# Patient Record
Sex: MALE | Race: BLACK OR AFRICAN AMERICAN | Employment: OTHER | URBAN - METROPOLITAN AREA
[De-identification: names, ages, dates, MRNs, and addresses within clinical notes are randomized per-mention and may not be internally consistent; named-entity substitution may affect disease eponyms.]

---

## 2018-02-22 ENCOUNTER — HOSPITAL ENCOUNTER (OUTPATIENT)
Dept: PREADMISSION TESTING | Age: 68
Discharge: HOME | End: 2018-02-22
Payer: MEDICARE

## 2018-02-22 VITALS
BODY MASS INDEX: 26.52 KG/M2 | WEIGHT: 165 LBS | HEART RATE: 65 BPM | DIASTOLIC BLOOD PRESSURE: 84 MMHG | TEMPERATURE: 98 F | HEIGHT: 66 IN | SYSTOLIC BLOOD PRESSURE: 151 MMHG

## 2018-02-22 LAB
ABO + RH BLD: NORMAL
ANION GAP SERPL CALC-SCNC: 6 MMOL/L (ref 5–15)
APPEARANCE UR: CLEAR
ATRIAL RATE: 63 BPM
BACTERIA URNS QL MICRO: NEGATIVE /HPF
BILIRUB UR QL: NEGATIVE
BLOOD GROUP ANTIBODIES SERPL: NORMAL
BUN SERPL-MCNC: 12 MG/DL (ref 6–20)
BUN/CREAT SERPL: 18 (ref 12–20)
CALCIUM SERPL-MCNC: 9.4 MG/DL (ref 8.5–10.1)
CALCULATED P AXIS, ECG09: 35 DEGREES
CALCULATED R AXIS, ECG10: 31 DEGREES
CALCULATED T AXIS, ECG11: 43 DEGREES
CHLORIDE SERPL-SCNC: 109 MMOL/L (ref 97–108)
CO2 SERPL-SCNC: 26 MMOL/L (ref 21–32)
COLOR UR: YELLOW
CREAT SERPL-MCNC: 0.65 MG/DL (ref 0.7–1.3)
DIAGNOSIS, 93000: NORMAL
EPITH CASTS URNS QL MICRO: NORMAL /LPF
ERYTHROCYTE [DISTWIDTH] IN BLOOD BY AUTOMATED COUNT: 12.3 % (ref 11.5–14.5)
EST. AVERAGE GLUCOSE BLD GHB EST-MCNC: NORMAL MG/DL
GLUCOSE SERPL-MCNC: 94 MG/DL (ref 65–100)
GLUCOSE UR STRIP.AUTO-MCNC: NEGATIVE MG/DL
HBA1C MFR BLD: 4.5 % (ref 4.2–6.3)
HCT VFR BLD AUTO: 35.4 % (ref 36.6–50.3)
HGB BLD-MCNC: 12 G/DL (ref 12.1–17)
HGB UR QL STRIP: NEGATIVE
INR PPP: 1 (ref 0.9–1.1)
KETONES UR QL STRIP.AUTO: NEGATIVE MG/DL
LEUKOCYTE ESTERASE UR QL STRIP.AUTO: NEGATIVE
MCH RBC QN AUTO: 32.3 PG (ref 26–34)
MCHC RBC AUTO-ENTMCNC: 33.9 G/DL (ref 30–36.5)
MCV RBC AUTO: 95.4 FL (ref 80–99)
NITRITE UR QL STRIP.AUTO: NEGATIVE
NRBC # BLD: 0 K/UL (ref 0–0.01)
NRBC BLD-RTO: 0 PER 100 WBC
P-R INTERVAL, ECG05: 168 MS
PH UR STRIP: 5.5 [PH]
PLATELET # BLD AUTO: 184 K/UL (ref 150–400)
PMV BLD AUTO: 11.7 FL (ref 8.9–12.9)
POTASSIUM SERPL-SCNC: 4.3 MMOL/L (ref 3.5–5.1)
PROT UR STRIP-MCNC: NEGATIVE MG/DL
PROTHROMBIN TIME: 10.6 SEC (ref 9–11.1)
Q-T INTERVAL, ECG07: 424 MS
QRS DURATION, ECG06: 76 MS
QTC CALCULATION (BEZET), ECG08: 433 MS
RBC # BLD AUTO: 3.71 M/UL (ref 4.1–5.7)
RBC #/AREA URNS HPF: NORMAL /HPF (ref 0–5)
SODIUM SERPL-SCNC: 141 MMOL/L (ref 136–145)
SP GR UR REFRACTOMETRY: 1.02
SPECIMEN EXP DATE BLD: NORMAL
UA: UC IF INDICATED,UAUC: NORMAL
UROBILINOGEN UR QL STRIP.AUTO: 0.2 EU/DL
VENTRICULAR RATE, ECG03: 63 BPM
WBC # BLD AUTO: 5.1 K/UL (ref 4.1–11.1)
WBC URNS QL MICRO: NORMAL /HPF (ref 0–4)

## 2018-02-22 PROCEDURE — 93005 ELECTROCARDIOGRAM TRACING: CPT

## 2018-02-22 PROCEDURE — 36415 COLL VENOUS BLD VENIPUNCTURE: CPT | Performed by: ORTHOPAEDIC SURGERY

## 2018-02-22 PROCEDURE — 86900 BLOOD TYPING SEROLOGIC ABO: CPT | Performed by: ORTHOPAEDIC SURGERY

## 2018-02-22 PROCEDURE — 81001 URINALYSIS AUTO W/SCOPE: CPT | Performed by: ORTHOPAEDIC SURGERY

## 2018-02-22 PROCEDURE — 85027 COMPLETE CBC AUTOMATED: CPT | Performed by: ORTHOPAEDIC SURGERY

## 2018-02-22 PROCEDURE — 85610 PROTHROMBIN TIME: CPT | Performed by: ORTHOPAEDIC SURGERY

## 2018-02-22 PROCEDURE — 83036 HEMOGLOBIN GLYCOSYLATED A1C: CPT | Performed by: ORTHOPAEDIC SURGERY

## 2018-02-22 PROCEDURE — 80048 BASIC METABOLIC PNL TOTAL CA: CPT | Performed by: ORTHOPAEDIC SURGERY

## 2018-02-22 RX ORDER — LANOLIN ALCOHOL/MO/W.PET/CERES
100 CREAM (GRAM) TOPICAL DAILY
COMMUNITY

## 2018-02-22 RX ORDER — IBUPROFEN 200 MG
400 TABLET ORAL
COMMUNITY
End: 2018-03-21

## 2018-02-22 RX ORDER — TAMSULOSIN HYDROCHLORIDE 0.4 MG/1
0.4 CAPSULE ORAL DAILY
COMMUNITY

## 2018-02-22 NOTE — ADVANCED PRACTICE NURSE
Preoperative instructions reviewed with patient. Patient given 2-6 packs of CHG wipes. Instructions to be reviewed in class on use of CHG wipes. Patient given SSI infection FAQS sheet, as well as a  MRSA/MSSA treatment instruction sheet  With an explanation to patient that they will be notified if treatment instructions need to be initiated. Patient was given the opportunity to ask questions on the information provided. Orthopedic pre-op assessment and planning form sent for scanning.

## 2018-02-23 LAB
BACTERIA SPEC CULT: NORMAL
BACTERIA SPEC CULT: NORMAL
SERVICE CMNT-IMP: NORMAL

## 2018-03-15 NOTE — H&P
Hudson Castellanos (MR# [id number])  : 1950        Office Visit     2018  171 Vero Ricci MD   Orthopedic Surgery   Primary osteoarthritis of both hips +1 more   Dx   Right Hip - Pain   Reason for Visit    Progress notes        PCP: No Pcp  There are no active problems to display for this patient.        Subjective:   Chief Complaint: Pain of the Right Hip        HPI: Hudson Castellanos is a 69 y.o. male who comes in today with a 1 year history of progressive increasing right hip and groin pain. He denies any injury. He states the pain is getting progressively worse to the point that now affects his everyday activities. He is no longer able to walk for exercise. He is having night pain. He is having difficulty sleeping. He is limping. He has tried anti-inflammatory agents without relief. He denies any radicular symptoms. He denies any symptoms on the left hip.        Objective:          Vitals:     18 1453   BP: (!) 167/98      Height: 5' 9\"   Weight: 165 lb   Body mass index is 24.37 kg/(m^2).     Constitutional:  No acute distress. Well nourished. Well developed. Eyes:  Sclera are nonicteric. Respiratory:  No labored breathing. Cardiovascular:  No marked edema. Skin:  No marked skin ulcers. Neurological:  No marked sensory loss noted. Psychiatric: Alert and oriented x3. Musculoskeletal :  He has essentially no motion of the right hip. He has good range of motion of the left hip. He has severe pain with motion of the right hip. He has negative straight leg raise. He has good range of motion of bilateral knees without crepitus or effusion. He has a strong pedal pulse. He has no pedal edema. He has shortening of the right leg compared to the left. He has no obvious atrophy.   His skin is healthy and intact.        Radiographs:       X-ray Hip Right 2+ Views     Result Date: 2018  AP Pelvis, Frog Lateral. Notes Impression:  AP pelvis and right lateral hip x-ray show severe end-stage osteoarthritis of the right hip with complete loss of joint space and subchondral sclerosis with cyst formation.         Assessment:      1. Primary osteoarthritis of both hips          Plan:   I reviewed his x-ray findings with him and discussed treatment options. He has severe end-stage osteoarthritis of the right hip. I discussed hip replacement surgery at length with him including expected outcomes and postop recovery as well as risks and complications. After much discussion he is anxious to proceed. We will set this up for him at his convenience.         Orders Placed This Encounter    X-ray hip right 2+ views    BP Elevated Patient Education & Instructions      Return for scheduled surgery.      Claire Leach MD   2/7/2018  7:46 PM      Electronically signed by Claire Leach MD at 2/7/2018  7:48 PM    Instructions         Return for scheduled surgery. Additional Documentation     Vitals:    BP  167/98    Ht 5' 9\"    Wt 165 lb    BMI 24.37 kg/m²    BSA 1.91 m²    Flowsheets:    Custom Formula Data       SmartForms:    OV REVIEW OF SYSTEMS       Encounter Info:    Billing Info,    History,    Allergies,    Detailed Report       Media      Consents-scan - Scan on 2/8/2018 2:45 PM : Surgical consent for right total hip replacement      Routing History     There are no sent or routed communications associated with this encounter. Recipient     There are no sent or routed communications associated with this encounter.    Media      Consents-scan - Scan on 2/8/2018 2:45 PM : Surgical consent for right total hip replacement      Orders Placed         X-ray hip right 2+ views      BP Elevated Patient Education & Instructions      Surgical Posting Sheet   Medication Changes        None      Medication List   Visit Diagnoses         Primary osteoarthritis of both hips      Primary osteoarthritis of right hip      Problem List     Date of Surgery Update:  Devi Gino was seen and examined. Past Medical History:   Diagnosis Date    Arthritis     Mild anemia     Prostate cancer ([facility] [address] 2008 2017    PALLIATIVE CARE PER DR LAMBERT FARR NOTES 2/12/2018: [facility], [facility], AND PAST EXTERNAL BEAM RADIATION THERAPY     Prior to Admission Medications   Prescriptions Last Dose Informant Patient Reported? Taking? CALCIUM CARBONATE/VITAMIN D3 (CALTRATE-600 PLUS VITAMIN D3 PO)   Yes No   Sig: Take 2 Tabs by mouth daily. enzalutamide (XTANDI) 40 mg capsule   Yes No   Sig: Take 160 mg by mouth daily. ibuprofen (ADVIL) 200 mg tablet   Yes No   Sig: Take 400 mg by mouth every eight (8) hours as needed for Pain.   tamsulosin (FLOMAX) 0.4 mg capsule   Yes No   Sig: Take 0.4 mg by mouth daily. thiamine (VITAMIN B-1) 100 mg tablet   Yes No   Sig: Take 100 mg by mouth daily. Facility-Administered Medications: None      Allergy to:Review of patient's allergies indicates no known allergies. Physical Examination: General appearance - alert, well appearing, and in no distress  Mental status - alert, oriented to person, place, and time  Chest - clear to auscultation  Heart - normal rate and regular rhythm  Neurological - alert, oriented, normal speech, no focal findings or movement disorder noted  Musculoskeletal - abnormal exam of both hip, painful/limited ROM  Extremities - peripheral pulses normal, right leg slightly shorter than left  Skin - Venous stasis disease Bilateral LE  History and physical has been reviewed. The patient has been examined.  There have been no significant clinical changes since the completion of the originally dated History and Physical.    Signed By: JERRI Ortega     March 19, 2018 9:27 AM

## 2018-03-16 ENCOUNTER — ANESTHESIA EVENT (OUTPATIENT)
Dept: SURGERY | Age: 68
DRG: 470 | End: 2018-03-16
Payer: MEDICARE

## 2018-03-19 ENCOUNTER — APPOINTMENT (OUTPATIENT)
Dept: GENERAL RADIOLOGY | Age: 68
DRG: 470 | End: 2018-03-19
Attending: ORTHOPAEDIC SURGERY
Payer: MEDICARE

## 2018-03-19 ENCOUNTER — HOSPITAL ENCOUNTER (INPATIENT)
Age: 68
LOS: 2 days | Discharge: NURSING, REHAB OR LONG-TERM CARE | DRG: 470 | End: 2018-03-21
Attending: ORTHOPAEDIC SURGERY | Admitting: ORTHOPAEDIC SURGERY
Payer: MEDICARE

## 2018-03-19 ENCOUNTER — ANESTHESIA (OUTPATIENT)
Dept: SURGERY | Age: 68
DRG: 470 | End: 2018-03-19
Payer: MEDICARE

## 2018-03-19 DIAGNOSIS — M16.11 PRIMARY OSTEOARTHRITIS OF RIGHT HIP: Primary | ICD-10-CM

## 2018-03-19 PROBLEM — M16.9 DEGENERATIVE JOINT DISEASE (DJD) OF HIP: Status: ACTIVE | Noted: 2018-03-19

## 2018-03-19 LAB
ABO + RH BLD: NORMAL
BLOOD GROUP ANTIBODIES SERPL: NORMAL
SPECIMEN EXP DATE BLD: NORMAL

## 2018-03-19 PROCEDURE — 77030018836 HC SOL IRR NACL ICUM -A: Performed by: ORTHOPAEDIC SURGERY

## 2018-03-19 PROCEDURE — 86850 RBC ANTIBODY SCREEN: CPT | Performed by: NURSE PRACTITIONER

## 2018-03-19 PROCEDURE — 74011000250 HC RX REV CODE- 250: Performed by: ORTHOPAEDIC SURGERY

## 2018-03-19 PROCEDURE — 74011250636 HC RX REV CODE- 250/636: Performed by: PHYSICIAN ASSISTANT

## 2018-03-19 PROCEDURE — 77030018846 HC SOL IRR STRL H20 ICUM -A: Performed by: ORTHOPAEDIC SURGERY

## 2018-03-19 PROCEDURE — G8979 MOBILITY GOAL STATUS: HCPCS

## 2018-03-19 PROCEDURE — 77030002933 HC SUT MCRYL J&J -A: Performed by: ORTHOPAEDIC SURGERY

## 2018-03-19 PROCEDURE — 76060000035 HC ANESTHESIA 2 TO 2.5 HR: Performed by: ORTHOPAEDIC SURGERY

## 2018-03-19 PROCEDURE — C1776 JOINT DEVICE (IMPLANTABLE): HCPCS | Performed by: ORTHOPAEDIC SURGERY

## 2018-03-19 PROCEDURE — 74011250636 HC RX REV CODE- 250/636

## 2018-03-19 PROCEDURE — 74011000258 HC RX REV CODE- 258

## 2018-03-19 PROCEDURE — 76000 FLUOROSCOPY <1 HR PHYS/QHP: CPT

## 2018-03-19 PROCEDURE — 36415 COLL VENOUS BLD VENIPUNCTURE: CPT | Performed by: NURSE PRACTITIONER

## 2018-03-19 PROCEDURE — G8978 MOBILITY CURRENT STATUS: HCPCS

## 2018-03-19 PROCEDURE — 65270000029 HC RM PRIVATE

## 2018-03-19 PROCEDURE — 97530 THERAPEUTIC ACTIVITIES: CPT

## 2018-03-19 PROCEDURE — 76010000171 HC OR TIME 2 TO 2.5 HR INTENSV-TIER 1: Performed by: ORTHOPAEDIC SURGERY

## 2018-03-19 PROCEDURE — 77030020788: Performed by: ORTHOPAEDIC SURGERY

## 2018-03-19 PROCEDURE — 77030006835 HC BLD SAW SAG STRY -B: Performed by: ORTHOPAEDIC SURGERY

## 2018-03-19 PROCEDURE — 77030007866 HC KT SPN ANES BBMI -B: Performed by: ANESTHESIOLOGY

## 2018-03-19 PROCEDURE — 97161 PT EVAL LOW COMPLEX 20 MIN: CPT

## 2018-03-19 PROCEDURE — 74011250636 HC RX REV CODE- 250/636: Performed by: ORTHOPAEDIC SURGERY

## 2018-03-19 PROCEDURE — 77030003666 HC NDL SPINAL BD -A: Performed by: ORTHOPAEDIC SURGERY

## 2018-03-19 PROCEDURE — 77030010507 HC ADH SKN DERMBND J&J -B: Performed by: ORTHOPAEDIC SURGERY

## 2018-03-19 PROCEDURE — 74011000250 HC RX REV CODE- 250

## 2018-03-19 PROCEDURE — 0SR902Z REPLACEMENT OF RIGHT HIP JOINT WITH METAL ON POLYETHYLENE SYNTHETIC SUBSTITUTE, OPEN APPROACH: ICD-10-PCS | Performed by: ORTHOPAEDIC SURGERY

## 2018-03-19 PROCEDURE — 77030035236 HC SUT PDS STRATFX BARB J&J -B: Performed by: ORTHOPAEDIC SURGERY

## 2018-03-19 PROCEDURE — 77030011264 HC ELECTRD BLD EXT COVD -A: Performed by: ORTHOPAEDIC SURGERY

## 2018-03-19 PROCEDURE — 77030032490 HC SLV COMPR SCD KNE COVD -B: Performed by: ORTHOPAEDIC SURGERY

## 2018-03-19 PROCEDURE — 76210000017 HC OR PH I REC 1.5 TO 2 HR: Performed by: ORTHOPAEDIC SURGERY

## 2018-03-19 PROCEDURE — 74011250637 HC RX REV CODE- 250/637: Performed by: PHYSICIAN ASSISTANT

## 2018-03-19 PROCEDURE — 77030034850: Performed by: ORTHOPAEDIC SURGERY

## 2018-03-19 PROCEDURE — 77030011640 HC PAD GRND REM COVD -A: Performed by: ORTHOPAEDIC SURGERY

## 2018-03-19 PROCEDURE — 77030012935 HC DRSG AQUACEL BMS -B: Performed by: ORTHOPAEDIC SURGERY

## 2018-03-19 PROCEDURE — 77030031139 HC SUT VCRL2 J&J -A: Performed by: ORTHOPAEDIC SURGERY

## 2018-03-19 PROCEDURE — 73501 X-RAY EXAM HIP UNI 1 VIEW: CPT

## 2018-03-19 DEVICE — SCREW BNE L25MM DIA6.5MM CANC HIP S STL GRIPTION FULL THRD: Type: IMPLANTABLE DEVICE | Site: HIP | Status: FUNCTIONAL

## 2018-03-19 DEVICE — LINER ACET OD56MM ID36MM HIP ALTRX PINN: Type: IMPLANTABLE DEVICE | Site: HIP | Status: FUNCTIONAL

## 2018-03-19 DEVICE — STEM FEMORAL SUMMIT: Type: IMPLANTABLE DEVICE | Site: HIP | Status: FUNCTIONAL

## 2018-03-19 DEVICE — CUP ACET DIA56MM HIP GRIPTION PRI CEMENTLESS FIX SECT SER: Type: IMPLANTABLE DEVICE | Site: HIP | Status: FUNCTIONAL

## 2018-03-19 DEVICE — COMPONENT TOT HIP PRIMARY MTL ON POLYETH: Type: IMPLANTABLE DEVICE | Status: FUNCTIONAL

## 2018-03-19 DEVICE — HEAD FEM DIA36MM -2MM OFFSET 12/14 TAPR ARTC EZ HIP MTL: Type: IMPLANTABLE DEVICE | Site: HIP | Status: FUNCTIONAL

## 2018-03-19 RX ORDER — SODIUM CHLORIDE 9 MG/ML
25 INJECTION, SOLUTION INTRAVENOUS CONTINUOUS
Status: DISCONTINUED | OUTPATIENT
Start: 2018-03-19 | End: 2018-03-19 | Stop reason: HOSPADM

## 2018-03-19 RX ORDER — AMOXICILLIN 250 MG
1 CAPSULE ORAL 2 TIMES DAILY
Status: DISCONTINUED | OUTPATIENT
Start: 2018-03-19 | End: 2018-03-21 | Stop reason: HOSPADM

## 2018-03-19 RX ORDER — SODIUM CHLORIDE, SODIUM LACTATE, POTASSIUM CHLORIDE, CALCIUM CHLORIDE 600; 310; 30; 20 MG/100ML; MG/100ML; MG/100ML; MG/100ML
25 INJECTION, SOLUTION INTRAVENOUS CONTINUOUS
Status: DISCONTINUED | OUTPATIENT
Start: 2018-03-19 | End: 2018-03-19 | Stop reason: HOSPADM

## 2018-03-19 RX ORDER — FENTANYL CITRATE 50 UG/ML
50 INJECTION, SOLUTION INTRAMUSCULAR; INTRAVENOUS AS NEEDED
Status: DISCONTINUED | OUTPATIENT
Start: 2018-03-19 | End: 2018-03-19 | Stop reason: HOSPADM

## 2018-03-19 RX ORDER — HYDROMORPHONE HYDROCHLORIDE 1 MG/ML
0.2 INJECTION, SOLUTION INTRAMUSCULAR; INTRAVENOUS; SUBCUTANEOUS
Status: DISCONTINUED | OUTPATIENT
Start: 2018-03-19 | End: 2018-03-19 | Stop reason: HOSPADM

## 2018-03-19 RX ORDER — ACETAMINOPHEN 325 MG/1
650 TABLET ORAL
Status: DISCONTINUED | OUTPATIENT
Start: 2018-03-19 | End: 2018-03-20

## 2018-03-19 RX ORDER — GLYCOPYRROLATE 0.2 MG/ML
INJECTION INTRAMUSCULAR; INTRAVENOUS AS NEEDED
Status: DISCONTINUED | OUTPATIENT
Start: 2018-03-19 | End: 2018-03-19 | Stop reason: HOSPADM

## 2018-03-19 RX ORDER — DIPHENHYDRAMINE HYDROCHLORIDE 50 MG/ML
12.5 INJECTION, SOLUTION INTRAMUSCULAR; INTRAVENOUS AS NEEDED
Status: DISCONTINUED | OUTPATIENT
Start: 2018-03-19 | End: 2018-03-19 | Stop reason: HOSPADM

## 2018-03-19 RX ORDER — POLYETHYLENE GLYCOL 3350 17 G/17G
17 POWDER, FOR SOLUTION ORAL DAILY
Status: DISCONTINUED | OUTPATIENT
Start: 2018-03-20 | End: 2018-03-21 | Stop reason: HOSPADM

## 2018-03-19 RX ORDER — SODIUM CHLORIDE, SODIUM LACTATE, POTASSIUM CHLORIDE, CALCIUM CHLORIDE 600; 310; 30; 20 MG/100ML; MG/100ML; MG/100ML; MG/100ML
INJECTION, SOLUTION INTRAVENOUS
Status: DISCONTINUED | OUTPATIENT
Start: 2018-03-19 | End: 2018-03-19 | Stop reason: HOSPADM

## 2018-03-19 RX ORDER — FACIAL-BODY WIPES
10 EACH TOPICAL DAILY PRN
Status: DISCONTINUED | OUTPATIENT
Start: 2018-03-21 | End: 2018-03-21 | Stop reason: HOSPADM

## 2018-03-19 RX ORDER — ONDANSETRON 2 MG/ML
4 INJECTION INTRAMUSCULAR; INTRAVENOUS AS NEEDED
Status: DISCONTINUED | OUTPATIENT
Start: 2018-03-19 | End: 2018-03-19 | Stop reason: HOSPADM

## 2018-03-19 RX ORDER — MELOXICAM 7.5 MG/1
15 TABLET ORAL DAILY
Status: DISCONTINUED | OUTPATIENT
Start: 2018-03-21 | End: 2018-03-21 | Stop reason: HOSPADM

## 2018-03-19 RX ORDER — CEFAZOLIN SODIUM IN 0.9 % NACL 2 G/100 ML
PLASTIC BAG, INJECTION (ML) INTRAVENOUS AS NEEDED
Status: DISCONTINUED | OUTPATIENT
Start: 2018-03-19 | End: 2018-03-19 | Stop reason: HOSPADM

## 2018-03-19 RX ORDER — LIDOCAINE HYDROCHLORIDE 10 MG/ML
0.1 INJECTION, SOLUTION EPIDURAL; INFILTRATION; INTRACAUDAL; PERINEURAL AS NEEDED
Status: DISCONTINUED | OUTPATIENT
Start: 2018-03-19 | End: 2018-03-19 | Stop reason: HOSPADM

## 2018-03-19 RX ORDER — KETOROLAC TROMETHAMINE 30 MG/ML
15 INJECTION, SOLUTION INTRAMUSCULAR; INTRAVENOUS EVERY 6 HOURS
Status: COMPLETED | OUTPATIENT
Start: 2018-03-19 | End: 2018-03-20

## 2018-03-19 RX ORDER — FENTANYL CITRATE 50 UG/ML
25 INJECTION, SOLUTION INTRAMUSCULAR; INTRAVENOUS
Status: DISCONTINUED | OUTPATIENT
Start: 2018-03-19 | End: 2018-03-19 | Stop reason: HOSPADM

## 2018-03-19 RX ORDER — SODIUM CHLORIDE 0.9 % (FLUSH) 0.9 %
5-10 SYRINGE (ML) INJECTION AS NEEDED
Status: DISCONTINUED | OUTPATIENT
Start: 2018-03-19 | End: 2018-03-19 | Stop reason: HOSPADM

## 2018-03-19 RX ORDER — BUPIVACAINE HYDROCHLORIDE 5 MG/ML
INJECTION, SOLUTION EPIDURAL; INTRACAUDAL AS NEEDED
Status: DISCONTINUED | OUTPATIENT
Start: 2018-03-19 | End: 2018-03-19 | Stop reason: HOSPADM

## 2018-03-19 RX ORDER — HYDROXYZINE HYDROCHLORIDE 10 MG/1
10 TABLET, FILM COATED ORAL
Status: DISCONTINUED | OUTPATIENT
Start: 2018-03-19 | End: 2018-03-21 | Stop reason: HOSPADM

## 2018-03-19 RX ORDER — CEFAZOLIN SODIUM 2 G/50ML
2 SOLUTION INTRAVENOUS EVERY 8 HOURS
Status: COMPLETED | OUTPATIENT
Start: 2018-03-19 | End: 2018-03-20

## 2018-03-19 RX ORDER — SODIUM CHLORIDE 0.9 % (FLUSH) 0.9 %
5-10 SYRINGE (ML) INJECTION EVERY 8 HOURS
Status: DISCONTINUED | OUTPATIENT
Start: 2018-03-20 | End: 2018-03-21 | Stop reason: HOSPADM

## 2018-03-19 RX ORDER — MIDAZOLAM HYDROCHLORIDE 1 MG/ML
1 INJECTION, SOLUTION INTRAMUSCULAR; INTRAVENOUS AS NEEDED
Status: DISCONTINUED | OUTPATIENT
Start: 2018-03-19 | End: 2018-03-19 | Stop reason: HOSPADM

## 2018-03-19 RX ORDER — CELECOXIB 200 MG/1
200 CAPSULE ORAL ONCE
Status: COMPLETED | OUTPATIENT
Start: 2018-03-19 | End: 2018-03-19

## 2018-03-19 RX ORDER — ONDANSETRON 2 MG/ML
INJECTION INTRAMUSCULAR; INTRAVENOUS AS NEEDED
Status: DISCONTINUED | OUTPATIENT
Start: 2018-03-19 | End: 2018-03-19 | Stop reason: HOSPADM

## 2018-03-19 RX ORDER — SODIUM CHLORIDE 9 MG/ML
1000 INJECTION, SOLUTION INTRAVENOUS CONTINUOUS
Status: DISCONTINUED | OUTPATIENT
Start: 2018-03-19 | End: 2018-03-19 | Stop reason: HOSPADM

## 2018-03-19 RX ORDER — SODIUM CHLORIDE 9 MG/ML
125 INJECTION, SOLUTION INTRAVENOUS CONTINUOUS
Status: DISPENSED | OUTPATIENT
Start: 2018-03-19 | End: 2018-03-20

## 2018-03-19 RX ORDER — OXYCODONE HYDROCHLORIDE 5 MG/1
10 TABLET ORAL
Status: DISCONTINUED | OUTPATIENT
Start: 2018-03-19 | End: 2018-03-21 | Stop reason: HOSPADM

## 2018-03-19 RX ORDER — DEXTROSE, SODIUM CHLORIDE, SODIUM LACTATE, POTASSIUM CHLORIDE, AND CALCIUM CHLORIDE 5; .6; .31; .03; .02 G/100ML; G/100ML; G/100ML; G/100ML; G/100ML
25 INJECTION, SOLUTION INTRAVENOUS CONTINUOUS
Status: DISCONTINUED | OUTPATIENT
Start: 2018-03-19 | End: 2018-03-19 | Stop reason: HOSPADM

## 2018-03-19 RX ORDER — SODIUM CHLORIDE 0.9 % (FLUSH) 0.9 %
5-10 SYRINGE (ML) INJECTION EVERY 8 HOURS
Status: DISCONTINUED | OUTPATIENT
Start: 2018-03-19 | End: 2018-03-19 | Stop reason: HOSPADM

## 2018-03-19 RX ORDER — LIDOCAINE HYDROCHLORIDE 20 MG/ML
INJECTION, SOLUTION EPIDURAL; INFILTRATION; INTRACAUDAL; PERINEURAL AS NEEDED
Status: DISCONTINUED | OUTPATIENT
Start: 2018-03-19 | End: 2018-03-19 | Stop reason: HOSPADM

## 2018-03-19 RX ORDER — ACETAMINOPHEN 500 MG
1000 TABLET ORAL ONCE
Status: COMPLETED | OUTPATIENT
Start: 2018-03-19 | End: 2018-03-19

## 2018-03-19 RX ORDER — OXYCODONE HYDROCHLORIDE 5 MG/1
5 TABLET ORAL
Status: DISCONTINUED | OUTPATIENT
Start: 2018-03-19 | End: 2018-03-21 | Stop reason: HOSPADM

## 2018-03-19 RX ORDER — PREGABALIN 75 MG/1
75 CAPSULE ORAL ONCE
Status: COMPLETED | OUTPATIENT
Start: 2018-03-19 | End: 2018-03-19

## 2018-03-19 RX ORDER — MIDAZOLAM HYDROCHLORIDE 1 MG/ML
INJECTION, SOLUTION INTRAMUSCULAR; INTRAVENOUS AS NEEDED
Status: DISCONTINUED | OUTPATIENT
Start: 2018-03-19 | End: 2018-03-19 | Stop reason: HOSPADM

## 2018-03-19 RX ORDER — MIDAZOLAM HYDROCHLORIDE 1 MG/ML
0.5 INJECTION, SOLUTION INTRAMUSCULAR; INTRAVENOUS
Status: DISCONTINUED | OUTPATIENT
Start: 2018-03-19 | End: 2018-03-19 | Stop reason: HOSPADM

## 2018-03-19 RX ORDER — DEXAMETHASONE SODIUM PHOSPHATE 4 MG/ML
INJECTION, SOLUTION INTRA-ARTICULAR; INTRALESIONAL; INTRAMUSCULAR; INTRAVENOUS; SOFT TISSUE AS NEEDED
Status: DISCONTINUED | OUTPATIENT
Start: 2018-03-19 | End: 2018-03-19 | Stop reason: HOSPADM

## 2018-03-19 RX ORDER — HYDROMORPHONE HYDROCHLORIDE 1 MG/ML
0.5 INJECTION, SOLUTION INTRAMUSCULAR; INTRAVENOUS; SUBCUTANEOUS
Status: DISPENSED | OUTPATIENT
Start: 2018-03-19 | End: 2018-03-20

## 2018-03-19 RX ORDER — NALOXONE HYDROCHLORIDE 0.4 MG/ML
0.4 INJECTION, SOLUTION INTRAMUSCULAR; INTRAVENOUS; SUBCUTANEOUS AS NEEDED
Status: DISCONTINUED | OUTPATIENT
Start: 2018-03-19 | End: 2018-03-21 | Stop reason: HOSPADM

## 2018-03-19 RX ORDER — ONDANSETRON 2 MG/ML
4 INJECTION INTRAMUSCULAR; INTRAVENOUS
Status: DISPENSED | OUTPATIENT
Start: 2018-03-19 | End: 2018-03-20

## 2018-03-19 RX ORDER — BUPIVACAINE HYDROCHLORIDE AND EPINEPHRINE 5; 5 MG/ML; UG/ML
INJECTION, SOLUTION EPIDURAL; INTRACAUDAL; PERINEURAL AS NEEDED
Status: DISCONTINUED | OUTPATIENT
Start: 2018-03-19 | End: 2018-03-19 | Stop reason: HOSPADM

## 2018-03-19 RX ORDER — MORPHINE SULFATE 10 MG/ML
2 INJECTION, SOLUTION INTRAMUSCULAR; INTRAVENOUS
Status: DISCONTINUED | OUTPATIENT
Start: 2018-03-19 | End: 2018-03-19 | Stop reason: HOSPADM

## 2018-03-19 RX ORDER — PROPOFOL 10 MG/ML
INJECTION, EMULSION INTRAVENOUS
Status: DISCONTINUED | OUTPATIENT
Start: 2018-03-19 | End: 2018-03-19 | Stop reason: HOSPADM

## 2018-03-19 RX ORDER — TAMSULOSIN HYDROCHLORIDE 0.4 MG/1
0.4 CAPSULE ORAL DAILY
Status: DISCONTINUED | OUTPATIENT
Start: 2018-03-20 | End: 2018-03-21 | Stop reason: HOSPADM

## 2018-03-19 RX ORDER — SODIUM CHLORIDE 0.9 % (FLUSH) 0.9 %
5-10 SYRINGE (ML) INJECTION AS NEEDED
Status: DISCONTINUED | OUTPATIENT
Start: 2018-03-19 | End: 2018-03-21 | Stop reason: HOSPADM

## 2018-03-19 RX ORDER — PROPOFOL 10 MG/ML
INJECTION, EMULSION INTRAVENOUS AS NEEDED
Status: DISCONTINUED | OUTPATIENT
Start: 2018-03-19 | End: 2018-03-19 | Stop reason: HOSPADM

## 2018-03-19 RX ADMIN — LIDOCAINE HYDROCHLORIDE 40 MG: 20 INJECTION, SOLUTION EPIDURAL; INFILTRATION; INTRACAUDAL; PERINEURAL at 10:28

## 2018-03-19 RX ADMIN — GLYCOPYRROLATE 0.2 MG: 0.2 INJECTION INTRAMUSCULAR; INTRAVENOUS at 10:12

## 2018-03-19 RX ADMIN — KETOROLAC TROMETHAMINE 15 MG: 30 INJECTION, SOLUTION INTRAMUSCULAR at 19:40

## 2018-03-19 RX ADMIN — SODIUM CHLORIDE, SODIUM LACTATE, POTASSIUM CHLORIDE, CALCIUM CHLORIDE: 600; 310; 30; 20 INJECTION, SOLUTION INTRAVENOUS at 10:03

## 2018-03-19 RX ADMIN — OXYCODONE HYDROCHLORIDE 5 MG: 5 TABLET ORAL at 18:14

## 2018-03-19 RX ADMIN — BUPIVACAINE HYDROCHLORIDE 15 MG: 5 INJECTION, SOLUTION EPIDURAL; INTRACAUDAL at 10:24

## 2018-03-19 RX ADMIN — SODIUM CHLORIDE 125 ML/HR: 900 INJECTION, SOLUTION INTRAVENOUS at 12:46

## 2018-03-19 RX ADMIN — DOCUSATE SODIUM AND SENNOSIDES 1 TABLET: 8.6; 5 TABLET, FILM COATED ORAL at 17:17

## 2018-03-19 RX ADMIN — CELECOXIB 200 MG: 200 CAPSULE ORAL at 10:02

## 2018-03-19 RX ADMIN — PREGABALIN 75 MG: 75 CAPSULE ORAL at 10:02

## 2018-03-19 RX ADMIN — MIDAZOLAM HYDROCHLORIDE 2 MG: 1 INJECTION, SOLUTION INTRAMUSCULAR; INTRAVENOUS at 10:12

## 2018-03-19 RX ADMIN — ONDANSETRON 4 MG: 2 INJECTION INTRAMUSCULAR; INTRAVENOUS at 10:41

## 2018-03-19 RX ADMIN — RIVAROXABAN 10 MG: 10 TABLET, FILM COATED ORAL at 21:39

## 2018-03-19 RX ADMIN — PROPOFOL 30 MG: 10 INJECTION, EMULSION INTRAVENOUS at 10:21

## 2018-03-19 RX ADMIN — CEFAZOLIN SODIUM 2 G: 2 SOLUTION INTRAVENOUS at 18:18

## 2018-03-19 RX ADMIN — SODIUM CHLORIDE, SODIUM LACTATE, POTASSIUM CHLORIDE, CALCIUM CHLORIDE: 600; 310; 30; 20 INJECTION, SOLUTION INTRAVENOUS at 11:00

## 2018-03-19 RX ADMIN — PROPOFOL 50 MCG/KG/MIN: 10 INJECTION, EMULSION INTRAVENOUS at 10:28

## 2018-03-19 RX ADMIN — Medication 2 G: at 10:49

## 2018-03-19 RX ADMIN — ACETAMINOPHEN 650 MG: 325 TABLET, FILM COATED ORAL at 21:39

## 2018-03-19 RX ADMIN — HYDROMORPHONE HYDROCHLORIDE 0.5 MG: 1 INJECTION, SOLUTION INTRAMUSCULAR; INTRAVENOUS; SUBCUTANEOUS at 21:39

## 2018-03-19 RX ADMIN — ACETAMINOPHEN 1000 MG: 500 TABLET, FILM COATED ORAL at 10:02

## 2018-03-19 RX ADMIN — DEXAMETHASONE SODIUM PHOSPHATE 8 MG: 4 INJECTION, SOLUTION INTRA-ARTICULAR; INTRALESIONAL; INTRAMUSCULAR; INTRAVENOUS; SOFT TISSUE at 10:41

## 2018-03-19 NOTE — ANESTHESIA PROCEDURE NOTES
Spinal Block    Start time: 3/19/2018 10:17 AM  End time: 3/19/2018 10:24 AM  Performed by: Malcolm Falcon  Authorized by: Mariela Alarcon     Pre-procedure:   Indications: at surgeon's request, post-op pain management, procedure for pain and primary anesthetic  Preanesthetic Checklist: patient identified, risks and benefits discussed, anesthesia consent, site marked, patient being monitored and timeout performed    Timeout Time: 10:17          Spinal Block:   Patient Position:  Seated    Prep: Betadine      Location:  L3-4  Technique:  Single shot  Local:  Lidocaine 1%  Local Dose (mL):  2    Needle:   Needle Type:  Pencil-tip  Needle Gauge:  24 G  Attempts:  1      Events: CSF confirmed, no blood with aspiration and no paresthesia        Assessment:  Insertion:  Uncomplicated  Patient tolerance:  Patient tolerated the procedure well with no immediate complications

## 2018-03-19 NOTE — ANESTHESIA PREPROCEDURE EVALUATION
Anesthetic History   No history of anesthetic complications            Review of Systems / Medical History  Patient summary reviewed, nursing notes reviewed and pertinent labs reviewed    Pulmonary  Within defined limits                 Neuro/Psych   Within defined limits           Cardiovascular    Hypertension                   GI/Hepatic/Renal  Within defined limits              Endo/Other  Within defined limits           Other Findings   Comments: Prostate cancer           Physical Exam    Airway  Mallampati: II  TM Distance: > 6 cm  Neck ROM: normal range of motion   Mouth opening: Normal     Cardiovascular  Regular rate and rhythm,  S1 and S2 normal,  no murmur, click, rub, or gallop             Dental    Dentition: Edentulous, Full lower dentures and Full upper dentures     Pulmonary  Breath sounds clear to auscultation               Abdominal  GI exam deferred       Other Findings            Anesthetic Plan    ASA: 2  Anesthesia type: spinal          Induction: Intravenous  Anesthetic plan and risks discussed with: Patient

## 2018-03-19 NOTE — PROGRESS NOTES
Problem: Mobility Impaired (Adult and Pediatric)  Goal: *Acute Goals and Plan of Care (Insert Text)  Physical Therapy Goals  Initiated 3/19/2018    1. Patient will move from supine to sit and sit to supine  and scoot up and down in bed with modified independence within 4 days. 2. Patient will perform sit to stand with modified independence within 4 days. 3. Patient will ambulate with modified independence for 200 feet with the least restrictive device within 4 days. 4. Patient will ascend/descend 4 stairs with  handrail(s) with modified independence within 4 days. 5. Patient will perform RAMO home exercise program per protocol with independence within 4 days. physical Therapy EVALUATION  Patient: Erick Orellana (44 y.o. male)  Date: 3/19/2018  Primary Diagnosis: DEGENERATIVE JOINT DISEASE RIGHT HIP  Osteoarthritis of right hip  Degenerative joint disease (DJD) of hip  Procedure(s) (LRB):  RIGHT TOTAL HIP REPLACEMENT ANTERIOR APPROACH   (Right) Day of Surgery   Precautions:   WBAT    ASSESSMENT :  Based on the objective data described below, the patient presents with impaired functional mobility as compared to baseline level 2* generalized post op pain, decreased ROM, decreased strength, and proximal R LE \"tingles\" leading to impaired gait and balance following admission for R RAMO, POD 0. Prior to this admission, pt reports that he lived at home alone in a [facility] with ~4STE and was indep with all ADLs and mobility. Reports he thinks he has a walker - will need to verifiy and/or order one. Pt was cleared for mobility by RN and agreeable to participation - vitals assessed and stable with positional changes despite complaints of dizziness. He required up to min A with increased time for bed mobility. Demos good/fair sitting balance. Completed sit<>stand with min A x2 and cues for hand placement sequencing.  Required manual block at R knee to promote extension through transitional movement - unable to laterally weight shift w/o knee buckle, therefore fwd and lateral stepping deferred this date. Worked on lateral scooting up to [facility] with SBA and assisted back to bed with min A for LE management. Provided education on basic HEP and avoidance of extreme ranges of motion. Recommend reassessment of Tinetti next session for more accurate falls risk reflection. Answered pt questions to satisfaction, NAD at end of session. Pending progress with therapy sessions, pt may benefit from short rehab stay prior to returning home with limited to no assist. Will continue to follow. Patient will benefit from skilled intervention to address the above impairments. Patients rehabilitation potential is considered to be Good  Factors which may influence rehabilitation potential include:   []         None noted  []         Mental ability/status  []         Medical condition  [x]         Home/family situation and support systems  []         Safety awareness  []         Pain tolerance/management  []         Other:      PLAN :  Recommendations and Planned Interventions:  [x]           Bed Mobility Training             [x]    Neuromuscular Re-Education  [x]           Transfer Training                   []    Orthotic/Prosthetic Training  [x]           Gait Training                         []    Modalities  [x]           Therapeutic Exercises           []    Edema Management/Control  [x]           Therapeutic Activities            [x]    Patient and Family Training/Education  []           Other (comment):    Frequency/Duration: Patient will be followed by physical therapy  twice daily to address goals. Discharge Recommendations: Rehab and To Be Determined  Further Equipment Recommendations for Discharge: Need to verify if pt has RW. . Order was NOT placed today     SUBJECTIVE:   Patient stated I'm still feeling dizzy.     OBJECTIVE DATA SUMMARY:   HISTORY:    Past Medical History:   Diagnosis Date    Arthritis     Mild anemia     Prostate cancer [facility]) 2008 2017    PALLIATIVE CARE PER  [address] NOTES 2/12/2018: [facility], AND PAST EXTERNAL BEAM RADIATION THERAPY     Past Surgical History:   Procedure Laterality Date    HX BUNIONECTOMY Right 2014    HX CATARACT REMOVAL Bilateral 2017 2018    HX COLONOSCOPY      HX GI  2009    ANAL FISTULA     HX HEMORRHOIDECTOMY  1974    HX TONSILLECTOMY  1958     Prior Level of Function/Home Situation: Lives at home alone in a [facility] with 4 KAYLENE; mod I with ADLs and mobility   Personal factors and/or comorbidities impacting plan of care:     Home Situation  Home Environment: Private residence  # Steps to Enter: 4  Rails to Enter: Yes  Hand Rails : Bilateral  One/Two Story Residence: One story  Living Alone: Yes  Support Systems: Family member(s)  Patient Expects to be Discharged to[de-identified] Rehabilitation facility  Current DME Used/Available at Home: [employer]    EXAMINATION/PRESENTATION/DECISION MAKING:   Critical Behavior:  Neurologic State: Alert  Orientation Level: Oriented X4  Cognition: Follows commands  Safety/Judgement: Awareness of environment, Fall prevention  Hearing: Auditory  Auditory Impairment: None  Skin:  Intact where exposed  Edema: none noted  Range Of Motion:  AROM: Generally decreased, functional     Strength:    Strength: Generally decreased, functional     Tone & Sensation:   Tone: Normal   Sensation:  (\"tingly\" proximal R LE)          Coordination:  Coordination: Within functional limits  Functional Mobility:  Bed Mobility:     Supine to Sit: Minimum assistance; Additional time  Sit to Supine: Minimum assistance; Additional time  Scooting: Additional time  Transfers:  Sit to Stand: Assist x2;Minimum assistance  Stand to Sit: Assist x2;Minimum assistance        Balance:   Sitting: Intact; With support  Standing: Impaired  Standing - Static: Poor;Constant support  Standing - Dynamic : Poor  Ambulation/Gait Training:     Gait Description (WDL): Exceptions to WDL  Right Side Weight Bearing: As tolerated      Therapeutic Exercises:   APs, quad sets, glute sets, IS    Functional Measure:  Tinetti test:    Sitting Balance: 1  Arises: 0  Attempts to Rise: 0  Immediate Standing Balance: 0  Standing Balance: 0  Nudged: 0  Eyes Closed: 0  Turn 360 Degrees - Continuous/Discontinuous: 0  Turn 360 Degrees - Steady/Unsteady: 0  Sitting Down: 0  Balance Score: 1  Indication of Gait: 0  R Step Length/Height: 0  L Step Length/Height: 0  R Foot Clearance: 0  L Foot Clearance: 0  Step Symmetry: 0  Step Continuity: 0  Path: 0  Trunk: 0  Walking Time: 0  Gait Score: 0  Total Score: 1       Tinetti Test and G-code impairment scale:  Percentage of Impairment CH    0%   CI    1-19% CJ    20-39% CK    40-59% CL    60-79% CM    80-99% CN     100%   Tinetti  Score 0-28 28 23-27 17-22 12-16 6-11 1-5 0       Tinetti Tool Score Risk of Falls  <19 = High Fall Risk  19-24 = Moderate Fall Risk  25-28 = Low Fall Risk  Tinetti ME. Performance-Oriented Assessment of Mobility Problems in Elderly Patients. [facility] 66; [id number]. (Scoring Description: PT Bulletin Feb. 10, 1993)    Older adults: Betsey Lindsey et al, 2009; n = 1000 [facility] elderly evaluated with ABC, FALGUNI, ADL, and IADL)  · Mean FALGUNI score for males aged 69-68 years = 26.21(3.40)  · Mean FALGUNI score for females age 69-68 years = 25.16(4.30)  · Mean FALGUNI score for males over 80 years = 23.29(6.02)  · Mean FALGUNI score for females over 80 years = 17.20(8.32)         G codes: In compliance with CMSs Claims Based Outcome Reporting, the following G-code set was chosen for this patient based on their primary functional limitation being treated: The outcome measure chosen to determine the severity of the functional limitation was the Tinetti with a score of 1/28 which was correlated with the impairment scale.     ? Mobility - Walking and Moving Around:     - CURRENT STATUS: CM - 80%-99% impaired, limited or restricted    - GOAL STATUS: CL - 60%-79% impaired, limited or restricted    - D/C STATUS:  ---------------To be determined---------------      Physical Therapy Evaluation Charge Determination   History Examination Presentation Decision-Making   HIGH Complexity :3+ comorbidities / personal factors will impact the outcome/ POC  MEDIUM Complexity : 3 Standardized tests and measures addressing body structure, function, activity limitation and / or participation in recreation  LOW Complexity : Stable, uncomplicated  HIGH Complexity : FOTO score of 1- 25       Based on the above components, the patient evaluation is determined to be of the following complexity level: LOW     Pain:  Pain Scale 1: Numeric (0 - 10)  Pain Intensity 1: 0            Activity Tolerance:   NAD; VSS but c/o dizziness    Please refer to the flowsheet for vital signs taken during this treatment. After treatment:   []         Patient left in no apparent distress sitting up in chair  [x]         Patient left in no apparent distress in bed  [x]         Call bell left within reach  [x]         Nursing notified  []         Caregiver present  []         Bed alarm activated    COMMUNICATION/EDUCATION:   The patients plan of care was discussed with: Registered Nurse. [x]         Fall prevention education was provided and the patient/caregiver indicated understanding. [x]         Patient/family have participated as able in goal setting and plan of care. [x]         Patient/family agree to work toward stated goals and plan of care. []         Patient understands intent and goals of therapy, but is neutral about his/her participation. []         Patient is unable to participate in goal setting and plan of care.     Thank you for this referral.  Laurita Ambrose, PT, DPT   Time Calculation: 28 mins

## 2018-03-19 NOTE — PROGRESS NOTES
Problem: Falls - Risk of  Goal: *Absence of Falls  Document Sam Fall Risk and appropriate interventions in the flowsheet.    Outcome: Progressing Towards Goal  Fall Risk Interventions:  Mobility Interventions: PT Consult for mobility concerns, PT Consult for assist device competence         Medication Interventions: Patient to call before getting OOB    Elimination Interventions: Call light in reach, Patient to call for help with toileting needs

## 2018-03-19 NOTE — PROGRESS NOTES
TRANSFER - IN REPORT:    Verbal report received from Suman(name) chris Meredith  being received from [place]) for routine post - op      Report consisted of patients Situation, Background, Assessment and   Recommendations(SBAR). Information from the following report(s) SBAR was reviewed with the receiving nurse. Opportunity for questions and clarification was provided. Assessment completed upon patients arrival to unit and care assumed.

## 2018-03-19 NOTE — IP AVS SNAPSHOT
Summary of Care Report The Summary of Care report has been created to help improve care coordination. Users with access to [employer]Q or [address] (Web-based application) may access additional patient information including the Discharge Summary. If you are not currently a [address] user and need more information, please call the number listed below in the [place] [address] section and ask to be connected with Medical Records. Facility Information Name Address Phone [address] [phone] Patient Information Patient Name Sex GAUTAM Caballero ([id number]) Male 1950 Discharge Information Admitting Provider Service Area Unit Jonaie Leggett MD / [address] / [phone] Discharge Provider Discharge Date/Time Discharge Disposition Destination (none) 3/21/2018 Afternoon (Pending) ELSY (none) Patient Language Language ENGLISH [13] Hospital Problems as of 3/21/2018  Reviewed: 3/19/2018 12:08 PM by Joanie Leggett MD  
  
  
  
 Class Noted - Resolved Last Modified POA Active Problems * (Principal)Osteoarthritis of right hip  3/19/2018 - Present 3/19/2018 by Joanie Leggett MD Yes Entered by JERRI Lopez Degenerative joint disease (DJD) of hip  3/19/2018 - Present 3/19/2018 by Joanie Leggett MD Yes Entered by JERRI Lyle Non-Hospital Problems as of 3/21/2018  Reviewed: 3/19/2018 12:08 PM by Joanie Leggett MD  
 None You are allergic to the following No active allergies Current Discharge Medication List  
  
START taking these medications Dose & Instructions Dispensing Information Comments  
 meloxicam 7.5 mg tablet Commonly known as:  MOBIC Dose:  7.5 mg Take 1 Tab by mouth daily for 21 days. Quantity:  21 Tab Refills:  0  
   
 oxyCODONE IR 5 mg immediate release tablet Commonly known as:  [place] Dose:  5-10 mg Take 1-2 Tabs by mouth every four (4) hours as needed for Pain. Max Daily Amount: 60 mg.  
 Quantity:  80 Tab Refills:  0  
   
 rivaroxaban 10 mg tablet Commonly known as:  Amadou Seat Dose:  10 mg Take 1 Tab by mouth daily (with lunch) for 30 days. Quantity:  30 Tab Refills:  0 CONTINUE these medications which have NOT CHANGED Dose & Instructions Dispensing Information Comments CALTRATE-600 PLUS VITAMIN D3 PO Dose:  2 Tab Take 2 Tabs by mouth daily. Refills:  0  
   
 tamsulosin 0.4 mg capsule Commonly known as:  FLOMAX Dose:  0.4 mg Take 0.4 mg by mouth daily. Refills:  0  
   
 VITAMIN B-1 100 mg tablet Generic drug:  thiamine Dose:  100 mg Take 100 mg by mouth daily. Refills:  0  
   
 XTANDI 40 mg capsule Generic drug:  enzalutamide Dose:  160 mg Take 160 mg by mouth daily. Refills:  0 STOP taking these medications Comments ADVIL 200 mg tablet Generic drug:  ibuprofen Surgery Information ID Date/Time Status Primary Surgeon All Procedures Location [id number] 3/19/2018 [address], MD RIGHT TOTAL HIP REPLACEMENT ANTERIOR APPROACH   [facility] MAIN OR Follow-up Information Follow up With Details Comments Contact Info Robert Landrum MD   Patient can only remember the practice name and not the physician [address] [address] [phone] Discharge Instructions Patient meets criteria for BUNDLED PAYMENT  
for Care Improvement Initiative Criteria Contact Information for Orthopedic Nurse Navigator:     
ANDREAS Eduardo, RN-BC 
Z:[phone] D:[phone] T:[phone] After [address] [facility] Discharge Instructions Anterior Approach Hip Replacement- Dr. Paola Marshall Patient Name  Aylin Gamez Date of procedure  3/19/2018 Procedure  Procedure(s): RIGHT TOTAL HIP REPLACEMENT ANTERIOR APPROACH Surgeon  Surgeon(s) and Role: Paulo Rojas MD - Primary PCP:  Jose Cantrell MD  
Date of discharge:  3/21/18 Follow up appointments 
-follow up with Dr. Paola Marshall in 2 weeks. Call [phone] to make an appointment. Activity 
- weight bearing with walker or crutches; discontinue as tolerated 
-avoid extreme movement of hip to prevent hip dislocation Incision Care 
the Aquacel (brown, waterproof) surgical dressing is to remain on the hip for 7 days. On the 7th day gently peel the dressing off by carefully lifting the edge and stretching it slightly to break the adhesive seal 
-You may now leave the incision open to air. Patient will have absorbable sutures in the incision. 
-If the Aquacel dressing comes loose/off before the 7th day, you may replace it with a dry sterile gauze dressing; change it daily. Once the incision is not draining, leave it open to air 
-May take a shower with the Aquacel dressing in place. Once the Aquacel is removed,  may shower and get the incision wet but do not submerge the incision under water in a bath tub, hot tub or swimming pool for 6 weeks after surgery. Preventing blood clots -Give Xarelto at 12 noon daily for 35 days Medications 
-continue pain medication as prescribed in the hospital 
-continue home medications per medication reconciliation - place an ice bag on the hip for 15-20 minutes after exercise and as needed Diet 
-resume usual diet; encourage fluids; provide foods high in fiber 
-provide stool softeners/laxatives as needed Rehab/SNF Protocol (to be followed by the facility) **Anticipated length of stay is 10 days. If going to exceed 10 day length of stay; call Dr. Brock Howell office at [phone] to discuss patients progress. Nursing 
-complete head to toe assessment, vital signs 
-medication reconciliation 
-review pain management 
-manage chronic medical conditions 
-remove Aquacel dressing at 7 days post-op Physical Therapy Weight bearing status: 
Precautions at Admission: WBAT Right Side Weight Bearing: As tolerated Mobility Status: 
Supine to Sit: Moderate assistance, Additional time Sit to Stand: Moderate assistance, Minimum assistance, Adaptive equipment, Additional time (initially mod and then min assist) Sit to Supine: Minimum assistance, Additional time Bed to Chair: Moderate assistance, Adaptive equipment, Additional time Gait: 
Distance (ft): 20 Feet (ft) (twice, with seated rest) Ambulation - Level of Assistance: Minimal assistance, Moderate assistance, Adaptive equipment, Additional time (initially mod, progressed to min) Assistive Device: Gait belt, Walker, rolling Gait Abnormalities: Antalgic, Decreased step clearance, Step to gait (difficulty advancing RLE) ADL status overall composite: 
  
  
  
  
 
Physical Therapy-anticipate 10 days length of stay 
-assessment and evaluation-bed mobility; functional transfers (bed, chair, bathroom, stairs); ambulation with equipment, safety and ability to get out of facility in the event of an emergency 
-review and maintain weight bearing as tolerated with walker or crutches; avoid extreme movement of hip to prevent hip dislocation 
-discuss pain management 
-review how to do ADLs. Refer to pages 46-50 of handbook. Exercise Program-refer to pages 38-45 of handbook 
-supine exercises-quad sets, hamstring sets, gluteal sets, hip abduction/adduction to neutral, hip internal rotation, heel slides (flexing the knee) -sitting exercises-ankle pumps, bicep curls (use weights as appropriate), triceps pushups, shoulder flexion (use weights as appropriate) -standing exercises holding onto supportive counter-toe raises, heel raises, mini-squats, heel/toe touches with knee bend, marching in place, hip abduction/adduction, hip external rotation, hip extension, hip abduction/extension/external rotation (concentration on gluteus steffi), heel toe taps Chart Review Routing History No Routing History on File

## 2018-03-19 NOTE — IP AVS SNAPSHOT
[address] [facility] [address] [place] [address] 
[phone] Patient: Tish Sorenson MRN: [id number] MNV:[birth year] A check arslan indicates which time of day the medication should be taken. My Medications START taking these medications Instructions Each Dose to Equal  
 Morning Noon Evening Bedtime  
 meloxicam 7.5 mg tablet Commonly known as:  MOBIC Your last dose was: Your next dose is: Take 1 Tab by mouth daily for 21 days. 7.5 mg  
    
   
   
   
  
 oxyCODONE IR 5 mg immediate release tablet Commonly known as:  Corina Wade Your last dose was: Your next dose is: Take 1-2 Tabs by mouth every four (4) hours as needed for Pain. Max Daily Amount: 60 mg.  
 5-10 mg  
    
   
   
   
  
 rivaroxaban 10 mg tablet Commonly known as:  Thresa Ferraris Your last dose was: Your next dose is: Take 1 Tab by mouth daily (with lunch) for 30 days. 10 mg CONTINUE taking these medications Instructions Each Dose to Equal  
 Morning Noon Evening Bedtime CALTRATE-600 PLUS VITAMIN D3 PO Your last dose was: Your next dose is: Take 2 Tabs by mouth daily. 2 Tab  
    
   
   
   
  
 tamsulosin 0.4 mg capsule Commonly known as:  FLOMAX Your last dose was: Your next dose is: Take 0.4 mg by mouth daily. 0.4 mg  
    
   
   
   
  
 VITAMIN B-1 100 mg tablet Generic drug:  thiamine Your last dose was: Your next dose is: Take 100 mg by mouth daily. 100 mg  
    
   
   
   
  
 XTANDI 40 mg capsule Generic drug:  enzalutamide Your last dose was: Your next dose is: Take 160 mg by mouth daily. 160 mg  
    
   
   
   
  
  
STOP taking these medications ADVIL 200 mg tablet Generic drug:  ibuprofen Where to Get Your Medications Information on where to get these meds will be given to you by the nurse or doctor. ! Ask your nurse or doctor about these medications  
  meloxicam 7.5 mg tablet  
 oxyCODONE IR 5 mg immediate release tablet  
 rivaroxaban 10 mg tablet

## 2018-03-19 NOTE — PROGRESS NOTES
Primary Nurse Derick Hanson, BARAK and Reanna Haney RN performed a dual skin assessment on this patient No impairment noted  Ty score is 21

## 2018-03-19 NOTE — OP NOTES
OPERATIVE NOTE    3/19/2018  DEGENERATIVE JOINT DISEASE RIGHT HIP  DEGENERATIVE JOINT DISEASE RIGHT HIP    PREOPERATIVE DIAGNOSIS: Osteoarthritis, right hip. POSTOPERATIVE DIAGNOSIS: Osteoarthritis, right hip. OPERATIVE PROCEDURE: right total hip replacement. SURGEON: Edenilson Brizuela MD    ASSISTANT SURGEON: Sunitha Gauthier, [facility]    ANESTHESIA: epidural/spinal anesthesia    ESTIMATED BLOOD LOSS: 100cc. INDICATIONS: A 77 y.o.  male  with end stage osteoarthritis to the right hip, not responsive to conservative management. COMPLICATIONS: None    PROCEDURE: Patient was taken to the operating room and underwent placement of spinal anesthesia. The patient was placed on the [facility] table with both legs in boots, and intermittent compression hose placed on bilateral calves. The right hip and leg were prepped and draped in the usual fashion. Standard anterior approach was made to the hip, down through the skin and subcutaneous tissue. Hemostasis was obtained using Bovie apparatus. Fascia overlying the tensor muscle was incised longitudinally and the muscles were dissected off the fascia, and retracted posterolaterally. Bovie was used to incise the fascia. Circumflex vessels identified, clamped and cauterized. Retractors were placed medially and laterally around the femoral neck. .The capsule was incised longitudinally in a T-shape fashion. The capsule was dissected subperiosteally. Sutures were place at 2 ends of the capsule medially and laterally and used for attraction. Retractors were placed intracapsular. .Femoral neck was cut at the appropriate level. The femoral head was removed using corkscrew. Acetabular retractors were placed. The acetabulum was reamed up to a size 55 reamer. A size 56 acetabular component was impacted into the acetabulum in the appropriate amount of anteversion and horizontal inclination. A single screw was placed superiorly in the ilium with good fixation. Fluoroscopy was used to confirm placement of the cup and screw. This was irrigated, A 36 mm inner diameter polyethylene liner was impacted into the acetabular component. We then turned our attention to the femur. Capsular releases were performed. The leg was brought into hyperextension, and retractors placed, exposing the proximal femur. Canal finder was used to establish the canal. The femoral canal was reamed w/ the tapered reamers up to a size of 7. Canal was broached up to a size 7 broach. Trial reduction was performed, noting excellent range of motion and stability. Fluoroscopy was used to confirm size of the implant and leg lengths. Trials were removed. Canal was cleansed using pulsatile lavage system with antibiotic solution. A size 7 std offset stem was impacted down the intramedullary canal with good fixation. Again, trial reduction was performed, and the -2 mm head was chosen. Leg lengths were measured. The trial head was removed, and a -2 mm metal head was impacted onto the stem. The hip reduced. The hip was stable at 90 degrees of external rotation. Fluoroscopy was used to confirm placement of all hardware, and to measure leg length using a horizontal line parallel to bilateral ischium, looking at the level of the lesser trochanters. Joint was extensively irrigated with antibiotic solution. Capsular repair was performed using #2 Vicryl interrupted figure-of-eight fashion. Capsule was infiltrated with 30 mL of 0.5% Marcaine with epinephrine. Fascia over the tensor muscle was repaired using #2 Stratafix quilled suture in a running fashion. The subcutaneous tissue was approximated using 2-0 Vicryl in interrupted fashion. The skin edges were approximated using 3-0 Monocryl in running subcuticular fashion. Dermabond was applied. A bulky, sterile dressing was applied. The patient was transferred to the recovery room in stable condition. There were no complications.     SPECIMEN:* No specimens in log *    G Gerardo Castro MD  3/19/2018  12:08 PM

## 2018-03-19 NOTE — BRIEF OP NOTE
BRIEF OPERATIVE NOTE    Date of Procedure: 3/19/2018   Preoperative Diagnosis: DEGENERATIVE JOINT DISEASE RIGHT HIP  Postoperative Diagnosis: DEGENERATIVE JOINT DISEASE RIGHT HIP    Procedure(s):  RIGHT TOTAL HIP REPLACEMENT ANTERIOR APPROACH    Surgeon(s) and Role:     * Kvng Reynoso MD - Primary         Assistant Staff: Physician Assistant: JERRI Britt      Surgical Staff:  Circ-1: Rosemary Jerome RN  Circ-Relief: Mala Childress RN  Physician Assistant: JERRI Britt  Scrub Tech-1: Kita Sandoval  Scrub RN-Relief: Tika Sanders RN  Surg Asst-1: Dallas Board Hulings  Event Time In   Incision Start 1053   Incision Close      Anesthesia: Spinal   Estimated Blood Loss: 100cc  Specimens: * No specimens in log *   Findings: DJD   Complications: none  Implants:   Implant Name Type Inv.  Item Serial No.  Lot No. LRB No. Used Action   CUP ACET SECTOR GRIPTION 56MM -- TI - SNA  CUP ACET SECTOR GRIPTION 56MM -- TI NA [facility] [id number] Right 1 Implanted   SCR ACET CANC PINN 6.5X25MM SS --  - SNA  SCR ACET CANC PINN 6.5X25MM SS --  NA [facility] [id number] Right 1 Implanted   LINER ACET PINN NEUT [id number] -- ALTRX - SNA  LINER ACET PINN NEUT [id number] -- ALTRX NA [facility] [id number] Right 1 Implanted   STEM FEM 12/14 TAPR 7 -- [place] - SNA  STEM FEM 12/14 TAPR 7 -- [place] NA [facility] [employer] [id number] Right 1 Implanted   HEAD FEM 36MM 2MM NK --  - SNA   HEAD FEM 36MM 2MM NK --  NA [facility] [id number] Right 1 Implanted

## 2018-03-19 NOTE — PROGRESS NOTES
Bedside and Verbal shift change report given to Kathy (oncoming nurse) by Savannah Mcpherson (offgoing nurse). Report included the following information SBAR.

## 2018-03-19 NOTE — IP AVS SNAPSHOT
[address] 
[phone] Patient: Elizabeth Gann MRN: [id number] OEA:[birth year] About your hospitalization You were admitted on:  March 19, 2018 You last received care in the:  [id number] [facility] You were discharged on:  March 21, 2018 Why you were hospitalized Your primary diagnosis was:  Osteoarthritis Of Right Hip Your diagnoses also included:  Degenerative Joint Disease (Djd) Of Hip Follow-up Information Follow up With Details Comments Contact Info Robert Landrum MD   Patient can only remember the practice name and not the physician [address] [place] [address] [phone] Discharge Orders None A check arslan indicates which time of day the medication should be taken. My Medications START taking these medications Instructions Each Dose to Equal  
 Morning Noon Evening Bedtime  
 meloxicam 7.5 mg tablet Commonly known as:  MOBIC Your last dose was: Your next dose is: Take 1 Tab by mouth daily for 21 days. 7.5 mg  
    
   
   
   
  
 oxyCODONE IR 5 mg immediate release tablet Commonly known as:  Abbi Sanders Your last dose was: Your next dose is: Take 1-2 Tabs by mouth every four (4) hours as needed for Pain. Max Daily Amount: 60 mg.  
 5-10 mg  
    
   
   
   
  
 rivaroxaban 10 mg tablet Commonly known as:  Sebas Fry Your last dose was: Your next dose is: Take 1 Tab by mouth daily (with lunch) for 30 days. 10 mg CONTINUE taking these medications Instructions Each Dose to Equal  
 Morning Noon Evening Bedtime CALTRATE-600 PLUS VITAMIN D3 PO Your last dose was: Your next dose is: Take 2 Tabs by mouth daily. 2 Tab tamsulosin 0.4 mg capsule Commonly known as:  FLOMAX Your last dose was: Your next dose is: Take 0.4 mg by mouth daily. 0.4 mg  
    
   
   
   
  
 VITAMIN B-1 100 mg tablet Generic drug:  thiamine Your last dose was: Your next dose is: Take 100 mg by mouth daily. 100 mg  
    
   
   
   
  
 XTANDI 40 mg capsule Generic drug:  enzalutamide Your last dose was: Your next dose is: Take 160 mg by mouth daily. 160 mg  
    
   
   
   
  
  
STOP taking these medications ADVIL 200 mg tablet Generic drug:  ibuprofen Where to Get Your Medications Information on where to get these meds will be given to you by the nurse or doctor. ! Ask your nurse or doctor about these medications  
  meloxicam 7.5 mg tablet  
 oxyCODONE IR 5 mg immediate release tablet  
 rivaroxaban 10 mg tablet Discharge Instructions Patient meets criteria for BUNDLED PAYMENT  
for Care Improvement Initiative Criteria Contact Information for Orthopedic Nurse Navigator:     
ANDREAS Martin, RN-BC 
J:[phone] B:[phone] H:[phone] After [address] [place] [address] for SNF/Rehab Discharge Instructions Anterior Approach Hip Replacement- Dr. Luciana Deras Patient Name  Jullie Area Date of procedure  3/19/2018 Procedure  Procedure(s): RIGHT TOTAL HIP REPLACEMENT ANTERIOR APPROACH Surgeon  Surgeon(s) and Role: Ed Sandoval MD - Primary PCP:  Em Burleson MD  
Date of discharge:  3/21/18 Follow up appointments 
-follow up with Dr. Luciana Deras in 2 weeks. Call [phone] to make an appointment. Activity 
- weight bearing with walker or crutches; discontinue as tolerated 
-avoid extreme movement of hip to prevent hip dislocation Incision Care 
the Aquacel (brown, waterproof) surgical dressing is to remain on the hip for 7 days. On the 7th day gently peel the dressing off by carefully lifting the edge and stretching it slightly to break the adhesive seal 
-You may now leave the incision open to air. Patient will have absorbable sutures in the incision. 
-If the Aquacel dressing comes loose/off before the 7th day, you may replace it with a dry sterile gauze dressing; change it daily. Once the incision is not draining, leave it open to air 
-May take a shower with the Aquacel dressing in place. Once the Aquacel is removed,  may shower and get the incision wet but do not submerge the incision under water in a bath tub, hot tub or swimming pool for 6 weeks after surgery. Preventing blood clots -Give Xarelto at 12 noon daily for 35 days Medications 
-continue pain medication as prescribed in the hospital 
-continue home medications per medication reconciliation - place an ice bag on the hip for 15-20 minutes after exercise and as needed Diet 
-resume usual diet; encourage fluids; provide foods high in fiber 
-provide stool softeners/laxatives as needed Rehab/SNF Protocol (to be followed by the facility) **Anticipated length of stay is 10 days. If going to exceed 10 day length of stay; call Dr. David Lyon office at [phone] to discuss patients progress. Nursing 
-complete head to toe assessment, vital signs 
-medication reconciliation 
-review pain management 
-manage chronic medical conditions 
-remove Aquacel dressing at 7 days post-op Physical Therapy Weight bearing status: 
Precautions at Admission: WBAT Right Side Weight Bearing: As tolerated Mobility Status: 
Supine to Sit: Moderate assistance, Additional time Sit to Stand: Moderate assistance, Minimum assistance, Adaptive equipment, Additional time (initially mod and then min assist) Sit to Supine: Minimum assistance, Additional time Bed to Chair: Moderate assistance, Adaptive equipment, Additional time Gait: 
 Distance (ft): 20 Feet (ft) (twice, with seated rest) Ambulation - Level of Assistance: Minimal assistance, Moderate assistance, Adaptive equipment, Additional time (initially mod, progressed to min) Assistive Device: Gait belt, Walker, rolling Gait Abnormalities: Antalgic, Decreased step clearance, Step to gait (difficulty advancing RLE) ADL status overall composite: 
  
  
  
  
 
Physical Therapy-anticipate 10 days length of stay 
-assessment and evaluation-bed mobility; functional transfers (bed, chair, bathroom, stairs); ambulation with equipment, safety and ability to get out of facility in the event of an emergency 
-review and maintain weight bearing as tolerated with walker or crutches; avoid extreme movement of hip to prevent hip dislocation 
-discuss pain management 
-review how to do ADLs. Refer to pages 46-50 of handbook. Exercise Program-refer to pages 38-45 of handbook 
-supine exercises-quad sets, hamstring sets, gluteal sets, hip abduction/adduction to neutral, hip internal rotation, heel slides (flexing the knee) -sitting exercises-ankle pumps, bicep curls (use weights as appropriate), triceps pushups, shoulder flexion (use weights as appropriate) -standing exercises holding onto supportive counter-toe raises, heel raises, mini-squats, heel/toe touches with knee bend, marching in place, hip abduction/adduction, hip external rotation, hip extension, hip abduction/extension/external rotation (concentration on gluteus steffi), heel toe taps Introducing [facility]! [facility] introduces [employer] patient portal. Now you can access parts of your medical record, email your doctor's office, and request medication refills online. 1. In your internet browser, go to [email or web address] 2. Click on the First Time User? Click Here link in the Sign In box. You will see the New Member Sign Up page. 3. Enter your [employer] Access Code exactly as it appears below. You will not need to use this code after youve completed the sign-up process. If you do not sign up before the expiration date, you must request a new code. · [employer] Access Code: [email or web address] Expires: 5/23/2018  8:53 AM 
 
4. Enter the last four digits of your Social Security Number (xxxx) and Date of Birth (mm/dd/yyyy) as indicated and click Submit. You will be taken to the next sign-up page. 5. Create a [employer] ID. This will be your [employer] login ID and cannot be changed, so think of one that is secure and easy to remember. 6. Create a [employer] password. You can change your password at any time. 7. Enter your Password Reset Question and Answer. This can be used at a later time if you forget your password. 8. Enter your e-mail address. You will receive e-mail notification when new information is available in [address] 9. Click Sign Up. You can now view and download portions of your medical record. 10. Click the Download Summary menu link to download a portable copy of your medical information. If you have questions, please visit the Frequently Asked Questions section of the [employer] website. Remember, [employer] is NOT to be used for urgent needs. For medical emergencies, dial 911. Now available from your iPhone and Android! Providers Seen During Your Hospitalization Provider Specialty Primary office phone Bridger Nichole MD Orthopedic Surgery [phone] Your Primary Care Physician (PCP) Primary Care Physician Office Phone Office Fax OTHER, PHYS ** None ** ** None ** You are allergic to the following No active allergies Recent Documentation Height Weight BMI Smoking Status 1.676 m 74.8 kg 26.63 kg/m2 Former Smoker Emergency Contacts Name Discharge Info Relation Home Work Mobile [phone] [address] CAREGIVER [3] Sister [23] [phone] Patient Belongings The following personal items are in your possession at time of discharge: 
  Dental Appliances: Lowers, Uppers  Visual Aid: Glasses, With patient             Clothing: Other (comment) (2 bags of clothes returned to patient) Please provide this summary of care documentation to your next provider. Signatures-by signing, you are acknowledging that this After Visit Summary has been reviewed with you and you have received a copy. Patient Signature:  ____________________________________________________________ Date:  ____________________________________________________________  
  
[facility] Provider Signature:  ____________________________________________________________ Date:  ____________________________________________________________

## 2018-03-19 NOTE — ANESTHESIA POSTPROCEDURE EVALUATION
Post-Anesthesia Evaluation and Assessment    Patient: Adolfo Young MRN: [id number]  SSN: [id number]    YOB: 1950  Age: 77 y.o. Sex: male       Cardiovascular Function/Vital Signs  Visit Vitals    /75    Pulse 72    Temp 36.4 °C (97.6 °F)    Resp 14    Ht 5' 6\" (1.676 m)    Wt 74.8 kg (165 lb)    SpO2 100%    BMI 26.63 kg/m2       Patient is status post spinal anesthesia for Procedure(s):  RIGHT TOTAL HIP REPLACEMENT ANTERIOR APPROACH  . Nausea/Vomiting: None    Postoperative hydration reviewed and adequate. Pain:  Pain Scale 1: Numeric (0 - 10) (03/19/18 1245)  Pain Intensity 1: 0 (03/19/18 1245)   Managed    Neurological Status:   Neuro (WDL): Exceptions to WDL (03/19/18 1245)  Neuro  Neurologic State: Alert (03/19/18 1245)  LUE Motor Response: Purposeful (03/19/18 1245)  LLE Motor Response: Pharmacologically paralyzed (03/19/18 1245)  RUE Motor Response: Purposeful (03/19/18 1245)  RLE Motor Response: Pharmacologically paralyzed (03/19/18 1245)   At baseline    Mental Status and Level of Consciousness: Arousable    Pulmonary Status:   O2 Device: Room air (03/19/18 1245)   Adequate oxygenation and airway patent    Complications related to anesthesia: None    Post-anesthesia assessment completed.  No concerns    Signed By: Mercedes Mariano MD     March 19, 2018

## 2018-03-19 NOTE — PROGRESS NOTES
Physical Therapy  Order received, chart reviewed, evaluation held. Pt remains numb and unable to participate after multiple checks. Pt on floor at 14:08. Will continue to follow as able and appropriate.   Thank you,  Miya Calixto, PT, DPT

## 2018-03-19 NOTE — PROGRESS NOTES
TRANSFER - OUT REPORT:    Verbal report given to ,RN(name) on Ming Zamora  being transferred to 56(unit) for routine post - op       Report consisted of patients Situation, Background, Assessment and   Recommendations(SBAR). Time Pre op antibiotic given:10:49 Ancef  Anesthesia Stop time: 12:23  Farrell Present on Transfer to floor:yes  Order for Farrell on Chart:yes  Discharge Prescriptions with Chart:no    Information from the following report(s) SBAR, Kardex, OR Summary, Procedure Summary, Intake/Output and MAR was reviewed with the receiving nurse. Opportunity for questions and clarification was provided. Is the patient on 02? NO       L/Min        Other     Is the patient on a monitor? NO    Is the nurse transporting with the patient? NO    Surgical Waiting Area notified of patient's transfer from PACU?  YES      The following personal items collected during your admission accompanied patient upon transfer:   Dental Appliance: Dental Appliances: Lowers, Uppers  Vision:    Hearing Aid:    Jewelry:    Clothing: Clothing: Other (comment) (2 bags of clothes returned to patient)  Other Valuables:    Valuables sent to safe:

## 2018-03-20 LAB
ANION GAP SERPL CALC-SCNC: 5 MMOL/L (ref 5–15)
BUN SERPL-MCNC: 10 MG/DL (ref 6–20)
BUN/CREAT SERPL: 16 (ref 12–20)
CALCIUM SERPL-MCNC: 8.2 MG/DL (ref 8.5–10.1)
CHLORIDE SERPL-SCNC: 106 MMOL/L (ref 97–108)
CO2 SERPL-SCNC: 26 MMOL/L (ref 21–32)
CREAT SERPL-MCNC: 0.61 MG/DL (ref 0.7–1.3)
GLUCOSE SERPL-MCNC: 112 MG/DL (ref 65–100)
HGB BLD-MCNC: 10.4 G/DL (ref 12.1–17)
POTASSIUM SERPL-SCNC: 3.5 MMOL/L (ref 3.5–5.1)
SODIUM SERPL-SCNC: 137 MMOL/L (ref 136–145)

## 2018-03-20 PROCEDURE — 65270000029 HC RM PRIVATE

## 2018-03-20 PROCEDURE — 36415 COLL VENOUS BLD VENIPUNCTURE: CPT | Performed by: PHYSICIAN ASSISTANT

## 2018-03-20 PROCEDURE — 85018 HEMOGLOBIN: CPT | Performed by: PHYSICIAN ASSISTANT

## 2018-03-20 PROCEDURE — 97165 OT EVAL LOW COMPLEX 30 MIN: CPT

## 2018-03-20 PROCEDURE — 74011250637 HC RX REV CODE- 250/637: Performed by: PHYSICIAN ASSISTANT

## 2018-03-20 PROCEDURE — 97530 THERAPEUTIC ACTIVITIES: CPT

## 2018-03-20 PROCEDURE — 74011250636 HC RX REV CODE- 250/636: Performed by: PHYSICIAN ASSISTANT

## 2018-03-20 PROCEDURE — G8987 SELF CARE CURRENT STATUS: HCPCS

## 2018-03-20 PROCEDURE — 97116 GAIT TRAINING THERAPY: CPT

## 2018-03-20 PROCEDURE — 80048 BASIC METABOLIC PNL TOTAL CA: CPT | Performed by: PHYSICIAN ASSISTANT

## 2018-03-20 PROCEDURE — 77010033678 HC OXYGEN DAILY

## 2018-03-20 PROCEDURE — 97535 SELF CARE MNGMENT TRAINING: CPT

## 2018-03-20 PROCEDURE — G8988 SELF CARE GOAL STATUS: HCPCS

## 2018-03-20 RX ORDER — MELOXICAM 7.5 MG/1
7.5 TABLET ORAL DAILY
Qty: 21 TAB | Refills: 0 | Status: SHIPPED | OUTPATIENT
Start: 2018-03-20 | End: 2018-04-10

## 2018-03-20 RX ORDER — ACETAMINOPHEN 325 MG/1
650 TABLET ORAL EVERY 6 HOURS
Status: DISCONTINUED | OUTPATIENT
Start: 2018-03-20 | End: 2018-03-21 | Stop reason: HOSPADM

## 2018-03-20 RX ORDER — OXYCODONE HYDROCHLORIDE 5 MG/1
5-10 TABLET ORAL
Qty: 80 TAB | Refills: 0 | OUTPATIENT
Start: 2018-03-20 | End: 2021-09-10

## 2018-03-20 RX ADMIN — TAMSULOSIN HYDROCHLORIDE 0.4 MG: 0.4 CAPSULE ORAL at 09:08

## 2018-03-20 RX ADMIN — OXYCODONE HYDROCHLORIDE 10 MG: 5 TABLET ORAL at 13:15

## 2018-03-20 RX ADMIN — POLYETHYLENE GLYCOL 3350 17 G: 17 POWDER, FOR SOLUTION ORAL at 09:08

## 2018-03-20 RX ADMIN — DOCUSATE SODIUM AND SENNOSIDES 1 TABLET: 8.6; 5 TABLET, FILM COATED ORAL at 09:08

## 2018-03-20 RX ADMIN — Medication 10 ML: at 15:11

## 2018-03-20 RX ADMIN — CEFAZOLIN SODIUM 2 G: 2 SOLUTION INTRAVENOUS at 02:06

## 2018-03-20 RX ADMIN — KETOROLAC TROMETHAMINE 15 MG: 30 INJECTION, SOLUTION INTRAMUSCULAR at 15:10

## 2018-03-20 RX ADMIN — OXYCODONE HYDROCHLORIDE 10 MG: 5 TABLET ORAL at 08:41

## 2018-03-20 RX ADMIN — KETOROLAC TROMETHAMINE 15 MG: 30 INJECTION, SOLUTION INTRAMUSCULAR at 09:17

## 2018-03-20 RX ADMIN — SODIUM CHLORIDE 125 ML/HR: 900 INJECTION, SOLUTION INTRAVENOUS at 05:20

## 2018-03-20 RX ADMIN — ACETAMINOPHEN 650 MG: 325 TABLET, FILM COATED ORAL at 05:01

## 2018-03-20 RX ADMIN — OXYCODONE HYDROCHLORIDE 10 MG: 5 TABLET ORAL at 20:12

## 2018-03-20 RX ADMIN — OXYCODONE HYDROCHLORIDE 10 MG: 5 TABLET ORAL at 05:01

## 2018-03-20 RX ADMIN — Medication 10 ML: at 05:25

## 2018-03-20 RX ADMIN — DOCUSATE SODIUM AND SENNOSIDES 1 TABLET: 8.6; 5 TABLET, FILM COATED ORAL at 17:44

## 2018-03-20 RX ADMIN — OXYCODONE HYDROCHLORIDE 10 MG: 5 TABLET ORAL at 16:13

## 2018-03-20 RX ADMIN — ACETAMINOPHEN 650 MG: 325 TABLET, FILM COATED ORAL at 17:43

## 2018-03-20 RX ADMIN — RIVAROXABAN 10 MG: 10 TABLET, FILM COATED ORAL at 11:11

## 2018-03-20 RX ADMIN — KETOROLAC TROMETHAMINE 15 MG: 30 INJECTION, SOLUTION INTRAMUSCULAR at 01:26

## 2018-03-20 RX ADMIN — OXYCODONE HYDROCHLORIDE 10 MG: 5 TABLET ORAL at 01:25

## 2018-03-20 RX ADMIN — ONDANSETRON 4 MG: 2 INJECTION INTRAMUSCULAR; INTRAVENOUS at 05:02

## 2018-03-20 RX ADMIN — ACETAMINOPHEN 650 MG: 325 TABLET, FILM COATED ORAL at 11:10

## 2018-03-20 NOTE — PROGRESS NOTES
Problem: Discharge Planning  Goal: *Discharge to safe environment  Outcome: Progressing Towards Goal  HH vs Rehab

## 2018-03-20 NOTE — PROGRESS NOTES
Called Dr. Elroy Runner to report patient having left groin pain and refusing to wear scd's and pain is better with scd maching turned off. Educated patient to do ankle pumps more often while awake. patient running a oral temperature of 100.2. I reported to Dr. Elroy Runner we are pushing patient to use his incentive spirometer more often and patient taking scheduled tylenol every 6 hours. No orders at this time, continuing to monitor.

## 2018-03-20 NOTE — DISCHARGE INSTRUCTIONS
Patient meets criteria for   BUNDLED PAYMENT   for Care Improvement Initiative Criteria    Contact Information for Orthopedic Nurse Navigator:      ANDREAS Valle, RN-BC  H:[phone]  P:[phone]  K:[phone]    After [address] for SNF/Rehab    Discharge Instructions Anterior Approach Hip Replacement- Dr. Tameka Miles    Patient Name  Irene Fox  Date of procedure  3/19/2018    Procedure  Procedure(s):  RIGHT TOTAL HIP REPLACEMENT ANTERIOR APPROACH    Surgeon  Surgeon(s) and Role:     * Darryl Zuniga MD - Primary      PCP:  Jerome Carter MD   Date of discharge:  3/21/18    Follow up appointments  -follow up with Dr. Tameka Miles in 2 weeks. Call [phone] to make an appointment. Activity  - weight bearing with walker or crutches; discontinue as tolerated  -avoid extreme movement of hip to prevent hip dislocation    Incision Care  the Aquacel (brown, waterproof) surgical dressing is to remain on the hip for 7 days. On the 7th day gently peel the dressing off by carefully lifting the edge and stretching it slightly to break the adhesive seal  -You may now leave the incision open to air. Patient will have absorbable sutures in the incision.  -If the Aquacel dressing comes loose/off before the 7th day, you may replace it with a dry sterile gauze dressing; change it daily. Once the incision is not draining, leave it open to air  -May take a shower with the Aquacel dressing in place. Once the Aquacel is removed,  may shower and get the incision wet but do not submerge the incision under water in a bath tub, hot tub or swimming pool for 6 weeks after surgery.     Preventing blood clots  -Give Xarelto at 12 noon daily for 35 days    Medications  -continue pain medication as prescribed in the hospital  -continue home medications per medication reconciliation  - place an ice bag on the hip for 15-20 minutes after exercise and as needed    Diet  -resume usual diet; encourage fluids; provide foods high in fiber  -provide stool softeners/laxatives as needed    Rehab/SNF Protocol (to be followed by the facility)    **Anticipated length of stay is 10 days. If going to exceed 10 day length of stay; call Dr. Laurita Lara office at [phone] to discuss patients progress. Nursing  -complete head to toe assessment, vital signs  -medication reconciliation  -review pain management  -manage chronic medical conditions  -remove Aquacel dressing at 7 days post-op    Physical Therapy  Weight bearing status:  Precautions at Admission: WBAT     Right Side Weight Bearing: As tolerated    Mobility Status:  Supine to Sit: Moderate assistance, Additional time  Sit to Stand: Moderate assistance, Minimum assistance, Adaptive equipment, Additional time (initially mod and then min assist)  Sit to Supine: Minimum assistance, Additional time  Bed to Chair: Moderate assistance, Adaptive equipment, Additional time    Gait:  Distance (ft): 20 Feet (ft) (twice, with seated rest)  Ambulation - Level of Assistance: Minimal assistance, Moderate assistance, Adaptive equipment, Additional time (initially mod, progressed to min)  Assistive Device: Gait belt, Walker, rolling  Gait Abnormalities: Antalgic, Decreased step clearance, Step to gait (difficulty advancing RLE)    ADL status overall composite:                Physical Therapy-anticipate 10 days length of stay  -assessment and evaluation-bed mobility; functional transfers (bed, chair, bathroom, stairs); ambulation with equipment, safety and ability to get out of facility in the event of an emergency  -review and maintain weight bearing as tolerated with walker or crutches; avoid extreme movement of hip to prevent hip dislocation  -discuss pain management  -review how to do ADLs. Refer to pages 46-50 of handbook.     Exercise Program-refer to pages 38-45 of handbook  -supine exercises-quad sets, hamstring sets, gluteal sets, hip abduction/adduction to neutral, hip internal rotation, heel slides (flexing the knee)  -sitting exercises-ankle pumps, bicep curls (use weights as appropriate), triceps pushups, shoulder flexion (use weights as appropriate)  -standing exercises holding onto supportive counter-toe raises, heel raises, mini-squats, heel/toe touches with knee bend, marching in place, hip abduction/adduction, hip external rotation, hip extension, hip abduction/extension/external rotation (concentration on gluteus steffi), heel toe taps

## 2018-03-20 NOTE — PROGRESS NOTES
Problem: Mobility Impaired (Adult and Pediatric)  Goal: *Acute Goals and Plan of Care (Insert Text)  Physical Therapy Goals  Initiated 3/19/2018    1. Patient will move from supine to sit and sit to supine  and scoot up and down in bed with modified independence within 4 days. 2. Patient will perform sit to stand with modified independence within 4 days. 3. Patient will ambulate with modified independence for 200 feet with the least restrictive device within 4 days. 4. Patient will ascend/descend 4 stairs with  handrail(s) with modified independence within 4 days. 5. Patient will perform RAMO home exercise program per protocol with independence within 4 days. physical Therapy TREATMENT  Patient: Denys Olszewski (51 y.o. male)  Date: 3/20/2018  Diagnosis: DEGENERATIVE JOINT DISEASE RIGHT HIP  Osteoarthritis of right hip  Degenerative joint disease (DJD) of hip Osteoarthritis of right hip  Procedure(s) (LRB):  RIGHT TOTAL HIP REPLACEMENT ANTERIOR APPROACH   (Right) 1 Day Post-Op  Precautions: WBAT  Chart, physical therapy assessment, plan of care and goals were reviewed. ASSESSMENT:  Patient moving very slowly with his mobility, requiring mod assist at first, but then to min assist for short distance ambulation in the room. He was not able to ambulate into the foley yet, but greatly improved from first to second gait training within session. His arthritis in his other joints, R hip pain and generalized weakness made mobility challenging for him. Reviewed activity recommendations and restrictions including limiting time in the chair to less than 1 hour at a time. Expect that he may need some post acute inpatient rehab and he could tolerate 3 hours a day of therapy. He lives alone and was \"furniture walking\" prior to admission, but was managing all of his own shopping and cooking and is very motivated to work. We will follow up this afternoon with progression of mobility.   Progression toward goals:  [] Improving appropriately and progressing toward goals  [x]      Improving slowly and progressing toward goals  []      Not making progress toward goals and plan of care will be adjusted     PLAN:  Patient continues to benefit from skilled intervention to address the above impairments. Continue treatment per established plan of care. Discharge Recommendations:  Inpatient Rehab likely  Further Equipment Recommendations for Discharge:  Pt has a std walker, no wheels, but will defer ordering DME until closer to D/C in case he ends up in rehab prior to home     SUBJECTIVE:   Patient stated I want to try to do as much as I can.     OBJECTIVE DATA SUMMARY:   Critical Behavior:  Neurologic State: Alert, Appropriate for age  Orientation Level: Appropriate for age, Oriented to person, Oriented to place, Oriented to situation, Oriented to time, Oriented X4  Cognition: Appropriate decision making, Appropriate for age attention/concentration, Appropriate safety awareness, Follows commands  Safety/Judgement: Awareness of environment, Fall prevention  Functional Mobility Training:  Bed Mobility:     Supine to Sit: Moderate assistance; Additional time              Transfers:  Sit to Stand: Moderate assistance;Minimum assistance; Adaptive equipment; Additional time (initially mod and then min assist)  Stand to Sit: Minimum assistance; Adaptive equipment; Additional time        Bed to Chair: Moderate assistance; Adaptive equipment; Additional time                    Balance:  Sitting: Intact; Without support  Standing: Impaired; With support  Standing - Static: Fair  Standing - Dynamic : Fair  Ambulation/Gait Training:  Distance (ft): 20 Feet (ft) (twice, with seated rest)  Assistive Device: Gait belt;Walker, rolling  Ambulation - Level of Assistance: Minimal assistance; Moderate assistance; Adaptive equipment; Additional time (initially mod, progressed to min)        Gait Abnormalities: Antalgic;Decreased step clearance; Step to gait (difficulty advancing RLE)  Right Side Weight Bearing: As tolerated     Base of Support: Shift to left  Stance: Right decreased  Speed/Jazmin: Pace decreased (<100 feet/min); Shuffled  Step Length: Left shortened;Right shortened  Swing Pattern: Right asymmetrical                 Stairs:              Therapeutic Exercises: deferred until afternoon session, in order to focus on gait and mobility training  SUPINE  EXERCISES   Sets   Reps   Active Active Assist   Passive Self ROM   Comments   Ankle Pumps   []                                        []                                        []                                        []                                           Quad Sets   []                                        []                                        []                                        []                                           Heel Slides   []                                        []                                        []                                        []                                           Hip Abduction   []                                        []                                        []                                        []                                           Glut Sets   []                                        []                                        []                                        []                                              []                                        []                                        []                                        []                                              []                                        []                                        []                                        []                                             STANDING  EXERCISES   Sets   Reps   Active Active Assist   Passive Self ROM   Comments   Heel Raises   []                                        [] []                                        []                                           Hip Abduction   []                                        []                                        []                                        []                                              []                                        []                                        []                                        []                                              []                                        []                                        []                                        []                                             Pain:  Pain Scale 1: Numeric (0 - 10)  Pain Intensity 1: 8  Pain Location 1: Incisional;Hip  Pain Orientation 1: Anterior; Inner;Lateral;Right  Pain Description 1: Aching; Intermittent; Sore  Pain Intervention(s) 1: Medication (see MAR); Elevation; Emotional support  Activity Tolerance:   Good, with no complaints of lightheadedness or dizziness with activity  Please refer to the flowsheet for vital signs taken during this treatment.   After treatment:   [x] Patient left in no apparent distress sitting up in chair  [] Patient left in no apparent distress in bed  [x] Call bell left within reach  [x] Nursing notified  [] Caregiver present  [] Bed alarm activated    COMMUNICATION/COLLABORATION:   The patients plan of care was discussed with: Occupational Therapist, Registered Nurse,  and JERRI Guevara, PT   Time Calculation: 41 mins

## 2018-03-20 NOTE — PROGRESS NOTES
Problem: Hip Replacement: Post Op Day 1  Goal: Nutrition/Diet  Outcome: Progressing Towards Goal  Diet is progressing.    Goal: *Optimal pain control at patient's stated goal  Outcome: Progressing Towards Goal  Patient taking oxycodone 10mg    Problem: Pain  Goal: *Control of Pain  Outcome: Progressing Towards Goal  Oxy 10mg

## 2018-03-20 NOTE — PROGRESS NOTES
Problem: Mobility Impaired (Adult and Pediatric)  Goal: *Acute Goals and Plan of Care (Insert Text)  Physical Therapy Goals  Initiated 3/19/2018    1. Patient will move from supine to sit and sit to supine  and scoot up and down in bed with modified independence within 4 days. 2. Patient will perform sit to stand with modified independence within 4 days. 3. Patient will ambulate with modified independence for 200 feet with the least restrictive device within 4 days. 4. Patient will ascend/descend 4 stairs with  handrail(s) with modified independence within 4 days. 5. Patient will perform RAMO home exercise program per protocol with independence within 4 days. physical Therapy TREATMENT  Patient: Rafael Meredith (87 y.o. male)  Date: 3/20/2018  Diagnosis: DEGENERATIVE JOINT DISEASE RIGHT HIP  Osteoarthritis of right hip  Degenerative joint disease (DJD) of hip Osteoarthritis of right hip  Procedure(s) (LRB):  RIGHT TOTAL HIP REPLACEMENT ANTERIOR APPROACH   (Right) 1 Day Post-Op  Precautions: WBAT  Chart, physical therapy assessment, plan of care and goals were reviewed. ASSESSMENT:  Patient was able to ambulate farther this afternoon compared to the morning session, but still had great difficulty with supine to sit and sit to stand. This appears to be due to R knee tightness in flexion. When sitting, he has quite a bit of difficulty with getting his R knee flexed enough to utilize the RLE to assist with sit to stand. He also needs constant cues to stand upright when walking, evidence that he has, in fact, been using furniture to support himself walking around at home for quite some time. Due to his generalized weakness, R knee stiffness and R hip pain related to RAMO, recommend rehab prior to discharge home in order to ensure safe transition to living alone with intermittent help.   Progression toward goals:  []      Improving appropriately and progressing toward goals  [x]      Improving slowly and progressing toward goals  []      Not making progress toward goals and plan of care will be adjusted     PLAN:  Patient continues to benefit from skilled intervention to address the above impairments. Continue treatment per established plan of care. Discharge Recommendations:  Rehab  Further Equipment Recommendations for Discharge:  He will need wheels for his walker or a new RW     SUBJECTIVE:   No complaints    OBJECTIVE DATA SUMMARY:   Functional Mobility Training:  Bed Mobility:     Supine to Sit: Moderate assistance; Additional time              Transfers:  Sit to Stand: Moderate assistance; Adaptive equipment; Additional time  Stand to Sit: Minimum assistance; Adaptive equipment; Additional time        Bed to Chair: Moderate assistance; Adaptive equipment; Additional time                    Ambulation/Gait Training:  Distance (ft): 90 Feet (ft)  Assistive Device: Gait belt;Walker, rolling  Ambulation - Level of Assistance: Minimal assistance; Moderate assistance; Adaptive equipment; Additional time (initially mod, progressed to min)        Gait Abnormalities: Antalgic;Decreased step clearance; Step to gait (difficulty advancing RLE)  Right Side Weight Bearing: As tolerated     Base of Support: Shift to left  Stance: Right decreased  Speed/Jazmin: Pace decreased (<100 feet/min); Shuffled  Step Length: Left shortened;Right shortened  Swing Pattern: Right asymmetrical                 Stairs: Therapeutic Exercises:   Exercises performed per protocol. See morning treatment note for description. Pain:  Pain Scale 1: Numeric (0 - 10)  Pain Intensity 1: 6 (\"pain is much better\")  Pain Location 1: Groin (asking to take the scd's off. Pumping action causing pain.)  Pain Orientation 1: Left  Pain Description 1: Aching  Pain Intervention(s) 1: Medication (see MAR); Ice  Activity Tolerance:   Fair  Please refer to the flowsheet for vital signs taken during this treatment.   After treatment:   [x] Patient left in no apparent distress sitting up in chair  [] Patient left in no apparent distress in bed  [x] Call bell left within reach  [x] Nursing notified  [x] Caregiver present, OT  [] Bed alarm activated    COMMUNICATION/COLLABORATION:   The patients plan of care was discussed with: Occupational Therapist, Registered Nurse and     Odalis Meyers, PT   Time Calculation: 31 mins

## 2018-03-20 NOTE — PROGRESS NOTES
Problem: Self Care Deficits Care Plan (Adult)  Goal: *Acute Goals and Plan of Care (Insert Text)  Occupational Therapy Goals  Initiated: 3/20/2018    1. Patient will perform grooming with supervision/set-up standing at sink within 3 day(s). 2.  Patient will perform bathing with supervision/set-up from chair within 3 day(s). 3.  Patient will perform upper body dressing and lower body dressing with supervision/set-up within 3 day(s). 4.  Patient will perform toilet transfers with supervision/set-up within 3 day(s). 5.  Patient will perform all aspects of toileting with supervision/set-up within 3 day(s). Occupational Therapy EVALUATION  Patient: Kun Larios (16 y.o. male)  Date: 3/20/2018  Primary Diagnosis: DEGENERATIVE JOINT DISEASE RIGHT HIP  Osteoarthritis of right hip  Degenerative joint disease (DJD) of hip  Procedure(s) (LRB):  RIGHT TOTAL HIP REPLACEMENT ANTERIOR APPROACH   (Right) 1 Day Post-Op   Precautions:   Fall, WBAT    ASSESSMENT :  Based on the objective data described below, the patient presents with decreased independence with all self care and functional mobility following admission for R THR. Pt noted with increased swelling in right thigh from above knee to hip region and reporting tightness and increased pain in this area. Encouraged pt to elevate and ice tonight to see if swelling will improve which is impairing his mobility at this time. Pt with limited knee flexion impairing lower body ADL performance due to tightness and pain in knee. He denies contractures and reports he was able to sit in chair with both legs underneath him without difficulty prior to surgery. At this time, pt unable to safely return home and recommend discharge to rehab setting. Pt is eager to regain independence and will tolerate aggressive in-pt rehab setting. Patient will benefit from skilled intervention to address the above impairments.   Patients rehabilitation potential is considered to be Good  Factors which may influence rehabilitation potential include:   []             None noted  []             Mental ability/status  []             Medical condition  []             Home/family situation and support systems  []             Safety awareness  [x]             Pain tolerance/management  []             Other:      PLAN :  Recommendations and Planned Interventions:  [x]               Self Care Training                  [x]        Therapeutic Activities  [x]               Functional Mobility Training    []        Cognitive Retraining  [x]               Therapeutic Exercises           [x]        Endurance Activities  [x]               Balance Training                   []        Neuromuscular Re-Education  []               Visual/Perceptual Training     [x]   Home Safety Training  [x]               Patient Education                 [x]        Family Training/Education  []               Other (comment):    Frequency/Duration: Patient will be followed by occupational therapy 5 times a week to address goals. Discharge Recommendations: Inpatient Rehab  Further Equipment Recommendations for Discharge: TBD     SUBJECTIVE:   Patient stated I am feeling alright.     OBJECTIVE DATA SUMMARY:   HISTORY:   Past Medical History:   Diagnosis Date    Arthritis     Mild anemia     Prostate cancer ([facility] [address] 2008 2017    PALLIATIVE CARE PER DR LAMBERT FARR NOTES 2/12/2018: Stephanie Griggs, AND PAST EXTERNAL BEAM RADIATION THERAPY     Past Surgical History:   Procedure Laterality Date    HX BUNIONECTOMY Right 2014    HX CATARACT REMOVAL Bilateral 2017 2018    HX COLONOSCOPY      HX GI  2009    ANAL FISTULA     HX HEMORRHOIDECTOMY  1974    HX TONSILLECTOMY  1958       Prior Level of Function/Environment/Context: pt was independent at home prior to surgery.    Expanded or extensive additional review of patient history:     Home Situation  Home Environment: Private residence  # Steps to Enter: 4  Rails to Enter: Yes  [employer] Rails : Bilateral  One/Two Story Residence: One story  Living Alone: Yes  Support Systems: Family member(s)  Patient Expects to be Discharged to[de-identified] Rehabilitation facility  Current DME Used/Available at Home: Walker  Tub or Shower Type: Shower  [x]  Right hand dominant   []  Left hand dominant    EXAMINATION OF PERFORMANCE DEFICITS:  Cognitive/Behavioral Status:  Neurologic State: Alert  Orientation Level: Oriented X4  Cognition: Appropriate for age attention/concentration  Perception: Appears intact  Perseveration: No perseveration noted  Safety/Judgement: Good awareness of safety precautions    Skin: see nursing notes    Edema: none noted    Hearing: Auditory  Auditory Impairment: None    Vision/Perceptual:                           Acuity: Within Defined Limits         Range of Motion:    AROM: Within functional limits  PROM: Within functional limits                      Strength:    Strength: Within functional limits                Coordination:  Coordination: Within functional limits  Fine Motor Skills-Upper: Right Intact; Left Intact    Gross Motor Skills-Upper: Right Intact; Left Intact    Tone & Sensation:    Tone: Normal  Sensation: Intact                      Balance:  Sitting: Intact; Without support  Standing: Impaired; With support  Standing - Static: Fair  Standing - Dynamic : Fair    Functional Mobility and Transfers for ADLs:  Bed Mobility:  Supine to Sit:  (received in chair)  Sit to Supine:  (remained in chair)    Transfers:  Sit to Stand: Moderate assistance  Stand to Sit: Moderate assistance  Bed to Chair: Moderate assistance  Toilet Transfer : Moderate assistance    ADL Assessment:  Feeding: Supervision    Oral Facial Hygiene/Grooming: Supervision    Bathing: Maximum assistance    Upper Body Dressing: Minimum assistance    Lower Body Dressing: Maximum assistance    Toileting:  Moderate assistance                ADL Intervention and task modifications:     Adaptive Equipment Training:  Pt educated on use of AE to complete lower body dressing. Pt received skilled instruction on the following tasks: donning socks, and doffing socks. Discussed dressing surgical LE first to maximize independence. Provided education for energy conservation and pain management for sit to stand to complete lower body dressing tasks. Pt demonstrated good understanding of training provided. Cognitive Retraining  Safety/Judgement: Good awareness of safety precautions    Functional Measure:  Barthel Index:    Bathin  Bladder: 10  Bowels: 10  Groomin  Dressin  Feeding: 10  Mobility: 5  Stairs: 0  Toilet Use: 5  Transfer (Bed to Chair and Back): 5  Total: 55       Barthel and G-code impairment scale:  Percentage of impairment CH  0% CI  1-19% CJ  20-39% CK  40-59% CL  60-79% CM  80-99% CN  100%   Barthel Score 0-100 100 99-80 79-60 59-40 20-39 1-19   0   Barthel Score 0-20 20 17-19 13-16 9-12 5-8 1-4 0      The Barthel ADL Index: Guidelines  1. The index should be used as a record of what a patient does, not as a record of what a patient could do. 2. The main aim is to establish degree of independence from any help, physical or verbal, however minor and for whatever reason. 3. The need for supervision renders the patient not independent. 4. A patient's performance should be established using the best available evidence. Asking the patient, friends/relatives and nurses are the usual sources, but direct observation and common sense are also important. However direct testing is not needed. 5. Usually the patient's performance over the preceding 24-48 hours is important, but occasionally longer periods will be relevant. 6. Middle categories imply that the patient supplies over 50 per cent of the effort. 7. Use of aids to be independent is allowed. Jessica Barragan., Barthel, D.W. ([id number]). Functional evaluation: the Barthel Index. [address] [facility] (14)2.   Larna Glaze annabelle [place], J.J.M.F, Paola Dia., Wandy Curiel. (1999). Measuring the change indisability after inpatient rehabilitation; comparison of the responsiveness of the Barthel Index and Functional Ronda Measure. Journal of Neurology, Neurosurgery, and Psychiatry, 66(4), 547-496. JOVANI Romero, DANIEL Schroeder, & Maria Fernanda Alba M.A. (2004.) Assessment of post-stroke quality of life in cost-effectiveness studies: The usefulness of the Barthel Index and the EuroQoL-5D. Quality of Life Research, 13, 284-15     G codes: In compliance with CMSs Claims Based Outcome Reporting, the following G-code set was chosen for this patient based on their primary functional limitation being treated: The outcome measure chosen to determine the severity of the functional limitation was the Barthel Index with a score of 55/100 which was correlated with the impairment scale. ? Self Care:     - CURRENT STATUS: CK - 40%-59% impaired, limited or restricted    - GOAL STATUS: CI - 1%-19% impaired, limited or restricted    - D/C STATUS:  ---------------To be determined---------------     Occupational Therapy Evaluation Charge Determination   History Examination Decision-Making   LOW Complexity : Brief history review  MEDIUM Complexity : 3-5 performance deficits relating to physical, cognitive , or psychosocial skils that result in activity limitations and / or participation restrictions MEDIUM Complexity : Patient may present with comorbidities that affect occupational performnce. Miniml to moderate modification of tasks or assistance (eg, physical or verbal ) with assesment(s) is necessary to enable patient to complete evaluation       Based on the above components, the patient evaluation is determined to be of the following complexity level: LOW   Pain:  Pain Scale 1: Numeric (0 - 10)  Pain Intensity 1: 6 (\"pain is much better\")  Pain Location 1: Groin (asking to take the scd's off.   Pumping action causing pain.)  Pain Orientation 1: Left  Pain Description 1: Aching  Pain Intervention(s) 1: Medication (see MAR); Ice  Activity Tolerance:   VSS throughout session. After treatment:   [x] Patient left in no apparent distress sitting up in chair  [] Patient left in no apparent distress in bed  [x] Call bell left within reach  [x] Nursing notified  [] Caregiver present  [] Bed alarm activated    COMMUNICATION/EDUCATION:   The patients plan of care was discussed with: Physical Therapist and Registered Nurse. [x] Home safety education was provided and the patient/caregiver indicated understanding. [] Patient/family have participated as able in goal setting and plan of care. [x] Patient/family agree to work toward stated goals and plan of care. [] Patient understands intent and goals of therapy, but is neutral about his/her participation. [] Patient is unable to participate in goal setting and plan of care. This patients plan of care is appropriate for delegation to [facility].     Thank you for this referral.  Ishmael Tim OT  Time Calculation: 25 mins

## 2018-03-20 NOTE — PROGRESS NOTES
Problem: Falls - Risk of  Goal: *Absence of Falls  Document Sam Fall Risk and appropriate interventions in the flowsheet.    Outcome: Progressing Towards Goal  Fall Risk Interventions:  Mobility Interventions: Communicate number of staff needed for ambulation/transfer, Patient to call before getting OOB         Medication Interventions: Patient to call before getting OOB, Teach patient to arise slowly    Elimination Interventions: Call light in reach, Patient to call for help with toileting needs    History of Falls Interventions: Consult care management for discharge planning, Evaluate medications/consider consulting pharmacy

## 2018-03-20 NOTE — PROGRESS NOTES
Care Management Interventions  PCP Verified by CM: Yes (doctors at [address] [place] [address]. He is 100% sercive connected. He does not remember the doctor's names)  Palliative Care Criteria Met (RRAT>21 & CHF Dx)?: No  Mode of Transport at Discharge: Self  Transition of Care Consult (CM Consult): Home Health, SNF (has friend/ that will assist at home. HH vs Acute Rehab)  [address]: No  Reason Outside [facility]: Out of service area ([facility] [employer])  [facility] [address] [place]: No  Discharge Durable Medical Equipment: No (has walker w/o wheels, may need RW if going home)  Physical Therapy Consult: Yes  Occupational Therapy Consult: Yes  Speech Therapy Consult: No  Current Support Network: Own Home, Lives Alone  Confirm Follow Up Transport: Friends  Plan discussed with Pt/Family/Caregiver: Yes  Freedom of Choice Offered: Yes (100% service connected)  [address] Provided?: Yes  Discharge Location  Discharge Placement:  ([facility])    Reason for Admission:    Right Total Hip Replacement (Anterior Approach)    RRAT Score:     5   Plan for utilizing home health:       [address] [employer] vs Acute Rehab at [facility] of Readmission:   Moderate if the patient went home today. Referral sent to [address] via [address]. CRM will follow the patient's therapy progress/discharge planning. This patient is a medicare bundle. OT evaluation pending. [address] [place] [address] accepted the case. [facility] is reviewing pending OT notes. KJT    3:10pm OT notes pending. Will send updates to  once available. KJT    4:16pm CRM sent updates with OT note to [facility] [place] via [employer]. KJT    The patient has been approved for acute Rehab at [facility]. Will follow for discharge tomorrow if medically stable.  KJT

## 2018-03-20 NOTE — PROGRESS NOTES
Orthopedic Joint Progress Note    2018  Admit Date: 3/19/2018  Admit Diagnosis: DEGENERATIVE JOINT DISEASE RIGHT HIP  Osteoarthritis of right hip  Degenerative joint disease (DJD) of hip    Post Op day: 1 Day Post-Op    Subjective:     Katia Blood states that he is feeling good overall. Some hip pain but better than prior to surgery. Was able to participate with PT yesterday but had some issues with even simple tasks. No new complaints. Hgb 10.4. Tolerating diet  Denies N/V/SOB or CP      Objective:     PT/OT:     PATIENT MOBILITY    Bed Mobility Training  Supine to Sit: Minimum assistance, Additional time  Sit to Supine: Minimum assistance, Additional time  Scooting: Additional time  Transfer Training  Sit to Stand: Assist x2, Minimum assistance  Stand to Sit: Assist x2, Minimum assistance          Weight Bearing Status  Right Side Weight Bearing: As tolerated        Vital Signs:    Blood pressure 124/72, pulse 73, temperature 98 °F (36.7 °C), resp. rate 16, height 5' 6\" (1.676 m), weight 74.8 kg (165 lb), SpO2 100 %.   Temp (24hrs), Av.5 °F (33.6 °C), Min:37.4 °F (3 °C), Max:98.4 °F (36.9 °C)      Pain Control:   Pain Assessment  Pain Scale 1: Numeric (0 - 10)  Pain Intensity 1: 10  Pain Onset 1: post op  Pain Location 1: Incisional, Hip  Pain Orientation 1: Right, Lateral  Pain Description 1: Aching, Intermittent, Sore  Pain Intervention(s) 1: Medication (see MAR), Ice    Meds:  Current Facility-Administered Medications   Medication Dose Route Frequency    tamsulosin (FLOMAX) capsule 0.4 mg  0.4 mg Oral DAILY    0.9% sodium chloride infusion  125 mL/hr IntraVENous CONTINUOUS    sodium chloride 0.9 % bolus infusion 500 mL  500 mL IntraVENous ONCE PRN    sodium chloride (NS) flush 5-10 mL  5-10 mL IntraVENous Q8H    sodium chloride (NS) flush 5-10 mL  5-10 mL IntraVENous PRN    naloxone (NARCAN) injection 0.4 mg  0.4 mg IntraVENous PRN    senna-docusate (PERICOLACE) 8.6-50 mg per tablet 1 Tab  1 Tab Oral BID    polyethylene glycol (MIRALAX) packet 17 g  17 g Oral DAILY    [START ON 3/21/2018] bisacodyl (DULCOLAX) suppository 10 mg  10 mg Rectal DAILY PRN    acetaminophen (TYLENOL) tablet 650 mg  650 mg Oral Q6H PRN    oxyCODONE IR (ROXICODONE) tablet 5 mg  5 mg Oral Q3H PRN    oxyCODONE IR (ROXICODONE) tablet 10 mg  10 mg Oral Q3H PRN    ketorolac (TORADOL) injection 15 mg  15 mg IntraVENous Q6H    HYDROmorphone (PF) (DILAUDID) injection 0.5 mg  0.5 mg IntraVENous Q4H PRN    ondansetron (ZOFRAN) injection 4 mg  4 mg IntraVENous Q4H PRN    hydrOXYzine HCl (ATARAX) tablet 10 mg  10 mg Oral Q8H PRN    rivaroxaban (XARELTO) tablet 10 mg  10 mg Oral DAILY WITH LUNCH    [START ON 3/21/2018] meloxicam (MOBIC) tablet 15 mg  15 mg Oral DAILY        LAB:    Lab Results   Component Value Date/Time    INR 1.0 02/22/2018 08:44 AM     Lab Results   Component Value Date/Time    HGB 10.4 (L) 03/20/2018 05:41 AM    HGB 12.0 (L) 02/22/2018 08:44 AM         Dressing:      Wound:       Physical Exam:    Dressing is clean, dry, and intact  Neurovascular checks within normal limits  Orientation:  Oriented    Assessment:      Principal Problem:    Osteoarthritis of right hip (3/19/2018)    Active Problems:    Degenerative joint disease (DJD) of hip (3/19/2018)         Plan:     Continue PT/OT/Rehab  Consult: Rehab team including PT, OT, recreational therapy, and    Cont Tylenol (will make scheduled as opposed to PRN) and Oxycodone for pain  Cont toradol  Cont Xarelto for dvt prophylaxis  Case Management for disposition planning - Care Advantage discussion    Discharge To:  home health hopefully but possibly rehab       Signed By: ZAINAB Vazquez 48

## 2018-03-20 NOTE — PROGRESS NOTES
Spiritual Care Partner Volunteer visited patient in Rm 565 on 3/20/2018.   Documented by:  Chaplain Garcia MDiv, MS, [address] ([id number])

## 2018-03-21 VITALS
TEMPERATURE: 99 F | OXYGEN SATURATION: 96 % | HEART RATE: 90 BPM | BODY MASS INDEX: 26.52 KG/M2 | RESPIRATION RATE: 16 BRPM | WEIGHT: 165 LBS | SYSTOLIC BLOOD PRESSURE: 130 MMHG | DIASTOLIC BLOOD PRESSURE: 70 MMHG | HEIGHT: 66 IN

## 2018-03-21 LAB — HGB BLD-MCNC: 11.3 G/DL (ref 12.1–17)

## 2018-03-21 PROCEDURE — 74011250637 HC RX REV CODE- 250/637: Performed by: PHYSICIAN ASSISTANT

## 2018-03-21 PROCEDURE — 36415 COLL VENOUS BLD VENIPUNCTURE: CPT | Performed by: PHYSICIAN ASSISTANT

## 2018-03-21 PROCEDURE — 97116 GAIT TRAINING THERAPY: CPT

## 2018-03-21 PROCEDURE — 85018 HEMOGLOBIN: CPT | Performed by: PHYSICIAN ASSISTANT

## 2018-03-21 PROCEDURE — 97530 THERAPEUTIC ACTIVITIES: CPT

## 2018-03-21 RX ADMIN — Medication 10 ML: at 05:38

## 2018-03-21 RX ADMIN — RIVAROXABAN 10 MG: 10 TABLET, FILM COATED ORAL at 11:46

## 2018-03-21 RX ADMIN — OXYCODONE HYDROCHLORIDE 5 MG: 5 TABLET ORAL at 10:34

## 2018-03-21 RX ADMIN — POLYETHYLENE GLYCOL 3350 17 G: 17 POWDER, FOR SOLUTION ORAL at 08:29

## 2018-03-21 RX ADMIN — OXYCODONE HYDROCHLORIDE 10 MG: 5 TABLET ORAL at 05:38

## 2018-03-21 RX ADMIN — DOCUSATE SODIUM AND SENNOSIDES 1 TABLET: 8.6; 5 TABLET, FILM COATED ORAL at 08:29

## 2018-03-21 RX ADMIN — TAMSULOSIN HYDROCHLORIDE 0.4 MG: 0.4 CAPSULE ORAL at 08:29

## 2018-03-21 RX ADMIN — MELOXICAM 15 MG: 7.5 TABLET ORAL at 08:29

## 2018-03-21 RX ADMIN — ACETAMINOPHEN 650 MG: 325 TABLET, FILM COATED ORAL at 11:46

## 2018-03-21 RX ADMIN — ACETAMINOPHEN 650 MG: 325 TABLET, FILM COATED ORAL at 05:38

## 2018-03-21 NOTE — PROGRESS NOTES
I have reviewed discharge instructions with the patient. The patient verbalized understanding. Patient is being discharged to [facility] for rehabilitation in [place]. Patient being discharged with all belongings and paperwork, including copies of the Kardex/MAR and EMTALA. Patient being transported to [facility] via EMS transport.

## 2018-03-21 NOTE — PROGRESS NOTES
TRANSFER - OUT REPORT:    Verbal report given to Amber Quintanilla RN (name) on Elizabeth Gann  being transferred to [facility] (unit) for routine progression of care       Report consisted of patients Situation, Background, Assessment and   Recommendations(SBAR). Information from the following report(s) SBAR, Kardex, Intake/Output, MAR and Recent Results was reviewed with the receiving nurse. Opportunity for questions and clarification was provided. Patient transported with: all belongings from home.

## 2018-03-21 NOTE — PROGRESS NOTES
Bedside shift change report given to Minoo Henderson RN (oncoming nurse) by Charis Mauro RN (offgoing nurse). Report included the following information SBAR, Kardex and Recent Results.

## 2018-03-21 NOTE — PROGRESS NOTES
Bedside shift change report given to Belkys (oncoming nurse) by Sho Adamson (offgoing nurse). Report included the following information SBAR and Kardex.

## 2018-03-21 NOTE — PROGRESS NOTES
Problem: Mobility Impaired (Adult and Pediatric)  Goal: *Acute Goals and Plan of Care (Insert Text)  Physical Therapy Goals  Initiated 3/19/2018    1. Patient will move from supine to sit and sit to supine  and scoot up and down in bed with modified independence within 4 days. 2. Patient will perform sit to stand with modified independence within 4 days. 3. Patient will ambulate with modified independence for 200 feet with the least restrictive device within 4 days. 4. Patient will ascend/descend 4 stairs with  handrail(s) with modified independence within 4 days. 5. Patient will perform RAMO home exercise program per protocol with independence within 4 days. physical Therapy TREATMENT  Patient: Laura Dillon (63 y.o. male)  Date: 3/21/2018  Diagnosis: DEGENERATIVE JOINT DISEASE RIGHT HIP  Osteoarthritis of right hip  Degenerative joint disease (DJD) of hip Osteoarthritis of right hip  Procedure(s) (LRB):  RIGHT TOTAL HIP REPLACEMENT ANTERIOR APPROACH   (Right) 2 Days Post-Op  Precautions: Fall, WBAT  Chart, physical therapy assessment, plan of care and goals were reviewed. ASSESSMENT:  Patient making slow progress with his mobility, but he continues to need mod to max assist to get to the EOB. He is not able to scoot laterally and had quite a bit of difficulty even getting to supine flat in the bed without bending his knees up. Worked on hip flexor stretching to neutral hip position in bed. Once sitting, he needs mod assist to maintain sitting balance until he is able to get his R hip to flex enough to sit without support. Sit to stand also requires hands on assistance. Once he is up and vertical, he is able to walk and is making progress with his overall gait pattern, but still very high risk for falls. Patient is not safe for discharge home and do not anticipate he will be able to mobilize himself safely into and out of the bed in the next several PT sessions.   Recommend inpatient rehab to continue progression of therapy and enable safe return home. Discussed this with patient, and although he acknowledges that he is not able to mobilize independently, he does not seem to understand that he is ready for rehab today, thinking he needs to stay until tomorrow and then to rehab. Tried repeatedly to explain the process to him, but he is not receptive to explanation, consistently stating that \"it is not the 3rd day, since the day of surgery doesn't count. \"  Discussed with RN and CM and PA. All are in agreement with rehab transfer. Progression toward goals:  []      Improving appropriately and progressing toward goals  [x]      Improving slowly and progressing toward goals  []      Not making progress toward goals and plan of care will be adjusted     PLAN:  Patient continues to benefit from skilled intervention to address the above impairments. Continue treatment per established plan of care. Discharge Recommendations:  Inpatient Rehab  Further Equipment Recommendations for Discharge: To be determined in rehab     SUBJECTIVE:   Patient stated I am stiff.     OBJECTIVE DATA SUMMARY:   Critical Behavior:  Neurologic State: Alert, Appropriate for age  Orientation Level: Oriented X4  Cognition: Appropriate decision making, Appropriate for age attention/concentration, Appropriate safety awareness, Follows commands  Safety/Judgement: Good awareness of safety precautions  Functional Mobility Training:  Bed Mobility:     Supine to Sit: Maximum assistance (unable to scoot or move supine to sit)     Scooting: Maximum assistance        Transfers:  Sit to Stand: Minimum assistance; Adaptive equipment; Additional time (plus verbal cues for safety)  Stand to Sit: Additional time; Adaptive equipment;Minimum assistance (with verbal cues, as well)        Bed to Chair: Minimum assistance; Adaptive equipment; Additional time                    Balance:  Sitting: Intact; Without support (once he has been sitting, at first, min assist)  Standing: Impaired; With support  Standing - Static: Fair  Standing - Dynamic : Fair  Ambulation/Gait Training:  Distance (ft): 120 Feet (ft)  Assistive Device: Gait belt;Walker, rolling  Ambulation - Level of Assistance: Adaptive equipment; Additional time;Minimal assistance        Gait Abnormalities: Antalgic;Decreased step clearance; Step to gait (progressed to step through, some vaulting on the LLE )  Right Side Weight Bearing: As tolerated     Base of Support: Shift to left; Widened  Stance: Right decreased  Speed/Jazmin: Pace decreased (<100 feet/min)  Step Length: Left shortened;Right shortened  Swing Pattern: Right asymmetrical                 Stairs:              Therapeutic Exercises:   SUPINE  EXERCISES   Sets   Reps   Active Active Assist   Passive Self ROM   Comments   Ankle Pumps   [x]                                        []                                        []                                        []                                           Quad Sets   [x]                                        []                                        []                                        []                                           Heel Slides   []                                        [x]                                        []                                        []                                           Hip Abduction   []                                        []                                        []                                        []                                           Glut Sets   []                                        []                                        []                                        []                                              []                                        []                                        []                                        []                                              []                                        [] []                                        []                                             STANDING  EXERCISES   Sets   Reps   Active Active Assist   Passive Self ROM   Comments   Heel Raises   []                                        []                                        []                                        []                                           Hip Abduction   []                                        []                                        []                                        []                                              []                                        []                                        []                                        []                                              []                                        []                                        []                                        []                                             Pain:  Pain Scale 1: Numeric (0 - 10)  Pain Intensity 1: 3  Pain Location 1: Hip  Pain Orientation 1: Left  Pain Description 1: Aching  Pain Intervention(s) 1: Medication (see MAR)  Activity Tolerance:   Good  Please refer to the flowsheet for vital signs taken during this treatment.   After treatment:   [x] Patient left in no apparent distress sitting up in chair  [] Patient left in no apparent distress in bed  [x] Call bell left within reach  [x] Nursing notified  [x] Caregiver present  [] Bed alarm activated    COMMUNICATION/COLLABORATION:   The patients plan of care was discussed with: Occupational Therapist, Registered Nurse,  and JERRI Lagos, PT   Time Calculation: 33 mins

## 2018-03-21 NOTE — PROGRESS NOTES
[facility]  SBAR Bundled Payment Handoff     FROM:                                TO: Health [facility]                                                      ([facility] or Facility name)  [address]  [address] [address]  Dept: [address]: [phone]                                      Room#:  565/01                                                      Discharging Nurse:  Rhiannon Rodriguez RN  Unit DY#:[phone]         SITUATION      ASAScore: ASA 2 - Patient with mild systemic disease with no functional limitations    Admitted:  3/19/2018  Hospital Day: 3      Attending Provider:  Jairo Cordova MD     Consultations:  None    PCP:  Robert Landrum MD   None     Admitting Dx:  DEGENERATIVE JOINT DISEASE RIGHT HIP  Osteoarthritis of right hip  Degenerative joint disease (DJD) of hip       Principal Problem:    Osteoarthritis of right hip (3/19/2018)    Active Problems:    Degenerative joint disease (DJD) of hip (3/19/2018)      2 Days Post-Op of   Procedure(s):  RIGHT TOTAL HIP REPLACEMENT ANTERIOR APPROACH     BY: Jairo Cordova MD             ON: 3/19/2018                  Code Status: Full Code             Advance Directive? No Doesnt Have (Send w/patient)     Isolation:  There are currently no Active Isolations       MDRO: No current active infections    BACKGROUND     Allergies:  No Known Allergies    Past Medical History:   Diagnosis Date    Arthritis     Mild anemia     Prostate cancer ([facility] [address] 2008 2017    PALLIATIVE CARE PER DR LAMBERT FARR NOTES 2/12/2018: Mini Fines, AND PAST EXTERNAL BEAM RADIATION THERAPY       Past Surgical History:   Procedure Laterality Date    HX BUNIONECTOMY Right 2014    HX CATARACT REMOVAL Bilateral 2017 2018    HX COLONOSCOPY      HX GI  2009    ANAL FISTULA     HX HEMORRHOIDECTOMY  1974    HX TONSILLECTOMY  1958       Prior to Admission Medications   Prescriptions Last Dose Informant Patient Reported? Taking? CALCIUM CARBONATE/VITAMIN D3 (CALTRATE-600 PLUS VITAMIN D3 PO) 3/17/2018 at 0800  Yes Yes   Sig: Take 2 Tabs by mouth daily. enzalutamide (XTANDI) 40 mg capsule 3/17/2018 at 0800  Yes No   Sig: Take 160 mg by mouth daily. ibuprofen (ADVIL) 200 mg tablet 3/12/2018 at Unknown time  Yes Yes   Sig: Take 400 mg by mouth every eight (8) hours as needed for Pain.   tamsulosin (FLOMAX) 0.4 mg capsule 3/17/2018 at 0800  Yes No   Sig: Take 0.4 mg by mouth daily. thiamine (VITAMIN B-1) 100 mg tablet 3/17/2018 at 0800  Yes No   Sig: Take 100 mg by mouth daily. Facility-Administered Medications: None       Vaccinations: There is no immunization history on file for this patient. ASSESSMENT   Age: 77 y.o. Gender: male        Height: Height: 5' 6\" (167.6 cm)                    Weight:Weight: 74.8 kg (165 lb)     Patient Vitals for the past 8 hrs:   Temp Pulse Resp BP SpO2   03/21/18 0808 98.3 °F (36.8 °C) 85 16 135/75 95 %   03/21/18 0547 - - - 150/78 -            Active Orders   Diet    DIET REGULAR       Orientation: Orientation Level: Oriented X4    Active Lines/Drains:  (Peg Tube / Farrell / CL or S/L?):no    Urinary Status: Voiding      Last BM: Last Bowel Movement Date: 03/18/18     Skin Integrity: Incision (comment) (R hip)   Wound Hip Anterior;Right-DRESSING STATUS: Clean, dry, and intact    Wound Hip Anterior;Right-DRESSING TYPE: Silver products (aquacel)    Mobility: Slightly limited   Weight Bearing Status: WBAT (Weight Bearing as Tolerated)      Gait Training  Assistive Device: Gait belt, Walker, rolling  Ambulation - Level of Assistance: Minimal assistance, Moderate assistance, Adaptive equipment, Additional time (initially mod, progressed to min)  Distance (ft): 90 Feet (ft)     On Anticoagulation?  YES  Xarelto                                      Last dose:  3/21/2018at 1146    Pain Medications given:  Oxycodone 5 mg Last dose: 3/21/2018 at  1034    Lab Results   Component Value Date/Time    Glucose 112 (H) 03/20/2018 05:41 AM    Hemoglobin A1c 4.5 02/22/2018 08:44 AM    INR 1.0 02/22/2018 08:44 AM    HGB 11.3 (L) 03/21/2018 02:49 AM    HGB 10.4 (L) 03/20/2018 05:41 AM    HGB 12.0 (L) 02/22/2018 08:44 AM       Readmission Risks:  Score:         RECOMMENDATION     See After Visit Summary (AVS) for:  · Discharge instructions  · After [address]   · Medication Reconciliation          [facility] Orthopaedic Nurse Navigator  ANDREAS Ramey, RN-BC       Office  [phone]  Cell      [phone]  Fax      [phone]  [email or web address]             . Phy

## 2018-03-21 NOTE — PROGRESS NOTES
The patient will discharge today to [address] via [facility] at 1:30pm. Discharge folder on the chart with report #. Emtala and discharge instructions will follow.  BETINA

## 2018-03-21 NOTE — DISCHARGE SUMMARY
Orthopedic Service Discharge Summary    Patient ID:  Lisha Barclay  [id number]  male  77 y.o.  1950    Admit date: 3/19/2018    Discharge date and time: 3/21/2018  4:47 PM     Admitting Physician: Robin Leon MD     Discharge Physician: Robin Leon MD    Consulting Physician(s): Treatment Team: Utilization Review: David Flores RN; Consulting Provider: Manuel Young NP; Care Manager: PRASANNA Correa; Physician Assistant: JERRI Cassidy    Date of Surgery: 3/19/2018     Preoperative Diagnosis:  DEGENERATIVE JOINT DISEASE RIGHT HIP    Postoperative Diagnosis: DEGENERATIVE JOINT DISEASE RIGHT HIP    Procedure(s): Procedure(s):  RIGHT TOTAL HIP REPLACEMENT ANTERIOR APPROACH      Surgeon: Surgeon(s) and Role:     Bhupinder Iqbal MD - Primary      Anesthesia:  Spinal    Preoperative Medical Clearance:                           HPI:  Pt is a 77 y.o. male who has a history of DEGENERATIVE JOINT DISEASE RIGHT HIP  Osteoarthritis of right hip  Degenerative joint disease (DJD) of hip  with pain and limitations of activities of daily living who presents at this time for a right total hip arthroplasty following the failure of conservative management. PMH:   Past Medical History:   Diagnosis Date    Arthritis     Mild anemia     Prostate cancer ([facility] [address] 2008 2017    PALLIATIVE CARE PER DR LAMBERT Matamoros Loud NOTES 2/12/2018: [facility] Lipjeanette, LUPRON, AND PAST EXTERNAL BEAM RADIATION THERAPY       Medications upon admission :   Prior to Admission Medications   Prescriptions Last Dose Informant Patient Reported? Taking? CALCIUM CARBONATE/VITAMIN D3 (CALTRATE-600 PLUS VITAMIN D3 PO) 3/17/2018 at 0800  Yes Yes   Sig: Take 2 Tabs by mouth daily. enzalutamide (XTANDI) 40 mg capsule 3/17/2018 at 0800  Yes No   Sig: Take 160 mg by mouth daily. tamsulosin (FLOMAX) 0.4 mg capsule 3/17/2018 at 0800  Yes No   Sig: Take 0.4 mg by mouth daily.    thiamine (VITAMIN B-1) 100 mg tablet 3/17/2018 at 0800  Yes No   Sig: Take 100 mg by mouth daily. Facility-Administered Medications: None        Allergies:  No Known Allergies     Hospital Course: The patient underwent surgery. Complications:  None; patient tolerated the procedure well. Was taken to the PACU in stable condition and then transferred to the Orthopedics floor. Condition on discharge:  stable    Perioperative Antibiotics:  Ancef     Postoperative Pain Management:  Acetaminophen, mobic, and oxycodone     DVT Prophylaxis: Xarelto 10 mg daily     Postoperative transfusions:     none Banked PRBCs     Post Op complications: none    Hemoglobin at discharge:    Lab Results   Component Value Date/Time    HGB 11.3 (L) 03/21/2018 02:49 AM    INR 1.0 02/22/2018 08:44 AM       Aquacel dressing intact. Incision - clean, dry and intact. No significant erythema or swelling. Neurovascular exam within normal limits. Wound appears to be healing without any evidence of infection. Thigh soft. Some complaints of numbness to top of right thigh. Motor intact, quadriceps working well     Physical Therapy started on the day following surgery and progressed to ambulation with the aid of a rolling walker for distances of 50 feet with moderate assistance. Range of motion  limited by pain. At the time of discharge, able to go up and down stairs and had understanding of precautions needed following surgery. Discharged to: Rehab. [facility]    Discharge instructions:  -See Full Summary of discharge instructions attached  -Anticoagulate with Xarelto 10 mg for 30 days post op  -Resume pre hospital diet            -Resume home medications   Discharge Medication List as of 3/21/2018 11:38 AM      START taking these medications    Details   rivaroxaban (XARELTO) 10 mg tablet Take 1 Tab by mouth daily (with lunch) for 30 days. , Print, Disp-30 Tab, R-0      meloxicam (MOBIC) 7.5 mg tablet Take 1 Tab by mouth daily for 21 days. , Print, Disp-21 Tab, R-0      oxyCODONE IR (ROXICODONE) 5 mg immediate release tablet Take 1-2 Tabs by mouth every four (4) hours as needed for Pain. Max Daily Amount: 60 mg., Print, Disp-80 Tab, R-0         CONTINUE these medications which have NOT CHANGED    Details   enzalutamide (XTANDI) 40 mg capsule Take 160 mg by mouth daily. , Historical Med      tamsulosin (FLOMAX) 0.4 mg capsule Take 0.4 mg by mouth daily. , Historical Med      thiamine (VITAMIN B-1) 100 mg tablet Take 100 mg by mouth daily. , Historical Med      CALCIUM CARBONATE/VITAMIN D3 (CALTRATE-600 PLUS VITAMIN D3 PO) Take 2 Tabs by mouth daily. , Historical Med         STOP taking these medications       ibuprofen (ADVIL) 200 mg tablet Comments:   Reason for Stopping:            per medical continuation form  -Discharge activity: Activity as tolerated and PT/OT Eval and Treat  -Ambulate with Walkers, Type: Constantino Walker, appropriate total joint protocol  -Wound Care Keep wound clean and dry, Reinforce dressing PRN, Ice to area for comfort and As directed  -Follow up in office in 2 weeks      Signed:  JERRI Wallace  3/21/2018  5:21 PM        No att. providers found

## 2018-03-21 NOTE — PROGRESS NOTES
..Bedside and Verbal shift change report given to Vincent Snyder (oncoming nurse) by Ashlie Ramirez (offgoing nurse). Report included the following information SBAR.

## 2018-03-21 NOTE — PROGRESS NOTES
Ortho Daily Progress Note    3/21/2018  9:39 AM    POD:  2 Days Post-Op  S/P:  Procedure(s):  RIGHT TOTAL HIP REPLACEMENT ANTERIOR APPROACH      Afebrile/VSS  Doing well without complaints of nausea  Pain well controlled  Calves soft/NTTP Bilaterally  Incision OK, no drainage or dehiscence. Dressing clean and dry  Thigh soft  Some c/o numbness to top of thigh, quad function intact  Moving lower extremities well. Neurocirculatory exam intact and within normal range.     Lab Results   Component Value Date/Time    HGB 11.3 (L) 03/21/2018 02:49 AM    INR 1.0 02/22/2018 08:44 AM         PLAN:  DVT prophylaxis: Xarelto 10 mg daily for 30 days post op  WBAT with PT-mobilization  Pain Control  Plan to D/C to [facility] in [place], [place]

## 2018-04-09 ENCOUNTER — PATIENT OUTREACH (OUTPATIENT)
Dept: OTHER | Age: 68
End: 2018-04-09

## 2018-08-01 ENCOUNTER — OP HISTORICAL/CONVERTED ENCOUNTER (OUTPATIENT)
Dept: OTHER | Age: 68
End: 2018-08-01

## 2018-09-05 ENCOUNTER — OP HISTORICAL/CONVERTED ENCOUNTER (OUTPATIENT)
Dept: OTHER | Age: 68
End: 2018-09-05

## 2018-10-01 ENCOUNTER — OP HISTORICAL/CONVERTED ENCOUNTER (OUTPATIENT)
Dept: OTHER | Age: 68
End: 2018-10-01

## 2021-09-10 ENCOUNTER — HOSPITAL ENCOUNTER (EMERGENCY)
Age: 71
Discharge: HOME | End: 2021-09-10
Payer: MEDICARE

## 2021-09-10 ENCOUNTER — APPOINTMENT (OUTPATIENT)
Dept: GENERAL RADIOLOGY | Age: 71
End: 2021-09-10
Attending: NURSE PRACTITIONER
Payer: MEDICARE

## 2021-09-10 VITALS
WEIGHT: 185 LBS | OXYGEN SATURATION: 95 % | HEART RATE: 69 BPM | BODY MASS INDEX: 27.4 KG/M2 | SYSTOLIC BLOOD PRESSURE: 182 MMHG | DIASTOLIC BLOOD PRESSURE: 79 MMHG | TEMPERATURE: 99.1 F | HEIGHT: 69 IN | RESPIRATION RATE: 16 BRPM

## 2021-09-10 DIAGNOSIS — M54.41 CHRONIC MIDLINE LOW BACK PAIN WITH RIGHT-SIDED SCIATICA: ICD-10-CM

## 2021-09-10 DIAGNOSIS — M16.11 PRIMARY OSTEOARTHRITIS OF RIGHT HIP: ICD-10-CM

## 2021-09-10 DIAGNOSIS — G89.29 CHRONIC MIDLINE LOW BACK PAIN WITH RIGHT-SIDED SCIATICA: ICD-10-CM

## 2021-09-10 DIAGNOSIS — K59.00 CONSTIPATION, UNSPECIFIED CONSTIPATION TYPE: Primary | ICD-10-CM

## 2021-09-10 LAB
ALBUMIN SERPL-MCNC: 3.7 G/DL (ref 3.5–5)
ALBUMIN/GLOB SERPL: 0.9 {RATIO} (ref 1.1–2.2)
ALP SERPL-CCNC: 104 U/L (ref 45–117)
ALT SERPL-CCNC: 25 U/L (ref 12–78)
ANION GAP SERPL CALC-SCNC: 7 MMOL/L (ref 5–15)
APPEARANCE UR: CLEAR
AST SERPL W P-5'-P-CCNC: 24 U/L (ref 15–37)
BACTERIA URNS QL MICRO: NEGATIVE /HPF
BASOPHILS # BLD: 0.1 K/UL (ref 0–0.1)
BASOPHILS NFR BLD: 1 % (ref 0–1)
BILIRUB SERPL-MCNC: 0.4 MG/DL (ref 0.2–1)
BILIRUB UR QL: NEGATIVE
BUN SERPL-MCNC: 11 MG/DL (ref 6–20)
BUN/CREAT SERPL: 16 (ref 12–20)
CA-I BLD-MCNC: 9.5 MG/DL (ref 8.5–10.1)
CHLORIDE SERPL-SCNC: 109 MMOL/L (ref 97–108)
CO2 SERPL-SCNC: 27 MMOL/L (ref 21–32)
COLOR UR: ABNORMAL
CREAT SERPL-MCNC: 0.68 MG/DL (ref 0.7–1.3)
DIFFERENTIAL METHOD BLD: ABNORMAL
EOSINOPHIL # BLD: 0.1 K/UL (ref 0–0.4)
EOSINOPHIL NFR BLD: 2 % (ref 0–7)
ERYTHROCYTE [DISTWIDTH] IN BLOOD BY AUTOMATED COUNT: 12.3 % (ref 11.5–14.5)
GLOBULIN SER CALC-MCNC: 4 G/DL (ref 2–4)
GLUCOSE SERPL-MCNC: 87 MG/DL (ref 65–100)
GLUCOSE UR STRIP.AUTO-MCNC: NEGATIVE MG/DL
HCT VFR BLD AUTO: 38.4 % (ref 36.6–50.3)
HGB BLD-MCNC: 12.3 G/DL (ref 12.1–17)
HGB UR QL STRIP: NEGATIVE
IMM GRANULOCYTES # BLD AUTO: 0 K/UL (ref 0–0.04)
IMM GRANULOCYTES NFR BLD AUTO: 0 % (ref 0–0.5)
KETONES UR QL STRIP.AUTO: 20 MG/DL
LEUKOCYTE ESTERASE UR QL STRIP.AUTO: NEGATIVE
LYMPHOCYTES # BLD: 2.4 K/UL (ref 0.8–3.5)
LYMPHOCYTES NFR BLD: 38 % (ref 12–49)
MCH RBC QN AUTO: 30.8 PG (ref 26–34)
MCHC RBC AUTO-ENTMCNC: 32 G/DL (ref 30–36.5)
MCV RBC AUTO: 96.2 FL (ref 80–99)
MONOCYTES # BLD: 0.6 K/UL (ref 0–1)
MONOCYTES NFR BLD: 9 % (ref 5–13)
MUCOUS THREADS URNS QL MICRO: ABNORMAL /LPF
NEUTS SEG # BLD: 3.2 K/UL (ref 1.8–8)
NEUTS SEG NFR BLD: 50 % (ref 32–75)
NITRITE UR QL STRIP.AUTO: NEGATIVE
NRBC # BLD: 0 K/UL (ref 0–0.01)
NRBC BLD-RTO: 0 PER 100 WBC
PH UR STRIP: 5 [PH] (ref 5–8)
PLATELET # BLD AUTO: 203 K/UL (ref 150–400)
PMV BLD AUTO: 11.4 FL (ref 8.9–12.9)
POTASSIUM SERPL-SCNC: 4.1 MMOL/L (ref 3.5–5.1)
PROT SERPL-MCNC: 7.7 G/DL (ref 6.4–8.2)
PROT UR STRIP-MCNC: NEGATIVE MG/DL
RBC # BLD AUTO: 3.99 M/UL (ref 4.1–5.7)
RBC #/AREA URNS HPF: ABNORMAL /HPF (ref 0–5)
SODIUM SERPL-SCNC: 143 MMOL/L (ref 136–145)
SP GR UR REFRACTOMETRY: 1.02 (ref 1–1.03)
UA: UC IF INDICATED,UAUC: ABNORMAL
UROBILINOGEN UR QL STRIP.AUTO: 0.1 EU/DL (ref 0.1–1)
WBC # BLD AUTO: 6.3 K/UL (ref 4.1–11.1)
WBC URNS QL MICRO: ABNORMAL /HPF (ref 0–4)

## 2021-09-10 PROCEDURE — 80053 COMPREHEN METABOLIC PANEL: CPT

## 2021-09-10 PROCEDURE — 72072 X-RAY EXAM THORAC SPINE 3VWS: CPT

## 2021-09-10 PROCEDURE — 81001 URINALYSIS AUTO W/SCOPE: CPT

## 2021-09-10 PROCEDURE — 85025 COMPLETE CBC W/AUTO DIFF WBC: CPT

## 2021-09-10 PROCEDURE — 74011250637 HC RX REV CODE- 250/637: Performed by: NURSE PRACTITIONER

## 2021-09-10 PROCEDURE — 72100 X-RAY EXAM L-S SPINE 2/3 VWS: CPT

## 2021-09-10 PROCEDURE — 74018 RADEX ABDOMEN 1 VIEW: CPT

## 2021-09-10 PROCEDURE — 99282 EMERGENCY DEPT VISIT SF MDM: CPT

## 2021-09-10 RX ORDER — POLYETHYLENE GLYCOL 3350 17 G/17G
17 POWDER, FOR SOLUTION ORAL DAILY
Qty: 119 G | Refills: 0 | Status: SHIPPED | OUTPATIENT
Start: 2021-09-10 | End: 2021-09-10

## 2021-09-10 RX ORDER — SENNOSIDES 8.6 MG/1
1 TABLET ORAL DAILY
Qty: 5 TABLET | Refills: 0 | Status: ON HOLD | OUTPATIENT
Start: 2021-09-10 | End: 2021-09-25

## 2021-09-10 RX ORDER — PREDNISONE 20 MG/1
TABLET ORAL
Qty: 18 TABLET | Refills: 0 | Status: SHIPPED | OUTPATIENT
Start: 2021-09-10 | End: 2021-09-10

## 2021-09-10 RX ORDER — OXYCODONE AND ACETAMINOPHEN 5; 325 MG/1; MG/1
1 TABLET ORAL
Qty: 12 TABLET | Refills: 0 | Status: SHIPPED | OUTPATIENT
Start: 2021-09-10 | End: 2021-09-13

## 2021-09-10 RX ORDER — OXYCODONE AND ACETAMINOPHEN 5; 325 MG/1; MG/1
2 TABLET ORAL
Status: COMPLETED | OUTPATIENT
Start: 2021-09-10 | End: 2021-09-10

## 2021-09-10 RX ORDER — SENNOSIDES 8.6 MG/1
1 TABLET ORAL DAILY
Qty: 5 TABLET | Refills: 0 | Status: SHIPPED | OUTPATIENT
Start: 2021-09-10 | End: 2021-09-10

## 2021-09-10 RX ORDER — OXYCODONE AND ACETAMINOPHEN 5; 325 MG/1; MG/1
1 TABLET ORAL
Qty: 12 TABLET | Refills: 0 | Status: SHIPPED | OUTPATIENT
Start: 2021-09-10 | End: 2021-09-10

## 2021-09-10 RX ORDER — POLYETHYLENE GLYCOL 3350 17 G/17G
17 POWDER, FOR SOLUTION ORAL DAILY
Qty: 235 G | Refills: 0 | Status: ON HOLD | OUTPATIENT
Start: 2021-09-10 | End: 2021-09-25

## 2021-09-10 RX ORDER — PREDNISONE 20 MG/1
TABLET ORAL
Qty: 18 TABLET | Refills: 0 | Status: ON HOLD | OUTPATIENT
Start: 2021-09-10 | End: 2021-09-25

## 2021-09-10 RX ADMIN — OXYCODONE HYDROCHLORIDE AND ACETAMINOPHEN 2 TABLET: 5; 325 TABLET ORAL at 19:14

## 2021-09-10 NOTE — ED TRIAGE NOTES
Pt complains of back pain for several weeks, denies injury, pt states that his right hip is bothering him, pt has hemorrhoids that need surgery, last BM today however pt has to strain, denies urinary pain, pt had nausea 2 days ago and threw up 'some'

## 2021-09-10 NOTE — ED PROVIDER NOTES
EMERGENCY DEPARTMENT HISTORY AND PHYSICAL EXAM      Date: 9/10/2021  Patient Name: Jeni Cruz    History of Presenting Illness     Chief Complaint   Patient presents with    Back Pain       History Provided By: Patient    HPI: Jeni Cruz, 66 y.o. male with a past medical history significant hypertension, osteoarthritis and malignancy prostate cancer presents to the ED with cc of low back pain, constipation, right leg nerve pain. He reports that he has a history of back problems but that over the last week or so he has had increased low back pain up into his thoracic spine with radiation of tingling and nerve type pain down his right leg. He has a history of right hip surgery. No bladder complaints or difficulty. He does report that he had constipation but that he did have some bowel movements this morning after using suppositories but it was still hard to defecate and the stool was hard. He denies any blood in his stool. He does report an associated symptoms of vomiting x1 yesterday before he was able to have bowel movements. He has some lower abdominal tenderness that he will ports is a dull ache. He uses stool softeners frequently for his hemorrhoids. He denies any symptoms of hemorrhoids at this time. He is able to ambulate. He reports history of a fistula at one time that was repaired but that was in 2010. He denies any narcotic use. He denies any fever, chills, body aches, nausea,diarrhea, recent illnesses, known Covid exposures. He denies any hematuria, he denies any other bowel or bladder difficulties. There are no other complaints, changes, or physical findings at this time. PCP: Other, MD Robert    No current facility-administered medications on file prior to encounter. Current Outpatient Medications on File Prior to Encounter   Medication Sig Dispense Refill    enzalutamide (XTANDI) 40 mg capsule Take 160 mg by mouth daily.       tamsulosin (FLOMAX) 0.4 mg capsule Take 0.4 mg by mouth daily.  thiamine (VITAMIN B-1) 100 mg tablet Take 100 mg by mouth daily.  CALCIUM CARBONATE/VITAMIN D3 (CALTRATE-600 PLUS VITAMIN D3 PO) Take 2 Tabs by mouth daily. Past History     Past Medical History:  Past Medical History:   Diagnosis Date    Arthritis     Mild anemia     Prostate cancer ([address] 2008    PALLIATIVE CARE PER DR LAMBERT FARR NOTES 2018: Serina Coyer, AND PAST EXTERNAL BEAM RADIATION THERAPY       Past Surgical History:  Past Surgical History:   Procedure Laterality Date    HX BUNIONECTOMY Right 2014    HX CATARACT REMOVAL Bilateral 2017    HX COLONOSCOPY      HX GI  2009    ANAL FISTULA     HX HEMORRHOIDECTOMY  1974    HX HIP ARTHROSCOPY Right     HX TONSILLECTOMY  195       Family History:  Family History   Problem Relation Age of Onset    Diabetes Mother     Hypertension Mother     Asthma Father     Hypertension Sister     Kidney Disease Brother         DIALYSIS    Diabetes Brother     Diabetes Brother     Drug Abuse Brother     Mental Retardation Sister     Obesity Sister     Anesth Problems Neg Hx        Social History:  Social History     Tobacco Use    Smoking status: Former Smoker     Packs/day: 0.25     Years: 5.00     Pack years: 1.25     Quit date:      Years since quittin.7    Smokeless tobacco: Never Used   Substance Use Topics    Alcohol use: No    Drug use: No       Allergies:  No Known Allergies      Review of Systems     Review of Systems   Constitutional: Negative for chills, fatigue, fever and unexpected weight change. HENT: Negative for ear pain, postnasal drip, rhinorrhea, sinus pressure, sore throat and trouble swallowing. Eyes: Negative for discharge and visual disturbance. Respiratory: Negative for cough, chest tightness, shortness of breath and wheezing. Cardiovascular: Positive for leg swelling. Negative for chest pain and palpitations.         Chronic lower extremity edema nonpitting bilateral ankles   Gastrointestinal: Positive for abdominal pain, constipation and vomiting. Negative for abdominal distention, anal bleeding, blood in stool, diarrhea, nausea and rectal pain. Endocrine: Negative. Genitourinary: Negative for decreased urine volume, discharge, dysuria, flank pain, frequency, hematuria, penile pain, scrotal swelling, testicular pain and urgency. Musculoskeletal: Positive for back pain. Negative for gait problem, joint swelling, myalgias, neck pain and neck stiffness. Skin: Negative for rash. Allergic/Immunologic: Negative. Neurological: Negative for dizziness, syncope, weakness, numbness and headaches. Hematological: Does not bruise/bleed easily. Psychiatric/Behavioral: Negative for confusion, hallucinations, sleep disturbance and suicidal ideas. The patient is not nervous/anxious. All other systems reviewed and are negative. Physical Exam     Physical Exam  Vitals and nursing note reviewed. Constitutional:       General: He is not in acute distress. Appearance: Normal appearance. He is well-developed and normal weight. He is not ill-appearing, toxic-appearing or diaphoretic. HENT:      Head: Normocephalic and atraumatic. Right Ear: External ear normal.      Left Ear: External ear normal.      Nose: Nose normal.      Mouth/Throat:      Pharynx: Oropharynx is clear. Eyes:      Extraocular Movements: Extraocular movements intact. Conjunctiva/sclera: Conjunctivae normal.      Pupils: Pupils are equal, round, and reactive to light. Neck:      Vascular: No JVD. Trachea: No tracheal deviation. Cardiovascular:      Rate and Rhythm: Normal rate and regular rhythm. No extrasystoles are present. Pulses:           Radial pulses are 2+ on the right side and 2+ on the left side. Heart sounds: Normal heart sounds. No murmur heard. Pulmonary:      Effort: Pulmonary effort is normal.      Breath sounds: Normal breath sounds. Chest:      Chest wall: No tenderness. Abdominal:      General: Bowel sounds are normal.      Palpations: Abdomen is soft. Tenderness: There is no abdominal tenderness. There is no guarding or rebound. Musculoskeletal:         General: Normal range of motion. Cervical back: Normal, normal range of motion and neck supple. No rigidity or tenderness. Thoracic back: Tenderness present. No swelling, edema, deformity, signs of trauma, lacerations, spasms or bony tenderness. Normal range of motion. No scoliosis. Lumbar back: Tenderness present. No swelling, edema, deformity, signs of trauma, lacerations, spasms or bony tenderness. Normal range of motion. No scoliosis. Right lower leg: Swelling present. No deformity, lacerations, tenderness or bony tenderness. 1+ Edema present. Left lower leg: Swelling present. No deformity, lacerations, tenderness or bony tenderness. 1+ Edema present. Skin:     General: Skin is warm and dry. Capillary Refill: Capillary refill takes less than 2 seconds. Findings: No erythema. Neurological:      General: No focal deficit present. Mental Status: He is alert and oriented to person, place, and time. Motor: No weakness. Psychiatric:         Mood and Affect: Mood normal.         Behavior: Behavior normal.         Lab and Diagnostic Study Results     Labs -   No results found for this or any previous visit (from the past 12 hour(s)). Radiologic Studies -   @lastxrresult@  CT Results  (Last 48 hours)    None        CXR Results  (Last 48 hours)    None            Medical Decision Making   - I am the first provider for this patient. - I reviewed the vital signs, available nursing notes, past medical history, past surgical history, family history and social history. - Initial assessment performed. The patients presenting problems have been discussed, and they are in agreement with the care plan formulated and outlined with them.   I have encouraged them to ask questions as they arise throughout their visit. Vital Signs-Reviewed the patient's vital signs. No data found. Records Reviewed: Nursing Notes, Old Medical Records and Previous Radiology Studies    The patient presents with back pain with a differential diagnosis of  kidney stone, lumbar strain, pyelonephritis and constipation, abdominal pain, viral illness, fecal impaction, radiculopathy, degenerative disc disease, herniation of disc      ED Course:     ED Course as of Sep 16 1015   Fri Sep 10, 2021   1709 Discussed patient with Dr. Elizabeth Maza. Reviewed vitals, imaging, assessment, and labs. Patient clear for discharge. [KR]      ED Course User Index  [KR] Starkey English, NP       Provider Notes (Medical Decision Making):     MDM  Number of Diagnoses or Management Options  Diagnosis management comments: Patient will be worked up with urinalysis, basic labs, imaging studies of KUB and thoracic and lumbar spine. Further diagnostics and interventions to be determined upon resulting of initial work-up and any change in patient's complaints or status. Disposition   Disposition: Condition stable  DC- Adult Discharges: All of the diagnostic tests were reviewed and questions answered. Diagnosis, care plan and treatment options were discussed. The patient understands the instructions and will follow up as directed. The patients results have been reviewed with them. They have been counseled regarding their diagnosis. The patient verbally convey understanding and agreement of the signs, symptoms, diagnosis, treatment and prognosis and additionally agrees to follow up as recommended with their PCP in 24 - 48 hours. They also agree with the care-plan and convey that all of their questions have been answered.   I have also put together some discharge instructions for them that include: 1) educational information regarding their diagnosis, 2) how to care for their diagnosis at home, as well a 3) list of reasons why they would want to return to the ED prior to their follow-up appointment, should their condition change. Discharged    DISCHARGE PLAN:  1. Current Discharge Medication List      CONTINUE these medications which have NOT CHANGED    Details   oxyCODONE IR (ROXICODONE) 5 mg immediate release tablet Take 1-2 Tabs by mouth every four (4) hours as needed for Pain. Max Daily Amount: 60 mg.  Qty: 80 Tab, Refills: 0    Associated Diagnoses: Primary osteoarthritis of right hip      enzalutamide (XTANDI) 40 mg capsule Take 160 mg by mouth daily. tamsulosin (FLOMAX) 0.4 mg capsule Take 0.4 mg by mouth daily. thiamine (VITAMIN B-1) 100 mg tablet Take 100 mg by mouth daily. CALCIUM CARBONATE/VITAMIN D3 (CALTRATE-600 PLUS VITAMIN D3 PO) Take 2 Tabs by mouth daily. 2.   Follow-up Information     Follow up With Specialties Details Why Contact [address]  Schedule an appointment as soon as possible for a visit   [address] [facility]  [phone]    [facility]  Schedule an appointment as soon as possible for a visit   [facility]  [address] [place] [address]  [phone]    Follow up with primary care, urgent care, or this Emergency department   If symptoms worsen         3. Return to ED if worse   4. Discharge Medication List as of 9/10/2021  6:08 PM      START taking these medications    Details   polyethylene glycol (Miralax) 17 gram/dose powder Take 17 g by mouth daily for 7 days. 1 tablespoon with 8 oz of water daily, Normal, Disp-119 g, R-0      senna (Senna) 8.6 mg tablet Take 1 Tablet by mouth daily for 5 days. , Normal, Disp-5 Tablet, R-0      predniSONE (DELTASONE) 20 mg tablet 3 tabs for 3 days, 2 tabs for 3 days, 1 tab for 3 days, Normal, Disp-18 Tablet, R-0      oxyCODONE-acetaminophen (Percocet) 5-325 mg per tablet Take 1 Tablet by mouth every six (6) hours as needed for Pain for up to 3 days. Max Daily Amount: 4 Tablets., Normal, Disp-12 Tablet, R-0         CONTINUE these medications which have NOT CHANGED    Details   oxyCODONE IR (ROXICODONE) 5 mg immediate release tablet Take 1-2 Tabs by mouth every four (4) hours as needed for Pain. Max Daily Amount: 60 mg., Print, Disp-80 Tab, R-0      enzalutamide (XTANDI) 40 mg capsule Take 160 mg by mouth daily. , Historical Med      tamsulosin (FLOMAX) 0.4 mg capsule Take 0.4 mg by mouth daily. , Historical Med      thiamine (VITAMIN B-1) 100 mg tablet Take 100 mg by mouth daily. , Historical Med      CALCIUM CARBONATE/VITAMIN D3 (CALTRATE-600 PLUS VITAMIN D3 PO) Take 2 Tabs by mouth daily. , Historical Med               Diagnosis     Clinical Impression:   1. Constipation, unspecified constipation type    2. Chronic midline low back pain with right-sided sciatica    3. Primary osteoarthritis of right hip        Attestations:    Libby Acevedo NP    Please note that this dictation was completed with Take5, the [employer] voice recognition software. Quite often unanticipated grammatical, syntax, homophones, and other interpretive errors are inadvertently transcribed by the computer software. Please disregard these errors. Please excuse any errors that have escaped final proofreading. Thank you.

## 2021-09-24 ENCOUNTER — APPOINTMENT (OUTPATIENT)
Dept: GENERAL RADIOLOGY | Age: 71
DRG: 542 | End: 2021-09-24
Attending: PHYSICIAN ASSISTANT
Payer: MEDICARE

## 2021-09-24 ENCOUNTER — APPOINTMENT (OUTPATIENT)
Dept: MRI IMAGING | Age: 71
DRG: 542 | End: 2021-09-24
Attending: EMERGENCY MEDICINE
Payer: MEDICARE

## 2021-09-24 ENCOUNTER — APPOINTMENT (OUTPATIENT)
Dept: CT IMAGING | Age: 71
DRG: 542 | End: 2021-09-24
Attending: EMERGENCY MEDICINE
Payer: MEDICARE

## 2021-09-24 ENCOUNTER — APPOINTMENT (OUTPATIENT)
Dept: VASCULAR SURGERY | Age: 71
DRG: 542 | End: 2021-09-24
Attending: PHYSICIAN ASSISTANT
Payer: MEDICARE

## 2021-09-24 ENCOUNTER — HOSPITAL ENCOUNTER (INPATIENT)
Age: 71
LOS: 1 days | Discharge: ANOTHER HOSPITAL | DRG: 542 | End: 2021-09-26
Attending: EMERGENCY MEDICINE | Admitting: HOSPITALIST
Payer: MEDICARE

## 2021-09-24 DIAGNOSIS — M54.42 ACUTE MIDLINE LOW BACK PAIN WITH BILATERAL SCIATICA: ICD-10-CM

## 2021-09-24 DIAGNOSIS — M89.9 BONE LESION: ICD-10-CM

## 2021-09-24 DIAGNOSIS — M54.41 ACUTE MIDLINE LOW BACK PAIN WITH BILATERAL SCIATICA: ICD-10-CM

## 2021-09-24 DIAGNOSIS — R53.1 WEAKNESS: Primary | ICD-10-CM

## 2021-09-24 DIAGNOSIS — C61 PROSTATE CANCER (HCC): ICD-10-CM

## 2021-09-24 LAB
ALBUMIN SERPL-MCNC: 3.6 G/DL (ref 3.5–5)
ALBUMIN/GLOB SERPL: 0.8 {RATIO} (ref 1.1–2.2)
ALP SERPL-CCNC: 184 U/L (ref 45–117)
ALT SERPL-CCNC: 26 U/L (ref 12–78)
ANION GAP SERPL CALC-SCNC: 4 MMOL/L (ref 5–15)
APPEARANCE UR: CLEAR
AST SERPL-CCNC: 23 U/L (ref 15–37)
BACTERIA URNS QL MICRO: NEGATIVE /HPF
BASOPHILS # BLD: 0.1 K/UL (ref 0–0.1)
BASOPHILS NFR BLD: 1 % (ref 0–1)
BILIRUB SERPL-MCNC: 0.8 MG/DL (ref 0.2–1)
BILIRUB UR QL: NEGATIVE
BNP SERPL-MCNC: 64 PG/ML
BUN SERPL-MCNC: 11 MG/DL (ref 6–20)
BUN/CREAT SERPL: 18 (ref 12–20)
CALCIUM SERPL-MCNC: 9.3 MG/DL (ref 8.5–10.1)
CHLORIDE SERPL-SCNC: 106 MMOL/L (ref 97–108)
CO2 SERPL-SCNC: 27 MMOL/L (ref 21–32)
COLOR UR: ABNORMAL
COMMENT, HOLDF: NORMAL
CREAT SERPL-MCNC: 0.61 MG/DL (ref 0.7–1.3)
DIFFERENTIAL METHOD BLD: ABNORMAL
EOSINOPHIL # BLD: 0 K/UL (ref 0–0.4)
EOSINOPHIL NFR BLD: 0 % (ref 0–7)
EPITH CASTS URNS QL MICRO: ABNORMAL /LPF
ERYTHROCYTE [DISTWIDTH] IN BLOOD BY AUTOMATED COUNT: 12.6 % (ref 11.5–14.5)
GLOBULIN SER CALC-MCNC: 4.6 G/DL (ref 2–4)
GLUCOSE SERPL-MCNC: 104 MG/DL (ref 65–100)
GLUCOSE UR STRIP.AUTO-MCNC: NEGATIVE MG/DL
HCT VFR BLD AUTO: 37.5 % (ref 36.6–50.3)
HGB BLD-MCNC: 12.3 G/DL (ref 12.1–17)
HGB UR QL STRIP: NEGATIVE
HYALINE CASTS URNS QL MICRO: ABNORMAL /LPF (ref 0–5)
IMM GRANULOCYTES # BLD AUTO: 0 K/UL (ref 0–0.04)
IMM GRANULOCYTES NFR BLD AUTO: 0 % (ref 0–0.5)
KETONES UR QL STRIP.AUTO: ABNORMAL MG/DL
LEUKOCYTE ESTERASE UR QL STRIP.AUTO: NEGATIVE
LYMPHOCYTES # BLD: 2 K/UL (ref 0.8–3.5)
LYMPHOCYTES NFR BLD: 22 % (ref 12–49)
MCH RBC QN AUTO: 31.4 PG (ref 26–34)
MCHC RBC AUTO-ENTMCNC: 32.8 G/DL (ref 30–36.5)
MCV RBC AUTO: 95.7 FL (ref 80–99)
MONOCYTES # BLD: 1.1 K/UL (ref 0–1)
MONOCYTES NFR BLD: 12 % (ref 5–13)
NEUTS SEG # BLD: 5.9 K/UL (ref 1.8–8)
NEUTS SEG NFR BLD: 65 % (ref 32–75)
NITRITE UR QL STRIP.AUTO: NEGATIVE
NRBC # BLD: 0 K/UL (ref 0–0.01)
NRBC BLD-RTO: 0 PER 100 WBC
PH UR STRIP: 5 [PH] (ref 5–8)
PLATELET # BLD AUTO: 174 K/UL (ref 150–400)
PMV BLD AUTO: 11.2 FL (ref 8.9–12.9)
POTASSIUM SERPL-SCNC: 4 MMOL/L (ref 3.5–5.1)
PROT SERPL-MCNC: 8.2 G/DL (ref 6.4–8.2)
PROT UR STRIP-MCNC: ABNORMAL MG/DL
RBC # BLD AUTO: 3.92 M/UL (ref 4.1–5.7)
RBC #/AREA URNS HPF: ABNORMAL /HPF (ref 0–5)
SAMPLES BEING HELD,HOLD: NORMAL
SODIUM SERPL-SCNC: 137 MMOL/L (ref 136–145)
SP GR UR REFRACTOMETRY: 1.03 (ref 1–1.03)
TROPONIN I SERPL-MCNC: <0.05 NG/ML
UR CULT HOLD, URHOLD: NORMAL
UROBILINOGEN UR QL STRIP.AUTO: 0.2 EU/DL (ref 0.2–1)
WBC # BLD AUTO: 9.2 K/UL (ref 4.1–11.1)
WBC URNS QL MICRO: ABNORMAL /HPF (ref 0–4)

## 2021-09-24 PROCEDURE — 93005 ELECTROCARDIOGRAM TRACING: CPT

## 2021-09-24 PROCEDURE — 71046 X-RAY EXAM CHEST 2 VIEWS: CPT

## 2021-09-24 PROCEDURE — 36415 COLL VENOUS BLD VENIPUNCTURE: CPT

## 2021-09-24 PROCEDURE — 72148 MRI LUMBAR SPINE W/O DYE: CPT

## 2021-09-24 PROCEDURE — 74176 CT ABD & PELVIS W/O CONTRAST: CPT

## 2021-09-24 PROCEDURE — 80053 COMPREHEN METABOLIC PANEL: CPT

## 2021-09-24 PROCEDURE — 93970 EXTREMITY STUDY: CPT

## 2021-09-24 PROCEDURE — 96376 TX/PRO/DX INJ SAME DRUG ADON: CPT

## 2021-09-24 PROCEDURE — 72131 CT LUMBAR SPINE W/O DYE: CPT

## 2021-09-24 PROCEDURE — 84484 ASSAY OF TROPONIN QUANT: CPT

## 2021-09-24 PROCEDURE — 81001 URINALYSIS AUTO W/SCOPE: CPT

## 2021-09-24 PROCEDURE — 83880 ASSAY OF NATRIURETIC PEPTIDE: CPT

## 2021-09-24 PROCEDURE — 70450 CT HEAD/BRAIN W/O DYE: CPT

## 2021-09-24 PROCEDURE — 74011250636 HC RX REV CODE- 250/636: Performed by: EMERGENCY MEDICINE

## 2021-09-24 PROCEDURE — 85025 COMPLETE CBC W/AUTO DIFF WBC: CPT

## 2021-09-24 PROCEDURE — 96374 THER/PROPH/DIAG INJ IV PUSH: CPT

## 2021-09-24 PROCEDURE — 96375 TX/PRO/DX INJ NEW DRUG ADDON: CPT

## 2021-09-24 PROCEDURE — 99284 EMERGENCY DEPT VISIT MOD MDM: CPT

## 2021-09-24 RX ORDER — MORPHINE SULFATE 4 MG/ML
4 INJECTION INTRAVENOUS
Status: COMPLETED | OUTPATIENT
Start: 2021-09-24 | End: 2021-09-24

## 2021-09-24 RX ORDER — SODIUM CHLORIDE 9 MG/ML
75 INJECTION, SOLUTION INTRAVENOUS ONCE
Status: COMPLETED | OUTPATIENT
Start: 2021-09-24 | End: 2021-09-24

## 2021-09-24 RX ORDER — KETOROLAC TROMETHAMINE 30 MG/ML
15 INJECTION, SOLUTION INTRAMUSCULAR; INTRAVENOUS
Status: COMPLETED | OUTPATIENT
Start: 2021-09-24 | End: 2021-09-24

## 2021-09-24 RX ORDER — LORAZEPAM 2 MG/ML
1 INJECTION INTRAMUSCULAR
Status: COMPLETED | OUTPATIENT
Start: 2021-09-24 | End: 2021-09-25

## 2021-09-24 RX ADMIN — SODIUM CHLORIDE 75 ML/HR: 9 INJECTION, SOLUTION INTRAVENOUS at 20:07

## 2021-09-24 RX ADMIN — MORPHINE SULFATE 4 MG: 4 INJECTION INTRAVENOUS at 18:53

## 2021-09-24 RX ADMIN — KETOROLAC TROMETHAMINE 15 MG: 30 INJECTION, SOLUTION INTRAMUSCULAR at 18:52

## 2021-09-24 RX ADMIN — MORPHINE SULFATE 4 MG: 4 INJECTION, SOLUTION INTRAMUSCULAR; INTRAVENOUS at 23:14

## 2021-09-24 NOTE — PROGRESS NOTES
Patient was brought to the Vascular Lab , and was unable to lie on the stretcher , expressing severe back pain as the reason. Patient was returned to ER. Will attempt later when patient is roomed and on a stretcher.

## 2021-09-24 NOTE — ED TRIAGE NOTES
Pt arrives via POV w/ c/c of back pain, BLE numbness/swelling x2 weeks & constipation x4 days. Pt was seen @ [facility] on 9/10/21 for the same. Pt reports that he has been taking oxycodone & senna w/out relief. Pt has imaging discs from visit @ [facility].

## 2021-09-24 NOTE — ED PROVIDER NOTES
Patient is a 75-year-old male with past medical history significant for prostate cancer, right hip replacement who presents emergency department for evaluation of leg weakness. He states that he never was really diagnosed with any definitive back problems but then a month or 2 ago he started to have some lumbar back discomfort. He states that week or 2 ago he started to have nerve heat type pain into his lower right lumbar area. He states this is progressively gotten worse and he initially had numbness in his feet bilaterally but now has numbness in both legs. He states that his left leg in particular feels weak and he is fallen multiple times. He states that he cannot lift his toes up very well. Denies any urinary retention or incontinence. No fevers. He states that he has been having some abdominal discomfort but has been constipated which he assumes is related. Patient states that he had spoken with his doctor today who referred him to the ER. He states that he thinks that he called the ER had of him earlier today.            Past Medical History:   Diagnosis Date    Arthritis     Mild anemia     Prostate cancer ([facility] [address] 2008 2017    PALLIATIVE CARE PER DR LAMBERT FARR NOTES 2/12/2018: Jennifer Parker, AND PAST EXTERNAL BEAM RADIATION THERAPY       Past Surgical History:   Procedure Laterality Date    HX BUNIONECTOMY Right 2014    HX CATARACT REMOVAL Bilateral 2017 2018    HX COLONOSCOPY      HX GI  2009    ANAL FISTULA     HX HEMORRHOIDECTOMY  1974    HX HIP ARTHROSCOPY Right     HX TONSILLECTOMY  1958         Family History:   Problem Relation Age of Onset    Diabetes Mother    Criss Stabs Hypertension Mother    Criss Stabs Asthma Father     Hypertension Sister     Kidney Disease Brother         DIALYSIS    Diabetes Brother     Diabetes Brother     Drug Abuse Brother     Mental Retardation Sister     Obesity Sister     Anesth Problems Neg Hx        Social History     Socioeconomic History    Marital status: SINGLE Spouse name: Not on file    Number of children: Not on file    Years of education: Not on file    Highest education level: Not on file   Occupational History    Not on file   Tobacco Use    Smoking status: Former Smoker     Packs/day: 0.25     Years: 5.00     Pack years: 1.25     Quit date: 44     Years since quittin.7    Smokeless tobacco: Never Used   Substance and Sexual Activity    Alcohol use: No    Drug use: No    Sexual activity: Not on file   Other Topics Concern    Not on file   Social History Narrative    Not on file     Social Determinants of Health     Financial Resource Strain:     Difficulty of Paying Living Expenses:    Food Insecurity:     Worried About [address] [employer] in the Last Year:     [address] [employer] in the Last Year:    Transportation Needs:     Lack of Transportation (Medical):  Lack of Transportation (Non-Medical):    Physical Activity:     Days of Exercise per Week:     Minutes of Exercise per Session:    Stress:     Feeling of Stress :    Social Connections:     Frequency of Communication with Friends and Family:     Frequency of Social Gatherings with Friends and Family:     Attends Caodaism Services:     Active Member of Clubs or Organizations:     Attends Club or Organization Meetings:     Marital Status:    Intimate Partner Violence:     Fear of Current or Ex-Partner:     Emotionally Abused:     Physically Abused:     Sexually Abused: ALLERGIES: Patient has no known allergies. Review of Systems   Constitutional: Negative for fever. HENT: Negative for drooling. Respiratory: Negative for shortness of breath. Cardiovascular: Negative for chest pain. Gastrointestinal: Positive for abdominal pain and constipation. Negative for vomiting. Genitourinary: Negative for difficulty urinating, scrotal swelling and testicular pain. Musculoskeletal: Positive for back pain and gait problem. Negative for neck pain.    Skin: Negative for rash. Neurological: Positive for weakness and numbness. Psychiatric/Behavioral: Negative. Vitals:    09/24/21 1117   BP: (!) 165/90   Pulse: 79   Resp: 16   Temp: 97.3 °F (36.3 °C)   SpO2: 96%   Weight: 83.9 kg (185 lb)   Height: 5' 9\" (1.753 m)            Physical Exam  Vitals and nursing note reviewed. Exam conducted with a chaperone present. Constitutional:       Appearance: Normal appearance. Comments: Elderly, deconditioned   HENT:      Right Ear: External ear normal.      Left Ear: External ear normal.      Nose: Nose normal.   Eyes:      General: No scleral icterus. Cardiovascular:      Rate and Rhythm: Normal rate. Pulses: Normal pulses. Heart sounds: Normal heart sounds. Pulmonary:      Effort: Pulmonary effort is normal.      Breath sounds: Normal breath sounds. Abdominal:      Palpations: Abdomen is soft. Tenderness: There is no guarding or rebound. Genitourinary:     Rectum: Abnormal anal tone. Musculoskeletal:      Cervical back: Neck supple. Comments: 1+ bilateral pitting edema   Skin:     General: Skin is warm and dry. Findings: No rash. Neurological:      Mental Status: He is alert and oriented to person, place, and time. Comments: Patient 2 out of 5 strength left hip flexor and unable to dorsiflex his great toes bilaterally. Psychiatric:         Mood and Affect: Mood normal.         Behavior: Behavior normal.         Thought Content: Thought content normal.         Judgment: Judgment normal.          MDM  Number of Diagnoses or Management Options  Diagnosis management comments: After pain medicines patient was able to dorsiflex his toes a bit better however still weak.        Amount and/or Complexity of Data Reviewed  Clinical lab tests: reviewed and ordered  Tests in the radiology section of CPT®: reviewed and ordered           Procedures      Perfect Serve Consult for Admission  12:23 AM    ED Room Number: ER12/12  Patient Name and age:  Mana Armenta 66 y.o.  male  Working Diagnosis:   1. Weakness    2. Acute midline low back pain with bilateral sciatica        COVID-19 Suspicion:  no  Sepsis present:  no  Reassessment needed: no  Code Status:  Full Code  Readmission: no  Isolation Requirements:  no  Recommended Level of Care:  med/surg  Department: [facility] - [phone]  Other: Patient is a 68-year-old male with past medical history significant for prostate cancer, prior right hip replacement who presents emergency department with worsening back pain and recently associated with bilateral lower extremity weakness. He states that he is having a hard time walking and has had multiple falls recently now with significant weakness in his legs making it all but impossible for him to walk. Had another fall today at home and was unable to get up. Patient had decreased rectal tone and had MRI that was negative for cauda equina. Needs admission for intractable back pain and possible placement.

## 2021-09-25 ENCOUNTER — APPOINTMENT (OUTPATIENT)
Dept: MRI IMAGING | Age: 71
DRG: 542 | End: 2021-09-25
Attending: PHYSICIAN ASSISTANT
Payer: MEDICARE

## 2021-09-25 ENCOUNTER — APPOINTMENT (OUTPATIENT)
Dept: VASCULAR SURGERY | Age: 71
DRG: 542 | End: 2021-09-25
Attending: PHYSICIAN ASSISTANT
Payer: MEDICARE

## 2021-09-25 ENCOUNTER — DOCUMENTATION ONLY (OUTPATIENT)
Dept: ONCOLOGY | Age: 71
End: 2021-09-25

## 2021-09-25 PROBLEM — R29.898 LOWER EXTREMITY WEAKNESS: Status: ACTIVE | Noted: 2021-09-25

## 2021-09-25 PROBLEM — M48.061 SPINAL STENOSIS OF LUMBAR REGION: Status: ACTIVE | Noted: 2021-09-25

## 2021-09-25 PROBLEM — M89.9 BONE LESION: Status: ACTIVE | Noted: 2021-09-25

## 2021-09-25 PROBLEM — R26.2 AMBULATORY DYSFUNCTION: Status: ACTIVE | Noted: 2021-09-25

## 2021-09-25 PROBLEM — M54.41 ACUTE MIDLINE LOW BACK PAIN WITH BILATERAL SCIATICA: Status: ACTIVE | Noted: 2021-09-25

## 2021-09-25 PROBLEM — Z85.46 HISTORY OF PROSTATE CANCER: Status: ACTIVE | Noted: 2021-09-25

## 2021-09-25 PROBLEM — R59.0 RETROPERITONEAL LYMPHADENOPATHY: Status: ACTIVE | Noted: 2021-09-25

## 2021-09-25 PROBLEM — M54.42 ACUTE MIDLINE LOW BACK PAIN WITH BILATERAL SCIATICA: Status: ACTIVE | Noted: 2021-09-25

## 2021-09-25 LAB — PSA SERPL-MCNC: 118 NG/ML (ref 0.01–4)

## 2021-09-25 PROCEDURE — 74011250636 HC RX REV CODE- 250/636: Performed by: RADIOLOGY

## 2021-09-25 PROCEDURE — 36415 COLL VENOUS BLD VENIPUNCTURE: CPT

## 2021-09-25 PROCEDURE — 84403 ASSAY OF TOTAL TESTOSTERONE: CPT

## 2021-09-25 PROCEDURE — 84153 ASSAY OF PSA TOTAL: CPT

## 2021-09-25 PROCEDURE — 74011250636 HC RX REV CODE- 250/636: Performed by: EMERGENCY MEDICINE

## 2021-09-25 PROCEDURE — 74011250636 HC RX REV CODE- 250/636: Performed by: HOSPITALIST

## 2021-09-25 PROCEDURE — 65270000029 HC RM PRIVATE

## 2021-09-25 PROCEDURE — 72149 MRI LUMBAR SPINE W/DYE: CPT

## 2021-09-25 PROCEDURE — 94760 N-INVAS EAR/PLS OXIMETRY 1: CPT

## 2021-09-25 PROCEDURE — 96375 TX/PRO/DX INJ NEW DRUG ADDON: CPT

## 2021-09-25 PROCEDURE — 93922 UPR/L XTREMITY ART 2 LEVELS: CPT

## 2021-09-25 PROCEDURE — 74011250637 HC RX REV CODE- 250/637: Performed by: HOSPITALIST

## 2021-09-25 PROCEDURE — A9576 INJ PROHANCE MULTIPACK: HCPCS | Performed by: RADIOLOGY

## 2021-09-25 PROCEDURE — 72157 MRI CHEST SPINE W/O & W/DYE: CPT

## 2021-09-25 RX ORDER — POLYETHYLENE GLYCOL 3350 17 G/17G
17 POWDER, FOR SOLUTION ORAL 2 TIMES DAILY
Status: DISCONTINUED | OUTPATIENT
Start: 2021-09-25 | End: 2021-09-26 | Stop reason: HOSPADM

## 2021-09-25 RX ORDER — DOCUSATE SODIUM 100 MG/1
100 CAPSULE, LIQUID FILLED ORAL DAILY
COMMUNITY
End: 2021-09-26

## 2021-09-25 RX ORDER — LORAZEPAM 2 MG/ML
0.5 INJECTION INTRAMUSCULAR
Status: DISCONTINUED | OUTPATIENT
Start: 2021-09-25 | End: 2021-09-26 | Stop reason: HOSPADM

## 2021-09-25 RX ORDER — CALCIUM CARB/MAGNESIUM CARB 311-232MG
5 TABLET ORAL
Status: DISCONTINUED | OUTPATIENT
Start: 2021-09-25 | End: 2021-09-26 | Stop reason: HOSPADM

## 2021-09-25 RX ORDER — SODIUM CHLORIDE 0.9 % (FLUSH) 0.9 %
5-40 SYRINGE (ML) INJECTION AS NEEDED
Status: DISCONTINUED | OUTPATIENT
Start: 2021-09-25 | End: 2021-09-26 | Stop reason: HOSPADM

## 2021-09-25 RX ORDER — DIPHENHYDRAMINE HCL 25 MG
25 CAPSULE ORAL
Status: DISCONTINUED | OUTPATIENT
Start: 2021-09-25 | End: 2021-09-26 | Stop reason: HOSPADM

## 2021-09-25 RX ORDER — ENOXAPARIN SODIUM 100 MG/ML
40 INJECTION SUBCUTANEOUS DAILY
Status: DISCONTINUED | OUTPATIENT
Start: 2021-09-25 | End: 2021-09-26

## 2021-09-25 RX ORDER — MORPHINE SULFATE 2 MG/ML
2 INJECTION, SOLUTION INTRAMUSCULAR; INTRAVENOUS
Status: DISCONTINUED | OUTPATIENT
Start: 2021-09-25 | End: 2021-09-26 | Stop reason: HOSPADM

## 2021-09-25 RX ORDER — DIPHENHYDRAMINE HYDROCHLORIDE 50 MG/ML
25 INJECTION, SOLUTION INTRAMUSCULAR; INTRAVENOUS
Status: DISCONTINUED | OUTPATIENT
Start: 2021-09-25 | End: 2021-09-26 | Stop reason: HOSPADM

## 2021-09-25 RX ORDER — SENNOSIDES 8.6 MG/1
1 TABLET ORAL
COMMUNITY

## 2021-09-25 RX ORDER — MELATONIN
1000 DAILY
COMMUNITY

## 2021-09-25 RX ORDER — SIMETHICONE 80 MG
80 TABLET,CHEWABLE ORAL
Status: DISCONTINUED | OUTPATIENT
Start: 2021-09-25 | End: 2021-09-26 | Stop reason: HOSPADM

## 2021-09-25 RX ORDER — ACETAMINOPHEN 650 MG/1
650 SUPPOSITORY RECTAL
Status: DISCONTINUED | OUTPATIENT
Start: 2021-09-25 | End: 2021-09-26 | Stop reason: HOSPADM

## 2021-09-25 RX ORDER — SODIUM CHLORIDE 0.9 % (FLUSH) 0.9 %
5-40 SYRINGE (ML) INJECTION EVERY 8 HOURS
Status: DISCONTINUED | OUTPATIENT
Start: 2021-09-25 | End: 2021-09-26 | Stop reason: HOSPADM

## 2021-09-25 RX ORDER — ONDANSETRON 2 MG/ML
4 INJECTION INTRAMUSCULAR; INTRAVENOUS
Status: DISCONTINUED | OUTPATIENT
Start: 2021-09-25 | End: 2021-09-26 | Stop reason: HOSPADM

## 2021-09-25 RX ORDER — OXYCODONE HYDROCHLORIDE 5 MG/1
5 TABLET ORAL
Status: DISCONTINUED | OUTPATIENT
Start: 2021-09-25 | End: 2021-09-26 | Stop reason: HOSPADM

## 2021-09-25 RX ORDER — MAG HYDROX/ALUMINUM HYD/SIMETH 200-200-20
30 SUSPENSION, ORAL (FINAL DOSE FORM) ORAL
Status: DISCONTINUED | OUTPATIENT
Start: 2021-09-25 | End: 2021-09-26 | Stop reason: HOSPADM

## 2021-09-25 RX ORDER — POLYETHYLENE GLYCOL 3350 17 G/17G
17 POWDER, FOR SOLUTION ORAL DAILY PRN
Status: DISCONTINUED | OUTPATIENT
Start: 2021-09-25 | End: 2021-09-26 | Stop reason: HOSPADM

## 2021-09-25 RX ORDER — POLYETHYLENE GLYCOL 3350 17 G/17G
17 POWDER, FOR SOLUTION ORAL
COMMUNITY

## 2021-09-25 RX ORDER — DEXAMETHASONE SODIUM PHOSPHATE 4 MG/ML
6 INJECTION, SOLUTION INTRA-ARTICULAR; INTRALESIONAL; INTRAMUSCULAR; INTRAVENOUS; SOFT TISSUE ONCE
Status: COMPLETED | OUTPATIENT
Start: 2021-09-25 | End: 2021-09-25

## 2021-09-25 RX ORDER — FACIAL-BODY WIPES
10 EACH TOPICAL DAILY PRN
Status: DISCONTINUED | OUTPATIENT
Start: 2021-09-25 | End: 2021-09-26 | Stop reason: HOSPADM

## 2021-09-25 RX ORDER — OXYCODONE AND ACETAMINOPHEN 5; 325 MG/1; MG/1
1 TABLET ORAL
COMMUNITY
End: 2021-10-02

## 2021-09-25 RX ORDER — ACETAMINOPHEN 325 MG/1
650 TABLET ORAL
Status: DISCONTINUED | OUTPATIENT
Start: 2021-09-25 | End: 2021-09-26 | Stop reason: HOSPADM

## 2021-09-25 RX ADMIN — POLYETHYLENE GLYCOL 3350 17 G: 17 POWDER, FOR SOLUTION ORAL at 14:54

## 2021-09-25 RX ADMIN — ENOXAPARIN SODIUM 40 MG: 40 INJECTION SUBCUTANEOUS at 08:18

## 2021-09-25 RX ADMIN — Medication 10 ML: at 17:33

## 2021-09-25 RX ADMIN — MORPHINE SULFATE 2 MG: 2 INJECTION, SOLUTION INTRAMUSCULAR; INTRAVENOUS at 15:15

## 2021-09-25 RX ADMIN — MORPHINE SULFATE 2 MG: 2 INJECTION, SOLUTION INTRAMUSCULAR; INTRAVENOUS at 21:47

## 2021-09-25 RX ADMIN — GADOTERIDOL 16 ML: 279.3 INJECTION, SOLUTION INTRAVENOUS at 19:40

## 2021-09-25 RX ADMIN — Medication 10 ML: at 21:48

## 2021-09-25 RX ADMIN — MORPHINE SULFATE 2 MG: 2 INJECTION, SOLUTION INTRAMUSCULAR; INTRAVENOUS at 08:19

## 2021-09-25 RX ADMIN — LORAZEPAM 1 MG: 2 INJECTION INTRAMUSCULAR; INTRAVENOUS at 00:43

## 2021-09-25 RX ADMIN — POLYETHYLENE GLYCOL 3350 17 G: 17 POWDER, FOR SOLUTION ORAL at 17:38

## 2021-09-25 RX ADMIN — DEXAMETHASONE SODIUM PHOSPHATE 6 MG: 4 INJECTION, SOLUTION INTRAMUSCULAR; INTRAVENOUS at 04:13

## 2021-09-25 RX ADMIN — Medication 10 ML: at 08:01

## 2021-09-25 NOTE — PROGRESS NOTES
Bedside and Verbal shift change report given to BARAK Hendricks (oncoming nurse) by Kim Arevalo (offgoing nurse). Report included the following information SBAR, Kardex, Procedure Summary, Intake/Output, MAR, Accordion, Recent Results and Med Rec Status.

## 2021-09-25 NOTE — PROGRESS NOTES
[facility]  [address] [facility], [place], [address] [place] [address]  [phone]      Medical Progress Note      NAME:         Cedric Salgado   :        1950  MRM:        [id number]    Date of service: 2021      Subjective: Patient has been seen and examined as a follow up for multiple medical issues. Chart, labs, diagnostics reviewed. Severe low back pain, worse with movement. No fever or chills. Lower extremity weakness    Objective:    Vital Signs:    Visit Vitals  BP (!) 149/73 (BP 1 Location: Right lower arm, BP Patient Position: At rest)   Pulse 69   Temp 98 °F (36.7 °C)   Resp 17   Ht 5' 9\" (1.753 m)   Wt 83.9 kg (185 lb)   SpO2 96%   BMI 27.32 kg/m²          Intake/Output Summary (Last 24 hours) at 2021 1014  Last data filed at 2021 0820  Gross per 24 hour   Intake --   Output 150 ml   Net -150 ml        Physical Examination:    General:   Weak and ill looking but not in any acute distress   Eyes:   pink conjunctivae, PERRLA with no discharge. ENT:   no ottorrhea or rhinorrhea with dry mucous membranes  Neck: no masses, thyroid non-tender and trachea central.  Pulm: clear breath sounds without crackles or wheezes  Card:  no JVD or murmurs, has regular and normal S1, S2 without thrills, bruits or peripheral edema  Abd:  Soft, non-tender, non-distended, normoactive bowel sounds   Musc:  No cyanosis, clubbing, atrophy or deformities. Low back discomfort  Skin:  No rashes, bruising or ulcers. Neuro: Awake and alert.  Generally a non focal exam. Follows commands appropriately  Psych:  Has a fair insight and is oriented x 3    Current Facility-Administered Medications   Medication Dose Route Frequency    melatonin (rapid dissolve) tablet 5 mg  5 mg Oral QHS PRN    alum-mag hydroxide-simeth (MYLANTA) oral suspension 30 mL  30 mL Oral Q4H PRN    LORazepam (ATIVAN) injection 0.5 mg  0.5 mg IntraVENous Q6H PRN  oxyCODONE IR (ROXICODONE) tablet 5 mg  5 mg Oral Q4H PRN    morphine injection 2 mg  2 mg IntraVENous Q4H PRN    diphenhydrAMINE (BENADRYL) injection 25 mg  25 mg IntraVENous Q6H PRN    diphenhydrAMINE (BENADRYL) capsule 25 mg  25 mg Oral Q6H PRN    simethicone (MYLICON) tablet 80 mg  80 mg Oral QID PRN    sodium chloride (NS) flush 5-40 mL  5-40 mL IntraVENous Q8H    sodium chloride (NS) flush 5-40 mL  5-40 mL IntraVENous PRN    acetaminophen (TYLENOL) tablet 650 mg  650 mg Oral Q6H PRN    Or    acetaminophen (TYLENOL) suppository 650 mg  650 mg Rectal Q6H PRN    polyethylene glycol (MIRALAX) packet 17 g  17 g Oral DAILY PRN    bisacodyL (DULCOLAX) suppository 10 mg  10 mg Rectal DAILY PRN    ondansetron (ZOFRAN) injection 4 mg  4 mg IntraVENous Q6H PRN    enoxaparin (LOVENOX) injection 40 mg  40 mg SubCUTAneous DAILY        Laboratory data and review:    Recent Labs     09/24/21  1244   WBC 9.2   HGB 12.3   HCT 37.5        Recent Labs     09/24/21  1244      K 4.0      CO2 27   *   BUN 11   CREA 0.61*   CA 9.3   ALB 3.6   ALT 26     No components found for: [place]    Diagnostics: Imaging studies have been reviewed    Assessment and Plan:    Acute midline low back pain with bilateral sciatica (9/25/2021) / Spinal stenosis of lumbar region (9/25/2021) POA: MRi lumbar spine showed multilevel DDD with spinal stenosis. No cord compression. Continue IV Morphine, Oxycodone PRN, bowel regimen. Consult Orthopedic surgery, PT and PT    Retroperitoneal lymphadenopathy (9/25/2021) / History of prostate cancer (9/25/2021) POA: apparently had 2 neg biopsies. Previously seen by Dr Curtis Sin of [facility]. His back pain, adenopathy noted on MRi and the non specific on L5 is concerning for metastatic disease. Currently not taking any medications. Will consult Oncology    Chronic DVT right lower extremity POA: ?noted on venous doppler studies.  On prophylactic enoxaparin    Total time spent for the patient's care: [address] [place] [address] discussed with: Patient, Care Manager and Nursing Staff    Discussed:  Care Plan and D/C Planning    Prophylaxis:  Lovenox    Anticipated Disposition:  SNF/LTC           ___________________________________________________    Attending Physician:   Karel José MD

## 2021-09-25 NOTE — CONSULTS
ORTHO CONSULT NOTE    Date of Consultation:  2021  Referring Physician:  Renee Campbell  CC: back pain    HPI:  Rudy Candelaria is a 66 y.o. male PMH prostate ca and right THR who c/o low back pain for 2-3 weeks. He has a burning pain in the right side of the lower back which is worse with coughing. He feels his left leg is now weak. He describes numbness and tingling in both legs. He denies bowel/bladder incontinence and denies saddle numbness. Of note, he has a history of prostate cancer. He tells me he chose to stop chemotherapy as he felt he was healed. He continues Lupron and Flomax. Past Medical History:   Diagnosis Date    Arthritis     Mild anemia     Prostate cancer ([address] 2008    PALLIATIVE CARE PER DR LAMBERT FARR NOTES 2018: Bernadette Peacock, AND PAST EXTERNAL BEAM RADIATION THERAPY      Past Surgical History:   Procedure Laterality Date    HX BUNIONECTOMY Right 2014    HX CATARACT REMOVAL Bilateral 2017    HX COLONOSCOPY      HX GI  2009    ANAL FISTULA     HX HEMORRHOIDECTOMY  1974    HX HIP ARTHROSCOPY Right     HX TONSILLECTOMY  195      Family History   Problem Relation Age of Onset    Diabetes Mother     Hypertension Mother     Asthma Father     Hypertension Sister     Kidney Disease Brother         DIALYSIS    Diabetes Brother     Diabetes Brother     Drug Abuse Brother     Mental Retardation Sister     Obesity Sister     Anesth Problems Neg Hx       Social History     Tobacco Use    Smoking status: Former Smoker     Packs/day: 0.25     Years: 5.00     Pack years: 1.25     Quit date:      Years since quittin.7    Smokeless tobacco: Never Used   Substance Use Topics    Alcohol use: No     No Known Allergies     Review of Systems:  Per HPI.     Objective:     Patient Vitals for the past 8 hrs:   BP Temp Pulse Resp SpO2   21 0846 (!) 149/73 98 °F (36.7 °C) 69 17 96 %     Temp (24hrs), Av.1 °F (36.7 °C), Min:98 °F (36.7 °C), Max:98.3 °F (36.8 °C)      EXAM:   NAD. Sitting up in bed eating lunch. Answers questions appropriately. Cervical spine NTTP. He has some limitations to neck motion without pain. Moves BUE spontaneously without clumsiness. SILT. Palpable radial pulse. Negative Toyin's. Strength 5/5 for resisted shrug, shoulder abduction, elbow flexion/extension, wrist flexion/extension, and . Thoracic spine seems tender around T10. Lumbar spine NTTP. Moves BLE spontaneously. 1 beat of clonus symmetrically. Tingling to light touch bilat feet dorsal 1st webspace and plantar foot. Strength is 3+/5 for hip flexion and 4/5 for knee flexion. Otherwise strength is 5/5 for knee extension as well as ankle/toe motion. Achilles reflex 1+ bilateral.  I am unable to get him relaxed for patellar tendon reflex testing. The left foot is warm with palpable DP. The right foot is cold without palpable pulses. CR in right toes is slow. Imaging Review:   MRI lumbar spine:  1. Multiple level degenerative disc disease with moderate spinal canal stenosis L3-4. No cord compression or cord signal abnormality. Disc bulge at L5-S1 abuts the S1 nerve roots without significant displacement. 2. Nonspecific 11 mm lesion in the posterior L5 vertebral body could be an osseous metastasis given history of prostate cancer. Consider bone scan. 3. Enlarged retroperitoneal lymph nodes. Labs:   WBC 9.2 9/24.     Impression:     Patient Active Problem List    Diagnosis Date Noted    Lower extremity weakness 09/25/2021    Ambulatory dysfunction 09/25/2021    Acute midline low back pain with bilateral sciatica 09/25/2021    Spinal stenosis of lumbar region 09/25/2021    Retroperitoneal lymphadenopathy 09/25/2021    History of prostate cancer 09/25/2021    Bone lesion 09/25/2021    Osteoarthritis of right hip 03/19/2018    Degenerative joint disease (DJD) of hip 03/19/2018     Active Problems:    Lower extremity weakness (9/25/2021)      Ambulatory dysfunction (9/25/2021)      Acute midline low back pain with bilateral sciatica (9/25/2021)      Spinal stenosis of lumbar region (9/25/2021)      Retroperitoneal lymphadenopathy (9/25/2021)      History of prostate cancer (9/25/2021)      Bone lesion (9/25/2021)        Plan:   Based on increase in spine pain with leg weakness, I will speak with Dr. Edith Mchugh about additional imaging. I will order a post void bladder scan. I anticipate initial management with steroids. Consider arterial work-up RLE. Addendum 15:30:  History, exam and Imaging reviewed with Dr. Edith Mchugh. CT abd/pelvis reveals suspected T10 lytic lesion vs artifact. With his pain, CT findings, and h/o prostate ca, I ordered Thoracic spine MRI with and without contrast.  Due to weakness, will add MRI lumbar with contrast (without already completed). Consider IV steroids and neurontin/Lyrica as well. If patient requires biopsy with IR, could add POLO L5-S1. Dr. Edith Mchugh is aware and agrees with above plan.       JERRI Rudd  Orthopedic Trauma Service  [facility]

## 2021-09-25 NOTE — ED NOTES
TRANSFER - OUT REPORT:    Verbal report given to Lilia RN(name) on Bakari Judge  being transferred to 5th Floor(unit) for routine progression of care       Report consisted of patients Situation, Background, Assessment and   Recommendations(SBAR). Information from the following report(s) SBAR, Kardex, ED Summary, [facility] and Recent Results was reviewed with the receiving nurse. Lines:   Peripheral IV 09/24/21 Right Antecubital (Active)        Opportunity for questions and clarification was provided.       Patient transported with:   Registered Nurse

## 2021-09-25 NOTE — PROGRESS NOTES
Consult received  Patient follows with Dr. Aj Chase with [facility]  Please consult respective group    Thank you

## 2021-09-25 NOTE — CONSULTS
[facility] [place] [facility] [place] [address] [address], [address], [address] [place] [address] Alec  W: [phone]  F: [phone]    Reason for consult    Miesha Florentino is a 66 y.o. male who is seen in consultation at the request of Dr. Elmira Morejon for evaluation of Prostate cancer- stage IV    History of Present Illness:   Patient is a 66 y.o. male with prostate cancer admitted 2021 with acute mid back pain. CT abdomen showed no clear abnormalities though was WO contrast. MRI Lumbar spine showed multilevel disc disease with spinal stenosis, disc bulge at L5-S1, 11 mm L5 lesion and enlarged RP nodes, largest being 18 mm. He is a patient of Dr. Shaila Peters with I who he follows with for Prostate cancer. Diagnosed  with a PSA of 53- Carl 7, treated with EBRT. Has a biochemical recurrence around  and was placed on Lupron. In  he was noted to have RP ami metastasis. Started Chrisite Mindi. PSA had dropped to 0.4 by 2017. He stopped the Christie Mindi 2020 on his on discretion. He gets Lupron every 6 months. He has been getting this at . He thinks his PSA was still low. He states that his pain is better. He has no weakness, dysuria, fevers, chills, sweats, HA, he has had some leg swelling. He has no bleeding or cough. Weight stable.       Past Medical History:   Diagnosis Date    Arthritis     Mild anemia     Prostate cancer ([address] 2008    PALLIATIVE CARE PER DR LAMBERT FARR NOTES 2018: Tanika Beaver, AND PAST EXTERNAL BEAM RADIATION THERAPY      Past Surgical History:   Procedure Laterality Date    HX BUNIONECTOMY Right 2014    HX CATARACT REMOVAL Bilateral 2017    HX COLONOSCOPY      HX GI  2009    ANAL FISTULA     HX HEMORRHOIDECTOMY  1974    HX HIP ARTHROSCOPY Right     HX TONSILLECTOMY        Social History     Tobacco Use    Smoking status: Former Smoker     Packs/day: 0.25     Years: 5.00     Pack years: 1.25     Quit date:      Years since quittin.7    Smokeless tobacco: Never Used   Substance Use Topics    Alcohol use: No      Family History   Problem Relation Age of Onset    Diabetes Mother     Hypertension Mother    [facility] Asthma Father     Hypertension Sister     Kidney Disease Brother         DIALYSIS    Diabetes Brother     Diabetes Brother     Drug Abuse Brother     Mental Retardation Sister     Obesity Sister     Anesth Problems Neg Hx      Current Facility-Administered Medications   Medication Dose Route Frequency    melatonin (rapid dissolve) tablet 5 mg  5 mg Oral QHS PRN    alum-mag hydroxide-simeth (MYLANTA) oral suspension 30 mL  30 mL Oral Q4H PRN    LORazepam (ATIVAN) injection 0.5 mg  0.5 mg IntraVENous Q6H PRN    oxyCODONE IR (ROXICODONE) tablet 5 mg  5 mg Oral Q4H PRN    morphine injection 2 mg  2 mg IntraVENous Q4H PRN    diphenhydrAMINE (BENADRYL) injection 25 mg  25 mg IntraVENous Q6H PRN    diphenhydrAMINE (BENADRYL) capsule 25 mg  25 mg Oral Q6H PRN    simethicone (MYLICON) tablet 80 mg  80 mg Oral QID PRN    sodium chloride (NS) flush 5-40 mL  5-40 mL IntraVENous Q8H    sodium chloride (NS) flush 5-40 mL  5-40 mL IntraVENous PRN    acetaminophen (TYLENOL) tablet 650 mg  650 mg Oral Q6H PRN    Or    acetaminophen (TYLENOL) suppository 650 mg  650 mg Rectal Q6H PRN    polyethylene glycol (MIRALAX) packet 17 g  17 g Oral DAILY PRN    bisacodyL (DULCOLAX) suppository 10 mg  10 mg Rectal DAILY PRN    ondansetron (ZOFRAN) injection 4 mg  4 mg IntraVENous Q6H PRN    enoxaparin (LOVENOX) injection 40 mg  40 mg SubCUTAneous DAILY    polyethylene glycol (MIRALAX) packet 17 g  17 g Oral BID      No Known Allergies     Review of Systems: A complete review of systems was obtained, negative except as described above.     Physical Exam:     Visit Vitals  BP (!) 149/73 (BP 1 Location: Right lower arm, BP Patient Position: At rest)   Pulse 69   Temp 98 °F (36.7 °C)   Resp 17   Ht 5' 9\" (1.753 m)   Wt 185 lb (83.9 kg)   SpO2 96%   BMI 27.32 kg/m²   General appearance - alert, well appearing, and in no distress  Mental status - oriented to person, place, and time  Mouth - mucous membranes moist, pharynx normal without lesions  Neck - supple, no appreciable thyromegaly  Lymphatics - no palpable lymphadenopathy  Abdomen - soft, nontender  Neurological - normal speech, no focal findings or movement disorder noted  Extremities - peripheral pulses normal, no pedal edema, no clubbing or cyanosis  Skin - normal coloration and turgor, no new rashes, no suspicious skin lesions noted    ECOG PS:1        Results:     Lab Results   Component Value Date/Time    WBC 9.2 09/24/2021 12:44 PM    HGB 12.3 09/24/2021 12:44 PM    HCT 37.5 09/24/2021 12:44 PM    PLATELET 615 94/99/5504 12:44 PM    MCV 95.7 09/24/2021 12:44 PM    ABS. NEUTROPHILS 5.9 09/24/2021 12:44 PM     Lab Results   Component Value Date/Time    Sodium 137 09/24/2021 12:44 PM    Potassium 4.0 09/24/2021 12:44 PM    Chloride 106 09/24/2021 12:44 PM    CO2 27 09/24/2021 12:44 PM    Glucose 104 (H) 09/24/2021 12:44 PM    BUN 11 09/24/2021 12:44 PM    Creatinine 0.61 (L) 09/24/2021 12:44 PM    GFR est AA >60 09/24/2021 12:44 PM    GFR est non-AA >60 09/24/2021 12:44 PM    Calcium 9.3 09/24/2021 12:44 PM     Lab Results   Component Value Date/Time    Bilirubin, total 0.8 09/24/2021 12:44 PM    ALT (SGPT) 26 09/24/2021 12:44 PM    Alk. phosphatase 184 (H) 09/24/2021 12:44 PM    Protein, total 8.2 09/24/2021 12:44 PM    Albumin 3.6 09/24/2021 12:44 PM    Globulin 4.6 (H) 09/24/2021 12:44 PM     Recent Labs     09/25/21  1456   .0*         External  Records reviewed and summarized above. Pathology report(s) reviewed above. Radiology report(s) reviewed above. MRI Lumbar spine  IMPRESSION  Multilevel disc and facet degenerative change. Severe canal and moderate bilateral foraminal stenosis at L3-4. Additional, less severe degenerative findings are as described in detail above.     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 Miri Michaels and discuss resuming Xtandi vs consider other palliative treatments   He endorsed understanding and stated he would be sure to follow up  It appears that Ortho has noted a mass at T10 causing cord impingement  MRI is pending    2) Disc herniation      3) Back pain  Ortho notes reviewed  MRI thoracic spine is pending  There is concern for a T10 mass with concerns for cord compression  He is being transferred to [facility] for NSG intervention  On dexamethasone bit dose can be dropped to 4 mg q 6 hours    4) Anemia  Resolved        Plan:     · Dexamethasone 4 mg q 6  · Noted plans for NSG intervention at [facility]  · Await MRI thoracic spine  · Once he is transferred to [facility] please consult [facility] as this patient follows with Dr. Miri Michaels    I appreciate the opportunity to participate in [facility].     Signed By: Lawyer Rissa MD

## 2021-09-25 NOTE — PROGRESS NOTES
Bedside and Verbal shift change report given to True Tang RN (oncoming nurse) by Cassy Mendez RN (offgoing nurse). Report included the following information SBAR, Kardex, Procedure Summary, Intake/Output, MAR, Accordion, Recent Results and Med Rec Status.

## 2021-09-25 NOTE — H&P
[facility]  [address] [facility], [facility] [address]  [phone]     Hospitalist Admission Note      NAME: Amy Ewing   :  1950   MRN:  [id number]     Date/Time:  2021     Patient PCP: Demetrio Wilson MD    Emergency Contact:    Extended Emergency Contact Information  Primary Emergency Contact: [address] Phone: [phone]  Relation: Sister      Code: Full Code     Isolation Precautions: There are currently no Active Isolations        Subjective:     CHIEF COMPLAINT: Severe back pain    HISTORY OF PRESENT ILLNESS:     Mr. David Vanegas is a very pleasant 66 y.o. male with history that includes arthritis, prostate cancer, and right hip replacement presents with a gradual onset of constant and progressively worsening low back pain pain that started 2 weeks ago. The pain radiates to the initially his feet bilaterally causing numbness and now into both legs. Describes it as burning and sharp and associated with Difficulty with walking. .  Aggravating factors: movement. Alleviating factors: none. Pain appears to be due to multilevel degenerative disc disease and spinal stenosis in the lumbar region. In addition there is a nonspecific lesion at L5 concerning for metastasis given his history of prostate cancer. No Known Allergies    Prior to Admission medications    Medication Sig Start Date End Date Taking? Authorizing Provider   predniSONE (DELTASONE) 20 mg tablet 3 tabs for 3 days, 2 tabs for 3 days, 1 tab for 3 days 9/10/21   Paul Rodriguez NP   polyethylene glycol (Miralax) 17 gram/dose powder Take 17 g by mouth daily. 1 tablespoon with 8 oz of water daily 9/10/21   Paul Rodriguez NP   senna (Senna) 8.6 mg tablet Take 1 Tablet by mouth daily. 9/10/21   Paul Rdoriguez NP   enzalutamide Marletta Outhouse) 40 mg capsule Take 160 mg by mouth daily.     Provider, Historical   tamsulosin (FLOMAX) 0.4 mg capsule Take 0.4 mg by mouth daily. Provider, Historical   thiamine (VITAMIN B-1) 100 mg tablet Take 100 mg by mouth daily. Provider, Historical   CALCIUM CARBONATE/VITAMIN D3 (CALTRATE-600 PLUS VITAMIN D3 PO) Take 2 Tabs by mouth daily.     Provider, Historical       Past Medical History:   Diagnosis Date    Arthritis     Mild anemia     Prostate cancer ([address] 2008    PALLIATIVE CARE PER DR LAMBERT FARR NOTES 2018: Afia Wall, AND PAST EXTERNAL BEAM RADIATION THERAPY        Past Surgical History:   Procedure Laterality Date    HX BUNIONECTOMY Right 2014    HX CATARACT REMOVAL Bilateral 2017    HX COLONOSCOPY      HX GI  2009    ANAL FISTULA     HX HEMORRHOIDECTOMY  1974    HX HIP ARTHROSCOPY Right     HX TONSILLECTOMY         Social History     Tobacco Use    Smoking status: Former Smoker     Packs/day: 0.25     Years: 5.00     Pack years: 1.25     Quit date:      Years since quittin.    Smokeless tobacco: Never Used   Substance Use Topics    Alcohol use: No        Family History   Problem Relation Age of Onset    Diabetes Mother    [facility] Hypertension Mother    [facility] Asthma Father     Hypertension Sister     Kidney Disease Brother         DIALYSIS    Diabetes Brother     Diabetes Brother     Drug Abuse Brother     Mental Retardation Sister     Obesity Sister     Anesth Problems Neg Hx           Review of Systems (14 point ROS):    Gen:   Negative  Eyes:  negative  ENT:    negative  Resp:  negative  CVS:   negative  GI:       constipation  :     negative  Skin:   negative  Hem:   negative  MS:     Back pain and difficulty with walking due to the pain  Michelle:    numbness and weakness Of the legs bilaterally   Psy:    negative  Endo:  negative  ALL:   negative         Objective:      Visit Vitals  BP (!) 122/58   Pulse 79   Temp 97.3 °F (36.3 °C)   Resp 16   Ht 5' 9\" (1.753 m)   Wt 83.9 kg (185 lb)   SpO2 95%   BMI 27.32 kg/m²       Exam:     Physical Exam:    General: alert and no distress  Head: Normocephalic, without obvious abnormality, atraumatic  Eyes: PERRL, EOMI, anicteric sclerae and conjuntiva clear  ENT: Lips, mucosa, and tongue normal.   Neck: normal, supple and no tenderness  Lungs: clear to auscultation with good breath sounds and normal respiratory effort  Heart: S1, S2 normal, regular rate and regular rhythm  Abd: not distended, soft, nontender, BS present and normactive  Ext: no cyanosis and bilateral lower extremity 1+ edema  Skin: normal skin color, no rashes and texture normal   MS:  LROM of back due to pain. Moves in antalgic manner  Neuro: Alert and oriented x3. Decreased strength in left lower extremity 2 out of 5. Psych: not anxious, cooperative, appropriate affect    Labs:    Recent Labs     09/24/21  1244   WBC 9.2   HGB 12.3   HCT 37.5        Recent Labs     09/24/21  1244      K 4.0      CO2 27   *   BUN 11   CREA 0.61*   CA 9.3   ALB 3.6   TBILI 0.8   ALT 26         Radiology:    XR CHEST PA LAT    Result Date: 9/24/2021  No acute process. MRI LUMB SPINE WO CONT    Result Date: 9/25/2021  1. Multiple level degenerative disc disease with moderate spinal canal stenosis L3-4. No cord compression or cord signal abnormality. Disc bulge at L5-S1 abuts the S1 nerve roots without significant displacement. 2. Nonspecific 11 mm lesion in the posterior L5 vertebral body could be an osseous metastasis given history of prostate cancer. Consider bone scan. 3. Enlarged retroperitoneal lymph nodes. CT HEAD WO CONT    Result Date: 9/24/2021  No acute intracranial process. CT SPINE LUMB WO CONT    Result Date: 9/24/2021  Multilevel disc and facet degenerative change. Severe canal and moderate bilateral foraminal stenosis at L3-4. Additional, less severe degenerative findings are as described in detail above. CT ABD PELV WO CONT    Result Date: 9/24/2021  No acute intraperitoneal process is identified.  Multilevel severe degenerative change of the lumbar spine. Incidental and/or nonemergent findings are as described in detail above. I personally reviewed and interpreted the imaging studies and agree with official reading. The chart, ER course, the above PMSFH, lab work, and radiological studies was reviewed by me on: September 25, 2021         Assessment/Plan:      Acute midline low back pain with bilateral sciatica / Spinal stenosis of lumbar region / Lower extremity weakness / Ambulatory dysfunction: Patient will be admitted for further work-up and treatment. Address pain control according to intensity Tylenol: mild pain, Roxicodone: mod pain and Morphine IV: severe pain and a dose of dexamethasone. Good bowel regimen to avoid worseing constipation. PT OT evaluation. Consult orthopedics. Retroperitoneal lymphadenopathy / Bone lesion of L5 vertebral body posteriorly / History of prostate cancer:  Findings discussed with patient. Given history of prostate cancer I agree with the oncology consult.      Body mass index is 27.32 kg/m².:  25.0 - 29.9 Overweight      Risk of deterioration: high      Discussed:  Code Status and Care Plan discussed with: Patient, ED Provider and RN    Prophylaxis:  Lovenox    Probable disposition:  TBD based on hospital course    Total time: 79 Minutes **I personally saw and examined the patient during this time period**    Date of service:    9/25/2021                ___________________________________________________    Admitting Physician: Arben Benítez MD         CC: Kassidy Gutierrez MD

## 2021-09-25 NOTE — PROGRESS NOTES
Admission Medication Reconciliation:     Information obtained from:   Patient via interview   RxQuery data available¹:  YES    Comments/Recommendations:   Patient able to confirm name, , allergies, and preferred pharmacy  Updated PTA medication list       ¹RxQuery pharmacy benefit data reflects medications filled and processed through the patient's insurance, however   this data does NOT capture whether the medication was picked up or is currently being taken by the patient. Prior to Admission Medications   Prescriptions Last Dose Informant Taking? OTHER,NON-FORMULARY, 2021 at Unknown time Self Yes   Sig: Bone supplement one dose daily, tumeric one dose daily   cholecalciferol (Vitamin D3) (1000 Units /25 mcg) tablet 2021 Self Yes   Sig: Take 1,000 Units by mouth daily. docusate sodium (COLACE) 100 mg capsule 2021 at Unknown time Self Yes   Sig: Take 100 mg by mouth daily. leuprolide acetate (LUPRON DEPOT IM) 2021 Self Yes   Sig: by IntraMUSCular route See Admin Instructions. Every 6 months   oxyCODONE-acetaminophen (Percocet) 5-325 mg per tablet  Self Yes   Sig: Take 1 Tablet by mouth every eight (8) hours as needed for Pain.   polyethylene glycol (Miralax) 17 gram/dose powder  Self Yes   Sig: Take 17 g by mouth daily as needed for Constipation. senna (Senna) 8.6 mg tablet  Self Yes   Sig: Take 1 Tablet by mouth daily as needed for Constipation. tamsulosin (FLOMAX) 0.4 mg capsule 2021 Self Yes   Sig: Take 0.4 mg by mouth daily. thiamine (VITAMIN B-1) 100 mg tablet 2021 Self Yes   Sig: Take 100 mg by mouth daily. Facility-Administered Medications: None         Please contact the main inpatient pharmacy with any questions or concerns at [phone] and we will direct you to the clinical pharmacist covering this patient's care while in-house.    Subhash Ascencio, PharmD, BCPS

## 2021-09-25 NOTE — PROGRESS NOTES
TRANSFER - IN REPORT:    Verbal report received from [address] [place] [address], RN(name) on Leda Brito  being received from ED(unit) for routine progression of care      Report consisted of patients Situation, Background, Assessment and   Recommendations(SBAR). Information from the following report(s) SBAR, Kardex, ED Summary, Procedure Summary, Intake/Output, MAR, Accordion, Recent Results and Med Rec Status was reviewed with the receiving nurse. Opportunity for questions and clarification was provided. Assessment completed upon patients arrival to unit and care assumed.

## 2021-09-26 ENCOUNTER — HOSPITAL ENCOUNTER (INPATIENT)
Age: 71
LOS: 6 days | Discharge: NURSING, REHAB OR LONG-TERM CARE | DRG: 457 | End: 2021-10-02
Attending: HOSPITALIST | Admitting: STUDENT IN AN ORGANIZED HEALTH CARE EDUCATION/TRAINING PROGRAM
Payer: MEDICARE

## 2021-09-26 VITALS
TEMPERATURE: 98.7 F | WEIGHT: 185 LBS | HEIGHT: 69 IN | DIASTOLIC BLOOD PRESSURE: 56 MMHG | HEART RATE: 67 BPM | OXYGEN SATURATION: 95 % | BODY MASS INDEX: 27.4 KG/M2 | SYSTOLIC BLOOD PRESSURE: 109 MMHG | RESPIRATION RATE: 18 BRPM

## 2021-09-26 DIAGNOSIS — G96.198 MASS IN EPIDURAL SPACE: Primary | ICD-10-CM

## 2021-09-26 PROBLEM — M48.9 MASS OF THORACIC VERTEBRA: Status: ACTIVE | Noted: 2021-09-26

## 2021-09-26 PROBLEM — M89.9 LESION OF BONE OF THORACIC SPINE: Status: ACTIVE | Noted: 2021-09-26

## 2021-09-26 LAB
ALBUMIN SERPL-MCNC: 2.5 G/DL (ref 3.5–5)
ALBUMIN/GLOB SERPL: 0.6 {RATIO} (ref 1.1–2.2)
ALP SERPL-CCNC: 117 U/L (ref 45–117)
ALT SERPL-CCNC: 23 U/L (ref 12–78)
ANION GAP SERPL CALC-SCNC: 5 MMOL/L (ref 5–15)
AST SERPL-CCNC: 19 U/L (ref 15–37)
ATRIAL RATE: 71 BPM
BASOPHILS # BLD: 0 K/UL (ref 0–0.1)
BASOPHILS NFR BLD: 0 % (ref 0–1)
BILIRUB SERPL-MCNC: 0.4 MG/DL (ref 0.2–1)
BUN SERPL-MCNC: 14 MG/DL (ref 6–20)
BUN/CREAT SERPL: 30 (ref 12–20)
CALCIUM SERPL-MCNC: 8.7 MG/DL (ref 8.5–10.1)
CALCULATED P AXIS, ECG09: 56 DEGREES
CALCULATED R AXIS, ECG10: -6 DEGREES
CALCULATED T AXIS, ECG11: 44 DEGREES
CHLORIDE SERPL-SCNC: 110 MMOL/L (ref 97–108)
CO2 SERPL-SCNC: 24 MMOL/L (ref 21–32)
CREAT SERPL-MCNC: 0.47 MG/DL (ref 0.7–1.3)
DIAGNOSIS, 93000: NORMAL
DIFFERENTIAL METHOD BLD: ABNORMAL
EOSINOPHIL # BLD: 0 K/UL (ref 0–0.4)
EOSINOPHIL NFR BLD: 0 % (ref 0–7)
ERYTHROCYTE [DISTWIDTH] IN BLOOD BY AUTOMATED COUNT: 12.2 % (ref 11.5–14.5)
GLOBULIN SER CALC-MCNC: 4 G/DL (ref 2–4)
GLUCOSE BLD STRIP.AUTO-MCNC: 101 MG/DL (ref 65–117)
GLUCOSE BLD STRIP.AUTO-MCNC: 179 MG/DL (ref 65–117)
GLUCOSE SERPL-MCNC: 102 MG/DL (ref 65–100)
HCT VFR BLD AUTO: 32.1 % (ref 36.6–50.3)
HGB BLD-MCNC: 10.4 G/DL (ref 12.1–17)
IMM GRANULOCYTES # BLD AUTO: 0.1 K/UL (ref 0–0.04)
IMM GRANULOCYTES NFR BLD AUTO: 1 % (ref 0–0.5)
LEFT ABI: 1.47
LEFT ARM BP: 143 MMHG
LEFT POSTERIOR TIBIAL: 210 MMHG
LEFT TBI: 0.82
LEFT TOE PRESSURE: 117 MMHG
LYMPHOCYTES # BLD: 1.7 K/UL (ref 0.8–3.5)
LYMPHOCYTES NFR BLD: 18 % (ref 12–49)
MCH RBC QN AUTO: 30.8 PG (ref 26–34)
MCHC RBC AUTO-ENTMCNC: 32.4 G/DL (ref 30–36.5)
MCV RBC AUTO: 95 FL (ref 80–99)
MONOCYTES # BLD: 0.8 K/UL (ref 0–1)
MONOCYTES NFR BLD: 8 % (ref 5–13)
NEUTS SEG # BLD: 6.5 K/UL (ref 1.8–8)
NEUTS SEG NFR BLD: 73 % (ref 32–75)
NRBC # BLD: 0.03 K/UL (ref 0–0.01)
NRBC BLD-RTO: 0.3 PER 100 WBC
P-R INTERVAL, ECG05: 168 MS
PLATELET # BLD AUTO: 166 K/UL (ref 150–400)
PMV BLD AUTO: 11.3 FL (ref 8.9–12.9)
POTASSIUM SERPL-SCNC: 3.8 MMOL/L (ref 3.5–5.1)
PROT SERPL-MCNC: 6.5 G/DL (ref 6.4–8.2)
Q-T INTERVAL, ECG07: 406 MS
QRS DURATION, ECG06: 74 MS
QTC CALCULATION (BEZET), ECG08: 441 MS
RBC # BLD AUTO: 3.38 M/UL (ref 4.1–5.7)
RIGHT ABI: 1.42
RIGHT ARM BP: 143 MMHG
RIGHT POSTERIOR TIBIAL: 203 MMHG
RIGHT TBI: 0.61
RIGHT TOE PRESSURE: 87 MMHG
SERVICE CMNT-IMP: ABNORMAL
SERVICE CMNT-IMP: NORMAL
SODIUM SERPL-SCNC: 139 MMOL/L (ref 136–145)
VAS LEFT DORSALIS PEDIS BP: 154 MMHG
VAS RIGHT DORSALIS PEDIS BP: 150 MMHG
VENTRICULAR RATE, ECG03: 71 BPM
WBC # BLD AUTO: 9.1 K/UL (ref 4.1–11.1)

## 2021-09-26 PROCEDURE — 82962 GLUCOSE BLOOD TEST: CPT

## 2021-09-26 PROCEDURE — 80053 COMPREHEN METABOLIC PANEL: CPT

## 2021-09-26 PROCEDURE — 85025 COMPLETE CBC W/AUTO DIFF WBC: CPT

## 2021-09-26 PROCEDURE — 74011250636 HC RX REV CODE- 250/636: Performed by: STUDENT IN AN ORGANIZED HEALTH CARE EDUCATION/TRAINING PROGRAM

## 2021-09-26 PROCEDURE — 99223 1ST HOSP IP/OBS HIGH 75: CPT | Performed by: INTERNAL MEDICINE

## 2021-09-26 PROCEDURE — 74011250637 HC RX REV CODE- 250/637: Performed by: STUDENT IN AN ORGANIZED HEALTH CARE EDUCATION/TRAINING PROGRAM

## 2021-09-26 PROCEDURE — 74011250636 HC RX REV CODE- 250/636: Performed by: HOSPITALIST

## 2021-09-26 PROCEDURE — 36415 COLL VENOUS BLD VENIPUNCTURE: CPT

## 2021-09-26 PROCEDURE — 65660000000 HC RM CCU STEPDOWN

## 2021-09-26 PROCEDURE — 94760 N-INVAS EAR/PLS OXIMETRY 1: CPT

## 2021-09-26 RX ORDER — MAGNESIUM SULFATE 100 %
4 CRYSTALS MISCELLANEOUS AS NEEDED
Status: DISCONTINUED | OUTPATIENT
Start: 2021-09-26 | End: 2021-10-02 | Stop reason: HOSPADM

## 2021-09-26 RX ORDER — MELATONIN
1000 DAILY
Status: DISCONTINUED | OUTPATIENT
Start: 2021-09-27 | End: 2021-10-02 | Stop reason: HOSPADM

## 2021-09-26 RX ORDER — DEXAMETHASONE SODIUM PHOSPHATE 4 MG/ML
6 INJECTION, SOLUTION INTRA-ARTICULAR; INTRALESIONAL; INTRAMUSCULAR; INTRAVENOUS; SOFT TISSUE EVERY 6 HOURS
Status: DISCONTINUED | OUTPATIENT
Start: 2021-09-26 | End: 2021-10-01

## 2021-09-26 RX ORDER — OXYCODONE AND ACETAMINOPHEN 5; 325 MG/1; MG/1
1 TABLET ORAL
Status: DISCONTINUED | OUTPATIENT
Start: 2021-09-26 | End: 2021-09-29 | Stop reason: ALTCHOICE

## 2021-09-26 RX ORDER — POLYETHYLENE GLYCOL 3350 17 G/17G
17 POWDER, FOR SOLUTION ORAL DAILY
Status: DISCONTINUED | OUTPATIENT
Start: 2021-09-27 | End: 2021-10-02 | Stop reason: HOSPADM

## 2021-09-26 RX ORDER — MORPHINE SULFATE 2 MG/ML
4 INJECTION, SOLUTION INTRAMUSCULAR; INTRAVENOUS
Status: DISCONTINUED | OUTPATIENT
Start: 2021-09-26 | End: 2021-10-02 | Stop reason: HOSPADM

## 2021-09-26 RX ORDER — TAMSULOSIN HYDROCHLORIDE 0.4 MG/1
0.4 CAPSULE ORAL DAILY
Status: DISCONTINUED | OUTPATIENT
Start: 2021-09-27 | End: 2021-10-02 | Stop reason: HOSPADM

## 2021-09-26 RX ORDER — DEXTROSE 50 % IN WATER (D50W) INTRAVENOUS SYRINGE
12.5-25 AS NEEDED
Status: DISCONTINUED | OUTPATIENT
Start: 2021-09-26 | End: 2021-10-02 | Stop reason: HOSPADM

## 2021-09-26 RX ORDER — INSULIN LISPRO 100 [IU]/ML
INJECTION, SOLUTION INTRAVENOUS; SUBCUTANEOUS
Status: DISCONTINUED | OUTPATIENT
Start: 2021-09-26 | End: 2021-10-02 | Stop reason: HOSPADM

## 2021-09-26 RX ORDER — OXYCODONE HCL 10 MG/1
10 TABLET, FILM COATED, EXTENDED RELEASE ORAL EVERY 12 HOURS
Status: DISCONTINUED | OUTPATIENT
Start: 2021-09-26 | End: 2021-09-26

## 2021-09-26 RX ORDER — LANOLIN ALCOHOL/MO/W.PET/CERES
100 CREAM (GRAM) TOPICAL DAILY
Status: DISCONTINUED | OUTPATIENT
Start: 2021-09-27 | End: 2021-10-02 | Stop reason: HOSPADM

## 2021-09-26 RX ORDER — DEXAMETHASONE SODIUM PHOSPHATE 10 MG/ML
10 INJECTION INTRAMUSCULAR; INTRAVENOUS EVERY 6 HOURS
Qty: 4 ML | Refills: 0 | Status: SHIPPED
Start: 2021-09-26 | End: 2021-10-02

## 2021-09-26 RX ORDER — PANTOPRAZOLE SODIUM 40 MG/1
40 TABLET, DELAYED RELEASE ORAL
Status: DISCONTINUED | OUTPATIENT
Start: 2021-09-26 | End: 2021-10-02 | Stop reason: HOSPADM

## 2021-09-26 RX ORDER — DEXAMETHASONE SODIUM PHOSPHATE 10 MG/ML
10 INJECTION INTRAMUSCULAR; INTRAVENOUS EVERY 6 HOURS
Status: DISCONTINUED | OUTPATIENT
Start: 2021-09-26 | End: 2021-09-26 | Stop reason: HOSPADM

## 2021-09-26 RX ORDER — SENNOSIDES 8.6 MG/1
1 TABLET ORAL DAILY
Status: DISCONTINUED | OUTPATIENT
Start: 2021-09-27 | End: 2021-10-02 | Stop reason: HOSPADM

## 2021-09-26 RX ADMIN — DEXAMETHASONE SODIUM PHOSPHATE 6 MG: 4 INJECTION, SOLUTION INTRAMUSCULAR; INTRAVENOUS at 21:18

## 2021-09-26 RX ADMIN — DEXAMETHASONE SODIUM PHOSPHATE 6 MG: 4 INJECTION, SOLUTION INTRAMUSCULAR; INTRAVENOUS at 15:30

## 2021-09-26 RX ADMIN — MORPHINE SULFATE 4 MG: 2 INJECTION, SOLUTION INTRAMUSCULAR; INTRAVENOUS at 13:58

## 2021-09-26 RX ADMIN — MORPHINE SULFATE 4 MG: 2 INJECTION, SOLUTION INTRAMUSCULAR; INTRAVENOUS at 21:04

## 2021-09-26 RX ADMIN — Medication 10 ML: at 06:36

## 2021-09-26 RX ADMIN — MORPHINE SULFATE 2 MG: 2 INJECTION, SOLUTION INTRAMUSCULAR; INTRAVENOUS at 04:07

## 2021-09-26 RX ADMIN — PANTOPRAZOLE SODIUM 40 MG: 40 TABLET, DELAYED RELEASE ORAL at 15:31

## 2021-09-26 NOTE — PROGRESS NOTES
Orthopedic NP Progress Note  Post Op day: * No surgery found *    September 26, 2021 10:21 AM     Allison Smalls    Attending Physician: Treatment Team: Attending Provider: Oj Castro MD; Consulting Provider: Bashir Strong MD; Consulting Provider: Jennifer Ellis MD; Consulting Provider: Cesario Seth MD; Utilization Review: Azeem Barrera RN; Consulting Provider: Jose R Christensen     Vital Signs:    Patient Vitals for the past 8 hrs:   BP Temp Pulse Resp SpO2   09/26/21 0723 (!) 109/56 98.7 °F (37.1 °C) 67 18 95 %   09/26/21 0448 (!) 115/57 98.3 °F (36.8 °C) 65 18 96 %     BMI (calculated): 27.3 (09/24/21 1117)    Intake/Output:  09/26 0701 - 09/26 1900  In: 420 [P.O.:420]  Out: -   09/24 1901 - 09/26 0700  In: -   Out: 450 [Urine:450]    Pain Control:   Pain Assessment  Pain Scale 1: Numeric (0 - 10)  Pain Intensity 1: 8  Pain Onset 1: chronic  Pain Location 1: Back  Pain Orientation 1: Posterior  Pain Description 1: Sharp  Pain Intervention(s) 1: Medication (see MAR)    LAB:    Recent Labs     09/26/21  0351   HCT 32.1*   HGB 10.4*     Lab Results   Component Value Date/Time    Sodium 139 09/26/2021 03:51 AM    Potassium 3.8 09/26/2021 03:51 AM    Chloride 110 (H) 09/26/2021 03:51 AM    CO2 24 09/26/2021 03:51 AM    Glucose 102 (H) 09/26/2021 03:51 AM    BUN 14 09/26/2021 03:51 AM    Creatinine 0.47 (L) 09/26/2021 03:51 AM    Calcium 8.7 09/26/2021 03:51 AM       Subjective:  Allison Smalls is a 66 y.o. male with back pain and difficulty walking for weeks, no change in s/sx . Tolerating diet. Upon walking in the room the pt was walking with assistance back from the BR - pt myelopathic when upright    + BM this AM and last PM     Objective: General: alert, cooperative, no distress. Neuro/Vascular: CNS Intact. Sensation stable. Brisk cap refill, 2+ pulses UE/LE  Musculoskeletal:  +ROM UE/LE with 4/5 strength in BLE with sensation intact .     Skin: Incision - clean, dry and intact. No significant erythema or swelling. Dressing: clean, dry, and intact    Farrell - n  Drain - n       PT/OT:   Gait:                      Assessment:    s/p     Principal Problem: Mass in epidural space (9/26/2021)    Active Problems:    Lower extremity weakness (9/25/2021)      Ambulatory dysfunction (9/25/2021)      Acute midline low back pain with bilateral sciatica (9/25/2021)      Spinal stenosis of lumbar region (9/25/2021)      Retroperitoneal lymphadenopathy (9/25/2021)      History of prostate cancer (9/25/2021)      Bone lesion (9/25/2021)      Mass of thoracic vertebra (9/26/2021)         Plan:   -  MRI reveled a T 8 Mass, Dr. Maribell Moya has discussed iwht Dr. China oPpe and he has agreed to accept pt at [facility] for surgery and care.    -  NPO, hold anticoagulants, continue pain management, bedrest, neuro checks      Signed By: Anneliese Reid NP    Orthopedic Nurse Practitioner

## 2021-09-26 NOTE — PROGRESS NOTES
Called all the [place] in the [place] area to place consult with oncology. No one picked up.  Will relay to day staff to try again during normal business hours tomorrow

## 2021-09-26 NOTE — DISCHARGE SUMMARY
[facility] [place] [address]  [address]  Tel: [phone]    Physician Discharge Summary    Patient ID:    Maik Sprague  Age:              66 y.o.    : 1950  MRN:             [id number]     PCP: Amber Talbert MD     Date of Admission: 2021    Date of Discharge:  2021    Principal admission Diagnosis:   Acute midline low back pain with bilateral sciatica [M54.42, M54.41]  Lower extremity weakness [R29.898]  Ambulatory dysfunction [R26.2]    Discharge Diagnoses:  Principal Problem: Mass in epidural space (2021)    Mass of thoracic vertebra (2021)    Lower extremity weakness (2021)    Ambulatory dysfunction (2021)    Acute midline low back pain with bilateral sciatica (2021)    Spinal stenosis of lumbar region (2021)    Retroperitoneal lymphadenopathy (2021)    History of prostate cancer (2021)    Bone lesion (2021)    Hospital Course:     Mr. Denzel Morrow is a 66 y.o. admitted to [facility] and treated for the following: Mass in epidural space (2021) /  Mass of thoracic vertebra (2021) / Acute midline low back pain with bilateral sciatica (2021) / Spinal stenosis of lumbar region (2021) POA: MRi lumbar spine showed multilevel DDD with spinal stenosis. No cord compression. Prelim MRi thoracic spine as per ortho shows a T10 lesion with extension into epidural space and posterior elements with encasement of the cord and slight cord edema. Dr Sushma Gonzalez has discussed with Dr Tj Kong at [facility]. Patient will be transferred there. In the meantime, start IV Dexamethasone, continue IV Morphine, Oxycodone PRN, bowel regimen. I have discussed with Dr Lucrecia Palomares whose team will assume further care     Retroperitoneal lymphadenopathy (2021) / History of prostate cancer (2021) POA: apparently had 2 neg biopsies. Previously seen by Dr Devyn An of [facility]. His back pain, adenopathy noted on MRi and the non specific on L5 is concerning for metastatic disease. . Bone scan planned. Seen by Oncology. VCi team to follow while at [facility]     Chronic DVT right lower extremity POA: ?noted on venous doppler studies. On prophylactic enoxaparin    Discharge Exam:    Visit Vitals  BP (!) 109/56 (BP 1 Location: Left arm, BP Patient Position: At rest) Comment: Cheryle Haley RN Notified   Pulse 67   Temp 98.7 °F (37.1 °C)   Resp 18   Ht 5' 9\" (1.753 m)   Wt 83.9 kg (185 lb)   SpO2 95%   BMI 27.32 kg/m²        Patient has been seen and examined. General: well looking and in no acute distress  Pulm: clear breath sounds without wheezes  Card: no murmurs, normal S1, S2 without thrills, bruits   Abd:    soft, non-tender, normoactive bowel sounds  Skin: no rashes and skin turgor is good  Neuro: awake, alert. Altered sensation upto groin level      Activity: Activity as tolerated    Diet: keep NPO until reviewed by spine surgery    Current Discharge Medication List      START taking these medications    Details   dexamethasone, PF, (DECADRON) 10 mg/mL injection 1 mL by IntraVENous route every six (6) hours for 4 doses. Qty: 4 mL, Refills: 0         CONTINUE these medications which have NOT CHANGED    Details   polyethylene glycol (Miralax) 17 gram/dose powder Take 17 g by mouth daily as needed for Constipation. senna (Senna) 8.6 mg tablet Take 1 Tablet by mouth daily as needed for Constipation. cholecalciferol (Vitamin D3) (1000 Units /25 mcg) tablet Take 1,000 Units by mouth daily. leuprolide acetate (LUPRON DEPOT IM) by IntraMUSCular route See Admin Instructions. Every 6 months      oxyCODONE-acetaminophen (Percocet) 5-325 mg per tablet Take 1 Tablet by mouth every eight (8) hours as needed for Pain.      tamsulosin (FLOMAX) 0.4 mg capsule Take 0.4 mg by mouth daily. thiamine (VITAMIN B-1) 100 mg tablet Take 100 mg by mouth daily.          STOP taking these medications       docusate sodium (COLACE) 100 mg capsule Comments:   Reason for Stopping:         OTHER,NON-FORMULARY, Comments:   Reason for Stopping: Follow-up Information     Follow up With Specialties Details Why Contact Info    Camila Soto MD [place] [address] [facility]  [phone]            Discharge Condition: Stable    Disposition: [facility]    Time taken to arrange discharge:  35 minutes.     Signed:  Cecy Beltre MD     [facility]  9/26/2021   10:05 AM

## 2021-09-26 NOTE — PROGRESS NOTES
Occupational Therapy  -   [phone]    Orders acknowledged and chart reviewed in prep for OT. Noted plan for MRI and need for plan from ortho. Will hold and f/u tomorrow. Thank you. Randolph Walker MS, OTR/L

## 2021-09-26 NOTE — PROGRESS NOTES
Physical Therapy  Patient is to be transferred to [facility] [place] momentarily.   Darcy March PT

## 2021-09-26 NOTE — PROGRESS NOTES
Occupational Therapy:  09/26/21    0800: Orders received and appreciated, chart reviewed, noted per ortho plan for MRI of spine for continued work-up due to back pain. Will defer until MRI interpreted and cleared by ortho. 1030: Spoke with RN who reports pt plan to t/f to [facility] for continued work-up due to MRI findings. Will hold due to pt not medically appropriate to participate with therapy. 1032: RN requesting assistance to assist pt back to bed from toilet in bathroom, this OT present to assist for patient safety per protocol. Pt received seated on toilet in bathroom, and requires max A x 2 for return to bed. Ortho NP present at bedside once pt assisted back to bed. Notified pt therapy will not return to formally assess once he is cleared by ortho.      Thank you,  Destin Martinez, OTR/L  Time spent with Pt: 15 minutes

## 2021-09-26 NOTE — PROGRESS NOTES
[address] [facility] report over phone to Aleksandra, [facility].    1130    TRANSFER - OUT REPORT:    Verbal report given to BARAK Lake (name) on Marquis Haider  being transferred via [facility] to [facility] (unit) for routine progression of care       Report consisted of patients Situation, Background, Assessment and   Recommendations(SBAR). Information from the following report(s) SBAR, Kardex, Procedure Summary, Intake/Output, MAR, Accordion, Recent Results and Med Rec Status was reviewed with the receiving nurse. Lines:   Peripheral IV 09/24/21 Right Antecubital (Active)   Site Assessment Clean, dry, & intact 09/26/21 0800   Phlebitis Assessment 0 09/26/21 0800   Infiltration Assessment 0 09/26/21 0800   Dressing Status Clean, dry, & intact 09/26/21 0800   Dressing Type Transparent;Tape 09/26/21 0800   Hub Color/Line Status Pink;Flushed 09/26/21 0800   Action Taken Open ports on tubing capped 09/26/21 0800   Alcohol Cap Used Yes 09/26/21 0800        Opportunity for questions and clarification was provided. Patient transported with:   Patient's medications from home.  [facility]      1130  Pt transferred to [address]

## 2021-09-26 NOTE — PROGRESS NOTES
Bedside and Verbal shift change report given to Reji Diehl RN (oncoming nurse) by Karyle Gill, RN (offgoing nurse). Report included the following information SBAR, Kardex, Intake/Output, MAR, Accordion, Recent Results and Med Rec Status.

## 2021-09-26 NOTE — PROGRESS NOTES
Call from Darlin. .. Pt to be NPO effective now. Went immediately in to advise Pt who had just finished his full breakfast. Adv Pt that he will be NPO going forward until MRI can be interpreted.

## 2021-09-26 NOTE — H&P
[address] [facility] Adult  Hospitalist Group  History and Physical    Primary Care Provider: Mary Oliver MD  Date of Service:  9/26/2021    Chief Complaint: back pain     Subjective:     Renuka Maier is a 66 y.o. male past medical history of stage IV prostate cancer, chronic RLE DVT was recently hospitalized at [facility] for acute mid lower back pain with lumbar spinal stenosis. Preliminary MRI of the T-spine per Ortho showed T10 lesion with extension into the epidural space with evidence of encasement of the spinal cord and slight cord edema. Orthopedic surgery Dr. Claudene Providence discussed  with Dr. Lizbeth Rosario at [facility] the patient will be direct transfer here presumably for intervention. Patient was started on IV Decadron, continue on IV morphine as needed as well as oxycodone with bowel regimen. Neurosurgery as well as oncology has been consulted here. Patient follows with Dr. Robbie Campbell as his hematologist oncologist.  Patient reports that he had been previously on [address] however in 2020 had stopped this medication by his own well. Patient states he has been on leuprolide. Currently patient denies significant back pain, had just been given a dose of morphine. Has no acute complains of n/v/d/f/c changes in vision. He does currently endorse that he has bilateral lower extremity numbness which is greater on the left than the right however states he is able to move both legs. Patient states his left leg feels weaker than his right leg. Labs overall unremarkable.        Review of Systems:    A comprehensive review of systems was negative except for that written in the History of Present Illness.      Past Medical History:   Diagnosis Date    Arthritis     Mild anemia     Prostate cancer ([facility] [address] 2008 2017    PALLIATIVE CARE PER DR LAMBERT FARR NOTES 2/12/2018: [place], AND PAST EXTERNAL BEAM RADIATION THERAPY      Past Surgical History:   Procedure Laterality Date    HX BUNIONECTOMY Right 2014    HX CATARACT REMOVAL Bilateral 2017 2018    HX COLONOSCOPY      HX GI  2009    ANAL FISTULA     HX HEMORRHOIDECTOMY  1974    HX HIP ARTHROSCOPY Right     HX TONSILLECTOMY  1958     Prior to Admission medications    Medication Sig Start Date End Date Taking? Authorizing Provider   dexamethasone, PF, (DECADRON) 10 mg/mL injection 1 mL by IntraVENous route every six (6) hours for 4 doses. 9/26/21 9/27/21  Brodie Green MD   polyethylene glycol (Miralax) 17 gram/dose powder Take 17 g by mouth daily as needed for Constipation. Provider, Historical   senna (Senna) 8.6 mg tablet Take 1 Tablet by mouth daily as needed for Constipation. Provider, Historical   cholecalciferol (Vitamin D3) (1000 Units /25 mcg) tablet Take 1,000 Units by mouth daily. Provider, Historical   leuprolide acetate (LUPRON DEPOT IM) by IntraMUSCular route See Admin Instructions. Every 6 months    Provider, Historical   oxyCODONE-acetaminophen (Percocet) 5-325 mg per tablet Take 1 Tablet by mouth every eight (8) hours as needed for Pain. Provider, Historical   tamsulosin (FLOMAX) 0.4 mg capsule Take 0.4 mg by mouth daily. Provider, Historical   thiamine (VITAMIN B-1) 100 mg tablet Take 100 mg by mouth daily. Provider, Historical     No Known Allergies   Family History   Problem Relation Age of Onset    Diabetes Mother     Hypertension Mother    Lind Asthma Father     Hypertension Sister     Kidney Disease Brother         DIALYSIS    Diabetes Brother     Diabetes Brother     Drug Abuse Brother     Mental Retardation Sister     Obesity Sister     Anesth Problems Neg Hx         SOCIAL HISTORY:  Patient resides at Home. Patient ambulates with no device.    Smoking history: None  Alcohol history: None          Objective:       Physical Exam:   Visit Vitals  /74 (BP 1 Location: Left arm, BP Patient Position: At rest)   Pulse 70   Temp 98 °F (36.7 °C)   Resp 18   SpO2 98%     General appearance: alert, cooperative, no distress, appears stated age  Head: Normocephalic, without obvious abnormality, atraumatic  Neck: supple, symmetrical, trachea midline, thyroid: not enlarged, symmetric, no tenderness/mass/nodules and no JVD  Back: symmetric, no curvature. ROM normal. No CVA tenderness. Slight tenderness R lower mid back   Lungs: clear to auscultation bilaterally  Heart: regular rate and rhythm, S1, S2 normal, no murmur, click, rub or gallop  Abdomen: soft, non-tender. Bowel sounds normal. No masses,  no organomegaly  Extremities: extremities normal, atraumatic, no cyanosis or edema  Pulses: 2+ and symmetric  Skin: Skin color, texture, turgor normal. No rashes or lesions  Neurologic: Alert and oriented X 3, normal strength and tone. 3/5 strength b/l LE, decrease sensation b/l LEs. UE 5/5 and normal sensation   Cap refill: Brisk, less than 3 seconds  Pulses: 2+, symmetric in all extremities  Skin: Warm, dry, without rashes or lesions    EC/24 reviewed normal EKG, normal sinus rhythm no acute ischemic changes     Data Review: All diagnostic labs and studies have been reviewed. MRI T spine:  IMPRESSION     Osseous and paraspinal soft tissue mass lesion measuring 42 x 30 x 45 mm in size  centered anteriorly at the T10 vertebral body extending to the right pedicle and  more into the right lamina and posterior elements at T10 and then into the left  T10 posterior elements. There is associated posterior epidural mass with severe  canal stenosis, moderate cord compression and mild cord edema.     An additional focus of osseous metastatic disease is suggested at T6 and  possibly at T1. These are minimal. No associated soft tissue mass.     Disc degenerative changes are most pronounced at C7-T1. There is mild to  moderate bilateral foraminal stenosis at C7-T1. MRI lumbar spine 2021:  IMPRESSION  Enhancing 12 mm lesion in the posterior L5 vertebral body is consistent with  metastasis. .  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     Moderate spinal canal stenosis L3-4. No cord compression or cord signal  abnormality. Disc bulge at L5-S1 abuts the S1 nerve roots without significant  Displacement.         Assessment:   T10 mass/lesion with cord compression with evidence of mets   -likely 2/2 metastatic spread of stage IV prostate Ca  -MRI T-spine notable for 42 x 30 x 45 mL soft tissue mass extending anteriorly at the T10 vertebral body with evidence of moderate cord compression as well as cord edema. Also noted 12 mm lesion in the posterior L5 vertebra  -Neurosurgery consulted, will likely need cord decompression  -NPO for surgery  -Continue dexamethasone 4 mg every 6, PPI while on dexamethasone, SSI was while on dex to prevent steroid-induced hyperglycemia  -Pain control with as needed morphine, Percocet, bowel regimen while on opioids   -Hematology, oncology group consulted [facility] , patient follows with Dr. Johnson  -PT/OT     Stage IV prostate cancer  -On Lupron, apparently castrate resistant  -Reviewed oncology note from 9/25, consult oncology here about discussion to possibly resume [address] [place] [address]  -cont flomax     Anemia   -chronic, ACD in the setting of malignancy   -transfuse for Hgb <7    Diet: N.p.o.  DVT PPx: On hold for impending surgery, SCDs  GI PPx: Protonix  Code: Full    Dispo: Patient admitted to her service with metastatic prostate cancer now with evidence of cord compression. Neurosurgery consulted, patient will need surgical decompression. Cont to monitor.            FUNCTIONAL STATUS PRIOR TO HOSPITALIZATION Ambulates Independently (including history of recent falls):       Signed By: Ragini Martinez MD     September 26, 2021

## 2021-09-26 NOTE — PROGRESS NOTES
Brief Ortho Note:   - Transferred to [address] [place] [address] for further care after discussion of patients hx, exam and imaging with Dr. Tanya Griffith. He will see the patient in the morning on 9/27 unless there is rapid change in patients condition in which case he should be made aware. - No surgical plans today; NPO after midnight  - Continue IV Decadron  - Hold chem DVT treatment for likely operative intervention tomorrow; pt has chronic DVT confirmed with duplex on 9/24, but will not likely restart anticoagulation right away; will consult IR for filter placement.      Signed By: JERRI Cotto     September 26, 2021

## 2021-09-27 ENCOUNTER — APPOINTMENT (OUTPATIENT)
Dept: CT IMAGING | Age: 71
DRG: 457 | End: 2021-09-27
Attending: STUDENT IN AN ORGANIZED HEALTH CARE EDUCATION/TRAINING PROGRAM
Payer: MEDICARE

## 2021-09-27 LAB
ANION GAP SERPL CALC-SCNC: 4 MMOL/L (ref 5–15)
BASOPHILS # BLD: 0 K/UL (ref 0–0.1)
BASOPHILS NFR BLD: 0 % (ref 0–1)
BUN SERPL-MCNC: 13 MG/DL (ref 6–20)
BUN/CREAT SERPL: 19 (ref 12–20)
CALCIUM SERPL-MCNC: 8.8 MG/DL (ref 8.5–10.1)
CHLORIDE SERPL-SCNC: 110 MMOL/L (ref 97–108)
CO2 SERPL-SCNC: 24 MMOL/L (ref 21–32)
CREAT SERPL-MCNC: 0.67 MG/DL (ref 0.7–1.3)
DIFFERENTIAL METHOD BLD: ABNORMAL
EOSINOPHIL # BLD: 0 K/UL (ref 0–0.4)
EOSINOPHIL NFR BLD: 0 % (ref 0–7)
ERYTHROCYTE [DISTWIDTH] IN BLOOD BY AUTOMATED COUNT: 12 % (ref 11.5–14.5)
GLUCOSE BLD STRIP.AUTO-MCNC: 132 MG/DL (ref 65–117)
GLUCOSE BLD STRIP.AUTO-MCNC: 151 MG/DL (ref 65–117)
GLUCOSE BLD STRIP.AUTO-MCNC: 180 MG/DL (ref 65–117)
GLUCOSE SERPL-MCNC: 153 MG/DL (ref 65–100)
HCT VFR BLD AUTO: 32.7 % (ref 36.6–50.3)
HGB BLD-MCNC: 10.6 G/DL (ref 12.1–17)
IMM GRANULOCYTES # BLD AUTO: 0 K/UL (ref 0–0.04)
IMM GRANULOCYTES NFR BLD AUTO: 0 % (ref 0–0.5)
LYMPHOCYTES # BLD: 0.6 K/UL (ref 0.8–3.5)
LYMPHOCYTES NFR BLD: 9 % (ref 12–49)
MCH RBC QN AUTO: 30.6 PG (ref 26–34)
MCHC RBC AUTO-ENTMCNC: 32.4 G/DL (ref 30–36.5)
MCV RBC AUTO: 94.5 FL (ref 80–99)
MONOCYTES # BLD: 0.1 K/UL (ref 0–1)
MONOCYTES NFR BLD: 2 % (ref 5–13)
NEUTS SEG # BLD: 6.1 K/UL (ref 1.8–8)
NEUTS SEG NFR BLD: 89 % (ref 32–75)
NRBC # BLD: 0 K/UL (ref 0–0.01)
NRBC BLD-RTO: 0 PER 100 WBC
PLATELET # BLD AUTO: 173 K/UL (ref 150–400)
PLATELET COMMENTS,PCOM: ABNORMAL
PMV BLD AUTO: 11.2 FL (ref 8.9–12.9)
POTASSIUM SERPL-SCNC: 4.5 MMOL/L (ref 3.5–5.1)
RBC # BLD AUTO: 3.46 M/UL (ref 4.1–5.7)
RBC MORPH BLD: ABNORMAL
SERVICE CMNT-IMP: ABNORMAL
SODIUM SERPL-SCNC: 138 MMOL/L (ref 136–145)
TESTOST SERPL-MCNC: <3 NG/DL (ref 264–916)
WBC # BLD AUTO: 6.8 K/UL (ref 4.1–11.1)

## 2021-09-27 PROCEDURE — 74011636637 HC RX REV CODE- 636/637: Performed by: STUDENT IN AN ORGANIZED HEALTH CARE EDUCATION/TRAINING PROGRAM

## 2021-09-27 PROCEDURE — 82962 GLUCOSE BLOOD TEST: CPT

## 2021-09-27 PROCEDURE — 72129 CT CHEST SPINE W/DYE: CPT

## 2021-09-27 PROCEDURE — 74011000636 HC RX REV CODE- 636: Performed by: HOSPITALIST

## 2021-09-27 PROCEDURE — 80048 BASIC METABOLIC PNL TOTAL CA: CPT

## 2021-09-27 PROCEDURE — 74011250637 HC RX REV CODE- 250/637: Performed by: STUDENT IN AN ORGANIZED HEALTH CARE EDUCATION/TRAINING PROGRAM

## 2021-09-27 PROCEDURE — 74011250636 HC RX REV CODE- 250/636: Performed by: STUDENT IN AN ORGANIZED HEALTH CARE EDUCATION/TRAINING PROGRAM

## 2021-09-27 PROCEDURE — 36415 COLL VENOUS BLD VENIPUNCTURE: CPT

## 2021-09-27 PROCEDURE — 65660000000 HC RM CCU STEPDOWN

## 2021-09-27 PROCEDURE — 85025 COMPLETE CBC W/AUTO DIFF WBC: CPT

## 2021-09-27 RX ADMIN — MORPHINE SULFATE 4 MG: 2 INJECTION, SOLUTION INTRAMUSCULAR; INTRAVENOUS at 03:02

## 2021-09-27 RX ADMIN — MORPHINE SULFATE 4 MG: 2 INJECTION, SOLUTION INTRAMUSCULAR; INTRAVENOUS at 22:36

## 2021-09-27 RX ADMIN — OXYCODONE HYDROCHLORIDE AND ACETAMINOPHEN 1 TABLET: 5; 325 TABLET ORAL at 20:44

## 2021-09-27 RX ADMIN — INSULIN LISPRO 2 UNITS: 100 INJECTION, SOLUTION INTRAVENOUS; SUBCUTANEOUS at 11:59

## 2021-09-27 RX ADMIN — SENNOSIDES 8.6 MG: 8.6 TABLET, COATED ORAL at 08:57

## 2021-09-27 RX ADMIN — IOPAMIDOL 100 ML: 612 INJECTION, SOLUTION INTRAVENOUS at 10:26

## 2021-09-27 RX ADMIN — PANTOPRAZOLE SODIUM 40 MG: 40 TABLET, DELAYED RELEASE ORAL at 07:11

## 2021-09-27 RX ADMIN — DEXAMETHASONE SODIUM PHOSPHATE 6 MG: 4 INJECTION, SOLUTION INTRAMUSCULAR; INTRAVENOUS at 03:02

## 2021-09-27 RX ADMIN — OXYCODONE HYDROCHLORIDE AND ACETAMINOPHEN 1 TABLET: 5; 325 TABLET ORAL at 11:59

## 2021-09-27 RX ADMIN — DEXAMETHASONE SODIUM PHOSPHATE 6 MG: 4 INJECTION, SOLUTION INTRAMUSCULAR; INTRAVENOUS at 20:44

## 2021-09-27 RX ADMIN — Medication 1000 UNITS: at 08:57

## 2021-09-27 RX ADMIN — DEXAMETHASONE SODIUM PHOSPHATE 6 MG: 4 INJECTION, SOLUTION INTRAMUSCULAR; INTRAVENOUS at 15:12

## 2021-09-27 RX ADMIN — MORPHINE SULFATE 4 MG: 2 INJECTION, SOLUTION INTRAMUSCULAR; INTRAVENOUS at 15:15

## 2021-09-27 RX ADMIN — DEXAMETHASONE SODIUM PHOSPHATE 6 MG: 4 INJECTION, SOLUTION INTRAMUSCULAR; INTRAVENOUS at 08:58

## 2021-09-27 RX ADMIN — OXYCODONE HYDROCHLORIDE AND ACETAMINOPHEN 1 TABLET: 5; 325 TABLET ORAL at 00:24

## 2021-09-27 RX ADMIN — MORPHINE SULFATE 4 MG: 2 INJECTION, SOLUTION INTRAMUSCULAR; INTRAVENOUS at 08:58

## 2021-09-27 RX ADMIN — TAMSULOSIN HYDROCHLORIDE 0.4 MG: 0.4 CAPSULE ORAL at 08:57

## 2021-09-27 RX ADMIN — Medication 100 MG: at 08:57

## 2021-09-27 NOTE — PROGRESS NOTES
Chart reviewed. Per MD,  pt is awaiting oncology input to provide definitive procedure Tuesday or Wednesday and to hold PT/OT eval until after surgery. Thanks!

## 2021-09-27 NOTE — PROGRESS NOTES
Faculty or Preceptor Review of Student Work    9/27/2021  - Shift times - 0730 to 1300    The student documentation of patient care for Bladimir Traylor has been reviewed and approved. All medications have been administered under the direct supervision of the faculty or preceptor.     Drea Bright

## 2021-09-27 NOTE — PROGRESS NOTES
[facility] Adult  Hospitalist Group                                                                                          Hospitalist Progress Note  Nadiya Hernandez MD  Answering service: [phone] from in house phone        Date of Service:  2021  NAME:  Rhonda Stein  :  1950  MRN:  [id number]      Admission Summary:     Rhonda Stein is a 66 y.o. male past medical history of stage IV prostate cancer, chronic RLE DVT was recently hospitalized at [facility] for acute mid lower back pain with lumbar spinal stenosis. Preliminary MRI of the T-spine per Ortho showed T10 lesion with extension into the epidural space with evidence of encasement of the spinal cord and slight cord edema. Orthopedic surgery Dr. Fran Dumont discussed  with Dr. Tyrell Garcia at [facility] the patient will be direct transfer here presumably for intervention. Patient was started on IV Decadron, continue on IV morphine as needed as well as oxycodone with bowel regimen. Neurosurgery as well as oncology has been consulted here. Patient follows with Dr. Binh Mccoy as his hematologist oncologist.  Patient reports that he had been previously on [address] however in  had stopped this medication by his own well. Patient states he has been on leuprolide. Currently patient denies significant back pain, had just been given a dose of morphine. Has no acute complains of n/v/d/f/c changes in vision. He does currently endorse that he has bilateral lower extremity numbness which is greater on the left than the right however states he is able to move both legs. Patient states his left leg feels weaker than his right leg.     Interval history / Subjective:     He said his back improving 8/10, feels lower extremities numbness, no urine or bowel incontinence      Assessment & Plan:     T10 mass/lesion with cord compression with evidence of mets   -likely secondary to metastatic spread of stage IV prostate Ca  -on iv dexamethasone, prn morphin  -MRI T-spine notable for 42 x 30 x 45 mL soft tissue mass extending anteriorly at the T10 vertebral body with evidence of moderate cord compression as well as cord edema. Also noted 12 mm lesion in the posterior L5 vertebra  -Pain control with as needed morphine, Percocet, bowel regimen while on opioids z  -PT/OT   -Orthopedic surgeon on board, possible decompressive procedure     Stage IV prostate cancer  -On Lupron, apparently castrate resistant  -Reviewed oncology note from 9/25,   -consult to oncology here about discussion to possibly resume Xtandi  -cont flomax      Anemia of chronic disease  -chronic, ACD in the setting of malignancy   -Hgb 10.6  -monitor H/H, to transfuse for Hgb <7     Code status: Full Code  DVT prophylaxis:DSC    Care Plan discussed with: Patient/Family, Nurse and   Anticipated Disposition: TBD  Anticipated Discharge: Greater than 48 hours     Hospital Problems  Date Reviewed: 3/19/2018        Codes Class Noted POA    Lesion of bone of thoracic spine ICD-10-CM: M89.9  ICD-9-CM: 733.90  9/26/2021 Unknown                 Vital Signs:    Last 24hrs VS reviewed since prior progress note. Most recent are:  Visit Vitals  BP (!) 121/53 (BP 1 Location: Left arm, BP Patient Position: At rest)   Pulse 68   Temp 97.6 °F (36.4 °C)   Resp 16   SpO2 92%         Intake/Output Summary (Last 24 hours) at 9/27/2021 1301  Last data filed at 9/27/2021 1218  Gross per 24 hour   Intake 120 ml   Output 400 ml   Net -280 ml        Physical Examination:     I had a face to face encounter with this patient and independently examined them on 9/27/2021 as outlined below:          Constitutional:  No acute distress, cooperative, pleasant    ENT:  Oral mucosa moist, oropharynx benign. Resp:  CTA bilaterally. No wheezing/rhonchi/rales. No accessory muscle use   CV:  Regular rhythm, normal rate, no murmurs, gallops, rubs    GI:  Soft, non distended, non tender. normoactive bowel sounds, no hepatosplenomegaly     Musculoskeletal:  No edema,     Neurologic:  Moves all extremities. AAOx3, CN II-XII reviewed            Data Review:    Review and/or order of clinical lab test  Review and/or order of tests in the radiology section of CPT  Review and/or order of tests in the medicine section of [facility]      Labs:     Recent Labs     09/27/21 0311 09/26/21 0351   WBC 6.8 9.1   HGB 10.6* 10.4*   HCT 32.7* 32.1*    166     Recent Labs     09/27/21 0311 09/26/21 0351    139   K 4.5 3.8   * 110*   CO2 24 24   BUN 13 14   CREA 0.67* 0.47*   * 102*   CA 8.8 8.7     Recent Labs     09/26/21 0351   ALT 23      TBILI 0.4   TP 6.5   ALB 2.5*   GLOB 4.0     No results for input(s): INR, PTP, APTT, INREXT, INREXT in the last 72 hours. No results for input(s): FE, TIBC, PSAT, FERR in the last 72 hours. No results found for: FOL, RBCF   No results for input(s): PH, PCO2, PO2 in the last 72 hours. No results for input(s): CPK, CKNDX, TROIQ in the last 72 hours.     No lab exists for component: CPKMB  No results found for: CHOL, CHOLX, CHLST, CHOLV, HDL, HDLP, LDL, LDLC, DLDLP, TGLX, TRIGL, TRIGP, CHHD, CHHDX  Lab Results   Component Value Date/Time    Glucose (POC) 180 (H) 09/27/2021 11:31 AM    Glucose (POC) 151 (H) 09/27/2021 06:06 AM    Glucose (POC) 179 (H) 09/26/2021 09:22 PM    Glucose (POC) 101 09/26/2021 05:23 PM     Lab Results   Component Value Date/Time    Color DARK YELLOW 09/24/2021 12:44 PM    Appearance CLEAR 09/24/2021 12:44 PM    Specific gravity 1.028 09/24/2021 12:44 PM    Specific gravity 1.024 09/10/2021 03:41 PM    pH (UA) 5.0 09/24/2021 12:44 PM    Protein TRACE (A) 09/24/2021 12:44 PM    Glucose Negative 09/24/2021 12:44 PM    Ketone TRACE (A) 09/24/2021 12:44 PM    Bilirubin Negative 09/24/2021 12:44 PM    Urobilinogen 0.2 09/24/2021 12:44 PM    Nitrites Negative 09/24/2021 12:44 PM    Leukocyte Esterase Negative 09/24/2021 12:44 PM Epithelial cells FEW 09/24/2021 12:44 PM    Bacteria Negative 09/24/2021 12:44 PM    WBC 0-4 09/24/2021 12:44 PM    RBC 0-5 09/24/2021 12:44 PM         Medications Reviewed:     Current Facility-Administered Medications   Medication Dose Route Frequency    morphine injection 4 mg  4 mg IntraVENous Q6H PRN    polyethylene glycol (MIRALAX) packet 17 g  17 g Oral DAILY    senna (SENOKOT) tablet 8.6 mg  1 Tablet Oral DAILY    tamsulosin (FLOMAX) capsule 0.4 mg  0.4 mg Oral DAILY    thiamine HCL (B-1) tablet 100 mg  100 mg Oral DAILY    cholecalciferol (VITAMIN D3) (1000 Units /25 mcg) tablet 1,000 Units  1,000 Units Oral DAILY    oxyCODONE-acetaminophen (PERCOCET) 5-325 mg per tablet 1 Tablet  1 Tablet Oral Q6H PRN    dexamethasone (DECADRON) 4 mg/mL injection 6 mg  6 mg IntraVENous Q6H    pantoprazole (PROTONIX) tablet 40 mg  40 mg Oral ACB    insulin lispro (HUMALOG) injection   SubCUTAneous TIDAC    glucose chewable tablet 16 g  4 Tablet Oral PRN    dextrose (D50W) injection syrg 12.5-25 g  12.5-25 g IntraVENous PRN    glucagon (GLUCAGEN) injection 1 mg  1 mg IntraMUSCular PRN     ______________________________________________________________________  EXPECTED LENGTH OF STAY: - - -  ACTUAL LENGTH OF STAY:          1                 Brittaney Cleaning MD

## 2021-09-27 NOTE — CONSULTS
Palliative Medicine Consult  [place]: [phone] ([phone])                  Consult received and chart reviewed. Awaiting oncology consult to know what the options are for further care. Will follow up with the patient after he is seen by oncology to discuss care decisions.           Brenna Winston, ROBERTAP-C

## 2021-09-27 NOTE — PROGRESS NOTES
EVAN:    RUR 12%    Disposition: Anticipate home with family assistance. Transportation: Sister or neighbor    Follow up: PCP/Specialist    Primary contact: Sky West(Sister) [phone]    Care Management Interventions  PCP Verified by CM: Yes  Palliative Care Criteria Met (RRAT>21 & CHF Dx)?: No  Mode of Transport at Discharge:  (Sister or neighbor)  Placido Cainer: No  Discharge Durable Medical Equipment: No  Physical Therapy Consult: Yes  Occupational Therapy Consult: Yes  Support Systems: Friend/Neighbor  Confirm Follow Up Transport: Family  Discharge Location  Discharge Placement: Home with family assistance    Reason for Admission:  Back pain                     RUR Score:   12%                  Plan for utilizing home health:  No orders        PCP: First and Last name:  Zenon Moseley MD     Name of Practice:    Are you a current patient: Yes/No: Yes   Approximate date of last visit:    Can you participate in a virtual visit with your PCP: Yes                    Current Advanced Directive/Advance Care Plan: Full Code      Healthcare Decision Maker:   Click here to complete [place] including selection of the Healthcare Decision Maker Relationship (ie \"Primary\")                             Transition of Care Plan:         CM met with patient to introduce CM role, verify demographics and begin discharge planning. Patient lives alone in a one story house. He was independent and driving prior to admission. Patient does not have any DME at home but stated that he needs a walker. Patient gets prescriptions filled at [place] [id number] and [facility]. Insurance verified: [place] Medicare A&B primary and  secondary.     Lauren Cassidy RN/CRM  [phone]

## 2021-09-27 NOTE — PROGRESS NOTES
Orthopedic Spine Progress Note  Post Op day: * No surgery found *    2021 8:37 AM   Admit Date: 2021  Procedure: * No surgery found *    Subjective:     Maik Sprague has no complaints. Has weakness and some sensory changes but motor exam is maintained with generalized weakness. Tolerating diet. No N/V. Pain Control:   Pain Assessment  Pain Scale 1: Numeric (0 - 10)  Pain Intensity 1: 10  Pain Location 1: Back  Pain Orientation 1: Lower  Pain Description 1: Aching, Sharp  Pain Intervention(s) 1: Medication (see MAR)    Objective:          Physical Exam:  General:  Alert and oriented. No acute distress. Heart:  Respirations unlabored. Abdomen:   Extremities: Soft, non-tender. No evidence of cyanosis. Pulses palpable in both upper and lower extremities. Neurologic:  Musculoskeletal:  No new motor deficits. Neurovascular exam within normal limits. Sensation stable. Motor: unchanged C5-T1 and L2-S1. Global weakness is noted but follows commands. .   Myra's sign negative in bilateral lower extremities. Calves soft, nontender upon palpation and with passive twitch. Moves both upper and lower extremities. Incision: clean, dry, and intact. No significant erythema or swelling. No active drainage noted. Vital Signs:    Blood pressure 124/65, pulse 66, temperature 98.1 °F (36.7 °C), resp. rate 18, SpO2 95 %.   Temp (24hrs), Av.1 °F (36.7 °C), Min:97.9 °F (36.6 °C), Max:98.4 °F (36.9 °C)      LAB:    Recent Labs     21  0311   HCT 32.7*   HGB 10.6*        Lab Results   Component Value Date/Time    Sodium 138 2021 03:11 AM    Potassium 4.5 2021 03:11 AM    Chloride 110 (H) 2021 03:11 AM    CO2 24 2021 03:11 AM    Glucose 153 (H) 2021 03:11 AM    BUN 13 2021 03:11 AM    Creatinine 0.67 (L) 2021 03:11 AM    Calcium 8.8 2021 03:11 AM       Intake/Output: 0701 -  1900  In: -   Out: 200 [Urine:200]   1901 - 09/27 0700  In: -   Out: 200 [Urine:200]    PT/OT:   Gait:                    Assessment:   Patient is with a metastatic t10 mass with cord compression    Plan:     1. Awaiting oncology input. LIkely will need a decompressive procedure to protect motor function. Planning on definitive procedure Tuesday or Wednesday. Discussed patients history with Dr. Stella Felix. Patient has been fairly non compliant with care. 2.  Continue established methods of pain control  3. VTE Prophylaxes - TEDS &/or SCDs. Will consult IR for Filter pre-operatively. 4.  Regular diet today. 5. CT scan of the thoracic spine today.         Signed By: Dondra Schirmer, MD

## 2021-09-27 NOTE — PROGRESS NOTES
Physical Therapy    Received consult and order acknowledged, chart reviewed. Per MD,  pt is awaiting oncology input to provide definitive procedure Tuesday or Wednesday and to hold PT/OT eval until after surgery. Thanks!   Alicia Freitas PT

## 2021-09-27 NOTE — PROGRESS NOTES
Spiritual Care Assessment/Progress Note  [facility]      NAME: Rudy Candelaria      MRN: [id number]  AGE: 66 y.o.  SEX: male  Rastafarian Affiliation: Latter-day   Language: English     9/27/2021     Total Time (in minutes): 38     Spiritual Assessment begun in [facility] 6W1 ORTHO SPINE through conversation with:         [x]Patient        [] Family    [] Friend(s)        Reason for Consult: Palliative Care, Initial/Spiritual Assessment     Spiritual beliefs: (Please include comment if needed)     [x] Identifies with a janie tradition:         [] Supported by a janie community:            [] Claims no spiritual orientation:           [] Seeking spiritual identity:                [] Adheres to an individual form of spirituality:           [] Not able to assess:                           Identified resources for coping:      [] Prayer                               [] Music                  [] Guided Imagery     [x] Family/friends                 [] Pet visits     [] Devotional reading                         [] Unknown     [] Other:                                             Interventions offered during this visit: (See comments for more details)    Patient Interventions: Affirmation of emotions/emotional suffering, Affirmation of janie, Catharsis/review of pertinent events in supportive environment, Coping skills reviewed/reinforced, Iconic (affirming the presence of God/Higher Power), Normalization of emotional/spiritual concerns           Plan of Care:     [] Support spiritual and/or cultural needs    [] Support AMD and/or advance care planning process      [] Support grieving process   [] Coordinate Rites and/or Rituals    [] Coordination with community clergy   [] No spiritual needs identified at this time   [] Detailed Plan of Care below (See Comments)  [] Make referral to Music Therapy  [] Make referral to Pet Therapy     [] Make referral to Addiction services  [] Make referral to [facility]  [] Make referral to Spiritual Care Partner  [] No future visits requested        [x] Follow up visits as needed     Visited pt for palliative initial spiritual assessment. Pt reports that he is not  and has no children. He has siblings and nieces and nephews with whom he is close. His family is supportive of him. He retired from [employer] after 23 years and then retired again after 25 years working for the [employer]. 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   Chaplain Nesha, MDiv, MS, [facility]

## 2021-09-28 ENCOUNTER — ANESTHESIA EVENT (OUTPATIENT)
Dept: SURGERY | Age: 71
DRG: 457 | End: 2021-09-28
Payer: MEDICARE

## 2021-09-28 LAB
ANION GAP SERPL CALC-SCNC: 2 MMOL/L (ref 5–15)
BASOPHILS # BLD: 0 K/UL (ref 0–0.1)
BASOPHILS NFR BLD: 0 % (ref 0–1)
BUN SERPL-MCNC: 17 MG/DL (ref 6–20)
BUN/CREAT SERPL: 27 (ref 12–20)
CALCIUM SERPL-MCNC: 9.1 MG/DL (ref 8.5–10.1)
CHLORIDE SERPL-SCNC: 108 MMOL/L (ref 97–108)
CO2 SERPL-SCNC: 27 MMOL/L (ref 21–32)
COVID-19 RAPID TEST, COVR: NOT DETECTED
CREAT SERPL-MCNC: 0.64 MG/DL (ref 0.7–1.3)
DIFFERENTIAL METHOD BLD: ABNORMAL
EOSINOPHIL # BLD: 0 K/UL (ref 0–0.4)
EOSINOPHIL NFR BLD: 0 % (ref 0–7)
ERYTHROCYTE [DISTWIDTH] IN BLOOD BY AUTOMATED COUNT: 11.8 % (ref 11.5–14.5)
GLUCOSE BLD STRIP.AUTO-MCNC: 130 MG/DL (ref 65–117)
GLUCOSE BLD STRIP.AUTO-MCNC: 190 MG/DL (ref 65–117)
GLUCOSE BLD STRIP.AUTO-MCNC: 196 MG/DL (ref 65–117)
GLUCOSE BLD STRIP.AUTO-MCNC: 199 MG/DL (ref 65–117)
GLUCOSE SERPL-MCNC: 154 MG/DL (ref 65–100)
HCT VFR BLD AUTO: 32.8 % (ref 36.6–50.3)
HGB BLD-MCNC: 10.6 G/DL (ref 12.1–17)
IMM GRANULOCYTES # BLD AUTO: 0.1 K/UL (ref 0–0.04)
IMM GRANULOCYTES NFR BLD AUTO: 1 % (ref 0–0.5)
LYMPHOCYTES # BLD: 0.9 K/UL (ref 0.8–3.5)
LYMPHOCYTES NFR BLD: 8 % (ref 12–49)
MCH RBC QN AUTO: 30.9 PG (ref 26–34)
MCHC RBC AUTO-ENTMCNC: 32.3 G/DL (ref 30–36.5)
MCV RBC AUTO: 95.6 FL (ref 80–99)
MONOCYTES # BLD: 0.4 K/UL (ref 0–1)
MONOCYTES NFR BLD: 4 % (ref 5–13)
NEUTS SEG # BLD: 10 K/UL (ref 1.8–8)
NEUTS SEG NFR BLD: 87 % (ref 32–75)
NRBC # BLD: 0 K/UL (ref 0–0.01)
NRBC BLD-RTO: 0 PER 100 WBC
PLATELET # BLD AUTO: 176 K/UL (ref 150–400)
PMV BLD AUTO: 11.7 FL (ref 8.9–12.9)
POTASSIUM SERPL-SCNC: 4.5 MMOL/L (ref 3.5–5.1)
RBC # BLD AUTO: 3.43 M/UL (ref 4.1–5.7)
SERVICE CMNT-IMP: ABNORMAL
SODIUM SERPL-SCNC: 137 MMOL/L (ref 136–145)
SOURCE, COVRS: NORMAL
WBC # BLD AUTO: 11.4 K/UL (ref 4.1–11.1)

## 2021-09-28 PROCEDURE — 36415 COLL VENOUS BLD VENIPUNCTURE: CPT

## 2021-09-28 PROCEDURE — 86901 BLOOD TYPING SEROLOGIC RH(D): CPT

## 2021-09-28 PROCEDURE — 87635 SARS-COV-2 COVID-19 AMP PRB: CPT

## 2021-09-28 PROCEDURE — 86923 COMPATIBILITY TEST ELECTRIC: CPT

## 2021-09-28 PROCEDURE — 74011250636 HC RX REV CODE- 250/636: Performed by: STUDENT IN AN ORGANIZED HEALTH CARE EDUCATION/TRAINING PROGRAM

## 2021-09-28 PROCEDURE — 74011636637 HC RX REV CODE- 636/637: Performed by: STUDENT IN AN ORGANIZED HEALTH CARE EDUCATION/TRAINING PROGRAM

## 2021-09-28 PROCEDURE — 65660000000 HC RM CCU STEPDOWN

## 2021-09-28 PROCEDURE — 85025 COMPLETE CBC W/AUTO DIFF WBC: CPT

## 2021-09-28 PROCEDURE — 80048 BASIC METABOLIC PNL TOTAL CA: CPT

## 2021-09-28 PROCEDURE — 99223 1ST HOSP IP/OBS HIGH 75: CPT | Performed by: NURSE PRACTITIONER

## 2021-09-28 PROCEDURE — 82962 GLUCOSE BLOOD TEST: CPT

## 2021-09-28 PROCEDURE — 74011250637 HC RX REV CODE- 250/637: Performed by: STUDENT IN AN ORGANIZED HEALTH CARE EDUCATION/TRAINING PROGRAM

## 2021-09-28 RX ADMIN — OXYCODONE HYDROCHLORIDE AND ACETAMINOPHEN 1 TABLET: 5; 325 TABLET ORAL at 08:34

## 2021-09-28 RX ADMIN — SENNOSIDES 8.6 MG: 8.6 TABLET, COATED ORAL at 08:34

## 2021-09-28 RX ADMIN — Medication 100 MG: at 08:34

## 2021-09-28 RX ADMIN — DEXAMETHASONE SODIUM PHOSPHATE 6 MG: 4 INJECTION, SOLUTION INTRAMUSCULAR; INTRAVENOUS at 02:45

## 2021-09-28 RX ADMIN — PANTOPRAZOLE SODIUM 40 MG: 40 TABLET, DELAYED RELEASE ORAL at 07:09

## 2021-09-28 RX ADMIN — OXYCODONE HYDROCHLORIDE AND ACETAMINOPHEN 1 TABLET: 5; 325 TABLET ORAL at 15:26

## 2021-09-28 RX ADMIN — POLYETHYLENE GLYCOL 3350 17 G: 17 POWDER, FOR SOLUTION ORAL at 08:35

## 2021-09-28 RX ADMIN — INSULIN LISPRO 2 UNITS: 100 INJECTION, SOLUTION INTRAVENOUS; SUBCUTANEOUS at 11:25

## 2021-09-28 RX ADMIN — DEXAMETHASONE SODIUM PHOSPHATE 6 MG: 4 INJECTION, SOLUTION INTRAMUSCULAR; INTRAVENOUS at 15:18

## 2021-09-28 RX ADMIN — DEXAMETHASONE SODIUM PHOSPHATE 6 MG: 4 INJECTION, SOLUTION INTRAMUSCULAR; INTRAVENOUS at 22:04

## 2021-09-28 RX ADMIN — MORPHINE SULFATE 4 MG: 2 INJECTION, SOLUTION INTRAMUSCULAR; INTRAVENOUS at 11:28

## 2021-09-28 RX ADMIN — TAMSULOSIN HYDROCHLORIDE 0.4 MG: 0.4 CAPSULE ORAL at 08:34

## 2021-09-28 RX ADMIN — Medication 1000 UNITS: at 08:34

## 2021-09-28 RX ADMIN — DEXAMETHASONE SODIUM PHOSPHATE 6 MG: 4 INJECTION, SOLUTION INTRAMUSCULAR; INTRAVENOUS at 07:08

## 2021-09-28 RX ADMIN — INSULIN LISPRO 2 UNITS: 100 INJECTION, SOLUTION INTRAVENOUS; SUBCUTANEOUS at 17:47

## 2021-09-28 NOTE — CONSULTS
Patient seen, chart reviewed, note dictated. I apologize for the lateness of our response. 67 y/o man with a long h/o stage IV prostate cancer, followed closely by Dr. Leonidas Stevenson in our group. Had an excellent reponse to lupron/xtandi, but patient opted to stop xtandi in 2019. Has had a gradual rise in his PSA. Now admitted with back pain, found to have a T10 mass with compression of the cord as well as a smaller L5 lesion. Tentatively scheduled for surgery tomorrow. 1. Prostate CA: Agree with plans for surgical decompression. Appreciate excellent surgical care. Patient agrees to see Dr. Leonidas Stevenson in f/u to restart xtandi. 2. Prevention: given his stage IV adenocarcinoma, would recommend close adherence to DVT prophylaxis. Thank you for consult.      Chalo Crocker MD  Hem/Onc    [id number]

## 2021-09-28 NOTE — PROGRESS NOTES
Faculty or Preceptor Review of Student Work    9/28/2021  - Shift times - 0730 to 1300    The student documentation of patient care for Aren Mcdaneils has been reviewed and approved. All medications have been administered under the direct supervision of the faculty or preceptor.     Apoorva Bolden

## 2021-09-28 NOTE — PROGRESS NOTES
[facility]  Hospitalist [facility]                                                                                          Hospitalist Progress Note  Toan Young MD  Answering service: [phone] from in house phone        Date of Service:  2021  NAME:  Hayden Méndez  :  1950  MRN:  [id number]      Admission Summary:     Hayden Méndez is a 66 y.o. male past medical history of stage IV prostate cancer, chronic RLE DVT was recently hospitalized at [facility] for acute mid lower back pain with lumbar spinal stenosis. Preliminary MRI of the T-spine per Ortho showed T10 lesion with extension into the epidural space with evidence of encasement of the spinal cord and slight cord edema. Orthopedic surgery Dr. Stephenie Tejeda discussed  with Dr. Preet Herrera at [facility] the patient will be direct transfer here presumably for intervention. Patient was started on IV Decadron, continue on IV morphine as needed as well as oxycodone with bowel regimen. Neurosurgery as well as oncology has been consulted here. Patient follows with Dr. Claudetta Kemps as his hematologist oncologist.  Patient reports that he had been previously on [address] however in  had stopped this medication by his own well. Patient states he has been on leuprolide. Currently patient denies significant back pain, had just been given a dose of morphine. Has no acute complains of n/v/d/f/c changes in vision. He does currently endorse that he has bilateral lower extremity numbness which is greater on the left than the right however states he is able to move both legs. Patient states his left leg feels weaker than his right leg.     Interval history / Subjective:     He said he has lower extremities and buttock numbness      Assessment & Plan:     T10 mass/lesion with cord compression with evidence of mets   -likely secondary to metastatic spread of stage IV prostate Ca  -on iv dexamethasone, prn morphin  -MRI T-spine notable for 42 x 30 x 45 mL soft tissue mass extending anteriorly at the T10 vertebral body with evidence of moderate cord compression as well as cord edema. Also noted 12 mm lesion in the posterior L5 vertebra  -Pain control with as needed morphine, Percocet, bowel regimen while on opioids z  -PT/OT   -Orthopedic surgeon on board, possible decompressive procedure 9/29     Stage IV prostate cancer  -On Lupron, apparently castrate resistant  -Reviewed oncology note from 9/25,   -oncology on board  -cont flomax      Anemia of chronic disease  -chronic, ACD in the setting of malignancy   -Hgb 10.6  -monitor H/H, to transfuse for Hgb <7     Code status: Full Code  DVT prophylaxis:DSC    Care Plan discussed with: Patient/Family, Nurse and   Anticipated Disposition: TBD  Anticipated Discharge: Greater than 48 hours     Hospital Problems  Date Reviewed: 3/19/2018        Codes Class Noted POA    Lesion of bone of thoracic spine ICD-10-CM: M89.9  ICD-9-CM: 733.90  9/26/2021 Unknown                 Vital Signs:    Last 24hrs VS reviewed since prior progress note. Most recent are:  Visit Vitals  /68   Pulse 61   Temp 97.7 °F (36.5 °C)   Resp 16   SpO2 95%         Intake/Output Summary (Last 24 hours) at 9/28/2021 0948  Last data filed at 9/27/2021 2054  Gross per 24 hour   Intake 120 ml   Output 200 ml   Net -80 ml        Physical Examination:     I had a face to face encounter with this patient and independently examined them on 9/28/2021 as outlined below:          Constitutional:  No acute distress, cooperative, pleasant    ENT:  Oral mucosa moist, oropharynx benign. Resp:  CTA bilaterally. No wheezing/rhonchi/rales. No accessory muscle use   CV:  Regular rhythm, normal rate, no murmurs, gallops, rubs    GI:  Soft, non distended, non tender. normoactive bowel sounds, no hepatosplenomegaly     Musculoskeletal:  No edema,     Neurologic:  Moves all extremities.   AAOx3, CN II-XII reviewed Data Review:    Review and/or order of clinical lab test  Review and/or order of tests in the radiology section of CPT  Review and/or order of tests in the medicine section of CPT      Labs:     Recent Labs     09/28/21  0259 09/27/21 0311   WBC 11.4* 6.8   HGB 10.6* 10.6*   HCT 32.8* 32.7*    173     Recent Labs     09/28/21  0259 09/27/21 0311 09/26/21  0351    138 139   K 4.5 4.5 3.8    110* 110*   CO2 27 24 24   BUN 17 13 14   CREA 0.64* 0.67* 0.47*   * 153* 102*   CA 9.1 8.8 8.7     Recent Labs     09/26/21 0351   ALT 23      TBILI 0.4   TP 6.5   ALB 2.5*   GLOB 4.0     No results for input(s): INR, PTP, APTT, INREXT, INREXT in the last 72 hours. No results for input(s): FE, TIBC, PSAT, FERR in the last 72 hours. No results found for: FOL, RBCF   No results for input(s): PH, PCO2, PO2 in the last 72 hours. No results for input(s): CPK, CKNDX, TROIQ in the last 72 hours.     No lab exists for component: CPKMB  No results found for: CHOL, CHOLX, CHLST, CHOLV, HDL, HDLP, LDL, LDLC, DLDLP, TGLX, TRIGL, TRIGP, CHHD, CHHDX  Lab Results   Component Value Date/Time    Glucose (POC) 130 (H) 09/28/2021 06:32 AM    Glucose (POC) 132 (H) 09/27/2021 04:44 PM    Glucose (POC) 180 (H) 09/27/2021 11:31 AM    Glucose (POC) 151 (H) 09/27/2021 06:06 AM    Glucose (POC) 179 (H) 09/26/2021 09:22 PM     Lab Results   Component Value Date/Time    Color DARK YELLOW 09/24/2021 12:44 PM    Appearance CLEAR 09/24/2021 12:44 PM    Specific gravity 1.028 09/24/2021 12:44 PM    Specific gravity 1.024 09/10/2021 03:41 PM    pH (UA) 5.0 09/24/2021 12:44 PM    Protein TRACE (A) 09/24/2021 12:44 PM    Glucose Negative 09/24/2021 12:44 PM    Ketone TRACE (A) 09/24/2021 12:44 PM    Bilirubin Negative 09/24/2021 12:44 PM    Urobilinogen 0.2 09/24/2021 12:44 PM    Nitrites Negative 09/24/2021 12:44 PM    Leukocyte Esterase Negative 09/24/2021 12:44 PM    Epithelial cells FEW 09/24/2021 12:44 PM    Bacteria Negative 09/24/2021 12:44 PM    WBC 0-4 09/24/2021 12:44 PM    RBC 0-5 09/24/2021 12:44 PM         Medications Reviewed:     Current Facility-Administered Medications   Medication Dose Route Frequency    morphine injection 4 mg  4 mg IntraVENous Q6H PRN    polyethylene glycol (MIRALAX) packet 17 g  17 g Oral DAILY    senna (SENOKOT) tablet 8.6 mg  1 Tablet Oral DAILY    tamsulosin (FLOMAX) capsule 0.4 mg  0.4 mg Oral DAILY    thiamine HCL (B-1) tablet 100 mg  100 mg Oral DAILY    cholecalciferol (VITAMIN D3) (1000 Units /25 mcg) tablet 1,000 Units  1,000 Units Oral DAILY    oxyCODONE-acetaminophen (PERCOCET) 5-325 mg per tablet 1 Tablet  1 Tablet Oral Q6H PRN    dexamethasone (DECADRON) 4 mg/mL injection 6 mg  6 mg IntraVENous Q6H    pantoprazole (PROTONIX) tablet 40 mg  40 mg Oral ACB    insulin lispro (HUMALOG) injection   SubCUTAneous TIDAC    glucose chewable tablet 16 g  4 Tablet Oral PRN    dextrose (D50W) injection syrg 12.5-25 g  12.5-25 g IntraVENous PRN    glucagon (GLUCAGEN) injection 1 mg  1 mg IntraMUSCular PRN     ______________________________________________________________________  EXPECTED LENGTH OF STAY: - - -  ACTUAL LENGTH OF STAY:          2                 Marie Gan MD

## 2021-09-28 NOTE — CONSULTS
Palliative Medicine Consult  [place]: [phone] ([phone])    Patient Name: Mana Armenta  YOB: 1950    Date of Initial Consult: 9/28/21  Reason for Consult: Care decisions  Requesting Provider: Dr. Jimena Lanza  Primary Care Physician: Ni Moreland MD     SUMMARY:   Mana Armenta is a 66 y.o. male with a past history of stage IV prostate cancer, chronic right LE DVT, and anemia, who was admitted on 9/26/2021 from home with a diagnosis of T10 lesion with cord compression. He presented with complaints of back pain, weakness, and sensory changes. Imaging revealed a T10 lesion with cord compression and a soft tissue mass. Decompression surgery is planned for tomorrow. Oncology consult is pending. Current medical issues leading to Palliative Medicine involvement include: stage IV cancer, care decisions. Social: He is single and lives alone. His sister Katina Armijo is his main support. He has several other siblings, but most of them are incapacitated due to medical problems. PALLIATIVE DIAGNOSES:   1. Palliative care encounter  2. Goals of care and advance care planning  3. DNR discussion  4. Back pain  5. Weakness  6. Stage IV prostate cancer  7. T10 lesion with cord compression       PLAN:   1. Prior to seeing the patient, the medical record was reviewed. 2. Patient seen at the bedside and palliative medicine services introduced. 3. We talked about his new symptoms and the new findings of the T10 lesion. We talked about the plan for surgery tomorrow with Dr. Gia Alicea. He is in agreement to proceed with surgery. 4. We talked about next steps and his past cancer treatments. He underwent radiation previously, takes Lupron shots, and was an oral chemo agent. He is open to doing any of these treatments again. We talked about the possibility of needing chemotherapy that is given through an IV. He was unsure if he would want to do this and was very worried about losing his hair.  Plan made to talk through this decision if this option is offered. 5. We also talked about his code status. I explained that he is a full code status and what this would entail if he had a cardiopulmonary arrest versus a do not resuscitate status. At this time, he wants to continue his full code status. 6. We also talked about his support system. He does not have an advance medical directive in place, but he is interested in completing one during this admission. He says his sister, Kristi Clark, who lives locally would be his primary medical decision maker. He has another brother and sister, but they have medical issues and would likely not be able to serve in this role. 7. Back pain: He reports that this is under control currently with the Percocet and IV Morphine. Will continue to monitor and adjust his meds as needed. 8. Palliative medicine contact information given to him and I encouraged him to reach out to me if needed. I will continue to follow along for support and ongoing goals of care discussions. 9. Initial consult note routed to primary continuity provider and/or primary health care team members  10. Communicated plan of care with: Palliative IDTJaime 192 Team     GOALS OF CARE / TREATMENT PREFERENCES:     GOALS OF CARE:  Patient/Health Care Proxy Stated Goals: Prolong life    TREATMENT PREFERENCES:   Code Status: Full Code    Advance Care Planning:  [x] The [facility] Interdisciplinary Team has updated the ACP Navigator with Health Care Decision Maker and Patient Capacity      Primary Decision Maker: Rosalie Garrett Sister - [phone]      Advance Care Planning 3/19/2018   Patient's Healthcare Decision Maker is: Legal Next of Kin   Primary Decision Maker Name -   Primary Decision Maker Relationship to Patient -   Confirm Advance Directive None   Patient Would Like to Complete Advance Directive -       Medical Interventions: Full interventions     Other Instructions:          Other:    As far as possible, the palliative care team has discussed with patient / health care proxy about goals of care / treatment preferences for patient. HISTORY:     History obtained from: chart, patient    CHIEF COMPLAINT: Back pain, weakness, sensory changes    HPI/SUBJECTIVE:    The patient is:   [x] Verbal and participatory  [] Non-participatory due to:     He reports that he developed back pain and numbness in his feet/toes. This has made it difficult to walk. He also complains of weakness. He denies nausea or shortness of breath. Clinical Pain Assessment (nonverbal scale for severity on nonverbal patients):   Clinical Pain Assessment  Severity: 2  Location: back  Character: aching  Duration: days  Frequency: intermittent          Duration: for how long has pt been experiencing pain (e.g., 2 days, 1 month, years)  Frequency: how often pain is an issue (e.g., several times per day, once every few days, constant)     FUNCTIONAL ASSESSMENT:     Palliative Performance Scale (PPS):  PPS: 60       PSYCHOSOCIAL/SPIRITUAL SCREENING:     Palliative IDT has assessed this patient for cultural preferences / practices and a referral made as appropriate to needs (Cultural Services, Patient Advocacy, Ethics, etc.)    Any spiritual / Advent concerns:  [] Yes /  [x] No    Caregiver Burnout:  [] Yes /  [] No /  [x] No Caregiver Present      Anticipatory grief assessment:   [x] Normal  / [] Maladaptive       ESAS Anxiety:      ESAS Depression:          REVIEW OF SYSTEMS:     Positive and pertinent negative findings in ROS are noted above in HPI. The following systems were [x] reviewed / [] unable to be reviewed as noted in HPI  Other findings are noted below. Systems: constitutional, ears/nose/mouth/throat, respiratory, gastrointestinal, genitourinary, musculoskeletal, integumentary, neurologic, psychiatric, endocrine. Positive findings noted below.   Modified ESAS Completed by: provider   Fatigue: 3 Drowsiness: 0     Pain: 2     Nausea: 0 Dyspnea: 0           Stool Occurrence(s): 0        PHYSICAL EXAM:     From RN flowsheet:  Wt Readings from Last 3 Encounters:   09/24/21 185 lb (83.9 kg)   09/10/21 185 lb (83.9 kg)   03/19/18 165 lb (74.8 kg)     Blood pressure 130/68, pulse 73, temperature 97.7 °F (36.5 °C), resp. rate 18, SpO2 95 %.     Pain Scale 1: Numeric (0 - 10)  Pain Intensity 1: 8  Pain Onset 1: acute  Pain Location 1: Back  Pain Orientation 1: Lower  Pain Description 1: Aching  Pain Intervention(s) 1: Medication (see MAR)  Last bowel movement, if known:     Constitutional: alert, awake, no acute distress, pleasant, conversational  Eyes: pupils equal, anicteric  ENMT: no nasal discharge, moist mucous membranes  Cardiovascular: regular rhythm  Respiratory: breathing not labored, symmetric  Musculoskeletal: no deformity  Skin: warm, dry  Neurologic: following commands, moving all extremities  Psychiatric: full affect, no hallucinations       HISTORY:     Active Problems:    Lesion of bone of thoracic spine (9/26/2021)      Palliative care encounter ()      Counseling regarding advance care planning and goals of care ()      DNR (do not resuscitate) discussion ()      Acute back pain, unspecified back location, unspecified back pain laterality ()      Past Medical History:   Diagnosis Date    Arthritis     Mild anemia     Prostate cancer ([facility] [address] 2008 2017    PALLIATIVE CARE PER DR LAMBERT FARR NOTES 2/12/2018: Jose Bryant, AND PAST EXTERNAL BEAM RADIATION THERAPY      Past Surgical History:   Procedure Laterality Date    HX BUNIONECTOMY Right 2014    HX CATARACT REMOVAL Bilateral 2017 2018    HX COLONOSCOPY      HX GI  2009    ANAL FISTULA     HX HEMORRHOIDECTOMY  1974    HX HIP ARTHROSCOPY Right     HX TONSILLECTOMY  1958      Family History   Problem Relation Age of Onset    Diabetes Mother     Hypertension Mother     Asthma Father     Hypertension Sister     Kidney Disease Brother         DIALYSIS    Diabetes Brother     Diabetes Brother     Drug Abuse Brother     Mental Retardation Sister     Obesity Sister     Anesth Problems Neg Hx       History reviewed, no pertinent family history. Social History     Tobacco Use    Smoking status: Former Smoker     Packs/day: 0.25     Years: 5.00     Pack years: 1.25     Quit date:      Years since quittin.7    Smokeless tobacco: Never Used   Substance Use Topics    Alcohol use: No     No Known Allergies   Current Facility-Administered Medications   Medication Dose Route Frequency    morphine injection 4 mg  4 mg IntraVENous Q6H PRN    polyethylene glycol (MIRALAX) packet 17 g  17 g Oral DAILY    senna (SENOKOT) tablet 8.6 mg  1 Tablet Oral DAILY    tamsulosin (FLOMAX) capsule 0.4 mg  0.4 mg Oral DAILY    thiamine HCL (B-1) tablet 100 mg  100 mg Oral DAILY    cholecalciferol (VITAMIN D3) (1000 Units /25 mcg) tablet 1,000 Units  1,000 Units Oral DAILY    oxyCODONE-acetaminophen (PERCOCET) 5-325 mg per tablet 1 Tablet  1 Tablet Oral Q6H PRN    dexamethasone (DECADRON) 4 mg/mL injection 6 mg  6 mg IntraVENous Q6H    pantoprazole (PROTONIX) tablet 40 mg  40 mg Oral ACB    insulin lispro (HUMALOG) injection   SubCUTAneous TIDAC    glucose chewable tablet 16 g  4 Tablet Oral PRN    dextrose (D50W) injection syrg 12.5-25 g  12.5-25 g IntraVENous PRN    glucagon (GLUCAGEN) injection 1 mg  1 mg IntraMUSCular PRN          LAB AND IMAGING FINDINGS:     Lab Results   Component Value Date/Time    WBC 11.4 (H) 2021 02:59 AM    HGB 10.6 (L) 2021 02:59 AM    PLATELET 523  02:59 AM     Lab Results   Component Value Date/Time    Sodium 137 2021 02:59 AM    Potassium 4.5 2021 02:59 AM    Chloride 108 2021 02:59 AM    CO2 27 2021 02:59 AM    BUN 17 2021 02:59 AM    Creatinine 0.64 (L) 2021 02:59 AM    Calcium 9.1 2021 02:59 AM      Lab Results   Component Value Date/Time    Alk.  phosphatase 117 2021 03:51 AM Protein, total 6.5 09/26/2021 03:51 AM    Albumin 2.5 (L) 09/26/2021 03:51 AM    Globulin 4.0 09/26/2021 03:51 AM     Lab Results   Component Value Date/Time    INR 1.0 02/22/2018 08:44 AM    Prothrombin time 10.6 02/22/2018 08:44 AM      No results found for: IRON, FE, TIBC, IBCT, PSAT, FERR   No results found for: PH, PCO2, PO2  No components found for: GLPOC   No results found for: CPK, CKMB             Total time: 70 min  Counseling / coordination time, spent as noted above: 45 min  > 50% counseling / coordination?: yes    Prolonged service was provided for  []30 min   []75 min in face to face time in the presence of the patient, spent as noted above. Time Start:   Time End:   Note: this can only be billed with 04780 (initial) or 64399 (follow up). If multiple start / stop times, list each separately.

## 2021-09-28 NOTE — PROGRESS NOTES
Orthopedic Spine Progress Note  Post Op day: * No surgery date entered *    2021 8:44 AM   Admit Date: 2021  Procedure: Procedure(s):  T9-T10 LAMINECTOMY WITH T10 TRANPEDICULAR  DECOMPRESSION, T7-T12 POSTERIOR SPINAL FUSION (O ARM)    Subjective:     Amanda Magaña has no complaints. Pain is well controlled. Tolerating diet. No N/V. Pain Control:   Pain Assessment  Pain Scale 1: Numeric (0 - 10)  Pain Intensity 1: 7  Pain Onset 1: acute  Pain Location 1: Back  Pain Orientation 1: Mid, Lower  Pain Description 1: Aching  Pain Intervention(s) 1: Medication (see MAR)    Objective:          Physical Exam:  General:  Alert and oriented. No acute distress. Heart:  Respirations unlabored. Abdomen:   Extremities: Soft, non-tender. No evidence of cyanosis. Pulses palpable in both upper and lower extremities. Neurologic:  Musculoskeletal:  No new motor deficits. Neurovascular exam within normal limits. Sensation stable. Motor: unchanged C5-T1 and L2-S1. Myra's sign negative in bilateral lower extremities. Calves soft, nontender upon palpation and with passive twitch. Moves both upper and lower extremities. Incision: clean, dry, and intact. No significant erythema or swelling. No active drainage noted. Vital Signs:    Blood pressure 119/68, pulse 61, temperature 97.7 °F (36.5 °C), resp. rate 16, SpO2 95 %. Temp (24hrs), Av °F (36.7 °C), Min:97.6 °F (36.4 °C), Max:98.6 °F (37 °C)      LAB:    Recent Labs     21  0259   HCT 32.8*   HGB 10.6*        Lab Results   Component Value Date/Time    Sodium 137 2021 02:59 AM    Potassium 4.5 2021 02:59 AM    Chloride 108 2021 02:59 AM    CO2 27 2021 02:59 AM    Glucose 154 (H) 2021 02:59 AM    BUN 17 2021 02:59 AM    Creatinine 0.64 (L) 2021 02:59 AM    Calcium 9.1 2021 02:59 AM       Intake/Output:No intake/output data recorded.   1901 -  07  In: 120 [P.O.:120]  Out: 600 [Urine:600]    PT/OT:   Gait:                    Assessment:   Patient is * No surgery date entered * s/p Procedure(s):  T9-T10 LAMINECTOMY WITH T10 TRANPEDICULAR  DECOMPRESSION, T7-T12 POSTERIOR SPINAL FUSION (O ARM)    Plan:     1. Continue PT/OT  2. Continue established methods of pain control  3. VTE Prophylaxes - TEDS &/or SCDs   4. Advance diet  5.   Plan to operate tomorrow pending input from oncology, NPO after midnight tonight         Signed By: JERRI Zayas

## 2021-09-28 NOTE — PROGRESS NOTES
Problem: Falls - Risk of  Goal: *Absence of Falls  Description: Document Bard  Fall Risk and appropriate interventions in the flowsheet.   Outcome: Progressing Towards Goal  Note: Fall Risk Interventions:  Mobility Interventions: Bed/chair exit alarm         Medication Interventions: Bed/chair exit alarm    Elimination Interventions: Bed/chair exit alarm    History of Falls Interventions: Bed/chair exit alarm

## 2021-09-29 ENCOUNTER — ANESTHESIA (OUTPATIENT)
Dept: SURGERY | Age: 71
DRG: 457 | End: 2021-09-29
Payer: MEDICARE

## 2021-09-29 ENCOUNTER — APPOINTMENT (OUTPATIENT)
Dept: GENERAL RADIOLOGY | Age: 71
DRG: 457 | End: 2021-09-29
Attending: HOSPITALIST
Payer: MEDICARE

## 2021-09-29 LAB
ABO + RH BLD: NORMAL
ANION GAP SERPL CALC-SCNC: 4 MMOL/L (ref 5–15)
BASOPHILS # BLD: 0 K/UL (ref 0–0.1)
BASOPHILS NFR BLD: 0 % (ref 0–1)
BLD PROD TYP BPU: NORMAL
BLD PROD TYP BPU: NORMAL
BLOOD GROUP ANTIBODIES SERPL: NORMAL
BPU ID: NORMAL
BPU ID: NORMAL
BUN SERPL-MCNC: 19 MG/DL (ref 6–20)
BUN/CREAT SERPL: 27 (ref 12–20)
CALCIUM SERPL-MCNC: 9 MG/DL (ref 8.5–10.1)
CHLORIDE SERPL-SCNC: 105 MMOL/L (ref 97–108)
CO2 SERPL-SCNC: 26 MMOL/L (ref 21–32)
CREAT SERPL-MCNC: 0.71 MG/DL (ref 0.7–1.3)
CROSSMATCH RESULT,%XM: NORMAL
CROSSMATCH RESULT,%XM: NORMAL
DIFFERENTIAL METHOD BLD: ABNORMAL
EOSINOPHIL # BLD: 0 K/UL (ref 0–0.4)
EOSINOPHIL NFR BLD: 0 % (ref 0–7)
ERYTHROCYTE [DISTWIDTH] IN BLOOD BY AUTOMATED COUNT: 11.9 % (ref 11.5–14.5)
GLUCOSE BLD STRIP.AUTO-MCNC: 152 MG/DL (ref 65–117)
GLUCOSE BLD STRIP.AUTO-MCNC: 169 MG/DL (ref 65–117)
GLUCOSE BLD STRIP.AUTO-MCNC: 170 MG/DL (ref 65–117)
GLUCOSE BLD STRIP.AUTO-MCNC: 186 MG/DL (ref 65–117)
GLUCOSE SERPL-MCNC: 152 MG/DL (ref 65–100)
HCT VFR BLD AUTO: 34.1 % (ref 36.6–50.3)
HGB BLD-MCNC: 11.3 G/DL (ref 12.1–17)
HISTORY CHECKED?,CKHIST: NORMAL
IMM GRANULOCYTES # BLD AUTO: 0.1 K/UL (ref 0–0.04)
IMM GRANULOCYTES NFR BLD AUTO: 1 % (ref 0–0.5)
LYMPHOCYTES # BLD: 0.9 K/UL (ref 0.8–3.5)
LYMPHOCYTES NFR BLD: 9 % (ref 12–49)
MCH RBC QN AUTO: 31.3 PG (ref 26–34)
MCHC RBC AUTO-ENTMCNC: 33.1 G/DL (ref 30–36.5)
MCV RBC AUTO: 94.5 FL (ref 80–99)
MONOCYTES # BLD: 0.5 K/UL (ref 0–1)
MONOCYTES NFR BLD: 4 % (ref 5–13)
NEUTS SEG # BLD: 9.2 K/UL (ref 1.8–8)
NEUTS SEG NFR BLD: 86 % (ref 32–75)
NRBC # BLD: 0 K/UL (ref 0–0.01)
NRBC BLD-RTO: 0 PER 100 WBC
PLATELET # BLD AUTO: 183 K/UL (ref 150–400)
PMV BLD AUTO: 11.6 FL (ref 8.9–12.9)
POTASSIUM SERPL-SCNC: 4.3 MMOL/L (ref 3.5–5.1)
RBC # BLD AUTO: 3.61 M/UL (ref 4.1–5.7)
SERVICE CMNT-IMP: ABNORMAL
SODIUM SERPL-SCNC: 135 MMOL/L (ref 136–145)
SPECIMEN EXP DATE BLD: NORMAL
STATUS OF UNIT,%ST: NORMAL
STATUS OF UNIT,%ST: NORMAL
UNIT DIVISION, %UDIV: 0
UNIT DIVISION, %UDIV: 0
WBC # BLD AUTO: 10.7 K/UL (ref 4.1–11.1)

## 2021-09-29 PROCEDURE — 88342 IMHCHEM/IMCYTCHM 1ST ANTB: CPT

## 2021-09-29 PROCEDURE — 74011000250 HC RX REV CODE- 250: Performed by: PHYSICIAN ASSISTANT

## 2021-09-29 PROCEDURE — C1713 ANCHOR/SCREW BN/BN,TIS/BN: HCPCS | Performed by: ORTHOPAEDIC SURGERY

## 2021-09-29 PROCEDURE — 36415 COLL VENOUS BLD VENIPUNCTURE: CPT

## 2021-09-29 PROCEDURE — 74011250636 HC RX REV CODE- 250/636: Performed by: PHYSICIAN ASSISTANT

## 2021-09-29 PROCEDURE — 74011636637 HC RX REV CODE- 636/637: Performed by: STUDENT IN AN ORGANIZED HEALTH CARE EDUCATION/TRAINING PROGRAM

## 2021-09-29 PROCEDURE — 88304 TISSUE EXAM BY PATHOLOGIST: CPT

## 2021-09-29 PROCEDURE — 77030038552 HC DRN WND MDII -A: Performed by: ORTHOPAEDIC SURGERY

## 2021-09-29 PROCEDURE — 88307 TISSUE EXAM BY PATHOLOGIST: CPT

## 2021-09-29 PROCEDURE — 77030038692 HC WND DEB SYS IRMX -B: Performed by: ORTHOPAEDIC SURGERY

## 2021-09-29 PROCEDURE — 76060000036 HC ANESTHESIA 2.5 TO 3 HR: Performed by: ORTHOPAEDIC SURGERY

## 2021-09-29 PROCEDURE — 0RG70J1 FUSION OF 2 TO 7 THORACIC VERTEBRAL JOINTS WITH SYNTHETIC SUBSTITUTE, POSTERIOR APPROACH, POSTERIOR COLUMN, OPEN APPROACH: ICD-10-PCS | Performed by: ORTHOPAEDIC SURGERY

## 2021-09-29 PROCEDURE — 77030029099 HC BN WAX SSPC -A: Performed by: ORTHOPAEDIC SURGERY

## 2021-09-29 PROCEDURE — 65660000000 HC RM CCU STEPDOWN

## 2021-09-29 PROCEDURE — 85025 COMPLETE CBC W/AUTO DIFF WBC: CPT

## 2021-09-29 PROCEDURE — 88311 DECALCIFY TISSUE: CPT

## 2021-09-29 PROCEDURE — 76010000172 HC OR TIME 2.5 TO 3 HR INTENSV-TIER 1: Performed by: ORTHOPAEDIC SURGERY

## 2021-09-29 PROCEDURE — 77030004391 HC BUR FLUT MEDT -C: Performed by: ORTHOPAEDIC SURGERY

## 2021-09-29 PROCEDURE — 74011250636 HC RX REV CODE- 250/636: Performed by: NURSE ANESTHETIST, CERTIFIED REGISTERED

## 2021-09-29 PROCEDURE — 2709999900 HC NON-CHARGEABLE SUPPLY: Performed by: ORTHOPAEDIC SURGERY

## 2021-09-29 PROCEDURE — 82962 GLUCOSE BLOOD TEST: CPT

## 2021-09-29 PROCEDURE — 74011000250 HC RX REV CODE- 250: Performed by: ORTHOPAEDIC SURGERY

## 2021-09-29 PROCEDURE — 77030031139 HC SUT VCRL2 J&J -A: Performed by: ORTHOPAEDIC SURGERY

## 2021-09-29 PROCEDURE — 74011250636 HC RX REV CODE- 250/636: Performed by: STUDENT IN AN ORGANIZED HEALTH CARE EDUCATION/TRAINING PROGRAM

## 2021-09-29 PROCEDURE — 00NX0ZZ RELEASE THORACIC SPINAL CORD, OPEN APPROACH: ICD-10-PCS | Performed by: ORTHOPAEDIC SURGERY

## 2021-09-29 PROCEDURE — 74011250636 HC RX REV CODE- 250/636: Performed by: ANESTHESIOLOGY

## 2021-09-29 PROCEDURE — 74011000258 HC RX REV CODE- 258: Performed by: NURSE ANESTHETIST, CERTIFIED REGISTERED

## 2021-09-29 PROCEDURE — 74011250637 HC RX REV CODE- 250/637: Performed by: PHYSICIAN ASSISTANT

## 2021-09-29 PROCEDURE — 88341 IMHCHEM/IMCYTCHM EA ADD ANTB: CPT

## 2021-09-29 PROCEDURE — 77030005513 HC CATH URETH FOL11 MDII -B: Performed by: ORTHOPAEDIC SURGERY

## 2021-09-29 PROCEDURE — 77030038600 HC TU BPLR IRR DISP STRY -B: Performed by: ORTHOPAEDIC SURGERY

## 2021-09-29 PROCEDURE — 74011000250 HC RX REV CODE- 250: Performed by: NURSE ANESTHETIST, CERTIFIED REGISTERED

## 2021-09-29 PROCEDURE — 77030014650 HC SEAL MTRX FLOSEL BAXT -C: Performed by: ORTHOPAEDIC SURGERY

## 2021-09-29 PROCEDURE — 74011250636 HC RX REV CODE- 250/636: Performed by: ORTHOPAEDIC SURGERY

## 2021-09-29 PROCEDURE — 77030008684 HC TU ET CUF COVD -B: Performed by: ANESTHESIOLOGY

## 2021-09-29 PROCEDURE — 80048 BASIC METABOLIC PNL TOTAL CA: CPT

## 2021-09-29 PROCEDURE — 76210000017 HC OR PH I REC 1.5 TO 2 HR: Performed by: ORTHOPAEDIC SURGERY

## 2021-09-29 DEVICE — SCREW 55840006545 5.5/6 MAS 6.5X45 CC .
Type: IMPLANTABLE DEVICE | Site: BACK | Status: FUNCTIONAL
Brand: CD HORIZON® SPINAL SYSTEM

## 2021-09-29 DEVICE — SCREW 55840005545 5.5/6 MAS 5.5X45 CC
Type: IMPLANTABLE DEVICE | Site: BACK | Status: FUNCTIONAL
Brand: CD HORIZON® SPINAL SYSTEM

## 2021-09-29 DEVICE — SET SCREW 5540030 5.5 TI NS BRK OFF
Type: IMPLANTABLE DEVICE | Site: BACK | Status: FUNCTIONAL
Brand: CD HORIZON® SPINAL SYSTEM

## 2021-09-29 DEVICE — ROD 1554200500 5.5 TIAL NS STRT LN 500MM
Type: IMPLANTABLE DEVICE | Site: BACK | Status: FUNCTIONAL
Brand: CD HORIZON® SPINAL SYSTEM

## 2021-09-29 DEVICE — GRAFT BNE SUB 15GM GRN CA COMPND PTTY AND SILICATE BASE INJ: Type: IMPLANTABLE DEVICE | Site: BACK | Status: FUNCTIONAL

## 2021-09-29 RX ORDER — OXYCODONE HYDROCHLORIDE 5 MG/1
5 TABLET ORAL
Status: DISCONTINUED | OUTPATIENT
Start: 2021-09-29 | End: 2021-10-02 | Stop reason: HOSPADM

## 2021-09-29 RX ORDER — PROPOFOL 10 MG/ML
INJECTION, EMULSION INTRAVENOUS AS NEEDED
Status: DISCONTINUED | OUTPATIENT
Start: 2021-09-29 | End: 2021-09-29 | Stop reason: HOSPADM

## 2021-09-29 RX ORDER — ROCURONIUM BROMIDE 10 MG/ML
INJECTION, SOLUTION INTRAVENOUS AS NEEDED
Status: DISCONTINUED | OUTPATIENT
Start: 2021-09-29 | End: 2021-09-29 | Stop reason: HOSPADM

## 2021-09-29 RX ORDER — SODIUM CHLORIDE 9 MG/ML
INJECTION, SOLUTION INTRAVENOUS
Status: DISCONTINUED | OUTPATIENT
Start: 2021-09-29 | End: 2021-09-29 | Stop reason: HOSPADM

## 2021-09-29 RX ORDER — DIPHENHYDRAMINE HYDROCHLORIDE 50 MG/ML
12.5 INJECTION, SOLUTION INTRAMUSCULAR; INTRAVENOUS
Status: ACTIVE | OUTPATIENT
Start: 2021-09-29 | End: 2021-09-30

## 2021-09-29 RX ORDER — MORPHINE SULFATE 2 MG/ML
2 INJECTION, SOLUTION INTRAMUSCULAR; INTRAVENOUS
Status: ACTIVE | OUTPATIENT
Start: 2021-09-29 | End: 2021-09-30

## 2021-09-29 RX ORDER — SODIUM CHLORIDE 9 MG/ML
25 INJECTION, SOLUTION INTRAVENOUS CONTINUOUS
Status: DISCONTINUED | OUTPATIENT
Start: 2021-09-29 | End: 2021-09-29 | Stop reason: HOSPADM

## 2021-09-29 RX ORDER — HYDROMORPHONE HYDROCHLORIDE 1 MG/ML
0.2 INJECTION, SOLUTION INTRAMUSCULAR; INTRAVENOUS; SUBCUTANEOUS
Status: DISPENSED | OUTPATIENT
Start: 2021-09-29 | End: 2021-09-29

## 2021-09-29 RX ORDER — LIDOCAINE HYDROCHLORIDE 20 MG/ML
INJECTION, SOLUTION EPIDURAL; INFILTRATION; INTRACAUDAL; PERINEURAL AS NEEDED
Status: DISCONTINUED | OUTPATIENT
Start: 2021-09-29 | End: 2021-09-29 | Stop reason: HOSPADM

## 2021-09-29 RX ORDER — FENTANYL CITRATE 50 UG/ML
25 INJECTION, SOLUTION INTRAMUSCULAR; INTRAVENOUS
Status: COMPLETED | OUTPATIENT
Start: 2021-09-29 | End: 2021-09-29

## 2021-09-29 RX ORDER — LIDOCAINE HYDROCHLORIDE 10 MG/ML
0.1 INJECTION, SOLUTION EPIDURAL; INFILTRATION; INTRACAUDAL; PERINEURAL AS NEEDED
Status: DISCONTINUED | OUTPATIENT
Start: 2021-09-29 | End: 2021-09-29 | Stop reason: HOSPADM

## 2021-09-29 RX ORDER — FENTANYL CITRATE 50 UG/ML
50 INJECTION, SOLUTION INTRAMUSCULAR; INTRAVENOUS AS NEEDED
Status: DISCONTINUED | OUTPATIENT
Start: 2021-09-29 | End: 2021-09-29 | Stop reason: HOSPADM

## 2021-09-29 RX ORDER — PROPOFOL 10 MG/ML
INJECTION, EMULSION INTRAVENOUS
Status: DISCONTINUED | OUTPATIENT
Start: 2021-09-29 | End: 2021-09-29 | Stop reason: HOSPADM

## 2021-09-29 RX ORDER — DIPHENHYDRAMINE HYDROCHLORIDE 50 MG/ML
12.5 INJECTION, SOLUTION INTRAMUSCULAR; INTRAVENOUS AS NEEDED
Status: DISCONTINUED | OUTPATIENT
Start: 2021-09-29 | End: 2021-09-29 | Stop reason: HOSPADM

## 2021-09-29 RX ORDER — SODIUM CHLORIDE 9 MG/ML
125 INJECTION, SOLUTION INTRAVENOUS CONTINUOUS
Status: DISPENSED | OUTPATIENT
Start: 2021-09-29 | End: 2021-09-30

## 2021-09-29 RX ORDER — DEXAMETHASONE SODIUM PHOSPHATE 4 MG/ML
INJECTION, SOLUTION INTRA-ARTICULAR; INTRALESIONAL; INTRAMUSCULAR; INTRAVENOUS; SOFT TISSUE AS NEEDED
Status: DISCONTINUED | OUTPATIENT
Start: 2021-09-29 | End: 2021-09-29 | Stop reason: HOSPADM

## 2021-09-29 RX ORDER — CYCLOBENZAPRINE HCL 10 MG
10 TABLET ORAL
Status: DISCONTINUED | OUTPATIENT
Start: 2021-09-29 | End: 2021-10-02 | Stop reason: HOSPADM

## 2021-09-29 RX ORDER — AMOXICILLIN 250 MG
1 CAPSULE ORAL 2 TIMES DAILY
Status: DISCONTINUED | OUTPATIENT
Start: 2021-09-29 | End: 2021-10-02 | Stop reason: HOSPADM

## 2021-09-29 RX ORDER — ENOXAPARIN SODIUM 100 MG/ML
40 INJECTION SUBCUTANEOUS EVERY 24 HOURS
Status: DISCONTINUED | OUTPATIENT
Start: 2021-09-30 | End: 2021-10-02 | Stop reason: HOSPADM

## 2021-09-29 RX ORDER — SODIUM CHLORIDE 0.9 % (FLUSH) 0.9 %
5-40 SYRINGE (ML) INJECTION EVERY 8 HOURS
Status: DISCONTINUED | OUTPATIENT
Start: 2021-09-29 | End: 2021-10-02 | Stop reason: HOSPADM

## 2021-09-29 RX ORDER — ONDANSETRON 2 MG/ML
4 INJECTION INTRAMUSCULAR; INTRAVENOUS
Status: ACTIVE | OUTPATIENT
Start: 2021-09-29 | End: 2021-09-30

## 2021-09-29 RX ORDER — POLYETHYLENE GLYCOL 3350 17 G/17G
17 POWDER, FOR SOLUTION ORAL DAILY
Status: DISCONTINUED | OUTPATIENT
Start: 2021-09-30 | End: 2021-10-02 | Stop reason: HOSPADM

## 2021-09-29 RX ORDER — SODIUM CHLORIDE, SODIUM LACTATE, POTASSIUM CHLORIDE, CALCIUM CHLORIDE 600; 310; 30; 20 MG/100ML; MG/100ML; MG/100ML; MG/100ML
100 INJECTION, SOLUTION INTRAVENOUS CONTINUOUS
Status: DISCONTINUED | OUTPATIENT
Start: 2021-09-29 | End: 2021-09-29 | Stop reason: HOSPADM

## 2021-09-29 RX ORDER — OXYCODONE HYDROCHLORIDE 5 MG/1
5 TABLET ORAL AS NEEDED
Status: DISCONTINUED | OUTPATIENT
Start: 2021-09-29 | End: 2021-09-29 | Stop reason: HOSPADM

## 2021-09-29 RX ORDER — VANCOMYCIN HYDROCHLORIDE 1 G/20ML
INJECTION, POWDER, LYOPHILIZED, FOR SOLUTION INTRAVENOUS AS NEEDED
Status: DISCONTINUED | OUTPATIENT
Start: 2021-09-29 | End: 2021-09-29 | Stop reason: HOSPADM

## 2021-09-29 RX ORDER — ONDANSETRON 2 MG/ML
INJECTION INTRAMUSCULAR; INTRAVENOUS AS NEEDED
Status: DISCONTINUED | OUTPATIENT
Start: 2021-09-29 | End: 2021-09-29 | Stop reason: HOSPADM

## 2021-09-29 RX ORDER — SODIUM CHLORIDE, SODIUM LACTATE, POTASSIUM CHLORIDE, CALCIUM CHLORIDE 600; 310; 30; 20 MG/100ML; MG/100ML; MG/100ML; MG/100ML
INJECTION, SOLUTION INTRAVENOUS
Status: DISCONTINUED | OUTPATIENT
Start: 2021-09-29 | End: 2021-09-29 | Stop reason: HOSPADM

## 2021-09-29 RX ORDER — NALOXONE HYDROCHLORIDE 0.4 MG/ML
0.4 INJECTION, SOLUTION INTRAMUSCULAR; INTRAVENOUS; SUBCUTANEOUS AS NEEDED
Status: DISCONTINUED | OUTPATIENT
Start: 2021-09-29 | End: 2021-10-02 | Stop reason: HOSPADM

## 2021-09-29 RX ORDER — MORPHINE SULFATE IN 0.9 % NACL 150MG/30ML
PATIENT CONTROLLED ANALGESIA SYRINGE INTRAVENOUS
Status: DISCONTINUED | OUTPATIENT
Start: 2021-09-29 | End: 2021-09-30

## 2021-09-29 RX ORDER — SODIUM CHLORIDE, SODIUM LACTATE, POTASSIUM CHLORIDE, CALCIUM CHLORIDE 600; 310; 30; 20 MG/100ML; MG/100ML; MG/100ML; MG/100ML
1000 INJECTION, SOLUTION INTRAVENOUS CONTINUOUS
Status: DISCONTINUED | OUTPATIENT
Start: 2021-09-29 | End: 2021-09-29 | Stop reason: HOSPADM

## 2021-09-29 RX ORDER — FACIAL-BODY WIPES
10 EACH TOPICAL DAILY PRN
Status: DISCONTINUED | OUTPATIENT
Start: 2021-10-01 | End: 2021-10-02 | Stop reason: HOSPADM

## 2021-09-29 RX ORDER — ONDANSETRON 2 MG/ML
4 INJECTION INTRAMUSCULAR; INTRAVENOUS AS NEEDED
Status: DISCONTINUED | OUTPATIENT
Start: 2021-09-29 | End: 2021-09-29 | Stop reason: HOSPADM

## 2021-09-29 RX ORDER — SODIUM CHLORIDE 0.9 % (FLUSH) 0.9 %
5-40 SYRINGE (ML) INJECTION AS NEEDED
Status: DISCONTINUED | OUTPATIENT
Start: 2021-09-29 | End: 2021-10-02 | Stop reason: HOSPADM

## 2021-09-29 RX ORDER — KETOROLAC TROMETHAMINE 30 MG/ML
15 INJECTION, SOLUTION INTRAMUSCULAR; INTRAVENOUS EVERY 6 HOURS
Status: COMPLETED | OUTPATIENT
Start: 2021-09-29 | End: 2021-09-30

## 2021-09-29 RX ORDER — OXYCODONE HYDROCHLORIDE 5 MG/1
10 TABLET ORAL
Status: DISCONTINUED | OUTPATIENT
Start: 2021-09-29 | End: 2021-10-02 | Stop reason: HOSPADM

## 2021-09-29 RX ORDER — ACETAMINOPHEN 325 MG/1
650 TABLET ORAL ONCE
Status: DISCONTINUED | OUTPATIENT
Start: 2021-09-29 | End: 2021-09-29 | Stop reason: HOSPADM

## 2021-09-29 RX ORDER — FENTANYL CITRATE 50 UG/ML
INJECTION, SOLUTION INTRAMUSCULAR; INTRAVENOUS AS NEEDED
Status: DISCONTINUED | OUTPATIENT
Start: 2021-09-29 | End: 2021-09-29 | Stop reason: HOSPADM

## 2021-09-29 RX ORDER — MIDAZOLAM HYDROCHLORIDE 1 MG/ML
INJECTION, SOLUTION INTRAMUSCULAR; INTRAVENOUS AS NEEDED
Status: DISCONTINUED | OUTPATIENT
Start: 2021-09-29 | End: 2021-09-29 | Stop reason: HOSPADM

## 2021-09-29 RX ORDER — ROPIVACAINE HYDROCHLORIDE 5 MG/ML
150 INJECTION, SOLUTION EPIDURAL; INFILTRATION; PERINEURAL AS NEEDED
Status: DISCONTINUED | OUTPATIENT
Start: 2021-09-29 | End: 2021-09-29 | Stop reason: HOSPADM

## 2021-09-29 RX ORDER — EPHEDRINE SULFATE/0.9% NACL/PF 50 MG/5 ML
SYRINGE (ML) INTRAVENOUS AS NEEDED
Status: DISCONTINUED | OUTPATIENT
Start: 2021-09-29 | End: 2021-09-29 | Stop reason: HOSPADM

## 2021-09-29 RX ORDER — MORPHINE SULFATE 2 MG/ML
2 INJECTION, SOLUTION INTRAMUSCULAR; INTRAVENOUS
Status: DISCONTINUED | OUTPATIENT
Start: 2021-09-29 | End: 2021-09-29 | Stop reason: HOSPADM

## 2021-09-29 RX ORDER — MIDAZOLAM HYDROCHLORIDE 1 MG/ML
0.5 INJECTION, SOLUTION INTRAMUSCULAR; INTRAVENOUS
Status: DISCONTINUED | OUTPATIENT
Start: 2021-09-29 | End: 2021-09-29 | Stop reason: HOSPADM

## 2021-09-29 RX ORDER — MIDAZOLAM HYDROCHLORIDE 1 MG/ML
1 INJECTION, SOLUTION INTRAMUSCULAR; INTRAVENOUS AS NEEDED
Status: DISCONTINUED | OUTPATIENT
Start: 2021-09-29 | End: 2021-09-29 | Stop reason: HOSPADM

## 2021-09-29 RX ORDER — GLYCOPYRROLATE 0.2 MG/ML
INJECTION INTRAMUSCULAR; INTRAVENOUS AS NEEDED
Status: DISCONTINUED | OUTPATIENT
Start: 2021-09-29 | End: 2021-09-29 | Stop reason: HOSPADM

## 2021-09-29 RX ORDER — EPHEDRINE SULFATE/0.9% NACL/PF 50 MG/5 ML
5 SYRINGE (ML) INTRAVENOUS AS NEEDED
Status: DISCONTINUED | OUTPATIENT
Start: 2021-09-29 | End: 2021-09-29 | Stop reason: HOSPADM

## 2021-09-29 RX ORDER — SUCCINYLCHOLINE CHLORIDE 20 MG/ML
INJECTION INTRAMUSCULAR; INTRAVENOUS AS NEEDED
Status: DISCONTINUED | OUTPATIENT
Start: 2021-09-29 | End: 2021-09-29 | Stop reason: HOSPADM

## 2021-09-29 RX ORDER — ACETAMINOPHEN 500 MG
1000 TABLET ORAL EVERY 6 HOURS
Status: DISCONTINUED | OUTPATIENT
Start: 2021-09-29 | End: 2021-10-02 | Stop reason: HOSPADM

## 2021-09-29 RX ADMIN — FENTANYL CITRATE 25 MCG: 50 INJECTION, SOLUTION INTRAMUSCULAR; INTRAVENOUS at 07:47

## 2021-09-29 RX ADMIN — DEXAMETHASONE SODIUM PHOSPHATE 6 MG: 4 INJECTION, SOLUTION INTRAMUSCULAR; INTRAVENOUS at 19:14

## 2021-09-29 RX ADMIN — ACETAMINOPHEN 1000 MG: 500 TABLET ORAL at 18:01

## 2021-09-29 RX ADMIN — LIDOCAINE HYDROCHLORIDE 60 MG: 20 INJECTION, SOLUTION EPIDURAL; INFILTRATION; INTRACAUDAL; PERINEURAL at 07:47

## 2021-09-29 RX ADMIN — WATER 2 G: 1 INJECTION INTRAMUSCULAR; INTRAVENOUS; SUBCUTANEOUS at 17:58

## 2021-09-29 RX ADMIN — KETOROLAC TROMETHAMINE 15 MG: 30 INJECTION, SOLUTION INTRAMUSCULAR at 13:18

## 2021-09-29 RX ADMIN — ROCURONIUM BROMIDE 25 MG: 10 SOLUTION INTRAVENOUS at 08:00

## 2021-09-29 RX ADMIN — FENTANYL CITRATE 25 MCG: 50 INJECTION INTRAMUSCULAR; INTRAVENOUS at 11:16

## 2021-09-29 RX ADMIN — ACETAMINOPHEN 1000 MG: 500 TABLET ORAL at 13:17

## 2021-09-29 RX ADMIN — SODIUM CHLORIDE: 900 INJECTION, SOLUTION INTRAVENOUS at 08:28

## 2021-09-29 RX ADMIN — PROPOFOL 110 MG: 10 INJECTION, EMULSION INTRAVENOUS at 07:47

## 2021-09-29 RX ADMIN — FENTANYL CITRATE 25 MCG: 50 INJECTION INTRAMUSCULAR; INTRAVENOUS at 11:00

## 2021-09-29 RX ADMIN — INSULIN LISPRO 2 UNITS: 100 INJECTION, SOLUTION INTRAVENOUS; SUBCUTANEOUS at 11:23

## 2021-09-29 RX ADMIN — GLYCOPYRROLATE 0.1 MG: 0.2 INJECTION, SOLUTION INTRAMUSCULAR; INTRAVENOUS at 07:37

## 2021-09-29 RX ADMIN — FENTANYL CITRATE 25 MCG: 50 INJECTION INTRAMUSCULAR; INTRAVENOUS at 11:06

## 2021-09-29 RX ADMIN — ROCURONIUM BROMIDE 5 MG: 10 SOLUTION INTRAVENOUS at 07:47

## 2021-09-29 RX ADMIN — TRANEXAMIC ACID 1 G: 100 INJECTION, SOLUTION INTRAVENOUS at 08:34

## 2021-09-29 RX ADMIN — FENTANYL CITRATE 25 MCG: 50 INJECTION, SOLUTION INTRAMUSCULAR; INTRAVENOUS at 08:45

## 2021-09-29 RX ADMIN — MIDAZOLAM 1 MG: 1 INJECTION INTRAMUSCULAR; INTRAVENOUS at 07:44

## 2021-09-29 RX ADMIN — MORPHINE SULFATE 2 MG: 2 INJECTION, SOLUTION INTRAMUSCULAR; INTRAVENOUS at 11:32

## 2021-09-29 RX ADMIN — SODIUM CHLORIDE 125 ML/HR: 9 INJECTION, SOLUTION INTRAVENOUS at 17:58

## 2021-09-29 RX ADMIN — MORPHINE SULFATE 4 MG: 2 INJECTION, SOLUTION INTRAMUSCULAR; INTRAVENOUS at 00:15

## 2021-09-29 RX ADMIN — INSULIN LISPRO 2 UNITS: 100 INJECTION, SOLUTION INTRAVENOUS; SUBCUTANEOUS at 17:58

## 2021-09-29 RX ADMIN — DEXAMETHASONE SODIUM PHOSPHATE 10 MG: 4 INJECTION, SOLUTION INTRAMUSCULAR; INTRAVENOUS at 08:35

## 2021-09-29 RX ADMIN — FENTANYL CITRATE 25 MCG: 50 INJECTION, SOLUTION INTRAMUSCULAR; INTRAVENOUS at 09:35

## 2021-09-29 RX ADMIN — SUGAMMADEX 200 MG: 100 INJECTION, SOLUTION INTRAVENOUS at 08:17

## 2021-09-29 RX ADMIN — Medication 10 ML: at 19:20

## 2021-09-29 RX ADMIN — ONDANSETRON HYDROCHLORIDE 4 MG: 2 INJECTION, SOLUTION INTRAMUSCULAR; INTRAVENOUS at 10:00

## 2021-09-29 RX ADMIN — PROPOFOL 100 MCG/KG/MIN: 10 INJECTION, EMULSION INTRAVENOUS at 08:08

## 2021-09-29 RX ADMIN — WATER 2 G: 1 INJECTION INTRAMUSCULAR; INTRAVENOUS; SUBCUTANEOUS at 08:15

## 2021-09-29 RX ADMIN — HYDROMORPHONE HYDROCHLORIDE 0.2 MG: 1 INJECTION, SOLUTION INTRAMUSCULAR; INTRAVENOUS; SUBCUTANEOUS at 11:21

## 2021-09-29 RX ADMIN — MORPHINE SULFATE 2 MG: 2 INJECTION, SOLUTION INTRAMUSCULAR; INTRAVENOUS at 11:37

## 2021-09-29 RX ADMIN — SUCCINYLCHOLINE CHLORIDE 140 MG: 20 INJECTION, SOLUTION INTRAMUSCULAR; INTRAVENOUS at 07:47

## 2021-09-29 RX ADMIN — DEXAMETHASONE SODIUM PHOSPHATE 6 MG: 4 INJECTION, SOLUTION INTRAMUSCULAR; INTRAVENOUS at 13:17

## 2021-09-29 RX ADMIN — SODIUM CHLORIDE, POTASSIUM CHLORIDE, SODIUM LACTATE AND CALCIUM CHLORIDE 1000 ML: 600; 310; 30; 20 INJECTION, SOLUTION INTRAVENOUS at 07:18

## 2021-09-29 RX ADMIN — FENTANYL CITRATE 25 MCG: 50 INJECTION, SOLUTION INTRAMUSCULAR; INTRAVENOUS at 08:29

## 2021-09-29 RX ADMIN — Medication: at 11:52

## 2021-09-29 RX ADMIN — DEXAMETHASONE SODIUM PHOSPHATE 6 MG: 4 INJECTION, SOLUTION INTRAMUSCULAR; INTRAVENOUS at 02:18

## 2021-09-29 RX ADMIN — KETOROLAC TROMETHAMINE 15 MG: 30 INJECTION, SOLUTION INTRAMUSCULAR at 18:00

## 2021-09-29 RX ADMIN — Medication 10 ML: at 14:00

## 2021-09-29 RX ADMIN — DOCUSATE SODIUM 50 MG AND SENNOSIDES 8.6 MG 1 TABLET: 8.6; 5 TABLET, FILM COATED ORAL at 18:01

## 2021-09-29 RX ADMIN — SODIUM CHLORIDE, POTASSIUM CHLORIDE, SODIUM LACTATE AND CALCIUM CHLORIDE: 600; 310; 30; 20 INJECTION, SOLUTION INTRAVENOUS at 07:15

## 2021-09-29 RX ADMIN — MIDAZOLAM 1 MG: 1 INJECTION INTRAMUSCULAR; INTRAVENOUS at 07:37

## 2021-09-29 RX ADMIN — Medication 10 MG: at 07:56

## 2021-09-29 RX ADMIN — FENTANYL CITRATE 25 MCG: 50 INJECTION INTRAMUSCULAR; INTRAVENOUS at 10:47

## 2021-09-29 NOTE — OP NOTES
[address]   OPERATIVE REPORT    Name:  Janeen Godinez  MR#:  [id number]  :  1950  ACCOUNT #:  [de-identified]  DATE OF SERVICE:  2021      PREOPERATIVE DIAGNOSIS:  Metastatic prostate cancer with thoracic cord compression, T9-T10. POSTOPERATIVE DIAGNOSIS:  Metastatic prostate cancer with thoracic cord compression, T9-T10. PROCEDURE PERFORMED:  Transradicular partial corpectomy of T10 and thoracic laminectomy T9-T10 with posterior fusion from T8-T12 with navigation    SURGEON:  Renay Ferrera MD    ASSISTANT:  Jennifer Garcia PA-C    ANESTHESIA:  General anesthesia. COMPLICATIONS:  None. SPECIMENS REMOVED:  Tumor sent for histology. IMPLANTS:  Medtronic Solera. ESTIMATED BLOOD LOSS:  Minimal.    INDICATIONS:  This is a pleasant 75-year-old gentleman with a known history of metastatic prostate cancer who presented to the emergency room with severe weakness in the bilateral lower extremities. Workup has revealed a large T10 deformity with metastatic disease causing severe spinal cord compression. Cord edema is noted. The patient is for decompression, debulking, for palliative measures. PROCEDURE:  The patient was identified, brought to the operative suite, and underwent general anesthesia without difficulty. Preoperative neuromonitoring was placed and baselines obtained. These remained stable throughout the surgical procedure. He was placed prone on the open China Murphy table with all bony prominences well padded. Back was prepped and draped sterilely. Time-out was confirmed. We then did an midline incision and dissected down. We exposed it down to approximately T10 region where we saw a large tumor mass extruding from the T10 right pedicle region. This was sent for specimen. We exposed the laminas and transverse processes from T8 all the way down to T12 bilaterally and then placed a spinous process clamp on T12.   We then did a sterile drape followed by Medtronic O2 O-arm which we did an intraoperative spin and we obtained 3-D image of the thoracolumbar region. At that time, using the S8 navigation system, we were able to place pedicle screws in T8 down to T12 on the left hand side. This was drilled with  holes of 30 mm followed by navigated tap and then placement of the Medtronic Solera screws at each level. These all tested out greater than 50 mA. There was no pedicle screw placed obviously at T10. On the right hand side though, we did have successful fixation to T8-T9 above and T11-T12 below the pathologic fracture. We then did a wide laminectomy with removal of the remainder of the T10 spinous process, inferior portion of T9, and central decompression was performed with #3 and #4 Kerrison and removed the ligamentum. The spinal cord itself was severely deflected on the right hand side due to the tumor mass and this was carefully dissected off the dura, resected, and removed for adequate decompression of the central canal.  We took down the pedicle on the right hand side removing all bony compression as well as any residual of his tumor that was causing impingement. Neuromonitoring remained stable. Wounds were irrigated with Irrisept irrigation as well as bulb irrigation. We cut and measured rods from T8 to T12 and attached them to the pedicle screws and setscrews. Final tightening was performed. Final x-rays demonstrated stable fixation. Wounds were then irrigated again, decorticated the good bone, and placed SignaFuse synthetic bone graft into the lateral gutters. Medium Hemovac placed. A #1 Stratafix on the fascial layer, 2-0 Stratafix on the subcutaneous layer, and 3-0 on the skin. The patient returned to the PACU in stable condition. The physician assistant was present during the entire operative procedure and assisted in all critical elements of the surgery. No surgical assist or resident was available.      Haris Herbert MD      [id number]  D:  09/29/2021 12:17  T:  09/29/2021 15:06  JOB #:  [id number]

## 2021-09-29 NOTE — BRIEF OP NOTE
Brief Postoperative Note    Patient: Ashley Ledbetter  YOB: 1950  MRN: [id number]    Date of Procedure: 9/29/2021     Pre-Op Diagnosis: TUMOR    Post-Op Diagnosis: Same as preoperative diagnosis. Procedure(s):  T9-T10 LAMINECTOMY WITH T10 TRANPEDICULAR  DECOMPRESSION, T7-T12 POSTERIOR SPINAL FUSION (O ARM)    Surgeon(s):  Therman Skiff, MD    Surgical Assistant: Physician Assistant: JERRI Melvin    Anesthesia: General     Estimated Blood Loss (mL): Minimal    Complications: None    Specimens:   ID Type Source Tests Collected by Time Destination   1 : t-10 tumor Fresh Spine  Therman Skiff, MD 9/29/2021 7827 Pathology        Implants:   Implant Name Type Inv. Item Serial No.  Lot No. LRB No. Used Action   GRAFT BNE SUB 15GM GRN CA COMPND PTTY AND SILICATE BASE INJ - SNA  GRAFT BNE SUB 15GM GRN CA COMPND PTTY AND SILICATE BASE INJ NA [employer] [id number] N/A 1 Implanted   GRAFT BNE SUB 15GM GRN CA COMPND PTTY AND SILICATE BASE INJ - SNA  GRAFT BNE SUB 15GM GRN CA COMPND PTTY AND SILICATE BASE INJ NA [employer] [id number] N/A 1 Implanted   SET SCR SPNL L6MM DIA5. 5MM TI BRK OFF JAYLYN W/ DETACH [address] - SNA  SET SCR SPNL L6MM DIA5. 5MM TI BRK OFF JAYLYN W/ DETACH [address] NA [employer] SOFAMOR DANEK_WD NA N/A 9 Implanted   SCREW SPNL L45MM DIA5. 5MM POST THORACOLUMBOSACRAL CO CHROM - SNA  SCREW SPNL L45MM DIA5. 5MM POST THORACOLUMBOSACRAL CO CHROM NA [employer] SOFAMOR DANEK_WD NA N/A 4 Implanted   SCREW SPNL L45MM DIA6. 5MM POST THORACOLUMBOSACRAL CO CHROM - SNA  SCREW SPNL L45MM DIA6. 5MM POST THORACOLUMBOSACRAL CO CHROM NA [employer] SOFAMOR DANEK_WD NA N/A 5 Implanted   PAVEL SPNL L500MM DIA5. 5MM THORACOLUMBOSACRAL TI SMOOTH STR - SNA  PAVEL SPNL L500MM DIA5. 5MM THORACOLUMBOSACRAL TI SMOOTH STR NA [employer] SOFAMOR DANEK_WD NA N/A 1 Implanted       Drains: * No LDAs found *    Findings: metastatic tumor, cord compression    Electronically Signed by Charmayne Sender, PA on 9/29/2021 at 10:21 AM

## 2021-09-29 NOTE — CONSULTS
[address]    Name:  Zach Lazar  MR#:  [id number]  :  1950  ACCOUNT #:  [de-identified]  DATE OF SERVICE:  2021    HEMATOLOGY CONSULT    REASON FOR ADMISSION:  Back pain. REASON FOR CONSULTATION:  Prostate cancer. HISTORY OF PRESENT ILLNESS:  The patient is a very pleasant 57-year-old man, he has been followed closely by Dr. Ava Ch in our group for many years. He was originally diagnosed in  when his PSA was elevated at 51. He was lost to follow up but then in , was diagnosed with a biopsy found to be Carl 7. He was treated with external beam radiation therapy, completed in . He did well until he was noted to have a biochemical recurrence and was started on Lupron. Unfortunately, in 2017 he was noted to have a recurrence as diagnosed by biopsy of retroperitoneal lymph node and Xtandi was added to Lupron. He did well on this until on or around 2019 when he understood that his cancer had been cured and he stopped the Husam, but continued the Lupron. He has seen Dr. Terra Hernandez closely, at his last visit on 2021 when Dr. Terra Hernandez strongly encouraged him to start back on the [place] and the patient politely declined. Recently, he began to notice numbness in his buttocks, feet, and legs. He denies any leg weakness, any fecal incontinence or urinary retention. He presented to the hospital for further evaluation and treatment. He had a CT scan of his thoracic spine done on , which unfortunately revealed T10 metastatic deposit with extension in the right pedicle and posterior elements with extraosseous extension of soft tissue and of the spinal canal, associated metastatic disease in the right 10th rib posteromedially. L-spine MRI additionally showed a smaller L5 lesion. The patient has been seen by the Orthopedic Surgery Service who have recommended decompression tomorrow.   He is currently on 6 mg of Decadron every 6 hours. PAST MEDICAL HISTORY:  1.  Prostate cancer as above. 2.  Previous hip replacement. CURRENT MEDICATIONS IN THE HOSPITAL:  1. Decadron. 2.  Protonix 40 mg daily. 3.  Humalog 3 times daily before meals. 4.  Flomax 0.4 mg p.o. daily. ALLERGIES:  NO KNOWN DRUG ALLERGIES. 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 He was previously in the [employer] He quit smoking in 1983. He is single. He lives alone. He has no children. FAMILY HISTORY:  Negative for cancer. REVIEW OF SYSTEMS:  GENERAL:  No fevers or chills. HEENT:  No auditory or visual change. LYMPHATIC:  No lumps or bumps in neck, underarm, or groin. CARDIOVASCULAR:  No chest pain or palpitations. PULMONARY:  No cough or shortness of breath. GASTROINTESTINAL:  No abdominal pain, diarrhea, or constipation. GENITOURINARY:  No dysuria or incontinence. SKIN:  No rash or changing mole. MUSCULOSKELETAL:  As above. NEUROPSYCH:  As above. PHYSICAL EXAMINATION:  GENERAL:  Pleasant, in no acute distress. VITAL SIGNS:  Temperature 97.7, /68, pulse 73, saturating 95% on room air. HEENT:  Sclerae anicteric. Oropharynx clear. NECK:  Supple without lymphadenopathy or thyromegaly. LUNGS:  Clear to auscultation bilaterally. HEART:  Regular rate and rhythm without murmur, rub, or gallop. ABDOMEN:  Nontender, nondistended. Normoactive bowel sounds. No hepatosplenomegaly. EXTREMITIES:  No clubbing, cyanosis, or edema. NEUROLOGIC:  Strength 5/5 dorsiflexion, plantarflexion, leg extension, leg flexion.     ASSESSMENT AND PLAN:  A 55-year-old man with a long history of stage IV prostate cancer followed closely by Dr. Georgina Wylie in our group, had an excellent response to Lupron and Manjeet Fanti, but the patient opted to stop Baker Fanti in 2019, has had a gradual rise in his prostate-specific antigen, now admitted with back pain and found to have a T10 mass with compression of the cord as well as a smaller L5 lesion and a 10th right rib lesion, tentatively scheduled for surgery tomorrow. 1.  Prostate cancer:  I agree with plans for surgical decompression. I appreciate excellent surgical care. The patient agrees to see Dr. Binh Mccoy in followup to restart Xtandi. 2.  Prevention:  Given his stage IV adenocarcinoma, would recommend close adherence to deep vein thrombosis prophylaxis postoperatively. Thank you for the consult.       Sahara Alejandro MD      [id number]  D:  09/28/2021 17:19  T:  09/28/2021 20:49  JOB #:  [id number]  CC:  MD Aleena Hightower Chi, MD

## 2021-09-29 NOTE — PROGRESS NOTES
[address] [facility] Adult  Hospitalist Group                                                                                          Hospitalist Progress Note  Sudha Harris MD  Answering service: [phone] from in house phone        Date of Service:  2021  NAME:  Joanie Reyes  :  1950  MRN:  [id number]      Admission Summary:     Joanie Reyes is a 66 y.o. male past medical history of stage IV prostate cancer, chronic RLE DVT was recently hospitalized at [place] [address] for acute mid lower back pain with lumbar spinal stenosis. Preliminary MRI of the T-spine per Ortho showed T10 lesion with extension into the epidural space with evidence of encasement of the spinal cord and slight cord edema. Orthopedic surgery Dr. Jonas Young discussed  with Dr. Irving Kulkarni at [facility] the patient will be direct transfer here presumably for intervention. Patient was started on IV Decadron, continue on IV morphine as needed as well as oxycodone with bowel regimen. Neurosurgery as well as oncology has been consulted here. Patient follows with Dr. Miri Michaels as his hematologist oncologist.  Patient reports that he had been previously on Towana Musca however in  had stopped this medication by his own well. Patient states he has been on leuprolide. Currently patient denies significant back pain, had just been given a dose of morphine. Has no acute complains of n/v/d/f/c changes in vision. He does currently endorse that he has bilateral lower extremity numbness which is greater on the left than the right however states he is able to move both legs. Patient states his left leg feels weaker than his right leg.     Interval history / Subjective:     He had the surgery today, his said he has back pain but using his PCA morphin     Assessment & Plan:     T10 mass/lesion with cord compression with evidence of mets   -likely secondary to metastatic spread of stage IV prostate Ca  -s/p T9-T10 laminectomy with T10 tranpedicuar decompression, T7-T12 posterior spinal fusion on 9/29  -on iv dexamethasone, morphine PCA  -MRI T-spine notable for 42 x 30 x 45 mL soft tissue mass extending anteriorly at the T10 vertebral body with evidence of moderate cord compression as well as cord edema. Also noted 12 mm lesion in the posterior L5 vertebra  -Pain control with as needed morphine, Percocet, bowel regimen while on opioids z  -PT/OT   -Orthopedic surgeon on board, possible decompressive procedure 9/29     Stage IV prostate cancer  -On Lupron, apparently castrate resistant  -Reviewed oncology note from 9/25,   -oncology on board  -cont flomax      Anemia of chronic disease  -chronic, ACD in the setting of malignancy   -Hgb 11.3  -monitor H/H, to transfuse for Hgb <7     Code status: Full Code  DVT prophylaxis:DSC    Care Plan discussed with: Patient/Family, Nurse and   Anticipated Disposition: TBD  Anticipated Discharge: Greater than 48 hours     Hospital Problems  Date Reviewed: 3/19/2018        Codes Class Noted POA    Palliative care encounter ICD-10-CM: Z51.5  ICD-9-CM: V66.7  Unknown Unknown        Counseling regarding advance care planning and goals of care ICD-10-CM: Z71.89  ICD-9-CM: V65.49  Unknown Unknown        DNR (do not resuscitate) discussion ICD-10-CM: Z71.89  ICD-9-CM: V65.49  Unknown Unknown        Acute back pain, unspecified back location, unspecified back pain laterality ICD-10-CM: M54.9  ICD-9-CM: 724.5  Unknown Unknown        Lesion of bone of thoracic spine ICD-10-CM: M89.9  ICD-9-CM: 733.90  9/26/2021 Unknown                 Vital Signs:    Last 24hrs VS reviewed since prior progress note.  Most recent are:  Visit Vitals  BP (!) 142/69 (BP 1 Location: Left upper arm)   Pulse (!) 56   Temp 97.2 °F (36.2 °C)   Resp 18   SpO2 98%         Intake/Output Summary (Last 24 hours) at 9/29/2021 1316  Last data filed at 9/29/2021 1202  Gross per 24 hour   Intake 700 ml   Output 510 ml   Net 190 ml Physical Examination:     I had a face to face encounter with this patient and independently examined them on 9/29/2021 as outlined below:          Constitutional:  No acute distress, cooperative, pleasant    ENT:  Oral mucosa moist, oropharynx benign. Resp:  CTA bilaterally. No wheezing/rhonchi/rales. No accessory muscle use   CV:  Regular rhythm, normal rate, no murmurs, gallops, rubs    GI:  Soft, non distended, non tender. normoactive bowel sounds, no hepatosplenomegaly     Musculoskeletal:  No edema,     Neurologic:  Moves all extremities. AAOx3, CN II-XII reviewed            Data Review:    Review and/or order of clinical lab test  Review and/or order of tests in the radiology section of CPT  Review and/or order of tests in the medicine section of CPT      Labs:     Recent Labs     09/29/21 0022 09/28/21 0259   WBC 10.7 11.4*   HGB 11.3* 10.6*   HCT 34.1* 32.8*    176     Recent Labs     09/29/21 0022 09/28/21 0259 09/27/21  0311   * 137 138   K 4.3 4.5 4.5    108 110*   CO2 26 27 24   BUN 19 17 13   CREA 0.71 0.64* 0.67*   * 154* 153*   CA 9.0 9.1 8.8     No results for input(s): ALT, AP, TBIL, TBILI, TP, ALB, GLOB, GGT, AML, LPSE in the last 72 hours. No lab exists for component: SGOT, GPT, AMYP, HLPSE  No results for input(s): INR, PTP, APTT, INREXT, INREXT in the last 72 hours. No results for input(s): FE, TIBC, PSAT, FERR in the last 72 hours. No results found for: FOL, RBCF   No results for input(s): PH, PCO2, PO2 in the last 72 hours. No results for input(s): CPK, CKNDX, TROIQ in the last 72 hours.     No lab exists for component: CPKMB  No results found for: CHOL, CHOLX, CHLST, CHOLV, HDL, HDLP, LDL, LDLC, DLDLP, TGLX, TRIGL, TRIGP, CHHD, CHHDX  Lab Results   Component Value Date/Time    Glucose (POC) 169 (H) 09/29/2021 10:58 AM    Glucose (POC) 152 (H) 09/29/2021 05:46 AM    Glucose (POC) 199 (H) 09/28/2021 10:26 PM    Glucose (POC) 196 (H) 09/28/2021 04:07 PM    Glucose (POC) 190 (H) 09/28/2021 11:15 AM     Lab Results   Component Value Date/Time    Color DARK YELLOW 09/24/2021 12:44 PM    Appearance CLEAR 09/24/2021 12:44 PM    Specific gravity 1.028 09/24/2021 12:44 PM    Specific gravity 1.024 09/10/2021 03:41 PM    pH (UA) 5.0 09/24/2021 12:44 PM    Protein TRACE (A) 09/24/2021 12:44 PM    Glucose Negative 09/24/2021 12:44 PM    Ketone TRACE (A) 09/24/2021 12:44 PM    Bilirubin Negative 09/24/2021 12:44 PM    Urobilinogen 0.2 09/24/2021 12:44 PM    Nitrites Negative 09/24/2021 12:44 PM    Leukocyte Esterase Negative 09/24/2021 12:44 PM    Epithelial cells FEW 09/24/2021 12:44 PM    Bacteria Negative 09/24/2021 12:44 PM    WBC 0-4 09/24/2021 12:44 PM    RBC 0-5 09/24/2021 12:44 PM         Medications Reviewed:     Current Facility-Administered Medications   Medication Dose Route Frequency    0.9% sodium chloride infusion  125 mL/hr IntraVENous CONTINUOUS    sodium chloride (NS) flush 5-40 mL  5-40 mL IntraVENous Q8H    sodium chloride (NS) flush 5-40 mL  5-40 mL IntraVENous PRN    naloxone (NARCAN) injection 0.4 mg  0.4 mg IntraVENous PRN    ondansetron (ZOFRAN) injection 4 mg  4 mg IntraVENous Q4H PRN    diphenhydrAMINE (BENADRYL) injection 12.5 mg  12.5 mg IntraVENous Q6H PRN    senna-docusate (PERICOLACE) 8.6-50 mg per tablet 1 Tablet  1 Tablet Oral BID    [START ON 9/30/2021] polyethylene glycol (MIRALAX) packet 17 g  17 g Oral DAILY    [START ON 10/1/2021] bisacodyL (DULCOLAX) suppository 10 mg  10 mg Rectal DAILY PRN    phenol throat spray (CHLORASEPTIC) 1 Spray  1 Spray Oral PRN    benzocaine-menthoL (CEPACOL) lozenge 1 Lozenge  1 Lozenge Oral PRN    acetaminophen (TYLENOL) tablet 1,000 mg  1,000 mg Oral Q6H    oxyCODONE IR (ROXICODONE) tablet 5 mg  5 mg Oral Q3H PRN    oxyCODONE IR (ROXICODONE) tablet 10 mg  10 mg Oral Q3H PRN    morphine injection 2 mg  2 mg IntraVENous Q3H PRN    ceFAZolin (ANCEF) 2 g in sterile water (preservative free) 20 mL IV syringe  2 g IntraVENous Q8H    ketorolac (TORADOL) injection 15 mg  15 mg IntraVENous Q6H    cyclobenzaprine (FLEXERIL) tablet 10 mg  10 mg Oral BID PRN    morphine  mg / 30 mL   IntraVENous TITRATE    [START ON 9/30/2021] enoxaparin (LOVENOX) injection 40 mg  40 mg SubCUTAneous Q24H    morphine injection 4 mg  4 mg IntraVENous Q6H PRN    polyethylene glycol (MIRALAX) packet 17 g  17 g Oral DAILY    senna (SENOKOT) tablet 8.6 mg  1 Tablet Oral DAILY    tamsulosin (FLOMAX) capsule 0.4 mg  0.4 mg Oral DAILY    thiamine HCL (B-1) tablet 100 mg  100 mg Oral DAILY    cholecalciferol (VITAMIN D3) (1000 Units /25 mcg) tablet 1,000 Units  1,000 Units Oral DAILY    oxyCODONE-acetaminophen (PERCOCET) 5-325 mg per tablet 1 Tablet  1 Tablet Oral Q6H PRN    dexamethasone (DECADRON) 4 mg/mL injection 6 mg  6 mg IntraVENous Q6H    pantoprazole (PROTONIX) tablet 40 mg  40 mg Oral ACB    insulin lispro (HUMALOG) injection   SubCUTAneous TIDAC    glucose chewable tablet 16 g  4 Tablet Oral PRN    dextrose (D50W) injection syrg 12.5-25 g  12.5-25 g IntraVENous PRN    glucagon (GLUCAGEN) injection 1 mg  1 mg IntraMUSCular PRN     ______________________________________________________________________  EXPECTED LENGTH OF STAY: 3d 12h  ACTUAL LENGTH OF STAY:          3                 Sudha Harris MD

## 2021-09-29 NOTE — PROGRESS NOTES
Problem: Falls - Risk of  Goal: *Absence of Falls  Description: Document Aleena Richards Fall Risk and appropriate interventions in the flowsheet.   Outcome: Progressing Towards Goal  Note: Fall Risk Interventions:  Mobility Interventions: Bed/chair exit alarm, PT Consult for mobility concerns, OT consult for ADLs         Medication Interventions: Bed/chair exit alarm    Elimination Interventions: Bed/chair exit alarm    History of Falls Interventions: Bed/chair exit alarm

## 2021-09-29 NOTE — ANESTHESIA PREPROCEDURE EVALUATION
Relevant Problems   HEMATOLOGY   (+) Retroperitoneal lymphadenopathy       Anesthetic History   No history of anesthetic complications            Review of Systems / Medical History  Patient summary reviewed, nursing notes reviewed and pertinent labs reviewed    Pulmonary  Within defined limits                 Neuro/Psych   Within defined limits           Cardiovascular  Within defined limits                     GI/Hepatic/Renal  Within defined limits              Endo/Other        Arthritis and cancer     Other Findings              Physical Exam    Airway  Mallampati: II  TM Distance: > 6 cm  Neck ROM: normal range of motion   Mouth opening: Normal     Cardiovascular  Regular rate and rhythm,  S1 and S2 normal,  no murmur, click, rub, or gallop             Dental  No notable dental hx       Pulmonary  Breath sounds clear to auscultation               Abdominal  GI exam deferred       Other Findings            Anesthetic Plan    ASA: 2  Anesthesia type: general          Induction: Intravenous  Anesthetic plan and risks discussed with: Patient

## 2021-09-29 NOTE — PROGRESS NOTES
TRANSFER - OUT REPORT:    Verbal report given to BARAK Elizabeth(name) on Tessie Khanmsted  being transferred to (unit) for routine post - op       Report consisted of patients Situation, Background, Assessment and   Recommendations(SBAR). Time Pre op antibiotic given:0815  Anesthesia Stop time: 5489  Farrell Present on Transfer to floor:yes  Order for Farrell on Chart:yes  Discharge Prescriptions with Chart:n/a    Information from the following report(s) SBAR, Procedure Summary, Intake/Output, MAR and Recent Results was reviewed with the receiving nurse. Opportunity for questions and clarification was provided. Is the patient on 02? YES       L/Min 2       Other     Is the patient on a monitor? NO    Is the nurse transporting with the patient? NO    Surgical Waiting Area notified of patient's transfer from PACU?  NO  Patient returned to room      The following personal items collected during your admission accompanied patient upon transfer:   Dental Appliance:    Vision:    Hearing Aid:    Jewelry:    Clothing:    Other Valuables:    Valuables sent to safe:

## 2021-09-29 NOTE — PROGRESS NOTES
Ortho/NeuroSurgery PA Note    POD# 0  s/p T9-T10 LAMINECTOMY WITH T10 TRANPEDICULAR  DECOMPRESSION, T7-T12 POSTERIOR SPINAL FUSION (O ARM)     Pt resting in bed. No complaints. Patient has had something to eat/drink. No nausea or vomiting. Most Recent Labs:   Lab Results   Component Value Date/Time    HGB 11.3 (L) 09/29/2021 12:22 AM    Hemoglobin A1c 4.5 02/22/2018 08:44 AM       There is no height or weight on file to calculate BMI. Reference: BMI greater than 30 is classified as obesity and greater than 40 is classified as morbid obesity. Voiding status: fernandez catheter  Dressing C/D/I    Calves soft and supple;  No pain with passive stretch  Sensation and motor intact    Plan:  s/p Transradicular partial corpectomy of T10 and thoracic laminectomy T9-T10 with posterior fusion from T8-T12   -Tumor pathology pending   -Pain control   -Serina-op Antibiotics Ancef  -PT/OT - will need a brace  -SCDs for mechanical DVT prophylaxis  -Protonix for GI Prophylaxis   -Regular diet  -CM consult; will likely need IPR  -Bowel regimen  -D/C fernandez tomorrow AM  -Monitor HVAC output  -Medical mgmt per hospitalist  -Oncology following      JERRI Jimenez

## 2021-09-29 NOTE — ANESTHESIA POSTPROCEDURE EVALUATION
Post-Anesthesia Evaluation and Assessment    Patient: Ed Paniagua MRN: [id number]  SSN: [id number]    YOB: 1950  Age: 66 y.o. Sex: male      I have evaluated the patient and they are stable and ready for discharge from the PACU. Cardiovascular Function/Vital Signs  Visit Vitals  BP (!) 105/53   Pulse 71   Temp 36.4 °C (97.5 °F)   Resp 15   SpO2 98%       Patient is status post General anesthesia for Procedure(s):  T9-T10 LAMINECTOMY WITH T10 TRANPEDICULAR  DECOMPRESSION, T7-T12 POSTERIOR SPINAL FUSION (O ARM). Nausea/Vomiting: None    Postoperative hydration reviewed and adequate. Pain:  Pain Scale 1: Numeric (0 - 10) (09/29/21 1047)  Pain Intensity 1: 8 (09/29/21 1047)   Managed    Neurological Status:   Neuro  Neurologic State: Alert (09/28/21 9861)  Orientation Level: Oriented X4 (09/28/21 9304)  Cognition: Follows commands; Appropriate for age attention/concentration (09/28/21 4865)  Speech: Clear (09/28/21 0833)  LUE Motor Response: Purposeful (09/28/21 0833)  LLE Motor Response: Weak;Numbness (09/28/21 2345)  RUE Motor Response: Purposeful (09/28/21 4694)  RLE Motor Response: Numbness;Weak (09/28/21 2345)   At baseline    Mental Status, Level of Consciousness: Alert and  oriented to person, place, and time    Pulmonary Status:   O2 Device: Nasal cannula (09/29/21 1030)   Adequate oxygenation and airway patent    Complications related to anesthesia: None    Post-anesthesia assessment completed. No concerns    Signed By: Rabia Beatty MD     September 29, 2021              Procedure(s):  T9-T10 LAMINECTOMY WITH T10 TRANPEDICULAR  DECOMPRESSION, T7-T12 POSTERIOR SPINAL FUSION (O ARM). general    <BSHSIANPOST>    INITIAL Post-op Vital signs:   Vitals Value Taken Time   /66 09/29/21 1045   Temp 36.4 °C (97.5 °F) 09/29/21 1030   Pulse 89 09/29/21 1055   Resp 15 09/29/21 1055   SpO2 98 % 09/29/21 1055   Vitals shown include unvalidated device data.

## 2021-09-29 NOTE — PROGRESS NOTES
Physical Therapy    Chart reviewed in prep for PT evaluation; however, pt LORNA for surgery. Will hold evaluation and follow up tomorrow as able and appropriate.   Alicia Michel PT

## 2021-09-30 LAB
ALBUMIN SERPL-MCNC: 2.2 G/DL (ref 3.5–5)
ALBUMIN/GLOB SERPL: 0.6 {RATIO} (ref 1.1–2.2)
ALP SERPL-CCNC: 100 U/L (ref 45–117)
ALT SERPL-CCNC: 21 U/L (ref 12–78)
ANION GAP SERPL CALC-SCNC: 6 MMOL/L (ref 5–15)
AST SERPL-CCNC: 13 U/L (ref 15–37)
BASOPHILS # BLD: 0 K/UL (ref 0–0.1)
BASOPHILS NFR BLD: 0 % (ref 0–1)
BILIRUB SERPL-MCNC: 0.3 MG/DL (ref 0.2–1)
BUN SERPL-MCNC: 22 MG/DL (ref 6–20)
BUN/CREAT SERPL: 30 (ref 12–20)
CALCIUM SERPL-MCNC: 8.4 MG/DL (ref 8.5–10.1)
CHLORIDE SERPL-SCNC: 105 MMOL/L (ref 97–108)
CO2 SERPL-SCNC: 23 MMOL/L (ref 21–32)
CREAT SERPL-MCNC: 0.74 MG/DL (ref 0.7–1.3)
DIFFERENTIAL METHOD BLD: ABNORMAL
EOSINOPHIL # BLD: 0 K/UL (ref 0–0.4)
EOSINOPHIL NFR BLD: 0 % (ref 0–7)
ERYTHROCYTE [DISTWIDTH] IN BLOOD BY AUTOMATED COUNT: 11.6 % (ref 11.5–14.5)
GLOBULIN SER CALC-MCNC: 3.6 G/DL (ref 2–4)
GLUCOSE BLD STRIP.AUTO-MCNC: 103 MG/DL (ref 65–117)
GLUCOSE BLD STRIP.AUTO-MCNC: 106 MG/DL (ref 65–117)
GLUCOSE BLD STRIP.AUTO-MCNC: 121 MG/DL (ref 65–117)
GLUCOSE BLD STRIP.AUTO-MCNC: 137 MG/DL (ref 65–117)
GLUCOSE SERPL-MCNC: 127 MG/DL (ref 65–100)
HCT VFR BLD AUTO: 31.2 % (ref 36.6–50.3)
HGB BLD-MCNC: 10.1 G/DL (ref 12.1–17)
IMM GRANULOCYTES # BLD AUTO: 0.1 K/UL (ref 0–0.04)
IMM GRANULOCYTES NFR BLD AUTO: 1 % (ref 0–0.5)
LYMPHOCYTES # BLD: 0.8 K/UL (ref 0.8–3.5)
LYMPHOCYTES NFR BLD: 7 % (ref 12–49)
MCH RBC QN AUTO: 30.9 PG (ref 26–34)
MCHC RBC AUTO-ENTMCNC: 32.4 G/DL (ref 30–36.5)
MCV RBC AUTO: 95.4 FL (ref 80–99)
MONOCYTES # BLD: 0.9 K/UL (ref 0–1)
MONOCYTES NFR BLD: 8 % (ref 5–13)
NEUTS SEG # BLD: 9.3 K/UL (ref 1.8–8)
NEUTS SEG NFR BLD: 84 % (ref 32–75)
NRBC # BLD: 0 K/UL (ref 0–0.01)
NRBC BLD-RTO: 0 PER 100 WBC
PLATELET # BLD AUTO: 164 K/UL (ref 150–400)
PMV BLD AUTO: 11.6 FL (ref 8.9–12.9)
POTASSIUM SERPL-SCNC: 4.5 MMOL/L (ref 3.5–5.1)
PROT SERPL-MCNC: 5.8 G/DL (ref 6.4–8.2)
RBC # BLD AUTO: 3.27 M/UL (ref 4.1–5.7)
RBC MORPH BLD: ABNORMAL
SERVICE CMNT-IMP: ABNORMAL
SERVICE CMNT-IMP: ABNORMAL
SERVICE CMNT-IMP: NORMAL
SERVICE CMNT-IMP: NORMAL
SODIUM SERPL-SCNC: 134 MMOL/L (ref 136–145)
WBC # BLD AUTO: 11.1 K/UL (ref 4.1–11.1)

## 2021-09-30 PROCEDURE — 97161 PT EVAL LOW COMPLEX 20 MIN: CPT

## 2021-09-30 PROCEDURE — 97535 SELF CARE MNGMENT TRAINING: CPT

## 2021-09-30 PROCEDURE — 97165 OT EVAL LOW COMPLEX 30 MIN: CPT

## 2021-09-30 PROCEDURE — 97530 THERAPEUTIC ACTIVITIES: CPT

## 2021-09-30 PROCEDURE — 74011250637 HC RX REV CODE- 250/637: Performed by: PHYSICIAN ASSISTANT

## 2021-09-30 PROCEDURE — 74011250636 HC RX REV CODE- 250/636: Performed by: STUDENT IN AN ORGANIZED HEALTH CARE EDUCATION/TRAINING PROGRAM

## 2021-09-30 PROCEDURE — 82962 GLUCOSE BLOOD TEST: CPT

## 2021-09-30 PROCEDURE — 74011250636 HC RX REV CODE- 250/636: Performed by: PHYSICIAN ASSISTANT

## 2021-09-30 PROCEDURE — 85025 COMPLETE CBC W/AUTO DIFF WBC: CPT

## 2021-09-30 PROCEDURE — 74011250637 HC RX REV CODE- 250/637: Performed by: STUDENT IN AN ORGANIZED HEALTH CARE EDUCATION/TRAINING PROGRAM

## 2021-09-30 PROCEDURE — 94760 N-INVAS EAR/PLS OXIMETRY 1: CPT

## 2021-09-30 PROCEDURE — 65660000000 HC RM CCU STEPDOWN

## 2021-09-30 PROCEDURE — 80053 COMPREHEN METABOLIC PANEL: CPT

## 2021-09-30 PROCEDURE — 36415 COLL VENOUS BLD VENIPUNCTURE: CPT

## 2021-09-30 PROCEDURE — 77010033678 HC OXYGEN DAILY

## 2021-09-30 PROCEDURE — 74011000250 HC RX REV CODE- 250: Performed by: PHYSICIAN ASSISTANT

## 2021-09-30 RX ADMIN — DOCUSATE SODIUM 50 MG AND SENNOSIDES 8.6 MG 1 TABLET: 8.6; 5 TABLET, FILM COATED ORAL at 19:17

## 2021-09-30 RX ADMIN — DOCUSATE SODIUM 50 MG AND SENNOSIDES 8.6 MG 1 TABLET: 8.6; 5 TABLET, FILM COATED ORAL at 08:46

## 2021-09-30 RX ADMIN — ENOXAPARIN SODIUM 40 MG: 40 INJECTION SUBCUTANEOUS at 08:46

## 2021-09-30 RX ADMIN — Medication 1000 UNITS: at 08:46

## 2021-09-30 RX ADMIN — DEXAMETHASONE SODIUM PHOSPHATE 6 MG: 4 INJECTION, SOLUTION INTRAMUSCULAR; INTRAVENOUS at 07:11

## 2021-09-30 RX ADMIN — OXYCODONE 10 MG: 5 TABLET ORAL at 07:11

## 2021-09-30 RX ADMIN — Medication 10 ML: at 06:25

## 2021-09-30 RX ADMIN — ACETAMINOPHEN 1000 MG: 500 TABLET ORAL at 00:51

## 2021-09-30 RX ADMIN — ACETAMINOPHEN 1000 MG: 500 TABLET ORAL at 06:24

## 2021-09-30 RX ADMIN — DEXAMETHASONE SODIUM PHOSPHATE 6 MG: 4 INJECTION, SOLUTION INTRAMUSCULAR; INTRAVENOUS at 14:44

## 2021-09-30 RX ADMIN — DEXAMETHASONE SODIUM PHOSPHATE 6 MG: 4 INJECTION, SOLUTION INTRAMUSCULAR; INTRAVENOUS at 02:00

## 2021-09-30 RX ADMIN — SENNOSIDES 8.6 MG: 8.6 TABLET, COATED ORAL at 08:46

## 2021-09-30 RX ADMIN — OXYCODONE 10 MG: 5 TABLET ORAL at 19:19

## 2021-09-30 RX ADMIN — TAMSULOSIN HYDROCHLORIDE 0.4 MG: 0.4 CAPSULE ORAL at 08:46

## 2021-09-30 RX ADMIN — POLYETHYLENE GLYCOL 3350 17 G: 17 POWDER, FOR SOLUTION ORAL at 08:45

## 2021-09-30 RX ADMIN — DEXAMETHASONE SODIUM PHOSPHATE 6 MG: 4 INJECTION, SOLUTION INTRAMUSCULAR; INTRAVENOUS at 19:16

## 2021-09-30 RX ADMIN — PANTOPRAZOLE SODIUM 40 MG: 40 TABLET, DELAYED RELEASE ORAL at 07:11

## 2021-09-30 RX ADMIN — Medication 10 ML: at 00:51

## 2021-09-30 RX ADMIN — Medication 100 MG: at 08:46

## 2021-09-30 RX ADMIN — Medication 10 ML: at 21:45

## 2021-09-30 RX ADMIN — KETOROLAC TROMETHAMINE 15 MG: 30 INJECTION, SOLUTION INTRAMUSCULAR at 00:51

## 2021-09-30 RX ADMIN — KETOROLAC TROMETHAMINE 15 MG: 30 INJECTION, SOLUTION INTRAMUSCULAR at 06:25

## 2021-09-30 RX ADMIN — WATER 2 G: 1 INJECTION INTRAMUSCULAR; INTRAVENOUS; SUBCUTANEOUS at 00:51

## 2021-09-30 RX ADMIN — ACETAMINOPHEN 1000 MG: 500 TABLET ORAL at 14:44

## 2021-09-30 RX ADMIN — OXYCODONE 10 MG: 5 TABLET ORAL at 14:45

## 2021-09-30 RX ADMIN — ACETAMINOPHEN 1000 MG: 500 TABLET ORAL at 19:16

## 2021-09-30 NOTE — PROGRESS NOTES
Problem: Mobility Impaired (Adult and Pediatric)  Goal: *Acute Goals and Plan of Care (Insert Text)  Description: FUNCTIONAL STATUS PRIOR TO ADMISSION: Patient was independent and active without use of DME. Pt has standard walker without wheels however will need RW, adjustable base bed and hip kit from previous surgery. HOME SUPPORT PRIOR TO ADMISSION: The patient lived alone with family nearby and neighbors to provide assistance. Physical Therapy Goals  Initiated 9/30/2021    1. Patient will move from supine to sit and sit to supine , scoot up and down, and roll side to side in bed with modified independence within 4 days. 2. Patient will perform sit to stand with supervision/set-up within 4 days. 3. Patient will ambulate with supervision/set-up for 150 feet with the least restrictive device within 4 days. 4. Patient will ascend/descend 4 stairs with 1 handrail(s) with supervision/set-up within 4 days. 5. Patient will verbalize and demonstrate understanding of spinal precautions (No bending, lifting greater than 5 lbs, or twisting; log-roll technique; frequent repositioning as instructed) within 4 days. 9/30/2021 1534 by Ta Arreaga PT  Outcome: Progressing Towards Goal  9/30/2021 1307 by Ta Arreaga PT  Outcome: Progressing Towards Goal     PHYSICAL THERAPY TREATMENT  Patient: Jovan Trejo (89 y.o. male)  Date: 9/30/2021  Diagnosis: Lesion of bone of thoracic spine [M89.9] <principal problem not specified>  Procedure(s) (LRB):  T9-T10 LAMINECTOMY WITH T10 TRANPEDICULAR  DECOMPRESSION, T7-T12 POSTERIOR SPINAL FUSION (O ARM) (N/A) 1 Day Post-Op  Precautions: Back  Chart, physical therapy assessment, plan of care and goals were reviewed. ASSESSMENT  Patient continues with skilled PT services and is progressing towards goals. Pt tolerates standing from chair with continued heavy, one step, multimodal cues.  Pt stuck on breathing technique and unable to process breathing out with standing and sitting, attempts to inhale with either standing or sitting. Pt reminded of technique multiple times. Pt tolerates transfer to the bed with heavy cues. Pt returned to sidelying however performs sit up long sitting as he rolls back onto back and unable to understand technique. Pt does not have carryover throughout session. Will continue to follow. .     Current Level of Function Impacting Discharge (mobility/balance): mod A throughout    Other factors to consider for discharge:          PLAN :  Patient continues to benefit from skilled intervention to address the above impairments. Continue treatment per established plan of care. to address goals. Recommendation for discharge: (in order for the patient to meet his/her long term goals)  Therapy 3 hours per day 5-7 days per week    This discharge recommendation:  Has been made in collaboration with the attending provider and/or case management    IF patient discharges home will need the following DME: to be determined (TBD)       SUBJECTIVE:   Patient stated Stephany Smiley got this, watch-\" then performs with poor technique. OBJECTIVE DATA SUMMARY:   Critical Behavior:  Neurologic State: Alert  Orientation Level: Oriented X4 (with intermittent confusion)  Cognition: Follows commands (distractible, benefits from simple, directive cues)  Safety/Judgement: Awareness of environment  Functional Mobility Training:  Bed Mobility:  Rolling: Moderate assistance  Supine to Sit: Moderate assistance  Sit to Supine: Moderate assistance  Scooting: Moderate assistance        Transfers:  Sit to Stand: Moderate assistance;Assist x1  Stand to Sit: Moderate assistance;Assist x1        Bed to Chair: Moderate assistance;Assist x1                    Balance:  Sitting: Intact; With support  Standing: Impaired; With support  Standing - Static: Fair  Standing - Dynamic : Fair  Ambulation/Gait Training:        Gait Description (WDL): Exceptions to WDL (2 sidesteps to chair, heavy one step cues)       Pain Rating:  controlled    Activity Tolerance:   Good, Fair, and requires rest breaks    After treatment patient left in no apparent distress:   Call bell within reach and Caregiver / family present    COMMUNICATION/COLLABORATION:   The patients plan of care was discussed with: Registered nurse and Case management.      Leda Mcfadden, PT   Time Calculation: 23 mins

## 2021-09-30 NOTE — PROGRESS NOTES
EVAN:    RUR 20%    Disposition: IPR-Referrals sent to [facility] and [facility].     Transportation: S    Follow up: PCP/Specialist    Primary Contact: Lela West(Sister)[phone]    Hali Martinez RN/CRM  [phone]

## 2021-09-30 NOTE — PROGRESS NOTES
Problem: Self Care Deficits Care Plan (Adult)  Goal: *Acute Goals and Plan of Care (Insert Text)  Description: FUNCTIONAL STATUS PRIOR TO ADMISSION: Pt lives alone in single story home, with 4 KAYLENE, and states he was [facility] at [facility] for ADL completion. HOME SUPPORT: Pt lives alone and reports good PRN assist from neighbor. Pt also reports good PRN assist from sister; however, she resides in [place], with pt residing in [place]. Occupational Therapy Goals  Initiated 9/30/2021    1. Patient will perform EOB/RW lower body dressing with minimal assistance using AE PRN within 4 days. 2.  Patient will perform RW toileting with minimal assistance using most appropriate DME within 4 days. 3.  Patient will RW grooming at contact guard assist within 4 days. 4.  Patient will don/doff back brace at supervision within 4 days. 5.  Patient will verbalize/demonstrate 3/3 back precautions during ADL tasks without cues within 4 days. Outcome: Not Met    OCCUPATIONAL THERAPY EVALUATION  Patient: Renuka Maier (59 y.o. male)  Date: 9/30/2021  Primary Diagnosis: Lesion of bone of thoracic spine [M89.9]  Procedure(s) (LRB):  T9-T10 LAMINECTOMY WITH T10 TRANPEDICULAR  DECOMPRESSION, T7-T12 POSTERIOR SPINAL FUSION (O ARM) (N/A) 1 Day Post-Op   Precautions:  Back    ASSESSMENT  Based on the objective data described below, the patient presents with decreased functional mobility/balance, decreased strength/endurance, decreased activity tolerance, decreased full body reaching, intermittent confusion and high distractibility, all of which limit pt's ability to complete self-care routine at level congruent with PLOF. Currently, pt is Independent with self-feeding, S/U for seated grooming, Min A for UB dressing and Max A for bathing/LE dressing/toileting. Pt noted to be highly distractible and required Max verbal/tactile cues for safe transfer engagement, as well as, sequencing through presented therapeutic challenges. Pt noted with posterior lean upon initial stand, benefiting from [address] x2 for sit to stand and bed to chair with use of RW. Pt reports he owns recommended long-handled AE secondary to prior to R hip surgery; however, 1 of 2 handles on sock aid is broken. Pt benefits from skilled OT to address functional deficits during acute hospitalization, with reporting therapist believing pt would benefit from inpatient rehab upon discharge. Current Level of Function Impacting Discharge (ADLs/self-care): Independent with self-feeding, S/U for seated grooming, Min A for UB dressing and Max A for bathing/LE dressing/toileting    Functional Outcome Measure: The patient scored 50/100 on the Barthel Index outcome measure. Other factors to consider for discharge: intermittent confusion, distractible     Patient will benefit from skilled therapy intervention to address the above noted impairments. PLAN :  Recommendations and Planned Interventions: self care training, functional mobility training, therapeutic exercise, balance training, therapeutic activities, endurance activities, and patient education    Frequency/Duration: Patient will be followed by occupational therapy 5 times a week to address goals. Recommendation for discharge: (in order for the patient to meet his/her long term goals)  Therapy 3 hours per day 5-7 days per week    This discharge recommendation:  Has not yet been discussed the attending provider and/or case management    IF patient discharges home will need the following DME: TBD       SUBJECTIVE:   Patient stated I have the sock thing but one of the handles is broken.     OBJECTIVE DATA SUMMARY:   HISTORY:   Past Medical History:   Diagnosis Date    Arthritis     Mild anemia     Prostate cancer ([facility] [address] 2008 2017    PALLIATIVE CARE PER DR LAMBERT FARR NOTES 2/12/2018: Britton Krabbe, AND PAST EXTERNAL BEAM RADIATION THERAPY     Past Surgical History:   Procedure Laterality Date    HX BUNIONECTOMY Right 2014    HX CATARACT REMOVAL Bilateral 2017 2018    HX COLONOSCOPY      HX GI  2009    ANAL FISTULA     HX HEMORRHOIDECTOMY  1974    HX HIP ARTHROSCOPY Right     HX TONSILLECTOMY  1958       Expanded or extensive additional review of patient history:     Home Situation  Home Environment: Private residence  # Steps to Enter: 4  Rails to Enter: Yes  One/Two Story Residence: One story  Living Alone: Yes  Support Systems: Friend/Neighbor  Current DME Used/Available at Home: Walker  Tub or Shower Type: Tub/Shower combination    Hand dominance: Right    EXAMINATION OF PERFORMANCE DEFICITS:  Cognitive/Behavioral Status:  Neurologic State: Alert  Orientation Level: Oriented X4 (with intermittent confusion)  Cognition: Follows commands (distractible, benefits from simple, directive cues)  Perception: Cues to maintain midline in standing (secondary to posterior lean)  Perseveration: No perseveration noted  Safety/Judgement: Awareness of environment    Hearing: Auditory  Auditory Impairment: None    Vision/Perceptual:                                Corrective Lenses: Glasses    Range of Motion:  AROM: Generally decreased, functional  PROM: Generally decreased, functional                      Strength:  Strength: Generally decreased, functional                Coordination:  Coordination: Generally decreased, functional  Fine Motor Skills-Upper: Left Intact; Right Intact    Gross Motor Skills-Upper: Right Impaired;Left Intact (decreased R shoulder flexion ~90*)    Tone & Sensation:  Tone: Normal  Sensation: Intact                      Balance:  Sitting: Intact; With support  Standing: Impaired; With support  Standing - Static: Fair;Constant support  Standing - Dynamic : Fair;Constant support    Functional Mobility and Transfers for ADLs:  Transfers:  Sit to Stand: Minimum assistance;Assist x2  Stand to Sit: Minimum assistance;Assist x2  Bed to Chair: Minimum assistance;Assist x2    ADL Assessment:  Feeding: Independent    Oral Facial Hygiene/Grooming: Setup (seated)    Bathing: Maximum assistance    Upper Body Dressing: Minimum assistance    Lower Body Dressing: Maximum assistance    Toileting: Maximum assistance    ADL Intervention and task modifications:  Patient instructed and demonstrated 3/3 back precautions with Max verbal cues. Cognitive Retraining  Safety/Judgement: Awareness of environment    Patient instructed and indicated understanding the benefits of maintaining activity tolerance, functional mobility, and independence with self care tasks during acute stay  to ensure safe return home and to baseline. Encouraged patient to increase frequency and duration OOB, not sitting longer than 30 mins without marching/walking with staff, be out of bed for all meals, perform daily ADLs (as approved by RN/MD regarding bathing etc), and performing functional mobility to/from bathroom. Patient instruction and indicated understanding on body mechanics, ergonomics and gravitational force on the spine during different body positions to plan activities in prep for return home to complete basic ADLs, instrumental ADLs and back to work safely. Bathing: Patient instructed and indicated understanding when bathing to not submerge wound in water, stand to shower or sponge bathe, cover wound with plastic and tape to ensure no water reaches bandage/wound without cues. Dressing brace: Patient instructed and demonstrated to don/doff velcro on brace using dominant side, keeping non-dominant side intact. Patient instructed and demonstrated in meantime of being able to stand with back against wall to don/doff brace, to don/doff seated using lap and bed/chair surface to support brace while manipulating. Dressing lower body: Patient instructed to don brace first and on the benefits to remain seated to don all clothing to increase independence with precautions and pain management.  Patient instructed and demonstrated tailor sitting for lower body dressing with Max A. Toileting: Patient instructed on the benefits of using flushable wet wipes and toilet tongs if decreased reach or pain for kaiden care. Also, the benefits of a reacher to aid in clothing management. Patient instruction and indicated understanding to not strain i.e. holding breath to bear down during a bowel movement, lifting/activity, and sexual activity. Home safety: Patient instructed and indicated understanding on home modifications and safety [raise height of ADL objects (i.e. clothing, sink items, fridge items, items to mouth when grooming), appropriate height of chair surfaces, recliner safety, change of floor surfaces, clear pathways] to increase independence and fall prevention. Standing: Patient instructed and indicated understanding to walk up to sink/counter top/surfaces, step into walker, square off while using objects, slide objects along surfaces, to increase adherence to back precautions and fall prevention. Patient instructed to increase amount of time standing in order to increase independence and tolerance with ADLs. During prolonged standing, can open cabinet door or place foot on stool to decrease spinal pressure/increase pain. Tub transfer: Patient instructed and indicated understanding regarding when it is safe to begin transfer into tub (complete stairs with PT, advance exercises with PT high enough to clear tub height, and while clothes donned practice with another person present). Functional Measure:  Barthel Index:    Bathin  Bladder: 10  Bowels: 10  Groomin  Dressin  Feeding: 10  Mobility: 0  Stairs: 0  Toilet Use: 5  Transfer (Bed to Chair and Back): 10  Total: 50/100        The Barthel ADL Index: Guidelines  1. The index should be used as a record of what a patient does, not as a record of what a patient could do.   2. The main aim is to establish degree of independence from any help, physical or verbal, however minor and for whatever reason. 3. The need for supervision renders the patient not independent. 4. A patient's performance should be established using the best available evidence. Asking the patient, friends/relatives and nurses are the usual sources, but direct observation and common sense are also important. However direct testing is not needed. 5. Usually the patient's performance over the preceding 24-48 hours is important, but occasionally longer periods will be relevant. 6. Middle categories imply that the patient supplies over 50 per cent of the effort. 7. Use of aids to be independent is allowed. Alix Beavers., Barthel, D.W. (1022). Functional evaluation: the Barthel Index. [facility] (14)2. Gia Alicea annabelle BRIANA Palacios, Leslie Domingo., Yuli Mclaughlin., [facility], [facility] (1999). Measuring the change indisability after inpatient rehabilitation; comparison of the responsiveness of the Barthel Index and Functional [place] Measure. Journal of Neurology, Neurosurgery, and Psychiatry, 66(4), 684-014. JOVANI Valentino, DANIEL Schroeder, & Brijesh Covington M.A. (2004.) Assessment of post-stroke quality of life in cost-effectiveness studies: The usefulness of the Barthel Index and the EuroQoL-5D.  Quality of Life Research, 15, 230-21        Occupational Therapy Evaluation Charge Determination   History Examination Decision-Making   LOW Complexity : Brief history review  HIGH Complexity : 5 or more performance deficits relating to physical, cognitive , or psychosocial skils that result in activity limitations and / or participation restrictions LOW Complexity : No comorbidities that affect functional and no verbal or physical assistance needed to complete eval tasks       Based on the above components, the patient evaluation is determined to be of the following complexity level: LOW   Pain Rating:  No c/o pain    Activity Tolerance:   Fair and requires rest breaks    After treatment patient left in no apparent distress:    Sitting in chair, Call bell within reach, Side rails x 3, and Student nurses and teacher present    COMMUNICATION/EDUCATION:   The patients plan of care was discussed with: Physical therapist and Registered nurse. Home safety education was provided and the patient/caregiver indicated understanding., Patient/family have participated as able in goal setting and plan of care. , and Patient/family agree to work toward stated goals and plan of care. This patients plan of care is appropriate for delegation to OPAL.     Thank you for this referral.  Nathaniel Randall, OT  Time Calculation: 17 mins

## 2021-09-30 NOTE — PROGRESS NOTES
[address] Adult  Hospitalist Group                                                                                          Hospitalist Progress Note  Paulino Mcdowell MD  Answering service: [phone] OR [phone] from in house phone              Progress Note    Patient: Ila Rose MRN: [id number]  SSN: [id number]    YOB: 1950  Age: 66 y.o. Sex: male      Admit Date: 9/26/2021    LOS: 4 days     Subjective:     Patient presents with tenderness lesion with cord compression with evidence of metastasis. Patient is s/p T9-T10 laminectomy with decompression and T7-T12 posterior spinal fusion 9/29. Patient is doing well. Pain is controlled at current. Objective:     Vitals:    09/30/21 0736 09/30/21 1013 09/30/21 1215 09/30/21 1401   BP: (!) 155/82   128/71   Pulse: 73 71 70 78   Resp: 22   16   Temp: 98.6 °F (37 °C)   99 °F (37.2 °C)   SpO2: 98%   97%        Intake and Output:  Current Shift: 09/30 0701 - 09/30 1900  In: -   Out: 545 [Urine:500; Drains:45]  Last three shifts: 09/28 1901 - 09/30 0700  In: 700 [I.V.:700]  Out: 1030 [Urine:700; Drains:280]    Physical Exam:   GENERAL: alert, cooperative, no distress, appears stated age  THROAT & NECK: normal and no erythema or exudates noted. LUNG: clear to auscultation bilaterally  HEART: regular rate and rhythm, S1, S2 normal, no murmur, click, rub or gallop  ABDOMEN: soft, non-tender. Bowel sounds normal. No masses,  no organomegaly  EXTREMITIES:  extremities normal, atraumatic, no cyanosis or edema  SKIN: no rash or abnormalities  NEUROLOGIC: AOx3. PSYCHIATRIC: non focal    Lab/Data Review: All lab results for the last 24 hours reviewed.      Recent Results (from the past 24 hour(s))   GLUCOSE, POC    Collection Time: 09/29/21  4:23 PM   Result Value Ref Range    Glucose (POC) 170 (H) 65 - 117 mg/dL    Performed by Nidia DIAZ    GLUCOSE, POC    Collection Time: 09/29/21  9:15 PM   Result Value Ref Range    Glucose (POC) 186 (H) 65 - 117 mg/dL    Performed by Julio Sinclair    CBC WITH AUTOMATED DIFF    Collection Time: 09/30/21  1:15 AM   Result Value Ref Range    WBC 11.1 4.1 - 11.1 K/uL    RBC 3.27 (L) 4.10 - 5.70 M/uL    HGB 10.1 (L) 12.1 - 17.0 g/dL    HCT 31.2 (L) 36.6 - 50.3 %    MCV 95.4 80.0 - 99.0 FL    MCH 30.9 26.0 - 34.0 PG    MCHC 32.4 30.0 - 36.5 g/dL    RDW 11.6 11.5 - 14.5 %    PLATELET 324 372 - 932 K/uL    MPV 11.6 8.9 - 12.9 FL    NRBC 0.0 0  WBC    ABSOLUTE NRBC 0.00 0.00 - 0.01 K/uL    NEUTROPHILS 84 (H) 32 - 75 %    LYMPHOCYTES 7 (L) 12 - 49 %    MONOCYTES 8 5 - 13 %    EOSINOPHILS 0 0 - 7 %    BASOPHILS 0 0 - 1 %    IMMATURE GRANULOCYTES 1 (H) 0.0 - 0.5 %    ABS. NEUTROPHILS 9.3 (H) 1.8 - 8.0 K/UL    ABS. LYMPHOCYTES 0.8 0.8 - 3.5 K/UL    ABS. MONOCYTES 0.9 0.0 - 1.0 K/UL    ABS. EOSINOPHILS 0.0 0.0 - 0.4 K/UL    ABS. BASOPHILS 0.0 0.0 - 0.1 K/UL    ABS. IMM. GRANS. 0.1 (H) 0.00 - 0.04 K/UL    DF SMEAR SCANNED      RBC COMMENTS NORMOCYTIC, NORMOCHROMIC     METABOLIC PANEL, COMPREHENSIVE    Collection Time: 09/30/21  1:15 AM   Result Value Ref Range    Sodium 134 (L) 136 - 145 mmol/L    Potassium 4.5 3.5 - 5.1 mmol/L    Chloride 105 97 - 108 mmol/L    CO2 23 21 - 32 mmol/L    Anion gap 6 5 - 15 mmol/L    Glucose 127 (H) 65 - 100 mg/dL    BUN 22 (H) 6 - 20 MG/DL    Creatinine 0.74 0.70 - 1.30 MG/DL    BUN/Creatinine ratio 30 (H) 12 - 20      GFR est AA >60 >60 ml/min/1.73m2    GFR est non-AA >60 >60 ml/min/1.73m2    Calcium 8.4 (L) 8.5 - 10.1 MG/DL    Bilirubin, total 0.3 0.2 - 1.0 MG/DL    ALT (SGPT) 21 12 - 78 U/L    AST (SGOT) 13 (L) 15 - 37 U/L    Alk.  phosphatase 100 45 - 117 U/L    Protein, total 5.8 (L) 6.4 - 8.2 g/dL    Albumin 2.2 (L) 3.5 - 5.0 g/dL    Globulin 3.6 2.0 - 4.0 g/dL    A-G Ratio 0.6 (L) 1.1 - 2.2     GLUCOSE, POC    Collection Time: 09/30/21  7:01 AM   Result Value Ref Range    Glucose (POC) 121 (H) 65 - 117 mg/dL    Performed by Oleksandr Rodriguez [id number], POC    Collection Time: 09/30/21 11:59 AM   Result Value Ref Range    Glucose (POC) 103 65 - 117 mg/dL    Performed by Timmy Olsen (MATTHIAS)         Imaging:    No results found. Assessment and Plan:     T10 mass lesion with spinal cord compression  -S/p T9-T10 laminectomy with decompression, T7-T12 posterior spinal fusion 9/29  -Spine surgery following  -On IV Decadron and morphine for pain control  -PT following, plan for inpatient rehab    Prostate cancer  -Patient with stage IV prostate cancer  -Continue Flomax, plan to restart Manjeet Siegel as outpatient  -Oncology following    Anemia  -Anemia of chronic disease due to malignancy  -H&H is stable     Discharge disposition: Plan for inpatient rehab when medically stable. Likely in 24 to 48 hours.     Signed By: Cecilia Darling MD     September 30, 2021

## 2021-09-30 NOTE — PROGRESS NOTES
Orthopedic Spine Progress Note  Post Op day: 1 Day Post-Op    2021 7:21 AM   Admit Date: 2021  Procedure: Procedure(s):  T9-T10 LAMINECTOMY WITH T10 TRANPEDICULAR  DECOMPRESSION, T7-T12 POSTERIOR SPINAL FUSION (O ARM)    Subjective:     Rhonda Riddles doing well this AM. Pain is well controlled. No complaints. Tolerating diet. No N/V. Pain Control:   Pain Assessment  Pain Scale 1: Numeric (0 - 10)  Pain Intensity 1: 8  Pain Onset 1: post op  Pain Location 1: Back  Pain Orientation 1: Lower  Pain Description 1: Aching  Pain Intervention(s) 1: Medication (see MAR)    Objective:          Physical Exam:  General:  Alert and oriented. No acute distress. Heart:  Respirations unlabored. Abdomen:   Extremities: Soft, non-tender. No evidence of cyanosis. Pulses palpable in both upper and lower extremities. Neurologic:  Musculoskeletal:  No new motor deficits. Neurovascular exam within normal limits. Sensation stable. Motor: unchanged C5-T1 and L2-S1. Myra's sign negative in bilateral lower extremities. Calves soft, nontender upon palpation and with passive twitch. Moves both upper and lower extremities. Incision: clean, dry, and intact. No significant erythema or swelling. No active drainage noted. Vital Signs:    Blood pressure 116/63, pulse (!) 50, temperature 99.3 °F (37.4 °C), resp. rate 18, SpO2 98 %. Temp (24hrs), Av.9 °F (36.6 °C), Min:97.2 °F (36.2 °C), Max:99.3 °F (37.4 °C)      LAB:    Recent Labs     21  0115   HCT 31.2*   HGB 10.1*        Lab Results   Component Value Date/Time    Sodium 134 (L) 2021 01:15 AM    Potassium 4.5 2021 01:15 AM    Chloride 105 2021 01:15 AM    CO2 23 2021 01:15 AM    Glucose 127 (H) 2021 01:15 AM    BUN 22 (H) 2021 01:15 AM    Creatinine 0.74 2021 01:15 AM    Calcium 8.4 (L) 2021 01:15 AM       Intake/Output:No intake/output data recorded.   1901 -  0700  In: 700 [I.V.:700]  Out: 56 [Urine:700; Drains:280]    PT/OT:   Gait:                    Assessment:   Patient is 1 Day Post-Op s/p Procedure(s):  T9-T10 LAMINECTOMY WITH T10 TRANPEDICULAR  DECOMPRESSION, T7-T12 POSTERIOR SPINAL FUSION (O ARM)    Plan:     1. Continue PT/OT  2. Continue established methods of pain control  3. VTE Prophylaxes - TEDS &/or SCDs   4. D/c drain once less than 80 cc in 8 hour shift  5. Encouraged incentive spirometry  6.   Dicharge pending       Signed By: Shay Rodarte PA-C

## 2021-09-30 NOTE — PROGRESS NOTES
Problem: Falls - Risk of  Goal: *Absence of Falls  Description: Document Mary Ellen Starch Fall Risk and appropriate interventions in the flowsheet. Outcome: Progressing Towards Goal  Note: Fall Risk Interventions:  Mobility Interventions: Bed/chair exit alarm         Medication Interventions: Bed/chair exit alarm    Elimination Interventions: Bed/chair exit alarm    History of Falls Interventions: Bed/chair exit alarm         Problem: Pressure Injury - Risk of  Goal: *Prevention of pressure injury  Description: Document Ty Scale and appropriate interventions in the flowsheet.   Outcome: Progressing Towards Goal  Note: Pressure Injury Interventions:  Sensory Interventions: Discuss PT/OT consult with provider    Moisture Interventions: Apply protective barrier, creams and emollients, Maintain skin hydration (lotion/cream)    Activity Interventions: PT/OT evaluation    Mobility Interventions: PT/OT evaluation    Nutrition Interventions: Offer support with meals,snacks and hydration    Friction and Shear Interventions: Apply protective barrier, creams and emollients, Foam dressings/transparent film/skin sealants

## 2021-09-30 NOTE — PROGRESS NOTES
Problem: Mobility Impaired (Adult and Pediatric)  Goal: *Acute Goals and Plan of Care (Insert Text)  Description: FUNCTIONAL STATUS PRIOR TO ADMISSION: Patient was independent and active without use of DME. Pt has standard walker without wheels however will need RW, adjustable base bed and hip kit from previous surgery. HOME SUPPORT PRIOR TO ADMISSION: The patient lived alone with family nearby and neighbors to provide assistance. Physical Therapy Goals  Initiated 9/30/2021    1. Patient will move from supine to sit and sit to supine , scoot up and down, and roll side to side in bed with modified independence within 4 days. 2. Patient will perform sit to stand with supervision/set-up within 4 days. 3. Patient will ambulate with supervision/set-up for 150 feet with the least restrictive device within 4 days. 4. Patient will ascend/descend 4 stairs with 1 handrail(s) with supervision/set-up within 4 days. 5. Patient will verbalize and demonstrate understanding of spinal precautions (No bending, lifting greater than 5 lbs, or twisting; log-roll technique; frequent repositioning as instructed) within 4 days. Outcome: Progressing Towards Goal     PHYSICAL THERAPY EVALUATION  Patient: Magan Mendoza (1 y.o. male)  Date: 9/30/2021  Primary Diagnosis: Lesion of bone of thoracic spine [M89.9]  Procedure(s) (LRB):  T9-T10 LAMINECTOMY WITH T10 TRANPEDICULAR  DECOMPRESSION, T7-T12 POSTERIOR SPINAL FUSION (O ARM) (N/A) 1 Day Post-Op   Precautions: Back      ASSESSMENT  Based on the objective data described below, the patient presents with impaired trunk ROM, spinal precautions, B LE pain, numbness and tingling, balance, weakness and gait dysfunction/intolerance causing limited independence, with mobility s/p recent new diagnosis of with T9-T10 laminectomy, T10 tranpedicular decompression, and T7-12 PLF POD #1. Pt PLOF: independent with ADLs and IADLs.  Patient lives alone in one story home, 4 steps to enter, b rails. Pt received in supine, requires extensive cues and increased time to tolerate putting bed flat and legs into flexion for log rolling instruction. Pt required PROM to BLE prior to mobility with high anxiety and fear of pain and resistance to care. Pt assisted into sitting and to EOB, max assist to don brace and instructed multiple times in use with decreased carryover. Pt tolerates sitting well however demos poor recruitment of leg ms for standing and max A x1 with frequent redirection and severe posterior lean. Able to initiate cotreat with OT and patient responded well to male presence with min A x2 to perform standing and posterior lean diminished with cues. Reviewed back precautions, sitting limit of 30-45 minutes, positioning in chair, and progress with poor integration and carryover. Pt is not cleared for discharge from Physical Therapy standpoint at this time, recommend IPR at this time. Will benefit from therapy in acute setting, anticipate will improve tolerance quickly with improved pain control. Current Level of Function Impacting Discharge (mobility/balance): heavy safety concerns and possible cognitive delays, requires simple one step cues. Functional Outcome Measure: The patient scored Total: 50/100 on the Barthel Index which is indicative of moderate impaired ability to care for basic self needs/dependency on others. Other factors to consider for discharge:      Patient will benefit from skilled therapy intervention to address the above noted impairments. PLAN :  Recommendations and Planned Interventions: bed mobility training, transfer training, gait training, therapeutic exercises, neuromuscular re-education, patient and family training/education and therapeutic activities      Frequency/Duration: Patient will be followed by physical therapy:  twice daily to address goals.     Recommendation for discharge: (in order for the patient to meet his/her long term goals)  Therapy 3 hours per day 5-7 days per week    This discharge recommendation:  Has been made in collaboration with the attending provider and/or case management    IF patient discharges home will need the following DME: rolling walker         SUBJECTIVE:   Patient stated Kennedy Castrejon will do everything you tell me to .    OBJECTIVE DATA SUMMARY:   HISTORY:    Past Medical History:   Diagnosis Date    Arthritis     Mild anemia     Prostate cancer ([facility] [address] 2008 2017    PALLIATIVE CARE PER DR LAMBERT FARR NOTES 2/12/2018: [facility] Sham, AND PAST EXTERNAL BEAM RADIATION THERAPY     Past Surgical History:   Procedure Laterality Date    HX BUNIONECTOMY Right 2014    HX CATARACT REMOVAL Bilateral 2017 2018    HX COLONOSCOPY      HX GI  2009    ANAL FISTULA     HX HEMORRHOIDECTOMY  1974    HX HIP ARTHROSCOPY Right     HX TONSILLECTOMY  1958       Personal factors and/or comorbidities impacting plan of care:     Home Situation  Home Environment: Private residence  # Steps to Enter: 4  Rails to Enter: Yes  One/Two Story Residence: One story  Living Alone: Yes  Support Systems: Friend/Neighbor  Current DME Used/Available at Home: Walker  Tub or Shower Type: Tub/Shower combination    EXAMINATION/PRESENTATION/DECISION MAKING:   Critical Behavior:  Neurologic State: Alert  Orientation Level: Oriented X4 (with intermittent confusion)  Cognition: Follows commands (distractible, benefits from simple, directive cues)  Safety/Judgement: Awareness of environment  Hearing:   Auditory  Auditory Impairment: None  Skin:  intact  Edema: none  Range Of Motion:  AROM: Generally decreased, functional           PROM: Generally decreased, functional           Strength:    Strength: Generally decreased, functional                    Tone & Sensation:   Tone: Normal              Sensation: Intact               Coordination:  Coordination: Generally decreased, functional  Vision:   Corrective Lenses: Glasses  Functional Mobility:  Bed Mobility: Transfers:  Sit to Stand: Minimum assistance;Assist x2  Stand to Sit: Minimum assistance;Assist x2        Bed to Chair: Minimum assistance;Assist x2              Balance:   Sitting: Intact; With support  Standing: Impaired; With support  Standing - Static: Fair;Constant support  Standing - Dynamic : Fair;Constant support  Ambulation/Gait Training:              Gait Description (WDL): Exceptions to WDL (2 sidesteps to chair, heavy one step cues)       Therapeutic Exercises:   controlled    Functional Measure:  Barthel Index:    Bathin  Bladder: 10  Bowels: 10  Groomin  Dressin  Feeding: 10  Mobility: 0  Stairs: 0  Toilet Use: 5  Transfer (Bed to Chair and Back): 10  Total: 50/100       The Barthel ADL Index: Guidelines  1. The index should be used as a record of what a patient does, not as a record of what a patient could do. 2. The main aim is to establish degree of independence from any help, physical or verbal, however minor and for whatever reason. 3. The need for supervision renders the patient not independent. 4. A patient's performance should be established using the best available evidence. Asking the patient, friends/relatives and nurses are the usual sources, but direct observation and common sense are also important. However direct testing is not needed. 5. Usually the patient's performance over the preceding 24-48 hours is important, but occasionally longer periods will be relevant. 6. Middle categories imply that the patient supplies over 50 per cent of the effort. 7. Use of aids to be independent is allowed. Paulette Voss., Barthel, DBeckyW. (0700). Functional evaluation: the Barthel Index. [address] [facility] (14)2. Lizbeth Rosario annabelle BRIANA Palacios, Jazmin Campbell., Zoe Edmonds., [facility], [address] (). Measuring the change indisability after inpatient rehabilitation; comparison of the responsiveness of the Barthel Index and Functional [place] Measure.  Journal of Neurology, Neurosurgery, and Psychiatry, 66(4), 584-650. JOVANI Frederick, DANIEL Schroeder, & Amanda Espinoza M.A. (2004.) Assessment of post-stroke quality of life in cost-effectiveness studies: The usefulness of the Barthel Index and the EuroQoL-5D. Quality of Life Research, 15, 702-98        Physical Therapy Evaluation Charge Determination   History Examination Presentation Decision-Making   HIGH Complexity :3+ comorbidities / personal factors will impact the outcome/ POC  HIGH Complexity : 4+ Standardized tests and measures addressing body structure, function, activity limitation and / or participation in recreation  LOW Complexity : Stable, uncomplicated  Other outcome measures barthel  MEDIUM      Based on the above components, the patient evaluation is determined to be of the following complexity level: LOW     Pain Rating:  controlled    Activity Tolerance:   Fair and requires rest breaks    After treatment patient left in no apparent distress:   Sitting in chair, Call bell within reach and Bed / chair alarm activated    COMMUNICATION/EDUCATION:   The patients plan of care was discussed with: Registered nurse and Case management. Fall prevention education was provided and the patient/caregiver indicated understanding. and Patient/family have participated as able in goal setting and plan of care.     Thank you for this referral.  Robin Lai, PT

## 2021-10-01 LAB
ANION GAP SERPL CALC-SCNC: 5 MMOL/L (ref 5–15)
BASOPHILS # BLD: 0 K/UL (ref 0–0.1)
BASOPHILS NFR BLD: 0 % (ref 0–1)
BUN SERPL-MCNC: 24 MG/DL (ref 6–20)
BUN/CREAT SERPL: 36 (ref 12–20)
CALCIUM SERPL-MCNC: 8.6 MG/DL (ref 8.5–10.1)
CHLORIDE SERPL-SCNC: 105 MMOL/L (ref 97–108)
CO2 SERPL-SCNC: 24 MMOL/L (ref 21–32)
CREAT SERPL-MCNC: 0.66 MG/DL (ref 0.7–1.3)
DIFFERENTIAL METHOD BLD: ABNORMAL
EOSINOPHIL # BLD: 0 K/UL (ref 0–0.4)
EOSINOPHIL NFR BLD: 0 % (ref 0–7)
ERYTHROCYTE [DISTWIDTH] IN BLOOD BY AUTOMATED COUNT: 11.7 % (ref 11.5–14.5)
GLUCOSE BLD STRIP.AUTO-MCNC: 124 MG/DL (ref 65–117)
GLUCOSE BLD STRIP.AUTO-MCNC: 128 MG/DL (ref 65–117)
GLUCOSE BLD STRIP.AUTO-MCNC: 132 MG/DL (ref 65–117)
GLUCOSE SERPL-MCNC: 127 MG/DL (ref 65–100)
HCT VFR BLD AUTO: 30.9 % (ref 36.6–50.3)
HGB BLD-MCNC: 10.2 G/DL (ref 12.1–17)
IMM GRANULOCYTES # BLD AUTO: 0.1 K/UL (ref 0–0.04)
IMM GRANULOCYTES NFR BLD AUTO: 1 % (ref 0–0.5)
LYMPHOCYTES # BLD: 0.8 K/UL (ref 0.8–3.5)
LYMPHOCYTES NFR BLD: 9 % (ref 12–49)
MCH RBC QN AUTO: 30.8 PG (ref 26–34)
MCHC RBC AUTO-ENTMCNC: 33 G/DL (ref 30–36.5)
MCV RBC AUTO: 93.4 FL (ref 80–99)
MONOCYTES # BLD: 0.7 K/UL (ref 0–1)
MONOCYTES NFR BLD: 8 % (ref 5–13)
NEUTS SEG # BLD: 7.4 K/UL (ref 1.8–8)
NEUTS SEG NFR BLD: 82 % (ref 32–75)
NRBC # BLD: 0 K/UL (ref 0–0.01)
NRBC BLD-RTO: 0 PER 100 WBC
PLATELET # BLD AUTO: 152 K/UL (ref 150–400)
PMV BLD AUTO: 12 FL (ref 8.9–12.9)
POTASSIUM SERPL-SCNC: 4.7 MMOL/L (ref 3.5–5.1)
RBC # BLD AUTO: 3.31 M/UL (ref 4.1–5.7)
SERVICE CMNT-IMP: ABNORMAL
SODIUM SERPL-SCNC: 134 MMOL/L (ref 136–145)
WBC # BLD AUTO: 9 K/UL (ref 4.1–11.1)

## 2021-10-01 PROCEDURE — 74011250636 HC RX REV CODE- 250/636: Performed by: STUDENT IN AN ORGANIZED HEALTH CARE EDUCATION/TRAINING PROGRAM

## 2021-10-01 PROCEDURE — 99233 SBSQ HOSP IP/OBS HIGH 50: CPT | Performed by: NURSE PRACTITIONER

## 2021-10-01 PROCEDURE — 97116 GAIT TRAINING THERAPY: CPT

## 2021-10-01 PROCEDURE — 74011250637 HC RX REV CODE- 250/637: Performed by: STUDENT IN AN ORGANIZED HEALTH CARE EDUCATION/TRAINING PROGRAM

## 2021-10-01 PROCEDURE — 82962 GLUCOSE BLOOD TEST: CPT

## 2021-10-01 PROCEDURE — 74011250636 HC RX REV CODE- 250/636: Performed by: PHYSICIAN ASSISTANT

## 2021-10-01 PROCEDURE — 36415 COLL VENOUS BLD VENIPUNCTURE: CPT

## 2021-10-01 PROCEDURE — 97535 SELF CARE MNGMENT TRAINING: CPT

## 2021-10-01 PROCEDURE — 85025 COMPLETE CBC W/AUTO DIFF WBC: CPT

## 2021-10-01 PROCEDURE — 65660000000 HC RM CCU STEPDOWN

## 2021-10-01 PROCEDURE — L0464 TLSO 4MOD SACRO-SCAP PRE: HCPCS

## 2021-10-01 PROCEDURE — 80048 BASIC METABOLIC PNL TOTAL CA: CPT

## 2021-10-01 PROCEDURE — 74011250636 HC RX REV CODE- 250/636: Performed by: FAMILY MEDICINE

## 2021-10-01 PROCEDURE — 74011250637 HC RX REV CODE- 250/637: Performed by: PHYSICIAN ASSISTANT

## 2021-10-01 RX ORDER — OXYCODONE HYDROCHLORIDE 5 MG/1
5-10 TABLET ORAL
Qty: 60 TABLET | Refills: 0 | Status: SHIPPED | OUTPATIENT
Start: 2021-10-01 | End: 2021-10-15

## 2021-10-01 RX ORDER — NALOXONE HYDROCHLORIDE 4 MG/.1ML
SPRAY NASAL
Qty: 1 EACH | Refills: 0 | Status: SHIPPED | OUTPATIENT
Start: 2021-10-01 | End: 2021-12-14 | Stop reason: ALTCHOICE

## 2021-10-01 RX ORDER — DEXAMETHASONE SODIUM PHOSPHATE 4 MG/ML
6 INJECTION, SOLUTION INTRA-ARTICULAR; INTRALESIONAL; INTRAMUSCULAR; INTRAVENOUS; SOFT TISSUE EVERY 12 HOURS
Status: DISCONTINUED | OUTPATIENT
Start: 2021-10-01 | End: 2021-10-02 | Stop reason: HOSPADM

## 2021-10-01 RX ADMIN — ENOXAPARIN SODIUM 40 MG: 40 INJECTION SUBCUTANEOUS at 09:20

## 2021-10-01 RX ADMIN — OXYCODONE 10 MG: 5 TABLET ORAL at 05:37

## 2021-10-01 RX ADMIN — DEXAMETHASONE SODIUM PHOSPHATE 6 MG: 4 INJECTION, SOLUTION INTRAMUSCULAR; INTRAVENOUS at 07:11

## 2021-10-01 RX ADMIN — PANTOPRAZOLE SODIUM 40 MG: 40 TABLET, DELAYED RELEASE ORAL at 07:11

## 2021-10-01 RX ADMIN — OXYCODONE 10 MG: 5 TABLET ORAL at 19:12

## 2021-10-01 RX ADMIN — OXYCODONE 10 MG: 5 TABLET ORAL at 13:03

## 2021-10-01 RX ADMIN — DEXAMETHASONE SODIUM PHOSPHATE 6 MG: 4 INJECTION, SOLUTION INTRAMUSCULAR; INTRAVENOUS at 21:00

## 2021-10-01 RX ADMIN — OXYCODONE 10 MG: 5 TABLET ORAL at 00:54

## 2021-10-01 RX ADMIN — MORPHINE SULFATE 4 MG: 2 INJECTION, SOLUTION INTRAMUSCULAR; INTRAVENOUS at 01:18

## 2021-10-01 RX ADMIN — DOCUSATE SODIUM 50 MG AND SENNOSIDES 8.6 MG 1 TABLET: 8.6; 5 TABLET, FILM COATED ORAL at 09:20

## 2021-10-01 RX ADMIN — ACETAMINOPHEN 1000 MG: 500 TABLET ORAL at 07:11

## 2021-10-01 RX ADMIN — POLYETHYLENE GLYCOL 3350 17 G: 17 POWDER, FOR SOLUTION ORAL at 09:20

## 2021-10-01 RX ADMIN — OXYCODONE 10 MG: 5 TABLET ORAL at 09:27

## 2021-10-01 RX ADMIN — Medication 10 ML: at 01:00

## 2021-10-01 RX ADMIN — Medication 10 ML: at 13:04

## 2021-10-01 RX ADMIN — Medication 1000 UNITS: at 09:20

## 2021-10-01 RX ADMIN — DEXAMETHASONE SODIUM PHOSPHATE 6 MG: 4 INJECTION, SOLUTION INTRAMUSCULAR; INTRAVENOUS at 01:00

## 2021-10-01 RX ADMIN — Medication 100 MG: at 09:19

## 2021-10-01 RX ADMIN — ACETAMINOPHEN 1000 MG: 500 TABLET ORAL at 19:12

## 2021-10-01 RX ADMIN — DEXAMETHASONE SODIUM PHOSPHATE 6 MG: 4 INJECTION, SOLUTION INTRAMUSCULAR; INTRAVENOUS at 13:03

## 2021-10-01 RX ADMIN — ACETAMINOPHEN 1000 MG: 500 TABLET ORAL at 01:00

## 2021-10-01 RX ADMIN — POLYETHYLENE GLYCOL 3350 17 G: 17 POWDER, FOR SOLUTION ORAL at 09:21

## 2021-10-01 RX ADMIN — TAMSULOSIN HYDROCHLORIDE 0.4 MG: 0.4 CAPSULE ORAL at 09:19

## 2021-10-01 RX ADMIN — SENNOSIDES 8.6 MG: 8.6 TABLET, COATED ORAL at 09:19

## 2021-10-01 NOTE — PROGRESS NOTES
Bedside shift report received from [facility], [address] [place] [address] (offgoing nurse). Report included the following information SBAR and Kardex. Informed by offgoing nurse that Hospitalist team talked about doing voiding trial for patient. No orders received to remove fernandez.  Will notify oncoming nurse in the AM.

## 2021-10-01 NOTE — PROGRESS NOTES
Problem: Mobility Impaired (Adult and Pediatric)  Goal: *Acute Goals and Plan of Care (Insert Text)  Description: FUNCTIONAL STATUS PRIOR TO ADMISSION: Patient was independent and active without use of DME. Pt has standard walker without wheels however will need RW, adjustable base bed and hip kit from previous surgery. HOME SUPPORT PRIOR TO ADMISSION: The patient lived alone with family nearby and neighbors to provide assistance. Physical Therapy Goals  Initiated 9/30/2021    1. Patient will move from supine to sit and sit to supine , scoot up and down, and roll side to side in bed with modified independence within 4 days. 2. Patient will perform sit to stand with supervision/set-up within 4 days. 3. Patient will ambulate with supervision/set-up for 150 feet with the least restrictive device within 4 days. 4. Patient will ascend/descend 4 stairs with 1 handrail(s) with supervision/set-up within 4 days. 5. Patient will verbalize and demonstrate understanding of spinal precautions (No bending, lifting greater than 5 lbs, or twisting; log-roll technique; frequent repositioning as instructed) within 4 days. 10/1/2021 1713 by Jennifer Howell, PT  Outcome: Progressing Towards Goal  10/1/2021 1155 by Jennifer Howell, PT  Outcome: Progressing Towards Goal     PHYSICAL THERAPY TREATMENT  Patient: Amanda Magaña (60 y.o. male)  Date: 10/1/2021  Diagnosis: Lesion of bone of thoracic spine [M89.9] <principal problem not specified>  Procedure(s) (LRB):  T9-T10 LAMINECTOMY WITH T10 TRANPEDICULAR  DECOMPRESSION, T7-T12 POSTERIOR SPINAL FUSION (O ARM) (N/A) 2 Days Post-Op  Precautions: Back  Chart, physical therapy assessment, plan of care and goals were reviewed. ASSESSMENT  Patient continues with skilled PT services and is progressing towards goals. Pt tolerates standing from the chair and assisted with line management, increased safety observed with fewer cues.  Pt tolerates gait around the bed with heavy sequencing cues, able to perform sitting with simple cues. Pt demos improved safety with technique. Pt aided with sit to supine however has continued difficulty laying flat and will panic on rolling, and performs longsitting from sidelying and max A to lay flat. Will continue to follow    Current Level of Function Impacting Discharge (mobility/balance): min a-max A    Other factors to consider for discharge:          PLAN :  Patient continues to benefit from skilled intervention to address the above impairments. Continue treatment per established plan of care. to address goals. Recommendation for discharge: (in order for the patient to meet his/her long term goals)  Therapy 3 hours per day 5-7 days per week    This discharge recommendation:  Has been made in collaboration with the attending provider and/or case management    IF patient discharges home will need the following DME: to be determined (TBD)       SUBJECTIVE:   Patient stated i'll get it.     OBJECTIVE DATA SUMMARY:   Critical Behavior:  Neurologic State: Alert  Orientation Level: Oriented X4  Cognition: Appropriate decision making, Appropriate for age attention/concentration, Appropriate safety awareness  Safety/Judgement: Awareness of environment  Functional Mobility Training:  Bed Mobility:  Rolling: Minimum assistance; Moderate assistance  Supine to Sit: Minimum assistance; Moderate assistance  Sit to Supine: Moderate assistance;Maximum assistance           Transfers:  Sit to Stand: Minimum assistance;Assist x1  Stand to Sit: Minimum assistance;Assist x1        Bed to Chair: Minimum assistance;Assist x1                    Balance:  Sitting: Intact; With support  Standing: Impaired; Without support  Standing - Static: Fair  Standing - Dynamic : Fair  Ambulation/Gait Training:  Distance (ft): 15 Feet (ft)  Assistive Device: Brace/Splint; Walker, rolling  Ambulation - Level of Assistance: Contact guard assistance;Minimal assistance Gait Abnormalities: Antalgic        Base of Support: Widened     Speed/Jazmin: Slow;Shuffled  Step Length: Right shortened;Left shortened         Pain Rating:  controlled    Activity Tolerance:   Fair, requires rest breaks, and observed SOB with activity    After treatment patient left in no apparent distress:   Supine in bed, Call bell within reach, and Bed / chair alarm activated    COMMUNICATION/COLLABORATION:   The patients plan of care was discussed with: Registered nurse and Case management.      Renetta Mcfadden, PT   Time Calculation: 14 mins

## 2021-10-01 NOTE — PROGRESS NOTES
Problem: Mobility Impaired (Adult and Pediatric)  Goal: *Acute Goals and Plan of Care (Insert Text)  Description: FUNCTIONAL STATUS PRIOR TO ADMISSION: Patient was independent and active without use of DME. Pt has standard walker without wheels however will need RW, adjustable base bed and hip kit from previous surgery. HOME SUPPORT PRIOR TO ADMISSION: The patient lived alone with family nearby and neighbors to provide assistance. Physical Therapy Goals  Initiated 9/30/2021    1. Patient will move from supine to sit and sit to supine , scoot up and down, and roll side to side in bed with modified independence within 4 days. 2. Patient will perform sit to stand with supervision/set-up within 4 days. 3. Patient will ambulate with supervision/set-up for 150 feet with the least restrictive device within 4 days. 4. Patient will ascend/descend 4 stairs with 1 handrail(s) with supervision/set-up within 4 days. 5. Patient will verbalize and demonstrate understanding of spinal precautions (No bending, lifting greater than 5 lbs, or twisting; log-roll technique; frequent repositioning as instructed) within 4 days. Outcome: Progressing Towards Goal    PHYSICAL THERAPY TREATMENT  Patient: Amanda Magaña (39 y.o. male)  Date: 10/1/2021  Diagnosis: Lesion of bone of thoracic spine [M89.9] <principal problem not specified>  Procedure(s) (LRB):  T9-T10 LAMINECTOMY WITH T10 TRANPEDICULAR  DECOMPRESSION, T7-T12 POSTERIOR SPINAL FUSION (O ARM) (N/A) 2 Days Post-Op  Precautions: Back  Chart, physical therapy assessment, plan of care and goals were reviewed. ASSESSMENT  Patient continues with skilled PT services and is progressing towards goals. Pt continues to have decreased command following and increase difficulty sequencing however improved over yersterdya. Pt performed standing and gait training to the toilet, requiring increased simple cues for technique.  Pt able to remember 30% of instructions and carryover, mod-max A for donning brace at this time. Pt demos increased following of commands with increased time. Will continue to follow. .     Current Level of Function Impacting Discharge (mobility/balance): min A-mod a    Other factors to consider for discharge: lives alone         PLAN :  Patient continues to benefit from skilled intervention to address the above impairments. Continue treatment per established plan of care. to address goals. Recommendation for discharge: (in order for the patient to meet his/her long term goals)  Therapy up to 5 days/week in SNF setting    This discharge recommendation:  Has been made in collaboration with the attending provider and/or case management    IF patient discharges home will need the following DME: to be determined (TBD)       SUBJECTIVE:   Patient stated I feel better today! Delano Puente    OBJECTIVE DATA SUMMARY:   Critical Behavior:  Neurologic State: Alert  Orientation Level: Oriented X4  Cognition: Appropriate decision making, Appropriate for age attention/concentration, Appropriate safety awareness  Safety/Judgement: Awareness of environment  Functional Mobility Training:  Bed Mobility:  Rolling: Minimum assistance; Moderate assistance  Supine to Sit: Minimum assistance; Moderate assistance  Sit to Supine: Moderate assistance;Maximum assistance           Transfers:  Sit to Stand: Minimum assistance;Assist x2  Stand to Sit: Minimum assistance;Assist x2        Bed to Chair: Minimum assistance;Assist x2                Balance:  Sitting: Intact; With support  Standing: Impaired; With support  Standing - Static: Fair  Standing - Dynamic : Fair  Ambulation/Gait Training:  Distance (ft): 30 Feet (ft)  Assistive Device: Brace/Splint; Walker, rolling  Ambulation - Level of Assistance: Contact guard assistance;Minimal assistance        Gait Abnormalities: Antalgic        Base of Support: Widened     Speed/Jazmin: Shuffled; Slow  Step Length: Right shortened;Left shortened           Pain Rating:  controlled    Activity Tolerance:   Fair and requires frequent rest breaks    After treatment patient left in no apparent distress:   Sitting in chair, Call bell within reach and Bed / chair alarm activated    COMMUNICATION/COLLABORATION:   The patients plan of care was discussed with: Registered nurse and Case management.      Benjamin Mcfadden, PT   Time Calculation: 39 mins

## 2021-10-01 NOTE — PROGRESS NOTES
[address] [facility] Adult  Hospitalist Group                                                                                          Hospitalist Progress Note  Chris Tracy MD  Answering service: [phone] OR [phone] from in house phone              Progress Note    Patient: Yesika Capone MRN: [id number]  SSN: [id number]    YOB: 1950  Age: 66 y.o. Sex: male      Admit Date: 9/26/2021    LOS: 5 days     Subjective:     Patient presents with tenderness lesion with cord compression with evidence of metastasis. Patient is s/p T9-T10 laminectomy with decompression and T7-T12 posterior spinal fusion 9/29. Patient is doing well. Pain is controlled at current. Objective:     Vitals:    10/01/21 0800 10/01/21 0937 10/01/21 1000 10/01/21 1200   BP:  124/70     Pulse: (!) 52 79 80 88   Resp:  18     Temp:  98.2 °F (36.8 °C)     SpO2:  97%          Intake and Output:  Current Shift: 10/01 0701 - 10/01 1900  In: -   Out: 2693 [Urine:950; Drains:65]  Last three shifts: 09/29 1901 - 10/01 0700  In: 400 [P.O.:400]  Out: 1965 [Urine:1650; Drains:315]    Physical Exam:   GENERAL: alert, cooperative, no distress, appears stated age  THROAT & NECK: normal and no erythema or exudates noted. LUNG: clear to auscultation bilaterally  HEART: regular rate and rhythm, S1, S2 normal, no murmur, click, rub or gallop  ABDOMEN: soft, non-tender. Bowel sounds normal. No masses,  no organomegaly  EXTREMITIES:  extremities normal, atraumatic, no cyanosis or edema  SKIN: no rash or abnormalities  NEUROLOGIC: AOx3. PSYCHIATRIC: non focal    Lab/Data Review: All lab results for the last 24 hours reviewed.      Recent Results (from the past 24 hour(s))   GLUCOSE, POC    Collection Time: 09/30/21  4:17 PM   Result Value Ref Range    Glucose (POC) 106 65 - 117 mg/dL    Performed by Renu Deluna    GLUCOSE, POC    Collection Time: 09/30/21  9:47 PM   Result Value Ref Range    Glucose (POC) 137 (H) 65 - 117 mg/dL Performed by Soo Fernando    METABOLIC PANEL, BASIC    Collection Time: 10/01/21  1:38 AM   Result Value Ref Range    Sodium 134 (L) 136 - 145 mmol/L    Potassium 4.7 3.5 - 5.1 mmol/L    Chloride 105 97 - 108 mmol/L    CO2 24 21 - 32 mmol/L    Anion gap 5 5 - 15 mmol/L    Glucose 127 (H) 65 - 100 mg/dL    BUN 24 (H) 6 - 20 MG/DL    Creatinine 0.66 (L) 0.70 - 1.30 MG/DL    BUN/Creatinine ratio 36 (H) 12 - 20      GFR est AA >60 >60 ml/min/1.73m2    GFR est non-AA >60 >60 ml/min/1.73m2    Calcium 8.6 8.5 - 10.1 MG/DL   CBC WITH AUTOMATED DIFF    Collection Time: 10/01/21  1:38 AM   Result Value Ref Range    WBC 9.0 4.1 - 11.1 K/uL    RBC 3.31 (L) 4.10 - 5.70 M/uL    HGB 10.2 (L) 12.1 - 17.0 g/dL    HCT 30.9 (L) 36.6 - 50.3 %    MCV 93.4 80.0 - 99.0 FL    MCH 30.8 26.0 - 34.0 PG    MCHC 33.0 30.0 - 36.5 g/dL    RDW 11.7 11.5 - 14.5 %    PLATELET 943 632 - 130 K/uL    MPV 12.0 8.9 - 12.9 FL    NRBC 0.0 0  WBC    ABSOLUTE NRBC 0.00 0.00 - 0.01 K/uL    NEUTROPHILS 82 (H) 32 - 75 %    LYMPHOCYTES 9 (L) 12 - 49 %    MONOCYTES 8 5 - 13 %    EOSINOPHILS 0 0 - 7 %    BASOPHILS 0 0 - 1 %    IMMATURE GRANULOCYTES 1 (H) 0.0 - 0.5 %    ABS. NEUTROPHILS 7.4 1.8 - 8.0 K/UL    ABS. LYMPHOCYTES 0.8 0.8 - 3.5 K/UL    ABS. MONOCYTES 0.7 0.0 - 1.0 K/UL    ABS. EOSINOPHILS 0.0 0.0 - 0.4 K/UL    ABS. BASOPHILS 0.0 0.0 - 0.1 K/UL    ABS. IMM. GRANS. 0.1 (H) 0.00 - 0.04 K/UL    DF AUTOMATED     GLUCOSE, POC    Collection Time: 10/01/21  6:12 AM   Result Value Ref Range    Glucose (POC) 124 (H) 65 - 117 mg/dL    Performed by Alba DIAZ    GLUCOSE, POC    Collection Time: 10/01/21 12:02 PM   Result Value Ref Range    Glucose (POC) 132 (H) 65 - 117 mg/dL    Performed by Trinidad Wetzel         Imaging:    No results found.        Assessment and Plan:     T10 mass lesion with spinal cord compression  -S/p T9-T10 laminectomy with decompression, T7-T12 posterior spinal fusion 9/29  -Spine surgery following  -On IV Decadron, start weaning, continue morphine for pain control  -PT following, plan for inpatient rehab    Prostate cancer  -Patient with stage IV prostate cancer  -Continue Flomax, plan to restart Eliud Morrison as outpatient  -Oncology following    Anemia  -Anemia of chronic disease due to malignancy  -H&H is stable     Discharge disposition: Plan for inpatient rehab when medically stable. Likely in 24 to 48 hours.     Signed By: Reji Carlton MD     October 1, 2021

## 2021-10-01 NOTE — PROGRESS NOTES
Palliative Medicine Consult  [place]: [phone] ([phone])    Patient Name: Helio Rodney  YOB: 1950    Date of Initial Consult: 9/28/21  Reason for Consult: Care decisions  Requesting Provider: Dr. Cathi Briones  Primary Care Physician: Manoj Campbell MD     SUMMARY:   Helio Rodney is a 66 y.o. male with a past history of stage IV prostate cancer, chronic right LE DVT, and anemia, who was admitted on 9/26/2021 from home with a diagnosis of T10 lesion with cord compression. He presented with complaints of back pain, weakness, and sensory changes. Imaging revealed a T10 lesion with cord compression and a soft tissue mass. Decompression surgery is planned for tomorrow. Oncology consult is pending. Current medical issues leading to Palliative Medicine involvement include: stage IV cancer, care decisions. Social: He is single and lives alone. His sister Juvencio Todd is his main support. He has several other siblings, but most of them are incapacitated due to medical problems. PALLIATIVE DIAGNOSES:   1. Palliative care encounter  2. Goals of care and advance care planning  3. DNR discussion  4. Back pain  5. Weakness  6. Stage IV prostate cancer  7. T10 lesion with cord compression  8. Constipation       PLAN:   Patient seen at the bedside in follow up with Girish Bal LCSW. He is not POD#2 from his spine surgery. He reports pain. He says that the IV morphine is the only thing that helps with the pain, the oxycodone has been ineffective. We talked about his goals for his care and next steps. He will be going to acute inpatient rehab at discharge to continue his recovery. He is working with PT/OT and will be fitted for a brace. He is motivated to go to rehab. We talked about the plan for him to follow up with oncology outpatient and to resume the [facility]. He is agreeable to this plan. We also talked more about creating an advance medical directive, which he wanted to do.  We assisted him with completing the form. He names his sister, Johnathon Boxer as his primary medical POA. If he were dying and treatment would not help him recover, he would not want life prolonging treatments continued. If he were in a vegetative state, he would want to set the limit of continuing care for 7 days to see if he recovers, if not, he would want life prolonging treatments withheld. We gave him copies of the directive and placed one on the chart. We also again confirmed his full code status. Back pain management: Would recommend continuing the IV morphine for severe breakthrough pain. I explained that we need to get him on an adequate oral regimen before discharge so that his pain will be managed at rehab and he can participate. He says the 10mg of oxycodone does not help. Would recommend trying a higher dose of oxycodone or changing to PO dilaudid. Constipation: MAR reviewed. Continue current regimen with scheduled Miralax and Senokot + PRN bisacodyl suppositories. Communicated plan of care with: Palliative Jaime RAMIREZ 192 Team     GOALS OF CARE / TREATMENT PREFERENCES:     GOALS OF CARE:  Patient/Health Care Proxy Stated Goals: Prolong life    TREATMENT PREFERENCES:   Code Status: Full Code    Advance Care Planning:  [x] [facility] Interdisciplinary Team has updated the ACP Navigator with Health Care Decision Maker and Patient Capacity      Primary Decision Maker: Aiden Becerril Sister - [phone]      Advance Care Planning 3/19/2018   Patient's Healthcare Decision Maker is: Legal Next of Kin   Primary Decision Maker Name -   Primary Decision Maker Relationship to Patient -   Confirm Advance Directive None   Patient Would Like to Complete Advance Directive -       Medical Interventions: Full interventions     Other Instructions:          Other:    As far as possible, the palliative care team has discussed with patient / health care proxy about goals of care / treatment preferences for patient. HISTORY:     History obtained from: chart, patient    CHIEF COMPLAINT: Back pain, weakness, sensory changes    HPI/SUBJECTIVE:    The patient is:   [x] Verbal and participatory  [] Non-participatory due to:     He reports that he developed back pain and numbness in his feet/toes. This has made it difficult to walk. He also complains of weakness. He denies nausea or shortness of breath. Clinical Pain Assessment (nonverbal scale for severity on nonverbal patients):   Clinical Pain Assessment  Severity: 7  Location: back  Character: sharp  Duration: days  Frequency: constant, worse with movement          Duration: for how long has pt been experiencing pain (e.g., 2 days, 1 month, years)  Frequency: how often pain is an issue (e.g., several times per day, once every few days, constant)     FUNCTIONAL ASSESSMENT:     Palliative Performance Scale (PPS):  PPS: 60       PSYCHOSOCIAL/SPIRITUAL SCREENING:     Palliative IDT has assessed this patient for cultural preferences / practices and a referral made as appropriate to needs (Cultural Services, Patient Advocacy, Ethics, etc.)    Any spiritual / Christianity concerns:  [] Yes /  [x] No    Caregiver Burnout:  [] Yes /  [] No /  [x] No Caregiver Present      Anticipatory grief assessment:   [x] Normal  / [] Maladaptive       ESAS Anxiety:      ESAS Depression:          REVIEW OF SYSTEMS:     Positive and pertinent negative findings in ROS are noted above in HPI. The following systems were [x] reviewed / [] unable to be reviewed as noted in HPI  Other findings are noted below. Systems: constitutional, ears/nose/mouth/throat, respiratory, gastrointestinal, genitourinary, musculoskeletal, integumentary, neurologic, psychiatric, endocrine. Positive findings noted below.   Modified ESAS Completed by: provider   Fatigue: 3 Drowsiness: 0     Pain: 7     Nausea: 0     Dyspnea: 0     Constipation: Yes     Stool Occurrence(s): 0        PHYSICAL EXAM:     From RN flowsheet:  Wt Readings from Last 3 Encounters:   09/24/21 185 lb (83.9 kg)   09/10/21 185 lb (83.9 kg)   03/19/18 165 lb (74.8 kg)     Blood pressure (!) 109/58, pulse 83, temperature 98.4 °F (36.9 °C), resp. rate 16, SpO2 96 %.     Pain Scale 1: Numeric (0 - 10)  Pain Intensity 1: 9  Pain Onset 1: post op  Pain Location 1: Back  Pain Orientation 1: Medial  Pain Description 1: Stabbing  Pain Intervention(s) 1: Medication (see MAR)  Last bowel movement, if known:     Constitutional: alert, awake, no acute distress, pleasant, conversational  Eyes: pupils equal, anicteric  ENMT: no nasal discharge, moist mucous membranes  Cardiovascular: regular rhythm  Respiratory: breathing not labored, symmetric  Musculoskeletal: no deformity  Skin: warm, dry  Neurologic: following commands, moving all extremities  Psychiatric: full affect, no hallucinations       HISTORY:     Active Problems:    Lesion of bone of thoracic spine (9/26/2021)      Palliative care encounter ()      Counseling regarding advance care planning and goals of care ()      DNR (do not resuscitate) discussion ()      Acute back pain, unspecified back location, unspecified back pain laterality ()      Constipation, unspecified constipation type ()      Past Medical History:   Diagnosis Date    Arthritis     Mild anemia     Prostate cancer ([facility] [address] 2008 2017    PALLIATIVE CARE PER DR LAMBERT FARR NOTES 2/12/2018: Ace Sarah, AND PAST EXTERNAL BEAM RADIATION THERAPY      Past Surgical History:   Procedure Laterality Date    HX BUNIONECTOMY Right 2014    HX CATARACT REMOVAL Bilateral 2017 2018    HX COLONOSCOPY      HX GI  2009    ANAL FISTULA     HX HEMORRHOIDECTOMY  1974    HX HIP ARTHROSCOPY Right     HX TONSILLECTOMY  1958      Family History   Problem Relation Age of Onset    Diabetes Mother     Hypertension Mother     Asthma Father     Hypertension Sister     Kidney Disease Brother         DIALYSIS    Diabetes Brother     Diabetes Brother  Drug Abuse Brother     Mental Retardation Sister     Obesity Sister     Anesth Problems Neg Hx       History reviewed, no pertinent family history.   Social History     Tobacco Use    Smoking status: Former Smoker     Packs/day: 0.25     Years: 5.00     Pack years: 1.25     Quit date:      Years since quittin.7    Smokeless tobacco: Never Used   Substance Use Topics    Alcohol use: No     No Known Allergies   Current Facility-Administered Medications   Medication Dose Route Frequency    dexamethasone (DECADRON) 4 mg/mL injection 6 mg  6 mg IntraVENous Q12H    sodium chloride (NS) flush 5-40 mL  5-40 mL IntraVENous Q8H    sodium chloride (NS) flush 5-40 mL  5-40 mL IntraVENous PRN    naloxone (NARCAN) injection 0.4 mg  0.4 mg IntraVENous PRN    senna-docusate (PERICOLACE) 8.6-50 mg per tablet 1 Tablet  1 Tablet Oral BID    polyethylene glycol (MIRALAX) packet 17 g  17 g Oral DAILY    bisacodyL (DULCOLAX) suppository 10 mg  10 mg Rectal DAILY PRN    phenol throat spray (CHLORASEPTIC) 1 Spray  1 Spray Oral PRN    benzocaine-menthoL (CEPACOL) lozenge 1 Lozenge  1 Lozenge Oral PRN    acetaminophen (TYLENOL) tablet 1,000 mg  1,000 mg Oral Q6H    oxyCODONE IR (ROXICODONE) tablet 5 mg  5 mg Oral Q3H PRN    oxyCODONE IR (ROXICODONE) tablet 10 mg  10 mg Oral Q3H PRN    cyclobenzaprine (FLEXERIL) tablet 10 mg  10 mg Oral BID PRN    enoxaparin (LOVENOX) injection 40 mg  40 mg SubCUTAneous Q24H    morphine injection 4 mg  4 mg IntraVENous Q6H PRN    polyethylene glycol (MIRALAX) packet 17 g  17 g Oral DAILY    senna (SENOKOT) tablet 8.6 mg  1 Tablet Oral DAILY    tamsulosin (FLOMAX) capsule 0.4 mg  0.4 mg Oral DAILY    thiamine HCL (B-1) tablet 100 mg  100 mg Oral DAILY    cholecalciferol (VITAMIN D3) (1000 Units /25 mcg) tablet 1,000 Units  1,000 Units Oral DAILY    pantoprazole (PROTONIX) tablet 40 mg  40 mg Oral ACB    insulin lispro (HUMALOG) injection   SubCUTAneous TIDAC    glucose chewable tablet 16 g  4 Tablet Oral PRN    dextrose (D50W) injection syrg 12.5-25 g  12.5-25 g IntraVENous PRN    glucagon (GLUCAGEN) injection 1 mg  1 mg IntraMUSCular PRN          LAB AND IMAGING FINDINGS:     Lab Results   Component Value Date/Time    WBC 9.0 10/01/2021 01:38 AM    HGB 10.2 (L) 10/01/2021 01:38 AM    PLATELET 863 96/52/9552 01:38 AM     Lab Results   Component Value Date/Time    Sodium 134 (L) 10/01/2021 01:38 AM    Potassium 4.7 10/01/2021 01:38 AM    Chloride 105 10/01/2021 01:38 AM    CO2 24 10/01/2021 01:38 AM    BUN 24 (H) 10/01/2021 01:38 AM    Creatinine 0.66 (L) 10/01/2021 01:38 AM    Calcium 8.6 10/01/2021 01:38 AM      Lab Results   Component Value Date/Time    Alk. phosphatase 100 09/30/2021 01:15 AM    Protein, total 5.8 (L) 09/30/2021 01:15 AM    Albumin 2.2 (L) 09/30/2021 01:15 AM    Globulin 3.6 09/30/2021 01:15 AM     Lab Results   Component Value Date/Time    INR 1.0 02/22/2018 08:44 AM    Prothrombin time 10.6 02/22/2018 08:44 AM      No results found for: IRON, FE, TIBC, IBCT, PSAT, FERR   No results found for: PH, PCO2, PO2  No components found for: GLPOC   No results found for: CPK, CKMB             Total time: 35 min  Counseling / coordination time, spent as noted above: 30 min  > 50% counseling / coordination?: yes    Prolonged service was provided for  []30 min   []75 min in face to face time in the presence of the patient, spent as noted above. Time Start:   Time End:   Note: this can only be billed with 26342 (initial) or 99752 (follow up). If multiple start / stop times, list each separately.

## 2021-10-01 NOTE — PROGRESS NOTES
Problem: Self Care Deficits Care Plan (Adult)  Goal: *Acute Goals and Plan of Care (Insert Text)  Description: FUNCTIONAL STATUS PRIOR TO ADMISSION: Pt lives alone in single story home, with 4 KAYLENE, and states he was [facility] Indep at [facility] for ADL completion. HOME SUPPORT: Pt lives alone and reports good PRN assist from neighbor. Pt also reports good PRN assist from sister; however, she resides in [place], with pt residing in [place]. Occupational Therapy Goals  Initiated 9/30/2021    1. Patient will perform EOB/RW lower body dressing with minimal assistance using AE PRN within 4 days. 2.  Patient will perform RW toileting with minimal assistance using most appropriate DME within 4 days. 3.  Patient will RW grooming at contact guard assist within 4 days. 4.  Patient will don/doff back brace at supervision within 4 days. 5.  Patient will verbalize/demonstrate 3/3 back precautions during ADL tasks without cues within 4 days. Outcome: Progressing Towards Goal    OCCUPATIONAL THERAPY TREATMENT  Patient: Ashley Ledbetter (71 y.o. male)  Date: 10/1/2021  Diagnosis: Lesion of bone of thoracic spine [M89.9] <principal problem not specified>  Procedure(s) (LRB):  T9-T10 LAMINECTOMY WITH T10 TRANPEDICULAR  DECOMPRESSION, T7-T12 POSTERIOR SPINAL FUSION (O ARM) (N/A) 2 Days Post-Op  Precautions: Back  Chart, occupational therapy assessment, plan of care, and goals were reviewed. ASSESSMENT  Patient continues with skilled OT services and is progressing towards goals. Pt noted with progressive mobility/balance, functionally evidenced by successful engagement with RW level bathroom mobility and toileting (pt unable to move bowels); however, he continues to demonstrate fluctuating levels of safe ambulation/transfers, ranging from CGA to Min Ax2, with Max verbal cues for safe transfer/DME technique.   Pt continues to benefit from skilled OT to address functional deficits during acute hospitalization, with reporting therapist believing pt would benefit from inpatient rehab upon discharge. Current Level of Function Impacting Discharge (ADLs): Max A    Other factors to consider for discharge: confusion, decreased safety         PLAN :  Patient continues to benefit from skilled intervention to address the above impairments. Continue treatment per established plan of care to address goals. Recommend with staff: OOB meals, Active ADL engagement    Recommend next OT session: POC progression    Recommendation for discharge: (in order for the patient to meet his/her long term goals)  Therapy 3 hours per day 5-7 days per week    This discharge recommendation:  Has been made in collaboration with the attending provider and/or case management    IF patient discharges home will need the following DME: TBD       SUBJECTIVE:   Patient stated do I reach back before I sit or after?     OBJECTIVE DATA SUMMARY:   Cognitive/Behavioral Status:  Neurologic State: Alert  Orientation Level: Oriented X4  Cognition: Appropriate decision making; Appropriate for age attention/concentration; Appropriate safety awareness  Perception: Cues to maintain midline in sitting (cues for full standing trunk elongation)  Perseveration: No perseveration noted  Safety/Judgement: Awareness of environment    Functional Mobility and Transfers for ADLs:  Transfers:  Sit to Stand: Minimum assistance;Assist x2  Functional Transfers  Bathroom Mobility: Minimum assistance (Min Ax2-CGA; fluctuating balance noted)  Toilet Transfer : Minimum assistance  Bed to Chair: Minimum assistance;Assist x2    Balance:  Sitting: Intact; With support  Standing: Impaired; With support  Standing - Static: Fair  Standing - Dynamic : Fair    ADL Intervention:  Upper Body Dressing Assistance  Orthotics(Brace): Maximum assistance    Toileting  Toileting Assistance: Minimum assistance (x2)  Clothing Management: Minimum assistance  Cues: Verbal cues provided (for safe transfer/DME technique)  Adaptive Equipment: Walker    Cognitive Retraining  Safety/Judgement: Awareness of environment    Pt educated on safe transfer techniques, with specific emphasis on proper hand placement to push up from seated surface rather than attempt to pull self up, fully positioning self in-front of desired seated location, feeling chair on back of legs and reaching back with 1-2 UE to slowly lower self to seated position. Pain:  No c/o pain    Activity Tolerance:   Fair and requires rest breaks    After treatment patient left in no apparent distress:   Sitting in chair, Call bell within reach, Bed / chair alarm activated, and PCT present    COMMUNICATION/COLLABORATION:   The patients plan of care was discussed with: Physical therapist, Registered nurse, and Case management.      Trinidad Springer OT  Time Calculation: 23 mins

## 2021-10-01 NOTE — PROGRESS NOTES
Orthopedic Spine Progress Note  Post Op day: 2 Days Post-Op    2021 9:59 AM   Admit Date: 2021  Procedure: Procedure(s):  T9-T10 LAMINECTOMY WITH T10 TRANPEDICULAR  DECOMPRESSION, T7-T12 POSTERIOR SPINAL FUSION (O ARM)    Subjective:     Mana Armenta has complaints of some increased pain in his back overnight. Better today when positioned in bed eating breakfast. Reports continued numbness in bilateral lower extremities. Tolerating diet. No N/V. Pain Control:   Pain Assessment  Pain Scale 1: Numeric (0 - 10)  Pain Intensity 1: 9  Pain Onset 1: post op  Pain Location 1: Back  Pain Orientation 1: Medial  Pain Description 1: Stabbing  Pain Intervention(s) 1: Medication (see MAR)    Objective:          Physical Exam:  General:  Alert and oriented. No acute distress. Heart:  Respirations unlabored. Abdomen:   Extremities: Soft, non-tender. No evidence of cyanosis. Pulses palpable in both upper and lower extremities. Neurologic:  Musculoskeletal:  No new motor deficits. Neurovascular exam within normal limits. Sensation stable. Motor: unchanged C5-T1 and L2-S1. Myra's sign negative in bilateral lower extremities. Calves soft, nontender upon palpation and with passive twitch. Moves both upper and lower extremities. Incision: clean, dry, and intact. No significant erythema or swelling. No active drainage noted. Vital Signs:    Blood pressure 124/70, pulse 79, temperature 98.2 °F (36.8 °C), resp. rate 18, SpO2 97 %.   Temp (24hrs), Av.7 °F (37.1 °C), Min:98.2 °F (36.8 °C), Max:99 °F (37.2 °C)      LAB:    Recent Labs     10/01/21  0138   HCT 30.9*   HGB 10.2*        Lab Results   Component Value Date/Time    Sodium 134 (L) 10/01/2021 01:38 AM    Potassium 4.7 10/01/2021 01:38 AM    Chloride 105 10/01/2021 01:38 AM    CO2 24 10/01/2021 01:38 AM    Glucose 127 (H) 10/01/2021 01:38 AM    BUN 24 (H) 10/01/2021 01:38 AM    Creatinine 0.66 (L) 10/01/2021 01:38 AM Calcium 8.6 10/01/2021 01:38 AM       Intake/Output:10/01 0701 - 10/01 1900  In: -   Out: 9930 [Urine:950; Drains:65]  09/29 1901 - 10/01 0700  In: 400 [P.O.:400]  Out: 1965 [IIPCY:7952; Drains:315]    PT/OT:   Gait:                    Assessment:   Patient is 2 Days Post-Op s/p Procedure(s):  T9-T10 LAMINECTOMY WITH T10 TRANPEDICULAR  DECOMPRESSION, T7-T12 POSTERIOR SPINAL FUSION (O ARM)    Plan:     1. Continue PT/OT  2. Continue established methods of pain control  3. VTE Prophylaxes - TEDS &/or SCDs   4. Advance diet  5. Encouraged incentive spirometer. 6.  Drain output was 65 mL overnight; okay to remove drain today   7.   Discharge pending      Signed By: JERRI Palma

## 2021-10-01 NOTE — PROGRESS NOTES
Palliative Medicine  [place]: [phone] ([phone])  [facility]: [phone] ([phone])    The SW met with the patient at bedside, along with Kennedy Angel NP, in order to provide ongoing support and follow-up with Advanced Medical Directive. The patient shared that he had spoken with his sister, and he appoints his sister Sadie Fernandes to be his [place] (see ACP note). The patient appears with a bright affect- he is eager for rehabilitation, and shared he hopes to go to [facility]. The patient asked very appropriate questions about his disease process, also discussed pain management. PT in to work with patient, patient endorses having all questions answered- eager to work with physical therapy. SW provided original and copy of AMD to patient, copy also placed on the patient's chart to be scanned into system. Thank you for including Palliative Medicine in the care of [facility].        Oj Mendoza LCSW  [phone]

## 2021-10-01 NOTE — PROGRESS NOTES
Problem: Falls - Risk of  Goal: *Absence of Falls  Description: Document Leafy Ruano Fall Risk and appropriate interventions in the flowsheet.   Outcome: Progressing Towards Goal  Note: Fall Risk Interventions:  Mobility Interventions: Bed/chair exit alarm, Communicate number of staff needed for ambulation/transfer, Patient to call before getting OOB, Utilize walker, cane, or other assistive device, Utilize gait belt for transfers/ambulation         Medication Interventions: Bed/chair exit alarm, Evaluate medications/consider consulting pharmacy    Elimination Interventions: Bed/chair exit alarm, Call light in reach    History of Falls Interventions: Bed/chair exit alarm, Evaluate medications/consider consulting pharmacy, Utilize gait belt for transfer/ambulation

## 2021-10-01 NOTE — PROGRESS NOTES
Transition of Care Plan   RUR- 17%     DISPOSITION: The disposition plan is [facility]; pending medical progression  o Per Dimple Ino; they can accept the pt over the weekend if medically stable.  F/U with PCP/Specialist     Transport: [facility]   2nd IM letter needed prior to D/C. Per conversation with the pt: The pt confirmed that he prefers to transition to [facility] as it is closer to his social support system. This cm informed the pt that he would inform the facility.      CM: [address] [facility],   [phone]

## 2021-10-02 VITALS
DIASTOLIC BLOOD PRESSURE: 76 MMHG | OXYGEN SATURATION: 97 % | HEART RATE: 66 BPM | RESPIRATION RATE: 16 BRPM | SYSTOLIC BLOOD PRESSURE: 163 MMHG | TEMPERATURE: 97.9 F

## 2021-10-02 LAB
GLUCOSE BLD STRIP.AUTO-MCNC: 111 MG/DL (ref 65–117)
GLUCOSE BLD STRIP.AUTO-MCNC: 131 MG/DL (ref 65–117)
GLUCOSE BLD STRIP.AUTO-MCNC: 91 MG/DL (ref 65–117)
SERVICE CMNT-IMP: ABNORMAL
SERVICE CMNT-IMP: NORMAL
SERVICE CMNT-IMP: NORMAL

## 2021-10-02 PROCEDURE — 82962 GLUCOSE BLOOD TEST: CPT

## 2021-10-02 PROCEDURE — 74011250637 HC RX REV CODE- 250/637: Performed by: STUDENT IN AN ORGANIZED HEALTH CARE EDUCATION/TRAINING PROGRAM

## 2021-10-02 PROCEDURE — 97116 GAIT TRAINING THERAPY: CPT

## 2021-10-02 PROCEDURE — 74011250636 HC RX REV CODE- 250/636: Performed by: FAMILY MEDICINE

## 2021-10-02 PROCEDURE — 74011250637 HC RX REV CODE- 250/637: Performed by: PHYSICIAN ASSISTANT

## 2021-10-02 PROCEDURE — 74011250636 HC RX REV CODE- 250/636: Performed by: PHYSICIAN ASSISTANT

## 2021-10-02 RX ADMIN — PANTOPRAZOLE SODIUM 40 MG: 40 TABLET, DELAYED RELEASE ORAL at 07:29

## 2021-10-02 RX ADMIN — Medication 10 ML: at 13:38

## 2021-10-02 RX ADMIN — ENOXAPARIN SODIUM 40 MG: 40 INJECTION SUBCUTANEOUS at 08:22

## 2021-10-02 RX ADMIN — DOCUSATE SODIUM 50 MG AND SENNOSIDES 8.6 MG 1 TABLET: 8.6; 5 TABLET, FILM COATED ORAL at 08:29

## 2021-10-02 RX ADMIN — ACETAMINOPHEN 1000 MG: 500 TABLET ORAL at 13:38

## 2021-10-02 RX ADMIN — Medication 1000 UNITS: at 08:22

## 2021-10-02 RX ADMIN — OXYCODONE 10 MG: 5 TABLET ORAL at 07:29

## 2021-10-02 RX ADMIN — SENNOSIDES 8.6 MG: 8.6 TABLET, COATED ORAL at 08:22

## 2021-10-02 RX ADMIN — TAMSULOSIN HYDROCHLORIDE 0.4 MG: 0.4 CAPSULE ORAL at 08:22

## 2021-10-02 RX ADMIN — ACETAMINOPHEN 1000 MG: 500 TABLET ORAL at 07:29

## 2021-10-02 RX ADMIN — OXYCODONE 10 MG: 5 TABLET ORAL at 14:12

## 2021-10-02 RX ADMIN — Medication 100 MG: at 08:22

## 2021-10-02 RX ADMIN — ACETAMINOPHEN 1000 MG: 500 TABLET ORAL at 17:39

## 2021-10-02 RX ADMIN — OXYCODONE 10 MG: 5 TABLET ORAL at 17:39

## 2021-10-02 RX ADMIN — DEXAMETHASONE SODIUM PHOSPHATE 6 MG: 4 INJECTION, SOLUTION INTRAMUSCULAR; INTRAVENOUS at 08:22

## 2021-10-02 NOTE — PROGRESS NOTES
EVAN  -RUR 17%  -Accepted [facility], Call Report [phone]  -Nurse to complete Emtala ([id number])  -Transport request with [facility] for 1pm, pending      CM spoke with nurse advised patient is ready for discharge. CM advised discharge orders needed. CM spoke with liaison at [facility] confirmed patient can admit today. Liaison advise will provide attending physician and call report once its confirmed patient has discharge orders. [id number] received update from [facility] Liaison that they are awaiting physicians approval for patient to admit today. Km File [phone].    1151-liaison advised patient approved by physician. CM arranged transport for 1PM, pending      CM faxed discharge clinicals to [phone]. Emtala completed, nurse to complete. Discharge packet and Ambulance form is on the chart.      SHANELLE Kovacs

## 2021-10-02 NOTE — PROGRESS NOTES
Orthopedic Spine Progress Note  Post Op day: 3 Days Post-Op    10/3/21 7:32am  Admit Date: 2021  Procedure: Procedure(s):  T9-T10 LAMINECTOMY WITH T10 TRANPEDICULAR  DECOMPRESSION, T7-T12 POSTERIOR SPINAL FUSION (O ARM)    Subjective:     Miesha Forts better pain control overnight. No pain meds per overnight RN. Reports continued numbness in bilateral lower extremities. Tolerating diet. No N/V. Pain Control:   Pain Assessment  Pain Scale 1: Visual  Pain Intensity 1: 0  Pain Onset 1: post op  Pain Location 1: Back  Pain Orientation 1: Lower  Pain Description 1: Aching  Pain Intervention(s) 1: Medication (see MAR)    Objective:          Physical Exam:  General:  Alert and oriented. No acute distress. Heart:  Respirations unlabored. Abdomen:   Extremities: Soft, non-tender. No evidence of cyanosis. Pulses palpable in both upper and lower extremities. Neurologic:  Musculoskeletal:  No new motor deficits. Neurovascular exam within normal limits. Sensation stable. Motor: unchanged C5-T1 and L2-S1. Myra's sign negative in bilateral lower extremities. Calves soft, nontender upon palpation and with passive twitch. Moves both upper and lower extremities. Incision: clean, dry, and intact. No significant erythema or swelling. No active drainage noted. Vital Signs:    Blood pressure (!) 193/80, pulse (!) 59, temperature 98.7 °F (37.1 °C), resp. rate 16, SpO2 99 %.   Temp (24hrs), Av.6 °F (37 °C), Min:98.2 °F (36.8 °C), Max:99.1 °F (37.3 °C)      LAB:    Recent Labs     10/01/21  0138   HCT 30.9*   HGB 10.2*        Lab Results   Component Value Date/Time    Sodium 134 (L) 10/01/2021 01:38 AM    Potassium 4.7 10/01/2021 01:38 AM    Chloride 105 10/01/2021 01:38 AM    CO2 24 10/01/2021 01:38 AM    Glucose 127 (H) 10/01/2021 01:38 AM    BUN 24 (H) 10/01/2021 01:38 AM    Creatinine 0.66 (L) 10/01/2021 01:38 AM    Calcium 8.6 10/01/2021 01:38 AM       Intake/Output:10/02 0701 - 10/02 1900  In: -   Out: 950 [Urine:950]  09/30 1901 - 10/02 0700  In: 400 [P.O.:400]  Out: 5935 [Urine:3050; Drains:155]    PT/OT:   Gait:  Gait  Base of Support: Widened  Speed/Jazmin: Slow, Shuffled  Step Length: Right shortened, Left shortened  Gait Abnormalities: Antalgic  Ambulation - Level of Assistance: Contact guard assistance, Minimal assistance  Distance (ft): 15 Feet (ft)  Assistive Device: Brace/Splint, Walker, rolling                 Assessment:   Patient is 3 Days Post-Op s/p Procedure(s):  T9-T10 LAMINECTOMY WITH T10 TRANPEDICULAR  DECOMPRESSION, T7-T12 POSTERIOR SPINAL FUSION (O ARM)    Plan:     1. Continue PT/OT  2. Continue established methods of pain control  3. VTE Prophylaxes - TEDS &/or SCDs   4. Advance diet  5. Encouraged incentive spirometer. 6.  Farrell per hospitalist  7.   Discharge pending per primary team.  [facility] pending      Signed By: Claudio Hernandez PA-C

## 2021-10-02 NOTE — PROGRESS NOTES
Problem: Falls - Risk of  Goal: *Absence of Falls  Description: Document Kendra Lundberg Fall Risk and appropriate interventions in the flowsheet.   Outcome: Resolved/Met  Note: Fall Risk Interventions:  Mobility Interventions: Patient to call before getting OOB         Medication Interventions: Patient to call before getting OOB    Elimination Interventions: Call light in reach    History of Falls Interventions: Bed/chair exit alarm

## 2021-10-02 NOTE — PROGRESS NOTES
Problem: Mobility Impaired (Adult and Pediatric)  Goal: *Acute Goals and Plan of Care (Insert Text)  Description: FUNCTIONAL STATUS PRIOR TO ADMISSION: Patient was independent and active without use of DME. Pt has standard walker without wheels however will need RW, adjustable base bed and hip kit from previous surgery. HOME SUPPORT PRIOR TO ADMISSION: The patient lived alone with family nearby and neighbors to provide assistance. Physical Therapy Goals  Initiated 9/30/2021    1. Patient will move from supine to sit and sit to supine , scoot up and down, and roll side to side in bed with modified independence within 4 days. 2. Patient will perform sit to stand with supervision/set-up within 4 days. 3. Patient will ambulate with supervision/set-up for 150 feet with the least restrictive device within 4 days. 4. Patient will ascend/descend 4 stairs with 1 handrail(s) with supervision/set-up within 4 days. 5. Patient will verbalize and demonstrate understanding of spinal precautions (No bending, lifting greater than 5 lbs, or twisting; log-roll technique; frequent repositioning as instructed) within 4 days. Outcome: Progressing Towards Goal    PHYSICAL THERAPY TREATMENT  Patient: Miesha Florentino (53 y.o. male)  Date: 10/2/2021  Diagnosis: Lesion of bone of thoracic spine [M89.9] <principal problem not specified>  Procedure(s) (LRB):  T9-T10 LAMINECTOMY WITH T10 TRANPEDICULAR  DECOMPRESSION, T7-T12 POSTERIOR SPINAL FUSION (O ARM) (N/A) 3 Days Post-Op  Precautions: Back No bending, no lifting greater than 5 lbs, no twisting, log-roll technique, repositioning every 20-30 min except when sleeping, brace when OOB (if ordered)  Chart, physical therapy assessment, plan of care and goals were reviewed. ASSESSMENT  Patient continues with skilled PT services and is progressing towards goals. Pt has decrease adherence to back precautions requiring v.c and facilitation. Pt reports numbness in LE.  Pt had decrease LE stability and foot placement with gait. Pt required assistance with all mobility     Current Level of Function Impacting Discharge (mobility/balance): mod A     Other factors to consider for discharge: lives along, back precautions adherence          PLAN :  Patient continues to benefit from skilled intervention to address the above impairments. Continue treatment per established plan of care. to address goals. Recommendation for discharge: (in order for the patient to meet his/her long term goals)  Therapy 3 hours per day 5-7 days per week    This discharge recommendation:  Has not yet been discussed the attending provider and/or case management    IF patient discharges home will need the following DME: patient owns DME required for discharge       SUBJECTIVE:   Patient stated this is too tight.  TLSO-adjusted    OBJECTIVE DATA SUMMARY:   Critical Behavior:  Neurologic State: Alert  Orientation Level: Oriented X4  Cognition: Appropriate decision making, Appropriate for age attention/concentration, Appropriate safety awareness  Safety/Judgement: Awareness of environment    Spinal diagnosis intervention:  Reviewed all 3 back precautions, log roll technique, and sitting for 30 minutes at a time. The patient required tactilecues to maintain back precautions during functional activity. Reviewed back brace application and wear schedule. Brace donned with maximal assistance     Functional Mobility Training:    Bed Mobility:  Log Rolling:  Moderate assistance  Supine to Sit: Minimum assistance              Transfers:  Sit to Stand: Minimum assistance  Stand to Sit: Minimum assistance                             Balance:  Sitting: Impaired  Sitting - Static: Fair (occasional)  Standing: Impaired  Standing - Static: Fair  Standing - Dynamic : Fair  Ambulation/Gait Training:  Distance (ft): 15 Feet (ft)  Assistive Device: Brace/Splint;Gait belt;Walker, rolling  Ambulation - Level of Assistance: Minimal assistance        Gait Abnormalities: Antalgic                 Step Length: Left shortened;Right shortened                 Stairs: Therapeutic Exercises:     Pain Ratin/10 back pain     Activity Tolerance:   Limited     After treatment patient left in no apparent distress:   Sitting in chair, Call bell within reach, Bed / chair alarm activated, and brace    COMMUNICATION/COLLABORATION:   The patients plan of care was discussed with: Registered nurse.      Reuben Walker PTA   Time Calculation: 16 mins

## 2021-10-02 NOTE — ROUTINE PROCESS
TRANSFER - OUT REPORT:    Verbal report given to Jo Ann Gonzalez on Tj Figueroa  being transferred to [facility](unit) for routine progression of care       Report consisted of patients Situation, Background, Assessment and   Recommendations(SBAR). Information from the following report(s) SBAR, Procedure Summary and Med Rec Status was reviewed with the receiving nurse. Lines:   Peripheral IV 09/29/21 Left Arm (Active)   Site Assessment Clean, dry, & intact 10/02/21 0824   Phlebitis Assessment 0 10/02/21 0824   Infiltration Assessment 0 10/02/21 0824   Dressing Status Clean, dry, & intact 10/02/21 0824   Dressing Type Transparent 10/02/21 0824   Hub Color/Line Status Capped 10/02/21 0824   Action Taken Open ports on tubing capped 10/02/21 0824   Alcohol Cap Used Yes 10/02/21 0824        Opportunity for questions and clarification was provided.       Patient transported with:   [employer]

## 2021-10-04 NOTE — PROGRESS NOTES
Physician Progress Note      PATIENT:               Cleo Truong  CSN #:                  [id number]  :                       1950  ADMIT DATE:       2021 11:22 AM  DISCH DATE:        2021 11:31 AM  RESPONDING  PROVIDER #:        Hector Montoya MD          QUERY TEXT:    Good Morning,    This patient was admitted on 2021-2021 for Acute midline low back pain with sumanth. sciatica  The patient has history of Prostate Cancer and was found to have possible metastatic disease to L5-T10 lesion with extension into the epidural space. CT of the abdomen had \"concer for a mass @ T10\" with extension into the epidural space\"    If possible,   please document in the progress notes and the discharge summary if after study can the suspected etiology of the patient symptoms, or the reason for this admission be further specified as: The medical record reflects the following:  Risk Factors: Known history of prostate cancer  Clinical Indicators: The patient presented with low back pain, bilateral sciatic, numbness, tingling and weakness of the lower extremities. The patient was found to have possible mets to L5 and T 10 lesion with extension into the epidural space. MRI of the lumbar spine showed multilevel DDD with spinal stenosis On eval, concern for nerve impingement . MRI ordered, L5 not consistent to explain the symptoms. Review of the CT of the abd. had concern for a mass @ T10 which would go along with his symptoms \"T10 lesion with extension into the epidural space and posterior elements were encasement of the cord and slight cord edema.   Treatment: Started on Decadron., MRI of Spine, CT of Spine, and then the patient required transfer to another facility    Thank you,  Ciara Manual, [id number] [place] [address], [facility]  [phone]  Options provided:  -- Acute midline low back pain and symptoms was due to Metastasis to T10 vertebra with extension to the epidural space  -- Acute midline low back pain and symptoms was due to other cause, please specify  -- Other - I will add my own diagnosis  -- Disagree - Not applicable / Not valid  -- Disagree - Clinically unable to determine / Unknown  -- Refer to Clinical Documentation Reviewer    PROVIDER RESPONSE TEXT:    This patient had acute midline back pain with sumanth sciatica due to other cause, please specify  Enhancing 12 mm lesion in the posterior L5 vertebral body is consistent with metastasis. . 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 ? Moderate spinal canal stenosis L3-4. No cord compression or cord signal abnormality. Disc bulge at L5-S1 abuts the S1 nerve roots without significant displacement.     Query created by: Chon Mendiola on 10/1/2021 7:45 AM      Electronically signed by:  Rufino Javed MD 10/4/2021 6:52 PM

## 2021-12-14 ENCOUNTER — OFFICE VISIT (OUTPATIENT)
Dept: ORTHOPEDIC SURGERY | Age: 71
End: 2021-12-14
Payer: MEDICARE

## 2021-12-14 VITALS — HEIGHT: 70 IN | BODY MASS INDEX: 25.77 KG/M2 | WEIGHT: 180 LBS

## 2021-12-14 DIAGNOSIS — Z98.1 S/P FUSION OF THORACIC SPINE: ICD-10-CM

## 2021-12-14 DIAGNOSIS — M89.9 BONE LESION: ICD-10-CM

## 2021-12-14 DIAGNOSIS — Z98.1 S/P LUMBAR SPINAL FUSION: Primary | ICD-10-CM

## 2021-12-14 PROCEDURE — 99024 POSTOP FOLLOW-UP VISIT: CPT | Performed by: ORTHOPAEDIC SURGERY

## 2021-12-14 RX ORDER — OXYCODONE HYDROCHLORIDE 5 MG/1
TABLET ORAL
COMMUNITY
Start: 2021-10-20

## 2021-12-14 RX ORDER — GABAPENTIN 100 MG/1
100 CAPSULE ORAL
COMMUNITY
Start: 2021-10-14

## 2021-12-14 RX ORDER — GABAPENTIN 100 MG/1
CAPSULE ORAL
COMMUNITY
Start: 2021-10-20

## 2021-12-14 NOTE — PROGRESS NOTES
Leda Brito (: 1950) is a 66 y.o. male, patient, here for evaluation of the following chief complaint(s):  Surgical Follow-up       ASSESSMENT/PLAN:    Below is the assessment and plan developed based on review of pertinent history, physical exam, labs, studies, and medications. At this point we have reviewed his films. His fusion is stable. And neurologically is stable. He has some mild paresthesias related to the cord compression that he had. He needs to see his oncologist to see if there needs to be follow-up for the metastatic lesion that he had. We will start some physical therapy for his lower back. I will give him some hydrocodone. I will see him back in 6 to 8 weeks. 1. S/P lumbar spinal fusion  -     XR SPINE ENTIRE T-L , SKULL TO SACRUM 2 OR 3 VWS SCOLIOSIS; Future      No follow-ups on file. SUBJECTIVE/OBJECTIVE:  Leda Brito (: 1950) is a 66 y.o. male. No flowsheet data found. Comes in today for follow-up. He is status post T8-T12 fusion and decompression for metastatic prostate cancer. Overall doing fairly well. He takes minimal pain medications. He does take the oral Neurontin. He has been released from rehab at this time. Imaging:          XR Results (most recent):  Results from Appointment encounter on 21    XR SPINE ENTIRE T-L , SKULL TO SACRUM 2 OR 3 VWS SCOLIOSIS    Narrative  Trays today demonstrate a stable T8-T12 fusion. No acute changes noted. Number is intact. MRI Results (most recent):  Results from [place] encounter on 21    MRI LUMB SPINE W CONT    Narrative  EXAM: MRI LUMB SPINE W CONT  History: T10 lesion Back pain and prostate cancer  INDICATION: back pain, h/o prostate ca; .     COMPARISON: 2021    TECHNIQUE: MR imaging of the lumbar spine was performed using the following  sequences: sagittal postcontrast T1, axial T1 postcontrast with fat saturation,  axial T1 following contrast administration. CONTRAST: 16 mL of ProHance. FINDINGS:    Discogenic marrow degenerative changes at L2-L3. Marrow signal changes along the  inferior margin of the T10 vertebral body are incidentally noted. Only seen on  sagittal imaging. Described on MR thoracic report. 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 Enhancing lesion in the left sacral Milagro. Large retroperitoneal  lymph nodes. 8 mm left sacral Milagro lesion. Left renal cyst.    The conus medullaris terminates at . Signal and caliber of the distal spinal  cord are within normal limits. There is no pathologic intrathecal enhancement. The paraspinal soft tissues are within normal limits. Lower thoracic spine: No herniation or stenosis. L1/2:  Disc bulge and facet arthropathy resulting in mild spinal canal stenosis. Foramina are patent. L2/3:  Disc bulge and facet arthropathy causes mild to moderate spinal canal  stenosis. Foramina are patent. L3/4:  Disc bulge and facet arthropathy causing moderate spinal canal stenosis. Mild bilateral foraminal stenosis. L4/5:  Disc bulge and facet arthropathy without significant spinal canal  stenosis. Mild right foraminal stenosis. L5/S1:  Diffuse disc bulge, abutting the bilateral S1 nerve roots without  significant displacement or spinal canal stenosis. Moderate bilateral foraminal  stenosis. Impression  Enhancing 12 mm lesion in the posterior L5 vertebral body is consistent with  metastasis. .  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 Moderate spinal canal stenosis L3-4. No cord compression or cord signal  abnormality. Disc bulge at L5-S1 abuts the S1 nerve roots without significant  displacement.          No Known Allergies    Current Outpatient Medications   Medication Sig    gabapentin (NEURONTIN) 100 mg capsule 100 mg.    oxyCODONE IR (ROXICODONE) 5 mg immediate release tablet     gabapentin (NEURONTIN) 100 mg capsule     polyethylene glycol (Miralax) 17 gram/dose powder Take 17 g by mouth daily as needed for Constipation.  senna (Senna) 8.6 mg tablet Take 1 Tablet by mouth daily as needed for Constipation.  cholecalciferol (Vitamin D3) (1000 Units /25 mcg) tablet Take 1,000 Units by mouth daily.  leuprolide acetate (LUPRON DEPOT IM) by IntraMUSCular route See Admin Instructions. Every 6 months    tamsulosin (FLOMAX) 0.4 mg capsule Take 0.4 mg by mouth daily.  thiamine (VITAMIN B-1) 100 mg tablet Take 100 mg by mouth daily. No current facility-administered medications for this visit. Past Medical History:   Diagnosis Date    Arthritis     Mild anemia     Prostate cancer ([facility] [address] 2008    PALLIATIVE CARE PER DR LAMBERT FARR NOTES 2018: Arnaud Lc, AND PAST EXTERNAL BEAM RADIATION THERAPY        Past Surgical History:   Procedure Laterality Date    HX BUNIONECTOMY Right 2014    HX CATARACT REMOVAL Bilateral 2017    HX COLONOSCOPY      HX GI  2009    ANAL FISTULA     HX HEMORRHOIDECTOMY  1974    HX HIP ARTHROSCOPY Right     HX TONSILLECTOMY         Family History   Problem Relation Age of Onset    Diabetes Mother     Hypertension Mother     Asthma Father     Hypertension Sister     Kidney Disease Brother         DIALYSIS    Diabetes Brother     Diabetes Brother     Drug Abuse Brother     Mental Retardation Sister     Obesity Sister     Anesth Problems Neg Hx         Social History     Tobacco Use    Smoking status: Former Smoker     Packs/day: 0.25     Years: 5.00     Pack years: 1.25     Quit date:      Years since quittin.9    Smokeless tobacco: Never Used   Substance Use Topics    Alcohol use: No    Drug use: No        Review of Systems   Constitutional: Positive for activity change. Musculoskeletal: Positive for back pain and joint swelling.           Vitals:  Ht 5' 10\" (1.778 m)   Wt 180 lb (81.6 kg)   BMI 25.83 kg/m² Body mass index is 25.83 kg/m². Ortho Exam       Integumentary  Assessment of Surgical Incision - healing and consistent with normal anticipated wound healing. Neurologic  Sensory  Light Touch - Intact - Globally. Overall Assessment of Muscle Strength and Tone reveals  Lower Extremities - Right Iliopsoas - 5/5. Left Iliopsoas - 5/5. Right Tibialis Anterior - 5/5. Left Tibialis Anterior - 5/5. Right Gastroc-Soleus - 5/5. Left Gastroc-Soleus - 5/5. Right EHL - 5/5. Left EHL - 5/5. General Assessment of Reflexes  Right Ankle - Clonus is not present. Left Ankle - Clonus is not present. Reflexes (Dermatomes)  2/2 Normal - Left Achilles (L5-S2), Left Knee (L2-4), Right Achilles (L5-S2) and Right Knee (L2-4). Musculoskeletal  Global Assessment  Examination of related systems reveals - well-developed, well-nourished, in no acute distress, alert and oriented x 3 and normal coordination. Spine/Ribs/Pelvis  Lumbosacral Spine - Examination of the lumbosacral spine reveals - no known fractures or deformities. Inspection and Palpation - Tenderness - moderate. Assessment of pain reveals the following findings - The pain is characterized as - moderate. Location - pain refers to lower back bilaterally. An electronic signature was used to authenticate this note.   -- Laney Morales MD

## 2021-12-29 ENCOUNTER — HOSPITAL ENCOUNTER (OUTPATIENT)
Dept: PHYSICAL THERAPY | Age: 71
Discharge: HOME | End: 2021-12-29
Payer: MEDICARE

## 2021-12-29 PROCEDURE — 97162 PT EVAL MOD COMPLEX 30 MIN: CPT

## 2021-12-29 PROCEDURE — 97110 THERAPEUTIC EXERCISES: CPT

## 2021-12-29 NOTE — PROGRESS NOTES
[address] [address] [facility] [address] [facility], [address] [place] [address]  Ph: [phone]    Fax: [phone]    Initial Evaluation/Plan of Care/Statement of Necessity for Physical Therapy Services     Patient name: Stacie Dc    Date/Start of Care:2021  : 1950  [x]  Patient  Verified  Provider#: [id number]          Referral source: Quiana Roberts MD    Return visit to MD:  2022    Medical/Treatment Diagnosis: Other low back pain [M54.59]    Payor: Melanie Wheat / Plan: [place] MEDICARE PART A & B / Product Type: Medicare /       Prior Hospitalization: see medical history     Comorbidities: see chart   Prior Level of Function: independent with RW- reports previous falls pre surgery. Medications: Verified on Patient Summary List          Patient / Family readiness to learn indicated by: asking questions, trying to perform skills and interest  Persons(s) to be included in education: patient (P)  Barriers to Learning/Limitations: None  Patient Self Reported Health Status: fair  Rehabilitation Potential: fair-good   Previous Treatment/Compliance:  Home PT  PMHx/Surgical Hx: R THR   Work Hx: Retired   Living Situation: Patient lives alone in a house, with 3 steps to enter with rails and then his on the main floor. 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 He was hospitalized for few days, then  received home PT for  2.5 weeks and then he was referred to outpatient PT. He has been wearing a thoracic TLSO brace and MD wants him to weaning off the brace but he still uses it. He reports constant achyness and pain around incision area and lower back specially when he lays down in the bed and does the log roll.  Also reports having occasional buckling sensation in B knees, numbness and tingling in both feet, leg and buttocks as well as swelling in his feet. Recent imaging were done and MD stated a little bit more healing is needed but all the hardware is ok. Activities that make it feel better: sitting down. Functional limitations: his ADL's, carrying items, lifting and walking faster. Mechanism of Injury: back surgery   Area of pain: thoracic/lower back. Pain Descriptors:  achyness    Pain Level (0-10 scale)  At rest: 3/10  With activity: 4/10   Worst: 6/10   Least: 1-2/1  Goal: \" To be Independent without a walker\"   Objective/Functional Measures including ROM/MMT:   Physical Findin  Ortho:   Posture: forwards trunk lean, B knee bent, decreased lordosis , posterior pelvic tilt ,slight leans towards R side. Gait and Functional Mobility: step through gait   Palpation: TTP at incision area and L4/L5 with gentle PA joint mobs   Gross findings: Ambulates with RW, incision CDI , obese, tight calves, ankles dry and scaly   Swelling: from ankle to mild shin 2+  pitting edema at ankles also noted skin discoloration at ankle joint.     Spine:   TRUNK ROM:     Flexion:                         [] N/A  []WNL  [x]Minimal  []Moderate  [] Major 44 cm mild pain in the lumbar region  Extension:                     [] N/A  []WNL  [x]Minimal  []Moderate  [] Major minimal movement but no pain    Right Lateral Flexion:    [] N/A  []WNL  []Minimal  []Moderate  [] Major    Left Lateral Flexion:   [] N/A  []WNL  [x]Minimal  []Moderate  [] Major pulling in the lower back   Rotation to the Right:  [] N/A  []WNL  [x]Minimal  []Moderate  [] Major pulling in the lower back   Rotation to the Left:   [] N/A  []WNL  [x]Minimal  []Moderate  [] Major   Right Side Glide:           [] N/A  []WNL  []Minimal  []Moderate  [] Major  Left Side Glide:   [] N/A  []WNL  []Minimal  []Moderate  [] Major  Additional comments: hypomobility with all direction patient is afraid of breaking his precautions but educated about not having precaution at this stage. Also presents with flexed posture and B knee flexion with all trunk mobility. Specific joints: *normal values in ()    Hip      AROM       PROM             MMT   R L R L R L   Flexion (120)     4 4+   Extension (15)     2+ 2+   Abduction (40)     4- 4-   Adduction (30)     1  3-   IR (40)         ER (40)         Additional comments:   Placed in semi flower position initially due to having discomfort and pain in chest in flat position, which resolved after 2 min and then he was able to lay flat. SLR with bilateral extensor lag   Able to initiate a bride and reports some back pain. Able to lay prone but required increased time and effort to get into position about 4 min to roll and bring his arms forward. C/o lower back pain with AROM of hip extension and knee flexion in prone position. Hamstring 90-90 flexibility test 40 deg bilaterally        Knee          AROM          PROM                       MMT   R L R L R L   Extension (0)      4- 4-   Flexion (145)     4 4   Additional comments: unable to fully extend knees     Ankle                                 AROM                       PROM                             MMT   R L R L R L   Dorsiflexion (15)     4+ 4+   Plantarflexion (50)         Additional comments:   Mobility Assessment:   Patient reports functional limitations with: standing, bending and lifting. Neurological: Reflexes / Sensations: reports numbness and tingling in bilateral feet since the surgery. Special Tests: N/A    Gait and Functional Mobility:  Transfers:   Bed mobility: Independent but increased time and effort required to go from supine <->prone. Sit to/from Supine: independent via log roll  Sit to/from stands: BUE support but can stand without UE support presents with flexed posture. Gait: slow with forwards and downward gaze.   Assistive device:  RW   Gait speed: NT  TUG test: 25 sec with RW  Stairs: NT  Sitting Balance: (unsupported)  Static:      [] Normal [x] Good [] Fair [] Poor  Dynamic: [] Normal [] Good [x] Fair [] Poor reports back pain     Standing Balance:   Static:     [] Normal [] Good [x] Fair [] Poor - able to stand for 2 min without UE support mild unsteadiness and sway noted. Dynamic: [] Normal [] Good [] Fair [x] Poor ambulates with RW       Five times sit to stand:  5 reps in 11 sec - we decreased control descend - reports mild back pain     Normative averages:  Clients younger than 61years old  £  10 seconds = Normal   Clients older than 61years old £ 14.2 seconds = Normal   Change of ³ 2.3 seconds shows a significant clinical improvement    Wendy RW. Reference values for the five repetition sit to stand test: a descriptive metaanalysis of data from elders. Percept Mot Skills 2006; 103(1):215-222.       4 Stage Balance test       1. Stand with your feet [id number]. TIME:   seconds  2. Place the instep of one foot so it is touching the big toe of the other foot. TIME:  60 seconds on both legs but increased sway noted with R foot forward  3. Tandem stand: Place one foot in front of the other, heel touching toe. TIME:  10 seconds with R leg forward and 2 sec with left leg forward   4. Stand on one foot. TIME:  RLE  2 seconds  L LE unable     Directions: There are four standing positions that get progressively harder to maintain. You should describe and demonstrate each position to the patient. Then,stand next to the patient, hold their arm, and help them assume the correct position. When the patient is steady, let go, and time how long they can maintain the position, but remain ready to assist the patient if they should lose their balance. If the patient can hold a position for 10 seconds without moving their feet or needing support, go on to the next position.  If not, STOP the test. Patients should not use an assistive device (cane or walker) and they should keep their eyes open. An older adult who cannot hold the tandem stand for at least 10 seconds is at increased risk of falling. Ampac Score:   27.54%  ASSESSMENT/Changes in Function:   Patient is 65 yo male s/p thoracic fusion /laminectory due to tumor formation. Patient presents to therapy with decreased core/LE strength, decreased balance, decreased flexibility and gait impairments. Patient will benefit from skilled PT services to address above. Problem List/Impairments: pain affecting function, decrease ROM, decrease strength, edema affecting function, impaired gait/ balance, decrease ADL/ functional abilitiies, decrease activity tolerance, decrease flexibility/ joint mobility and decrease transfer abilities  Treatment Plan may include any combination of the following: Therapeutic exercise, Neuromuscular re-education, Physical agent/modality, Gait/balance training, Manual therapy, Patient education, Functional mobility training, Home safety training and Stair training  Patient/ Caregiver education and instruction: exercises  Frequency / Duration: Patient to be seen 2 times per week for 16  treatments. Certification Period: 12/29/21-3/29/22  Patient Goal (s): To be independent without walker    [] Met [] Not met [] Partially met   Short Term Goals: To be accomplished in 2-4  treatments. 1. Patient will independent with his HEP to progress with POC. [] Met [] Not met [] Partially met   Long Term Goals: To be accomplished in 16  treatments. 1. Patient will gain 1/2 to 1 grade with LE strength to improve stability. [] Met [] Not met [] Partially met   2. Patient will reports numbness and tingling are subsiding by 50% since start of therapy. [] Met [] Not met [] Partially met  3. Patient will reports no more sensation of B knee buckling during ambulation since start of therapy. [] Met [] Not met [] Partially met   4.  Patient will be able to demonstrate proper lifting technique with decreased back pain and discomfort. [] Met [] Not met [] Partially met   5. Patient will be able to ambulate short household distance without RW. [] Met [] Not met [] Partially met   6. Patient will be able to perform a 6 min walking test with LRAD and decreased back pain. [] Met [] Not met [] Partially met     TODAY'S TREATMENT: EVALUATION completed   Visit #: 1  In time: 2:10   Out time: 3:15 Total Treatment Time (min): 65  Total Timed Codes (min): 10  1:1 Treatment Time ( only): 10  Pain Level (0-10 scale) pre treatment:  3-4/10  Pain Level (0-10 scale) post treatment: 3-4/10  10 min Therapeutic Exercise:  [x] See flow sheet :   Rationale: increase ROM, increase strength, improve coordination, improve balance and increase proprioception to improve the patients ability to reduce back pain and discomfort  With   [x] TE   [] TA   [] Neuro   [] SC   [] other: Patient Education: [x] Review HEP    [] Progressed/Changed HEP based on:   [] positioning   [] body mechanics   [] transfers   [] heat/ice application    [] other:        [x]  Plan of care has been reviewed with PTA. The Plan of Care is based on information from the initial evaluation. Serena Masters, PT, DPT 12/29/2021   ________________________________________________________________________    I certify that the above Therapy Services are being furnished while the patient is under my care. I agree with the treatment plan and certify that this therapy is necessary.     Physician's Signature:_________________________________________________  Date:____________Time: ____________     Tonja Pascual MD

## 2022-01-05 ENCOUNTER — HOSPITAL ENCOUNTER (OUTPATIENT)
Dept: PHYSICAL THERAPY | Age: 72
Discharge: HOME | End: 2022-01-05
Payer: MEDICARE

## 2022-01-05 PROCEDURE — 97110 THERAPEUTIC EXERCISES: CPT

## 2022-01-05 NOTE — PROGRESS NOTES
PT DAILY TREATMENT NOTE - [facility]     Patient Name: Jeanenne Essex  Date:2022  : 1950  [x]  Patient  Verified  Payor: Niraj Memory / Plan: [place] MEDICARE PART A & B / Product Type: Medicare /    Treatment Area: Other low back pain [M54.59]   Next MD APPT:   In time:09:15  Out time:10:00 am  Total Treatment Time (min): 45 min  Total Timed Codes (min): 45 min  1:1 Treatment Time ( only): 45 min  Visit #:  Visit count could not be calculated. Make sure you are using a visit which is associated with an episode. SUBJECTIVE  Pain Level (0-10 scale) pre treatment: 1-2/10  Pain Level (0-10 scale) post treatment: 2-3/10  Any medication changes, allergies to medications, adverse drug reactions, diagnosis change, or new procedure performed?:   [] No    [] Yes (see summary sheet for update)  Subjective functional status/changes:   [] No changes reported  Patient stated the MD wants him to wean off brace so he did not wear it today to therapy. He ambulated into department with RW.    OBJECTIVE      45 min Therapeutic Exercise:  [] See flow sheet :   Rationale: increase ROM, increase strength, improve coordination, improve balance and increase proprioception to improve the patients ability to ambulate safely with LRAD to perform ADL's.    min Gait Training:  ___ feet with ___ device on level surfaces with ___ level of assist   Rationale: improve coordination, improve balance and increase proprioception  to improve the patients ability to ambulate safely with LRAD to perform ADL's      With   [x] TE   [] TA   [] Neuro   [] SC   [] other: Patient Education: [x] Review HEP    [] Progressed/Changed HEP based on:   [] positioning   [] body mechanics   [] transfers   [] Use of heat/ice    [x] other: Reviewed TA bracing          Other Objective/Functional Measures: Added lumbar stabilization exercises and reviewed TA bracing with patient.      ASSESSMENT/Changes in Function:   Patient not wear back brace today into therapy. He did not have any difficulty with the exercises and only slight increase in pain. Discussed posture with patient as he was able to stand tall with no forward lean performing standing exercises. Patient has a tendency to push RW like shopping cart. Reviewed TA bracing with patient to perform with exercises. Patient declined modalities and reported he was fine. Slight increase in pain post treatment session. Patient will continue to benefit from skilled PT services to modify and progress therapeutic interventions, address functional mobility deficits, address ROM deficits, address strength deficits, analyze and address soft tissue restrictions and assess and modify postural abnormalities to attain remaining goals. GOALS/Progress towards goals:  Patient Goal (s): To be independent without walker    []? Met []? Not met []? Partially met   Short Term Goals: To be accomplished in 2-4  treatments. 1. Patient will independent with his HEP to progress with POC. []? Met []? Not met []? Partially met   Long Term Goals: To be accomplished in 16  treatments. 1. Patient will gain 1/2 to 1 grade with LE strength to improve stability. []? Met []? Not met []? Partially met   2. Patient will reports numbness and tingling are subsiding by 50% since start of therapy. []? Met []? Not met []? Partially met  3. Patient will reports no more sensation of B knee buckling during ambulation since start of therapy. []? Met []? Not met []? Partially met   4. Patient will be able to demonstrate proper lifting technique with decreased back pain and discomfort. []? Met []? Not met []? Partially met   5. Patient will be able to ambulate short household distance without RW. []? Met []? Not met []? Partially met   6. Patient will be able to perform a 6 min walking test with LRAD and decreased back pain. []? Met []? Not met []?  Partially met     Treatment Plan may include any combination of the following: Therapeutic exercise, Neuromuscular re-education, Physical agent/modality, Gait/balance training, Manual therapy, Patient education, Functional mobility training, Home safety training and Stair training    Frequency / Duration: Patient to be seen 2 times per week for 16  treatments.     Certification Period: 12/29/21-3/29/22    PLAN  [x]  Upgrade activities as tolerated     [x]  Continue plan of care  [x]  Update interventions per flow sheet       []  Discharge due to:_  []  Other:_      Jennifer Araujo, PTA 1/5/2022

## 2022-01-07 ENCOUNTER — HOSPITAL ENCOUNTER (OUTPATIENT)
Dept: PHYSICAL THERAPY | Age: 72
Discharge: HOME | End: 2022-01-07
Payer: MEDICARE

## 2022-01-07 PROCEDURE — 97110 THERAPEUTIC EXERCISES: CPT

## 2022-01-07 NOTE — PROGRESS NOTES
PT DAILY TREATMENT NOTE - [facility]     Patient Name: Jesse Orozco  Date:2022  : 1950  [x]  Patient  Verified  Payor: Lilian Rolon / Plan: [place] MEDICARE PART A & B / Product Type: Medicare /    Treatment Area: Other low back pain [M54.59]     T7-T12 fusion, T9-T10 September  Next MD APPT:   In time:0832am  Out time: 0915am  Total Treatment Time (min): 43  Total Timed Codes (min): 43  1:1 Treatment Time ( only): 43  Visit #:3/16     SUBJECTIVE  Pain Level (0-10 scale) pre treatment: 3/10  Pain Level (0-10 scale) post treatment: 3/10  Any medication changes, allergies to medications, adverse drug reactions, diagnosis change, or new procedure performed?:   [] No    [] Yes (see summary sheet for update)  Subjective functional status/changes:   [] No changes reported  Patient states his stomach was hurting after the last session when he was working on his abdominal isometrics. Pt is getting home health aides paid for by [facility]. He states they started coming this week and help do things like take him shopping.     OBJECTIVE    43 min Therapeutic Exercise:  [] See flow sheet :   Rationale: increase ROM, increase strength, improve coordination, improve balance and increase proprioception to improve the patients ability to ambulate safely with LRAD to perform ADL's.    min Gait Training:  ___ feet with ___ device on level surfaces with ___ level of assist   Rationale: improve coordination, improve balance and increase proprioception  to improve the patients ability to ambulate safely with LRAD to perform ADL's      With   [x] TE   [] TA   [] Neuro   [] SC   [] other: Patient Education: [x] Review HEP    [] Progressed/Changed HEP based on:   [] positioning   [] body mechanics   [] transfers   [] Use of heat/ice    [x] other: Reviewed TA bracing          Other Objective/Functional Measures: reinforced proper technique with TA isometrics    ASSESSMENT/Changes in Function:   Pt continues with poor standing posture - forward trunk bend, hip flexor tightness, lacks full knee extension. Continued to emphasize pt education on posture and TA isometric. Patient will continue to benefit from skilled PT services to modify and progress therapeutic interventions, address functional mobility deficits, address ROM deficits, address strength deficits, analyze and address soft tissue restrictions and assess and modify postural abnormalities to attain remaining goals. GOALS/Progress towards goals:  Patient Goal (s): To be independent without walker    []? Met []? Not met []? Partially met   Short Term Goals: To be accomplished in 2-4  treatments. 1. Patient will independent with his HEP to progress with POC. []? Met []? Not met []? Partially met   Long Term Goals: To be accomplished in 16  treatments. 1. Patient will gain 1/2 to 1 grade with LE strength to improve stability. []? Met []? Not met []? Partially met   2. Patient will reports numbness and tingling are subsiding by 50% since start of therapy. []? Met []? Not met []? Partially met  3. Patient will reports no more sensation of B knee buckling during ambulation since start of therapy. []? Met []? Not met []? Partially met   4. Patient will be able to demonstrate proper lifting technique with decreased back pain and discomfort. []? Met []? Not met []? Partially met   5. Patient will be able to ambulate short household distance without RW. []? Met []? Not met []? Partially met   6. Patient will be able to perform a 6 min walking test with LRAD and decreased back pain. []? Met []? Not met []? Partially met     Treatment Plan may include any combination of the following: Therapeutic exercise, Neuromuscular re-education, Physical agent/modality, Gait/balance training, Manual therapy, Patient education, Functional mobility training, Home safety training and Stair training    Frequency / Duration: Patient to be seen 2 times per week for 16  treatments.     Certification Period: 12/29/21-3/29/22    PLAN  [x]  Upgrade activities as tolerated     [x]  Continue plan of care  [x]  Update interventions per flow sheet       []  Discharge due to:_  []  Other:_      April Doshi, PT, DPT 1/7/2022

## 2022-01-10 ENCOUNTER — HOSPITAL ENCOUNTER (OUTPATIENT)
Dept: PHYSICAL THERAPY | Age: 72
Discharge: HOME | End: 2022-01-10
Payer: MEDICARE

## 2022-01-10 PROCEDURE — 97110 THERAPEUTIC EXERCISES: CPT

## 2022-01-10 NOTE — PROGRESS NOTES
PT DAILY TREATMENT NOTE - [facility]     Patient Name: Zay Walsh  Date:1/10/2022  : 1950  [x]  Patient  Verified  Payor: Julienne Arceo / Plan: [place] MEDICARE PART A & B / Product Type: Medicare /    Treatment Area: Other low back pain [M54.59]     T7-T12 fusion, T9-T10 September  Next MD APPT:   In time:07:05 am  Out time:07:45 am  Total Treatment Time (min): 40 min  Total Timed Codes (min): 40 min  1:1 Treatment Time ( only): 40 min  Visit #:     SUBJECTIVE  Pain Level (0-10 scale) pre treatment: 3/10  Pain Level (0-10 scale) post treatment: 210  Any medication changes, allergies to medications, adverse drug reactions, diagnosis change, or new procedure performed?:   [] No    [] Yes (see summary sheet for update)  Subjective functional status/changes:   [] No changes reported  Patient reports he still wearing his back brace but not as much as before. He is trying to work on not pushing RW and standing up straighter. OBJECTIVE    40 min Therapeutic Exercise:  [] See flow sheet :   Rationale: increase ROM, increase strength, improve coordination, improve balance and increase proprioception to improve the patients ability to ambulate safely with LRAD to perform ADL's.    min Gait Training:  ___ feet with ___ device on level surfaces with ___ level of assist   Rationale: improve coordination, improve balance and increase proprioception  to improve the patients ability to ambulate safely with LRAD to perform ADL's      With   [x] TE   [] TA   [] Neuro   [] SC   [] other: Patient Education: [x] Review HEP    [] Progressed/Changed HEP based on:   [] positioning   [] body mechanics   [] transfers   [] Use of heat/ice    [x] other: Reviewed TA bracing          Other Objective/Functional Measures:  ASSESSMENT/Changes in Function:   Discussed again with patient not pushing RW like a shopping cart and working on his posture.  Also reviewed log roll technique due to patient having pain and difficulty getting in and out of bed. Patient will continue to benefit from skilled PT services to modify and progress therapeutic interventions, address functional mobility deficits, address ROM deficits, address strength deficits, analyze and address soft tissue restrictions and assess and modify postural abnormalities to attain remaining goals. GOALS/Progress towards goals:  Patient Goal (s): To be independent without walker    []? Met []? Not met []? Partially met   Short Term Goals: To be accomplished in 2-4  treatments. 1. Patient will independent with his HEP to progress with POC. []? Met []? Not met []? Partially met   Long Term Goals: To be accomplished in 16  treatments. 1. Patient will gain 1/2 to 1 grade with LE strength to improve stability. []? Met []? Not met []? Partially met   2. Patient will reports numbness and tingling are subsiding by 50% since start of therapy. []? Met []? Not met []? Partially met  3. Patient will reports no more sensation of B knee buckling during ambulation since start of therapy. []? Met []? Not met []? Partially met   4. Patient will be able to demonstrate proper lifting technique with decreased back pain and discomfort. []? Met []? Not met []? Partially met   5. Patient will be able to ambulate short household distance without RW. []? Met []? Not met []? Partially met   6. Patient will be able to perform a 6 min walking test with LRAD and decreased back pain. []? Met []? Not met []? Partially met     Treatment Plan may include any combination of the following: Therapeutic exercise, Neuromuscular re-education, Physical agent/modality, Gait/balance training, Manual therapy, Patient education, Functional mobility training, Home safety training and Stair training    Frequency / Duration: Patient to be seen 2 times per week for 16  treatments.     Certification Period: 12/29/21-3/29/22    PLAN  [x]  Upgrade activities as tolerated     [x]  Continue plan of care  [x]  Update interventions per flow sheet       []  Discharge due to:_  []  Other:_      Idalmis Chen, HUMZA 1/10/2022

## 2022-01-13 ENCOUNTER — HOSPITAL ENCOUNTER (OUTPATIENT)
Dept: PHYSICAL THERAPY | Age: 72
Discharge: HOME | End: 2022-01-13
Payer: MEDICARE

## 2022-01-13 PROCEDURE — 97110 THERAPEUTIC EXERCISES: CPT

## 2022-01-13 NOTE — PROGRESS NOTES
PT DAILY TREATMENT NOTE - [facility]     Patient Name: Jimmy Kaplan  Date:2022  : 1950  [x]  Patient  Verified  Payor: Augustin Conner / Plan: [place] / Product Type: Medicare /    Treatment Area: Other low back pain [M54.59]     T7-T12 fusion, T9-T10 September  Next MD APPT:   In time:0800am  Out time: 0838am  Total Treatment Time (min): 38  Total Timed Codes (min): 38  1:1 Treatment Time ( only): 38  Visit #:     SUBJECTIVE  Pain Level (0-10 scale) pre treatment: 3-4/10  Pain Level (0-10 scale) post treatment: 4/10  Any medication changes, allergies to medications, adverse drug reactions, diagnosis change, or new procedure performed?:   [] No    [] Yes (see summary sheet for update)  Subjective functional status/changes:   [] No changes reported  Patient continues to feel some soreness in the abdominals after therapy but not as bad. OBJECTIVE    38 min Therapeutic Exercise:  [] See flow sheet :   Rationale: increase ROM, increase strength, improve coordination, improve balance and increase proprioception to improve the patients ability to ambulate safely with LRAD to perform ADL's.    min Gait Training:  ___ feet with ___ device on level surfaces with ___ level of assist   Rationale: improve coordination, improve balance and increase proprioception  to improve the patients ability to ambulate safely with LRAD to perform ADL's      With   [x] TE   [] TA   [] Neuro   [] SC   [] other: Patient Education: [x] Review HEP    [] Progressed/Changed HEP based on:   [] positioning   [] body mechanics   [] transfers   [] Use of heat/ice    [x] other: Reviewed TA bracing          Other Objective/Functional Measures:  ASSESSMENT/Changes in Function:   Pt has not been compliant with HEP. Discussed importance of HEP and pt states he will try to do it. He has a packet from [facility] he states he will bring in to review with home exercises, as well as initial handout from deb here.  He may need updated HEP next session and review packets with therapist. Overall he tolerates the exercises well. Mild increase in LBP with standing exercises. Patient will continue to benefit from skilled PT services to modify and progress therapeutic interventions, address functional mobility deficits, address ROM deficits, address strength deficits, analyze and address soft tissue restrictions and assess and modify postural abnormalities to attain remaining goals. GOALS/Progress towards goals:  Patient Goal (s): To be independent without walker    []? Met []? Not met []? Partially met   Short Term Goals: To be accomplished in 2-4  treatments. 1. Patient will independent with his HEP to progress with POC. []? Met [x]? Not met []? Partially met  1/13/22  Long Term Goals: To be accomplished in 16  treatments. 1. Patient will gain 1/2 to 1 grade with LE strength to improve stability. []? Met []? Not met []? Partially met   2. Patient will reports numbness and tingling are subsiding by 50% since start of therapy. []? Met []? Not met []? Partially met  3. Patient will reports no more sensation of B knee buckling during ambulation since start of therapy. []? Met []? Not met []? Partially met   4. Patient will be able to demonstrate proper lifting technique with decreased back pain and discomfort. []? Met []? Not met []? Partially met   5. Patient will be able to ambulate short household distance without RW. []? Met []? Not met []? Partially met   6. Patient will be able to perform a 6 min walking test with LRAD and decreased back pain. []? Met []? Not met []?  Partially met     Treatment Plan may include any combination of the following: Therapeutic exercise, Neuromuscular re-education, Physical agent/modality, Gait/balance training, Manual therapy, Patient education, Functional mobility training, Home safety training and Stair training    Frequency / Duration: Patient to be seen 2 times per week for 16 treatments.     Certification Period: 12/29/21-3/29/22    PLAN  [x]  Upgrade activities as tolerated     [x]  Continue plan of care  [x]  Update interventions per flow sheet       []  Discharge due to:_  []  Other:_      Mattie Morris, PT, DPT 1/13/2022

## 2022-01-20 ENCOUNTER — HOSPITAL ENCOUNTER (OUTPATIENT)
Dept: PHYSICAL THERAPY | Age: 72
Discharge: HOME | End: 2022-01-20
Payer: MEDICARE

## 2022-01-20 PROCEDURE — 97110 THERAPEUTIC EXERCISES: CPT

## 2022-01-20 NOTE — PROGRESS NOTES
PT DAILY TREATMENT NOTE - [facility]     Patient Name: Tony Hess  Date:2022  : 1950  [x]  Patient  Verified  Payor: Kamlesh Wylie / Plan: [place] MEDICARE PART A & B / Product Type: Medicare /    Treatment Area: Other low back pain [M54.59]     T7-T12 fusion, T9-T10 September  Next MD APPT:   In time:09:15 am  Out time:10:15 am  Total Treatment Time (min):45 min  Total Timed Codes (min): 45 min  1:1 Treatment Time ( only):45 min  Visit #:     SUBJECTIVE  Pain Level (0-10 scale) pre treatment: 3-4/10  Pain Level (0-10 scale) post treatment: 310  Any medication changes, allergies to medications, adverse drug reactions, diagnosis change, or new procedure performed?:   [] No    [] Yes (see summary sheet for update)  Subjective functional status/changes:   [] No changes reported  Patient stated he still has numbness and tingling in LE but the swelling has gone down in his feet. He did bring in a booklet from [facility] but it was a patient education booklet and discussed do/don'ts, sit to stand, sleeping positions and how to get out of bed. OBJECTIVE    45 min Therapeutic Exercise:  [] See flow sheet :   Rationale: increase ROM, increase strength, improve coordination, improve balance and increase proprioception to improve the patients ability to ambulate safely with LRAD to perform ADL's.    min Gait Training:  ___ feet with ___ device on level surfaces with ___ level of assist   Rationale: improve coordination, improve balance and increase proprioception  to improve the patients ability to ambulate safely with LRAD to perform ADL's      With   [x] TE   [] TA   [] Neuro   [] SC   [] other: Patient Education: [x] Review HEP    [] Progressed/Changed HEP based on:   [] positioning   [] body mechanics   [] transfers   [] Use of heat/ice    [x] other: Reviewed TA bracing          Other Objective/Functional Measures: Used a mirror to show patient posture with sitting exercises .     ASSESSMENT/Changes in Function:   Patient  still has some confusion with some of the HEP. Gave patient an updated packet and reviewed all exercises some in sitting and some in standing. Patient tends to lean back and hike shoulders with standing and sitting exercises. Patient was more aware of posture with sitting exercises with using the mirror. Discussed with patient the options of performing some of the exercises in sitting and supine. Copy of new exercises in chart. Patient will continue to benefit from skilled PT services to modify and progress therapeutic interventions, address functional mobility deficits, address ROM deficits, address strength deficits, analyze and address soft tissue restrictions and assess and modify postural abnormalities to attain remaining goals. GOALS/Progress towards goals:  Patient Goal (s): To be independent without walker    []? Met []? Not met []? Partially met   Short Term Goals: To be accomplished in 2-4  treatments. 1. Patient will independent with his HEP to progress with POC. []? Met [x]? Not met []? Partially met  1/13/22  Long Term Goals: To be accomplished in 16  treatments. 1. Patient will gain 1/2 to 1 grade with LE strength to improve stability. []? Met []? Not met []? Partially met   2. Patient will reports numbness and tingling are subsiding by 50% since start of therapy. []? Met [x]? Not met []? Partially met  3. Patient will reports no more sensation of B knee buckling during ambulation since start of therapy. []? Met []? Not met []? Partially met   4. Patient will be able to demonstrate proper lifting technique with decreased back pain and discomfort. []? Met []? Not met []? Partially met   5. Patient will be able to ambulate short household distance without RW. []? Met []? Not met []? Partially met   6. Patient will be able to perform a 6 min walking test with LRAD and decreased back pain. []? Met []? Not met []?  Partially met     Treatment Plan may include any combination of the following: Therapeutic exercise, Neuromuscular re-education, Physical agent/modality, Gait/balance training, Manual therapy, Patient education, Functional mobility training, Home safety training and Stair training    Frequency / Duration: Patient to be seen 2 times per week for 16  treatments.     Certification Period: 12/29/21-3/29/22    PLAN  [x]  Upgrade activities as tolerated     [x]  Continue plan of care  [x]  Update interventions per flow sheet       []  Discharge due to:_  []  Other:_      Jennifer Araujo, PTA 1/20/2022

## 2022-01-21 ENCOUNTER — HOSPITAL ENCOUNTER (OUTPATIENT)
Dept: PHYSICAL THERAPY | Age: 72
Discharge: HOME | End: 2022-01-21
Payer: MEDICARE

## 2022-01-21 PROCEDURE — 97110 THERAPEUTIC EXERCISES: CPT

## 2022-01-21 NOTE — PROGRESS NOTES
PT DAILY TREATMENT NOTE - [facility]     Patient Name: Brittney Encarnacion  Date:2022  : 1950  [x]  Patient  Verified  Payor: Antionette Swetha / Plan: [place] MEDICARE PART A & B / Product Type: Medicare /    Treatment Area: Other low back pain [M54.59]     T7-T12 fusion, T9-T10 September  Next MD APPT:   In time:09:10 am  Out time:10:00 am  Total Treatment Time (min):50 min  Total Timed Codes (min): 50 min  1:1 Treatment Time [facility] only):50 min  Visit #:     SUBJECTIVE  Pain Level (0-10 scale) pre treatment: 3/10  Pain Level (0-10 scale) post treatment: 310  Any medication changes, allergies to medications, adverse drug reactions, diagnosis change, or new procedure performed?:   [] No    [] Yes (see summary sheet for update)  Subjective functional status/changes:   [] No changes reported  Patient stated he just has mild pain and soreness. He feels the exercises are helping. \"I'm going to ask the doctor for a brace to help me stand up straighter. \" Discussed with patient the importance of performing HEP at home. \"I guess I need to move more then. \"  OBJECTIVE    50 min Therapeutic Exercise:  [] See flow sheet :   Rationale: increase ROM, increase strength, improve coordination, improve balance and increase proprioception to improve the patients ability to ambulate safely with LRAD to perform ADL's.    min Gait Training:  ___ feet with ___ device on level surfaces with ___ level of assist   Rationale: improve coordination, improve balance and increase proprioception  to improve the patients ability to ambulate safely with LRAD to perform ADL's      With   [x] TE   [] TA   [] Neuro   [] SC   [] other: Patient Education: [x] Review HEP    [] Progressed/Changed HEP based on:   [] positioning   [] body mechanics   [] transfers   [] Use of heat/ice    [x] other: Reviewed TA bracing          Other Objective/Functional Measures: Used a mirror to show patient posture with sitting exercises .     ASSESSMENT/Changes in Function: Discussed with patient he needs to work on HEP to help build up strength. Also talked about moving more at home , standing inside the walker and not pushing the walker. Mirror does help patient pay attention to posture when performing the exercises. Patient lives alone and does all his own ADL's. Patient will continue to benefit from skilled PT services to modify and progress therapeutic interventions, address functional mobility deficits, address ROM deficits, address strength deficits, analyze and address soft tissue restrictions and assess and modify postural abnormalities to attain remaining goals. GOALS/Progress towards goals:  Patient Goal (s): To be independent without walker    []? Met []? Not met []? Partially met   Short Term Goals: To be accomplished in 2-4  treatments. 1. Patient will independent with his HEP to progress with POC. []? Met [x]? Not met []? Partially met  1/13/22  Long Term Goals: To be accomplished in 16  treatments. 1. Patient will gain 1/2 to 1 grade with LE strength to improve stability. []? Met []? Not met []? Partially met   2. Patient will reports numbness and tingling are subsiding by 50% since start of therapy. []? Met [x]? Not met []? Partially met  3. Patient will reports no more sensation of B knee buckling during ambulation since start of therapy. []? Met []? Not met []? Partially met   4. Patient will be able to demonstrate proper lifting technique with decreased back pain and discomfort. []? Met []? Not met []? Partially met   5. Patient will be able to ambulate short household distance without RW. []? Met []? Not met []? Partially met   6. Patient will be able to perform a 6 min walking test with LRAD and decreased back pain. []? Met []? Not met []?  Partially met     Treatment Plan may include any combination of the following: Therapeutic exercise, Neuromuscular re-education, Physical agent/modality, Gait/balance training, Manual therapy, Patient education, Functional mobility training, Home safety training and Stair training    Frequency / Duration: Patient to be seen 2 times per week for 16  treatments.     Certification Period: 12/29/21-3/29/22    PLAN  [x]  Upgrade activities as tolerated     [x]  Continue plan of care  [x]  Update interventions per flow sheet       []  Discharge due to:_  []  Other:_      Jennifer Araujo, PTA 1/21/2022

## 2022-01-24 DIAGNOSIS — Z98.1 S/P FUSION OF THORACIC SPINE: ICD-10-CM

## 2022-01-25 ENCOUNTER — HOSPITAL ENCOUNTER (OUTPATIENT)
Dept: PHYSICAL THERAPY | Age: 72
Discharge: HOME | End: 2022-01-25
Payer: MEDICARE

## 2022-01-25 PROCEDURE — 97110 THERAPEUTIC EXERCISES: CPT

## 2022-01-25 NOTE — DISCHARGE SUMMARY
[facility]  DEPARTMENT OF NEUROLOGY   HEADACHE CLINIC      SUBJECTIVE    Patient returns to the neurology clinic at [facility] today in follow up of their chronic neurologic disease.  Patient was last seen on  Recent Visits  Date Type Provider Dept   11/02/21 Office Visit DO Devorah Trammell Neurology   08/05/21 Office Visit DO Devorah Trammell Neurology   07/23/21 Office Visit DO Devorah Trammell Neurology   Showing recent visits within past 365 days with a meds authorizing provider and meeting all other requirements  Today's Visits  Date Type Provider Dept   01/25/22 Office Visit DO Devorah Trammell Neurology   Showing today's visits with a meds authorizing provider and meeting all other requirements  Future Appointments  Date Type Provider Dept   04/19/22 Appointment DO Devorah Trammell Neurology   Showing future appointments within next 90 days with a meds authorizing provider and meeting all other requirements  At said visit patient was seen for The primary encounter diagnosis was Chronic migraine without aura, intractable, without status migrainosus. Diagnoses of Chronic neck pain and Anxiety disorder, unspecified type were also pertinent to this visit. for which they again follow up for today.  At that time patient was treated with   Current Outpatient Medications   Medication Instructions   • atenolol (TENORMIN) 25 mg, Oral, DAILY   • busPIRone (BUSPAR) 20 mg, Oral, 2 TIMES DAILY   • butalbital-APAP-caffeine (FIORICET) -40 MG per tablet Take 2 tablets by mouth at onset of headache.  May repeat in 6 hrs if needed for max 4 tabs/day. No more than 8 days/month   • CALCIUM CARBONATE-VITAMIN D  mg, Oral, 2 TIMES DAILY   • estradiol (ESTRACE) 0.5 mg, Oral, DAILY   • hydrOXYzine (ATARAX) 10 mg, Oral, 2 TIMES DAILY PRN   • MULTIPLE VITAMIN PO Oral, DAILY   • omeprazole (PRILOSEC) 40 mg, Oral, DAILY   • rizatriptan (MAXALT) 5 MG tablet TAKE 1 TABLET BY MOUTH AT  ONSET OF  Discharge Summary       PATIENT ID: Cedric Salgado  MRN: [id number]   YOB: 1950    DATE OF ADMISSION: 9/26/2021 12:31 PM    DATE OF DISCHARGE: 10/2/2021   PRIMARY CARE PROVIDER: Tracie Shah MD     ATTENDING PHYSICIAN: Amanda Cedillo  DISCHARGING PROVIDER: Amy Muller MD    To contact this individual call [phone] and ask the  to page. If unavailable ask to be transferred the Adult Hospitalist Department. CONSULTATIONS: IP CONSULT TO ONCOLOGY  IP CONSULT TO PALLIATIVE CARE - PROVIDER  IP CONSULT TO INTERVENTIONAL RADIOLOGY    PROCEDURES/SURGERIES: Procedure(s):  T9-T10 LAMINECTOMY WITH T10 TRANPEDICULAR  DECOMPRESSION, T7-T12 POSTERIOR SPINAL FUSION (O ARM)    ADMITTING DIAGNOSES & HOSPITAL COURSE:     HPI  Cedric Salgado is a 66 y.o. male past medical history of stage IV prostate cancer, chronic RLE DVT was recently hospitalized at [place] [address] for acute mid lower back pain with lumbar spinal stenosis. Preliminary MRI of the T-spine per Ortho showed T10 lesion with extension into the epidural space with evidence of encasement of the spinal cord and slight cord edema. Orthopedic surgery Dr. Corby Viramontes discussed  with Dr. Harish Carolina at [facility] the patient will be direct transfer here presumably for intervention. Patient was started on IV Decadron, continue on IV morphine as needed as well as oxycodone with bowel regimen. Neurosurgery as well as oncology has been consulted here. Patient follows with Dr. Jonathan Contreras as his hematologist oncologist.  Patient reports that he had been previously on [address] however in 2020 had stopped this medication by his own well. Patient states he has been on leuprolide. Currently patient denies significant back pain, had just been given a dose of morphine. Has no acute complains of n/v/d/f/c changes in vision.   He does currently endorse that he has bilateral lower extremity numbness which is greater on the left than the right however states he is able to move both legs. Patient states his left leg feels weaker than his right leg. Hospital Course  Patient admitted with T10 mass lesion with spinal cord compression. Patient went for T9-T10 laminectomy with decompression, T7-T12 posterior spinal fusion. Patient did well postoperatively and patient worked with physical therapy and will be discharged to inpatient rehab. Pathology from bone sample pending at the time of discharge. Patient with history of prostate cancer and patient also was followed by heme-onc during this admission. Redness to follow-up outpatient with oncology and plan to restart Xtandi at that time. DISCHARGE DIAGNOSES / PLAN:      1. T10 mass lesion with spinal cord compression  2. Prostate cancer  3. Anemia     ADDITIONAL CARE RECOMMENDATIONS:     Follow-up with orthopedic and oncology as scheduled. PENDING TEST RESULTS:   At the time of discharge the following test results are still pending: Bone biopsy    FOLLOW UP APPOINTMENTS:    Follow-up Information     Follow up With Specialties Details Why Contact Info    Yuly Gonzalez., MD [facility]   [address] [place] [address]  [phone]               DIET: Cardiac Diet  Oral Nutritional Supplements: No Oral Supplement prescribed    ACTIVITY: Activity as tolerated    WOUND CARE: Per instructions from Ortho    EQUIPMENT needed: None      DISCHARGE MEDICATIONS:  Current Discharge Medication List      START taking these medications    Details   oxyCODONE IR (ROXICODONE) 5 mg immediate release tablet Take 1-2 Tablets by mouth every four (4) hours as needed for Pain for up to 14 days. Max Daily Amount: 60 mg.  Qty: 60 Tablet, Refills: 0  Start date: 10/1/2021, End date: 10/15/2021    Associated Diagnoses: Mass in epidural space      naloxone (NARCAN) 4 mg/actuation nasal spray Use 1 spray intranasally, then discard.  Repeat with new spray every 2 min as needed for opioid overdose symptoms, alternating nostrils. Qty: 1 Each, Refills: 0  Start date: 10/1/2021         CONTINUE these medications which have NOT CHANGED    Details   polyethylene glycol (Miralax) 17 gram/dose powder Take 17 g by mouth daily as needed for Constipation. senna (Senna) 8.6 mg tablet Take 1 Tablet by mouth daily as needed for Constipation. cholecalciferol (Vitamin D3) (1000 Units /25 mcg) tablet Take 1,000 Units by mouth daily. leuprolide acetate (LUPRON DEPOT IM) by IntraMUSCular route See Admin Instructions. Every 6 months      tamsulosin (FLOMAX) 0.4 mg capsule Take 0.4 mg by mouth daily. thiamine (VITAMIN B-1) 100 mg tablet Take 100 mg by mouth daily. STOP taking these medications       dexamethasone, PF, (DECADRON) 10 mg/mL injection Comments:   Reason for Stopping:         oxyCODONE-acetaminophen (Percocet) 5-325 mg per tablet Comments:   Reason for Stopping:         docusate sodium (COLACE) 100 mg capsule Comments:   Reason for Stopping:                 NOTIFY YOUR PHYSICIAN FOR ANY OF THE FOLLOWING:   Fever over 101 degrees for 24 hours. Chest pain, shortness of breath, fever, chills, nausea, vomiting, diarrhea, change in mentation, falling, weakness, bleeding. Severe pain or pain not relieved by medications. Or, any other signs or symptoms that you may have questions about. DISPOSITION:    Home With:   OT  PT  HH  RN       Long term SNF/Inpatient Rehab    Independent/assisted living    Hospice    Other:       PATIENT CONDITION AT DISCHARGE:     Functional status    Poor     Deconditioned     Independent      Cognition     Lucid     Forgetful     Dementia      Catheters/lines (plus indication)    Farrell     PICC     PEG     None      Code status     Full code     DNR      PHYSICAL EXAMINATION AT DISCHARGE:  General:          Alert, cooperative, no distress, appears stated age.      HEENT:           Atraumatic, anicteric sclerae, pink conjunctivae                          No oral ulcers, Mer Randall  Date of Birth:  1957  Referring Physician: Michoacano Perry mucosa moist, throat clear, dentition fair  Neck:               Supple, symmetrical  Lungs:             Clear to auscultation bilaterally. No Wheezing or Rhonchi. No rales. Chest wall:      No tenderness  No Accessory muscle use. Heart:              Regular  rhythm,  No  murmur   No edema  Abdomen:        Soft, non-tender. Not distended. Bowel sounds normal  Extremities:     No cyanosis. No clubbing,                            Skin turgor normal, Capillary refill normal  Skin:                Not pale. Not Jaundiced  No rashes   Psych:             Not anxious or agitated.   Neurologic:      Alert, moves all extremities, answers questions appropriately and responds to commands       CHRONIC MEDICAL DIAGNOSES:  Problem List as of 10/2/2021 Date Reviewed: 3/19/2018        Codes Class Noted - Resolved    Constipation, unspecified constipation type ICD-10-CM: K59.00  ICD-9-CM: 564.00  Unknown - Present        Palliative care encounter ICD-10-CM: Z51.5  ICD-9-CM: V66.7  Unknown - Present        Counseling regarding advance care planning and goals of care ICD-10-CM: Z71.89  ICD-9-CM: V65.49  Unknown - Present        DNR (do not resuscitate) discussion ICD-10-CM: Z71.89  ICD-9-CM: V65.49  Unknown - Present        Acute back pain, unspecified back location, unspecified back pain laterality ICD-10-CM: M54.9  ICD-9-CM: 724.5  Unknown - Present        Mass of thoracic vertebra ICD-10-CM: M48.9  ICD-9-CM: 733.99  9/26/2021 - Present        Mass in epidural space ICD-10-CM: G96.198  ICD-9-CM: 349.2  9/26/2021 - Present        Lesion of bone of thoracic spine ICD-10-CM: M89.9  ICD-9-CM: 733.90  9/26/2021 - Present        Lower extremity weakness ICD-10-CM: R29.898  ICD-9-CM: 729.89  9/25/2021 - Present        Ambulatory dysfunction ICD-10-CM: R26.2  ICD-9-CM: 719.7  9/25/2021 - Present        Acute midline low back pain with bilateral sciatica ICD-10-CM: M54.42, M54.41  ICD-9-CM: 724.2, 724.3  9/25/2021 - Present        Spinal point review of systems is negative other than what has been mentioned above or within the HPI.      MEDICATIONS  Reviewed and updated via EMR.  Current Outpatient Medications   Medication Sig Dispense Refill   • Valacyclovir HCl 1000 MG Tab TAKE 2 TABLETS BY MOUTH  EVERY 12 HOURS AS NEEDED 30 tablet 2   • butalbital-APAP-caffeine (stenosis of lumbar region ICD-10-CM: M48.061  ICD-9-CM: 724.02  9/25/2021 - Present        Retroperitoneal lymphadenopathy ICD-10-CM: R59.0  ICD-9-CM: 785.6  9/25/2021 - Present        History of prostate cancer ICD-10-CM: Z85.46  ICD-9-CM: V10.46  9/25/2021 - Present        Bone lesion ICD-10-CM: M89.9  ICD-9-CM: 733.90  9/25/2021 - Present        Osteoarthritis of right hip ICD-10-CM: M16.11  ICD-9-CM: 715.95  3/19/2018 - Present        Degenerative joint disease (DJD) of hip ICD-10-CM: M16.9  ICD-9-CM: 715.95  3/19/2018 - Present              Greater than 60 minutes were spent with the patient on counseling and coordination of care    Signed:   Adwoa Vyas MD  10/2/2021  10:56 AM Cough       Mer Randall'

## 2022-01-25 NOTE — PROGRESS NOTES
PT DAILY TREATMENT NOTE - MCR     Patient Name: Jose Vanegas  Date:2022  : 1950  [x]  Patient  Verified  Payor: Shoaib Guerra / Plan: [place] / Product Type: Medicare /    Treatment Area: Other low back pain [M54.59]     T7-T12 fusion, T9-T10 September  Next MD APPT:   In time:09:15 am  Out time:10:00 am  Total Treatment Time (min):45 min  Total Timed Codes (min):45min  1:1 Treatment Time (MC only) 45min  Visit #8/16     SUBJECTIVE  Pain Level (0-10 scale) pre treatment: 3/10  Pain Level (0-10 scale) post treatment:3/10  Any medication changes, allergies to medications, adverse drug reactions, diagnosis change, or new procedure performed?:   [] No    [] Yes (see summary sheet for update)  Subjective functional status/changes:   [] No changes reported  Patient stated he is working on standing up straighter when he walks. He also states he has always walked fast.  OBJECTIVE    45 min Therapeutic Exercise:  [] See flow sheet :   Rationale: increase ROM, increase strength, improve coordination, improve balance and increase proprioception to improve the patients ability to ambulate safely with LRAD to perform ADL's.    min Gait Training:  ___ feet with ___ device on level surfaces with ___ level of assist   Rationale: improve coordination, improve balance and increase proprioception  to improve the patients ability to ambulate safely with LRAD to perform ADL's      With   [x] TE   [] TA   [] Neuro   [] SC   [] other: Patient Education: [x] Review HEP    [] Progressed/Changed HEP based on:   [] positioning   [] body mechanics   [] transfers   [] Use of heat/ice    [x] other: Reviewed TA bracing          Other Objective/Functional Measures: 6 MWT = 890 ft, added wheel. ASSESSMENT/Changes in Function:   Patient was able to perform a 6 MWT with RW today . He did need cues to stop leaning/pushing RW. He did fatigue with standing exercises against wall.  When patient speeds up with ambulation he increases forward posture. Patient will continue to benefit from skilled PT services to modify and progress therapeutic interventions, address functional mobility deficits, address ROM deficits, address strength deficits, analyze and address soft tissue restrictions and assess and modify postural abnormalities to attain remaining goals. GOALS/Progress towards goals:  Patient Goal (s): To be independent without walker    []? Met []? Not met []? Partially met   Short Term Goals: To be accomplished in 2-4  treatments. 1. Patient will independent with his HEP to progress with POC. []? Met [x]? Not met []? Partially met  1/13/22  Long Term Goals: To be accomplished in 16  treatments. 1. Patient will gain 1/2 to 1 grade with LE strength to improve stability. []? Met []? Not met []? Partially met   2. Patient will reports numbness and tingling are subsiding by 50% since start of therapy. []? Met [x]? Not met []? Partially met  3. Patient will reports no more sensation of B knee buckling during ambulation since start of therapy. []? Met []? Not met []? Partially met   4. Patient will be able to demonstrate proper lifting technique with decreased back pain and discomfort. []? Met []? Not met []? Partially met   5. Patient will be able to ambulate short household distance without RW. []? Met []? Not met []? Partially met   6. Patient will be able to perform a 6 min walking test with LRAD and decreased back pain. []? Met []? Not met []? Partially met     Treatment Plan may include any combination of the following: Therapeutic exercise, Neuromuscular re-education, Physical agent/modality, Gait/balance training, Manual therapy, Patient education, Functional mobility training, Home safety training and Stair training    Frequency / Duration: Patient to be seen 2 times per week for 16  treatments.     Certification Period: 12/29/21-3/29/22    PLAN  [x]  Upgrade activities as tolerated     [x]  Continue plan of care  [x]  Update interventions per flow sheet       []  Discharge due to:_  []  Other:_      Jennifer Araujo, PTA 1/25/2022

## 2022-01-27 ENCOUNTER — HOSPITAL ENCOUNTER (OUTPATIENT)
Dept: PHYSICAL THERAPY | Age: 72
Discharge: HOME | End: 2022-01-27
Payer: MEDICARE

## 2022-01-27 PROCEDURE — 97110 THERAPEUTIC EXERCISES: CPT

## 2022-01-27 NOTE — PROGRESS NOTES
PT DAILY TREATMENT NOTE - MCR     Patient Name: Jesse Orozco  Date:2022  : 1950  [x]  Patient  Verified  Payor: Lilian Rolon / Plan: [place] MEDICARE PART A & B / Product Type: Medicare /    Treatment Area: Other low back pain [M54.59]     T7-T12 fusion, T9-T10 September  Next MD APPT:   In time:10:00 am  Out time:45 am  Total Treatment Time (min):45 min  Total Timed Codes (min):45min  1:1 Treatment Time ( only) 45min  Visit #916     SUBJECTIVE  Pain Level (0-10 scale) pre treatment: 4/10  Pain Level (0-10 scale) post treatment:2-3/10  Any medication changes, allergies to medications, adverse drug reactions, diagnosis change, or new procedure performed?:   [] No    [] Yes (see summary sheet for update)  Subjective functional status/changes:   [] No changes reported  Patient stated he is having more pain today. He states he sees the doctor next week. He does report he sleeps with pillows underneath his legs at night. \"I thought I needed to elevate them for the swelling. Patient also states he does a lot of bending at home and sitting.        OBJECTIVE    45 min Therapeutic Exercise:  [] See flow sheet :   Rationale: increase ROM, increase strength, improve coordination, improve balance and increase proprioception to improve the patients ability to ambulate safely with LRAD to perform ADL's.    min Gait Training:  ___ feet with ___ device on level surfaces with ___ level of assist   Rationale: improve coordination, improve balance and increase proprioception  to improve the patients ability to ambulate safely with LRAD to perform ADL's      With   [x] TE   [] TA   [] Neuro   [] SC   [] other: Patient Education: [x] Review HEP    [] Progressed/Changed HEP based on:   [] positioning   [] body mechanics   [] transfers   [] Use of heat/ice    [] other:          Other Objective/Functional Measures: Reviewed exercises    ASSESSMENT/Changes in Function:   Discussed with patient not to sleep with pillows under his knees. Also to try and ambulate more at home looking up and staying inside the RW . Patient needs frequent cues to look up and not push/lean on RW. Patient was alert and oriented but forgetful at times. He did better today with maintaining posture with static standing and alternate arm raises using RW. Patient will continue to benefit from skilled PT services to modify and progress therapeutic interventions, address functional mobility deficits, address ROM deficits, address strength deficits, analyze and address soft tissue restrictions and assess and modify postural abnormalities to attain remaining goals. GOALS/Progress towards goals:  Patient Goal (s): To be independent without walker    []? Met []? Not met []? Partially met   Short Term Goals: To be accomplished in 2-4  treatments. 1. Patient will independent with his HEP to progress with POC. []? Met []? Not met [x]? Partially met  1/13/22  Long Term Goals: To be accomplished in 16  treatments. 1. Patient will gain 1/2 to 1 grade with LE strength to improve stability. []? Met []? Not met []? Partially met   2. Patient will reports numbness and tingling are subsiding by 50% since start of therapy. []? Met [x]? Not met []? Partially met  3. Patient will reports no more sensation of B knee buckling during ambulation since start of therapy. []? Met []? Not met []? Partially met   4. Patient will be able to demonstrate proper lifting technique with decreased back pain and discomfort. []? Met []? Not met []? Partially met   5. Patient will be able to ambulate short household distance without RW. []? Met []? Not met []? Partially met   6. Patient will be able to perform a 6 min walking test with LRAD and decreased back pain. [x]? Met []? Not met []?  Partially met     Treatment Plan may include any combination of the following: Therapeutic exercise, Neuromuscular re-education, Physical agent/modality, Gait/balance training, Manual therapy, Patient education, Functional mobility training, Home safety training and Stair training    Frequency / Duration: Patient to be seen 2 times per week for 16  treatments.     Certification Period: 12/29/21-3/29/22    PLAN  [x]  Upgrade activities as tolerated     [x]  Continue plan of care  [x]  Update interventions per flow sheet       []  Discharge due to:_  []  Other:_  Reassess    Jennifer Araujo, PTA 1/27/2022

## 2022-01-31 ENCOUNTER — HOSPITAL ENCOUNTER (OUTPATIENT)
Dept: PHYSICAL THERAPY | Age: 72
Discharge: HOME | End: 2022-01-31
Payer: MEDICARE

## 2022-01-31 PROCEDURE — 97110 THERAPEUTIC EXERCISES: CPT

## 2022-01-31 NOTE — PROGRESS NOTES
PT DAILY TREATMENT NOTE - MCR     Patient Name: Jeanenne Essex  Date:2022  : 1950  [x]  Patient  Verified  Payor: Niraj Memory / Plan: [place] MEDICARE PART A & B / Product Type: Medicare /    Treatment Area: Other low back pain [M54.59]     T7-T12 fusion, T9-T10 September  Next MD APPT:   In time:09:35 am  Out time:10:25  Total Treatment Time (min):50 min  Total Timed Codes (min):50 min  1:1 Treatment Time ( only) 50 min  Visit #10/16     SUBJECTIVE  Pain Level (0-10 scale) pre treatment: 5/10  Pain Level (0-10 scale) post treatment:4/10  Any medication changes, allergies to medications, adverse drug reactions, diagnosis change, or new procedure performed?:   [] No    [] Yes (see summary sheet for update)  Subjective functional status/changes:   [] No changes reported  Patient reporting 50% improvement since receiving therapy. His pain levels at best 3-4/10, worse 5/10. He is able to ambulate short distances in his household without any AD now. He is able to go up and down the stairs using reciprocal pattern and decrease in UE support. He is able to dress independently. He is only able to stand 15 min and sit for 1-2 hours before increase in pain. He reports the numbness/tingling has decreased 25% only.        OBJECTIVE    50 min Therapeutic Exercise:  [] See flow sheet :   Rationale: increase ROM, increase strength, improve coordination, improve balance and increase proprioception to improve the patients ability to ambulate safely with LRAD to perform ADL's.    min Gait Training:  ___ feet with ___ device on level surfaces with ___ level of assist   Rationale: improve coordination, improve balance and increase proprioception  to improve the patients ability to ambulate safely with LRAD to perform ADL's      With   [x] TE   [] TA   [] Neuro   [] SC   [] other: Patient Education: [x] Review HEP    [] Progressed/Changed HEP based on:   [] positioning   [] body mechanics   [] transfers   [] Use of heat/ice [] other:          Other Objective/Functional Measures:     Spine:   TRUNK ROM:     Flexion:                         [] N/A  []WNL  [x]Minimal  []Moderate  [] Major 23 cm  Extension:                     [] N/A  []WNL  []Minimal  []Moderate  [x] Major  Able to get into neutral  Right Lateral Flexion:    [] N/A  []WNL  [x]Minimal  []Moderate  [] Major 50 cm  Left Lateral Flexion:  [] N/A  []WNL  [x]Minimal  []Moderate  [] Major 47 cm  Rotation to the Right:  [] N/A  []WNL  []Minimal  [x]Moderate  [] Major   Rotation to the Left:   [] N/A  []WNL  []Minimal  [x]Moderate  [] Major   Right Side Glide:           [] N/A  []WNL  []Minimal  []Moderate  [] Major   Left Slide Glide:   [] N/A  []WNL  []Minimal  []Moderate  [] Major  Additional comments: Pain only with flexion      Hip      AROM       PROM             MMT   R L R L R L   Flexion (120)     4+ 4+   Extension (15)     1 1   Abduction (40)     4- 4-   Adduction (30)     1 2+   IR (40)         ER (40)         Additional comments: Patient able to get into prone without difficulty      Knee          AROM          PROM                       MMT   R L R L R L   Extension (0)      5 5   Flexion (145)     4+ 4+   Additional comments:     TU sec  6 MWT= 890 ft. RW  AMPAC = 29.36 %  ASSESSMENT/Changes in Function:   Patient is progressing well towards goals meeting all STG's and some LTG's. He has made some improvements with ROM but still has limitations. He has also improved with MMT but still showing LE weakness. He is able to ambulate without RW short distances in his household . Go up and down the stairs using reciprocal pattern and less pulling on UE. He still has numbness/tingling down B LE. Decrease in bilateral LE instability noted. Patient was also able to perform 6 MWT with no increase in pain to back.  Patient will continue to benefit from skilled PT services to modify and progress therapeutic interventions, address functional mobility deficits, address ROM deficits, address strength deficits, analyze and address soft tissue restrictions and assess and modify postural abnormalities to attain remaining goals. GOALS/Progress towards goals:  Patient Goal (s): To be independent without walker    []? Met []? Not met [x]? Partially met  1/31/22 short distance only in his house. Short Term Goals: To be accomplished in 2-4  treatments. 1. Patient will independent with his HEP to progress with POC. []? Met []? Not met [x]? Partially met  1/13/22  Long Term Goals: To be accomplished in 16  treatments. 1. Patient will gain 1/2 to 1 grade with LE strength to improve stability. []? Met [x]? Not met []? Partially met   2. Patient will reports numbness and tingling are subsiding by 50% since start of therapy. []? Met []? Not met [x]? Partially met 1/31/22 reports 25% decreased  3. Patient will reports no more sensation of B knee buckling during ambulation since start of therapy. []? Met []? Not met [x]? Partially met 1/31/22 reports a decrease in incident   4. Patient will be able to demonstrate proper lifting technique with decreased back pain and discomfort. []? Met [x]? Not met []? Partially met still has pain and discomfort  5. Patient will be able to ambulate short household distance without RW. [x]? Met []? Not met []? Partially met   6. Patient will be able to perform a 6 min walking test with LRAD and decreased back pain. [x]? Met []? Not met []? Partially met     Treatment Plan may include any combination of the following: Therapeutic exercise, Neuromuscular re-education, Physical agent/modality, Gait/balance training, Manual therapy, Patient education, Functional mobility training, Home safety training and Stair training    Frequency / Duration: Patient to be seen 2 times per week for 16  treatments.     Certification Period: 12/29/21-3/29/22    PLAN  [x]  Upgrade activities as tolerated     [x]  Continue plan of care  [x]  Update interventions per flow sheet []  Discharge due to:_  []  Other:_     Leonor Rowley, PTA 1/31/2022

## 2022-02-01 ENCOUNTER — OFFICE VISIT (OUTPATIENT)
Dept: ORTHOPEDIC SURGERY | Age: 72
End: 2022-02-01
Payer: MEDICARE

## 2022-02-01 VITALS — BODY MASS INDEX: 26.07 KG/M2 | HEIGHT: 69 IN | WEIGHT: 176 LBS

## 2022-02-01 DIAGNOSIS — M54.41 CHRONIC BILATERAL LOW BACK PAIN WITH BILATERAL SCIATICA: ICD-10-CM

## 2022-02-01 DIAGNOSIS — M54.42 CHRONIC BILATERAL LOW BACK PAIN WITH BILATERAL SCIATICA: ICD-10-CM

## 2022-02-01 DIAGNOSIS — G89.29 CHRONIC BILATERAL LOW BACK PAIN WITH BILATERAL SCIATICA: ICD-10-CM

## 2022-02-01 DIAGNOSIS — Z98.1 S/P LUMBAR SPINAL FUSION: Primary | ICD-10-CM

## 2022-02-01 PROCEDURE — G8427 DOCREV CUR MEDS BY ELIG CLIN: HCPCS | Performed by: ORTHOPAEDIC SURGERY

## 2022-02-01 PROCEDURE — G8536 NO DOC ELDER MAL SCRN: HCPCS | Performed by: ORTHOPAEDIC SURGERY

## 2022-02-01 PROCEDURE — 99214 OFFICE O/P EST MOD 30 MIN: CPT | Performed by: ORTHOPAEDIC SURGERY

## 2022-02-01 PROCEDURE — G8419 CALC BMI OUT NRM PARAM NOF/U: HCPCS | Performed by: ORTHOPAEDIC SURGERY

## 2022-02-01 PROCEDURE — 3017F COLORECTAL CA SCREEN DOC REV: CPT | Performed by: ORTHOPAEDIC SURGERY

## 2022-02-01 PROCEDURE — G8432 DEP SCR NOT DOC, RNG: HCPCS | Performed by: ORTHOPAEDIC SURGERY

## 2022-02-01 PROCEDURE — 1101F PT FALLS ASSESS-DOCD LE1/YR: CPT | Performed by: ORTHOPAEDIC SURGERY

## 2022-02-01 RX ORDER — FUROSEMIDE 20 MG/1
TABLET ORAL
COMMUNITY
Start: 2021-12-17

## 2022-02-01 RX ORDER — OXYCODONE AND ACETAMINOPHEN 5; 325 MG/1; MG/1
TABLET ORAL
COMMUNITY
Start: 2021-09-10 | End: 2022-02-13

## 2022-02-01 NOTE — PROGRESS NOTES
Kaleigh Cosby (: 1950) is a 66 y.o. male, patient, here for evaluation of the following chief complaint(s):  Scoliosis       ASSESSMENT/PLAN:    Below is the assessment and plan developed based on review of pertinent history, physical exam, labs, studies, and medications. At this point we have reviewed his films. His fusion is stable. And neurologically is stable. He has some mild paresthesias related to the cord compression that he had. We will continue with physical therapy. He will see me back in 6 months. 1. S/P lumbar spinal fusion  -     XR SPINE ENTIRE T-L , SKULL TO SACRUM 2 OR 3 VWS SCOLIOSIS; Future  2. Chronic bilateral low back pain with bilateral sciatica      No follow-ups on file. SUBJECTIVE/OBJECTIVE:  Kaleigh Cosby (: 1950) is a 66 y.o. male. No flowsheet data found. Comes in today for follow-up. He is status post T8-T12 fusion and decompression for metastatic prostate cancer. Overall doing fairly well. He takes minimal pain medications. He does take the oral Neurontin. He has been released from rehab at this time. He continues to make progress with physical therapy    Imaging:          XR Results (most recent):  Results from Appointment encounter on 22    XR SPINE ENTIRE T-L , SKULL TO SACRUM 2 OR 3 VWS SCOLIOSIS    Narrative  AP and lateral of the thoracic spine demonstrates a stable thoracic fusion with no acute changes. MRI Results (most recent):  Results from [place] encounter on 21    MRI LUMB SPINE W CONT    Narrative  EXAM: MRI LUMB SPINE W CONT  History: T10 lesion Back pain and prostate cancer  INDICATION: back pain, h/o prostate ca; . COMPARISON: 2021    TECHNIQUE: MR imaging of the lumbar spine was performed using the following  sequences: sagittal postcontrast T1, axial T1 postcontrast with fat saturation,  axial T1 following contrast administration.     CONTRAST: 16 mL of ProHance. FINDINGS:    Discogenic marrow degenerative changes at L2-L3. Marrow signal changes along the  inferior margin of the T10 vertebral body are incidentally noted. Only seen on  sagittal imaging. Described on MR thoracic report. 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 Enhancing lesion in the left sacral Milagro. Large retroperitoneal  lymph nodes. 8 mm left sacral Milagro lesion. Left renal cyst.    The conus medullaris terminates at . Signal and caliber of the distal spinal  cord are within normal limits. There is no pathologic intrathecal enhancement. The paraspinal soft tissues are within normal limits. Lower thoracic spine: No herniation or stenosis. L1/2:  Disc bulge and facet arthropathy resulting in mild spinal canal stenosis. Foramina are patent. L2/3:  Disc bulge and facet arthropathy causes mild to moderate spinal canal  stenosis. Foramina are patent. L3/4:  Disc bulge and facet arthropathy causing moderate spinal canal stenosis. Mild bilateral foraminal stenosis. L4/5:  Disc bulge and facet arthropathy without significant spinal canal  stenosis. Mild right foraminal stenosis. L5/S1:  Diffuse disc bulge, abutting the bilateral S1 nerve roots without  significant displacement or spinal canal stenosis. Moderate bilateral foraminal  stenosis. Impression  Enhancing 12 mm lesion in the posterior L5 vertebral body is consistent with  metastasis. .  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 Moderate spinal canal stenosis L3-4. No cord compression or cord signal  abnormality. Disc bulge at L5-S1 abuts the S1 nerve roots without significant  displacement. No Known Allergies    Current Outpatient Medications   Medication Sig    furosemide (LASIX) 20 mg tablet     oxyCODONE-acetaminophen (PERCOCET) 5-325 mg per tablet Take  by mouth.     gabapentin (NEURONTIN) 100 mg capsule     polyethylene glycol (Miralax) 17 gram/dose powder Take 17 g by mouth daily as needed for Constipation.  senna (Senna) 8.6 mg tablet Take 1 Tablet by mouth daily as needed for Constipation.  cholecalciferol (Vitamin D3) (1000 Units /25 mcg) tablet Take 1,000 Units by mouth daily.  leuprolide acetate (LUPRON DEPOT IM) by IntraMUSCular route See Admin Instructions. Every 6 months    tamsulosin (FLOMAX) 0.4 mg capsule Take 0.4 mg by mouth daily.  thiamine (VITAMIN B-1) 100 mg tablet Take 100 mg by mouth daily.  gabapentin (NEURONTIN) 100 mg capsule 100 mg. (Patient not taking: Reported on 2022)    oxyCODONE IR (ROXICODONE) 5 mg immediate release tablet  (Patient not taking: Reported on 2022)     No current facility-administered medications for this visit.        Past Medical History:   Diagnosis Date    Arthritis     Mild anemia     Prostate cancer ([facility] [address] 2008    PALLIATIVE CARE PER DR LAMBERT FARR NOTES 2018: [facility] Balls, AND PAST EXTERNAL BEAM RADIATION THERAPY        Past Surgical History:   Procedure Laterality Date    HX BUNIONECTOMY Right 2014    HX CATARACT REMOVAL Bilateral 2017    HX COLONOSCOPY      HX GI  2009    ANAL FISTULA     HX HEMORRHOIDECTOMY  1974    HX HIP ARTHROSCOPY Right     HX TONSILLECTOMY  1958       Family History   Problem Relation Age of Onset    Diabetes Mother     Hypertension Mother     Asthma Father     Hypertension Sister     Kidney Disease Brother         DIALYSIS    Diabetes Brother     Diabetes Brother     Drug Abuse Brother     Mental Retardation Sister     Obesity Sister     Anesth Problems Neg Hx         Social History     Tobacco Use    Smoking status: Former Smoker     Packs/day: 0.25     Years: 5.00     Pack years: 1.25     Quit date:      Years since quittin.1    Smokeless tobacco: Never Used   Substance Use Topics    Alcohol use: No    Drug use: No        Review of Systems   Constitutional: Positive for activity change. Musculoskeletal: Positive for back pain and joint swelling. Neurological: Positive for weakness. All other systems reviewed and are negative. Vitals:  Ht 5' 9\" (1.753 m)   Wt 176 lb (79.8 kg)   BMI 25.99 kg/m²    Body mass index is 25.99 kg/m². Ortho Exam         Integumentary  Assessment of Surgical Incision - healing and consistent with normal anticipated wound healing. Neurologic  Sensory  Light Touch - Intact - Globally. Overall Assessment of Muscle Strength and Tone reveals  Lower Extremities - Right Iliopsoas - 5/5. Left Iliopsoas - 5/5. Right Tibialis Anterior - 5/5. Left Tibialis Anterior - 5/5. Right Gastroc-Soleus - 5/5. Left Gastroc-Soleus - 5/5. Right EHL - 5/5. Left EHL - 5/5. General Assessment of Reflexes  Right Ankle - Clonus is not present. Left Ankle - Clonus is not present. Reflexes (Dermatomes)  2/2 Normal - Left Achilles (L5-S2), Left Knee (L2-4), Right Achilles (L5-S2) and Right Knee (L2-4). Musculoskeletal  Global Assessment  Examination of related systems reveals - well-developed, well-nourished, in no acute distress, alert and oriented x 3 and normal coordination. Spine/Ribs/Pelvis  Lumbosacral Spine - Examination of the lumbosacral spine reveals - no known fractures or deformities. Inspection and Palpation - Tenderness - moderate. Assessment of pain reveals the following findings - The pain is characterized as - moderate. Location - pain refers to lower back bilaterally. An electronic signature was used to authenticate this note.   -- Anne Erickson MD

## 2022-02-03 ENCOUNTER — HOSPITAL ENCOUNTER (OUTPATIENT)
Dept: PHYSICAL THERAPY | Age: 72
Discharge: HOME | End: 2022-02-03
Payer: MEDICARE

## 2022-02-03 PROCEDURE — 97110 THERAPEUTIC EXERCISES: CPT

## 2022-02-03 NOTE — PROGRESS NOTES
PT DAILY TREATMENT NOTE - MCR     Patient Name: Jeanenne Essex  NKDR:3[birth year]  : 1950  [x]  Patient  Verified  Payor: [employer] / Plan: [place] MED[place] A & B / Product Type: Medicare /    Treatment Area: Other low back pain [M54.59]     T7-T12 fusion, T9-T10 September  Next MD APPT:   In time:09:15 am  Out time:10:00  Total Treatment Time (min):45 min  Total Timed Codes (min):45 min  1:1 Treatment Time ( only) 45 min  Visit #11/16     SUBJECTIVE  Pain Level (0-10 scale) pre treatment: 5/10  Pain Level (0-10 scale) post treatment:4/10  Any medication changes, allergies to medications, adverse drug reactions, diagnosis change, or new procedure performed?:   [] No    [] Yes (see summary sheet for update)  Subjective functional status/changes:   [] No changes reported  Patient stated he saw MD and was given a new prescription to continue therapy 2 x week for 4-6 weeks. He was also told he did not have to wear the back brace anymore. 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 He does complain of soreness to his sides. \"I'm just so tired today. \"  Patient did say MD wanted him to contact his oncology MD concerning medication.   OBJECTIVE    45 min Therapeutic Exercise:  [] See flow sheet :   Rationale: increase ROM, increase strength, improve coordination, improve balance and increase proprioception to improve the patients ability to ambulate safely with LRAD to perform ADL's.    min Gait Training:  ___ feet with ___ device on level surfaces with ___ level of assist   Rationale: improve coordination, improve balance and increase proprioception  to improve the patients ability to ambulate safely with LRAD to perform ADL's      With   [x] TE   [] TA   [] Neuro   [] SC   [] other: Patient Education: [x] Review HEP    [] Progressed/Changed HEP based on:   [] positioning   [] body mechanics   [] transfers   [] Use of heat/ice    [] other:          Other Objective/Functional Measures: worked on [employer] exercises in supine and sitting      ASSESSMENT/Changes in Function:   Due to patient fatigue worked on sitting and supine exercises. Patient reported decrease in pain levels following exercises today. He is able to correct sitting posture with exercises better. He was advise to wean away from lumbar brace and is now only wearing it to [employer]. Will work on increasing exercises as tolerated. Md did advise patient to contact oncology concerning his medication. Patient will continue to benefit from skilled PT services to modify and progress therapeutic interventions, address functional mobility deficits, address ROM deficits, address strength deficits, analyze and address soft tissue restrictions and assess and modify postural abnormalities to attain remaining goals. GOALS/Progress towards goals:  Patient Goal (s): To be independent without walker    []? Met []? Not met [x]? Partially met  1/31/22 short distance only in his house. Short Term Goals: To be accomplished in 2-4  treatments. 1. Patient will independent with his HEP to progress with POC. []? Met []? Not met [x]? Partially met  1/13/22  Long Term Goals: To be accomplished in 16  treatments. 1. Patient will gain 1/2 to 1 grade with LE strength to improve stability. []? Met [x]? Not met []? Partially met   2. Patient will reports numbness and tingling are subsiding by 50% since start of therapy. []? Met []? Not met [x]? Partially met 1/31/22 reports 25% decreased  3. Patient will reports no more sensation of B knee buckling during ambulation since start of therapy. []? Met []? Not met [x]? Partially met 1/31/22 reports a decrease in incident   4. Patient will be able to demonstrate proper lifting technique with decreased back pain and discomfort. []? Met [x]? Not met []? Partially met still has pain and discomfort  5. Patient will be able to ambulate short household distance without RW. [x]? Met []? Not met []?  Partially met 6. Patient will be able to perform a 6 min walking test with LRAD and decreased back pain. [x]? Met []? Not met []? Partially met     Treatment Plan may include any combination of the following: Therapeutic exercise, Neuromuscular re-education, Physical agent/modality, Gait/balance training, Manual therapy, Patient education, Functional mobility training, Home safety training and Stair training    Frequency / Duration: Patient to be seen 2 times per week for 16  treatments.     Certification Period: 12/29/21-3/29/22    PLAN  [x]  Upgrade activities as tolerated     [x]  Continue plan of care  [x]  Update interventions per flow sheet       []  Discharge due to:_  []  Other:_     Jennifer Araujo, PTA 2/3/2022

## 2022-02-08 ENCOUNTER — HOSPITAL ENCOUNTER (OUTPATIENT)
Dept: PHYSICAL THERAPY | Age: 72
Discharge: HOME | End: 2022-02-08
Payer: MEDICARE

## 2022-02-08 PROCEDURE — 97110 THERAPEUTIC EXERCISES: CPT

## 2022-02-08 NOTE — PROGRESS NOTES
PT DAILY TREATMENT NOTE - MCR     Patient Name: Nelson Pinto  Date:2022  : 1950  [x]  Patient  Verified  Payor: Cortez Garrido / Plan: [place] MEDICARE PART A & B / Product Type: Medicare /    Treatment Area: Other low back pain [M54.59]     T7-T12 fusion, T9-T10 September  Next MD APPT:   In time:09:15 am  Out time:09:55 am  Total Treatment Time (min):40 min  Total Timed Codes (min):40 min  1:1 Treatment Time ( only) 40 min  Visit #12/16     SUBJECTIVE  Pain Level (0-10 scale) pre treatment:1-2/10  Pain Level (0-10 scale) post treatment:1-2/10  Any medication changes, allergies to medications, adverse drug reactions, diagnosis change, or new procedure performed?:   [] No    [] Yes (see summary sheet for update)  Subjective functional status/changes:   [] No changes reported  Patient stated he has not been doing his exercises at home. \"Those rods in my back just worry me. \" He states he was instructed at hospital to walk at home but he has not done that either. 40 min Therapeutic Exercise:  [] See flow sheet :   Rationale: increase ROM, increase strength, improve coordination, improve balance and increase proprioception to improve the patients ability to ambulate safely with LRAD to perform ADL's.    min Gait Training:  ___ feet with ___ device on level surfaces with ___ level of assist   Rationale: improve coordination, improve balance and increase proprioception  to improve the patients ability to ambulate safely with LRAD to perform ADL's      With   [x] TE   [] TA   [] Neuro   [] SC   [] other: Patient Education: [x] Review HEP    [] Progressed/Changed HEP based on:   [] positioning   [] body mechanics   [] transfers   [] Use of heat/ice    [] other:          Other Objective/Functional Measures: worked on standing exercises    ASSESSMENT/Changes in Function:   Patient has not been compliant with HEP . He reports he has been sitting instead of exercising but will start to do more at home.  He indicated he was instructed to walk more after discharge from the hospital but he has not done that either. Worked on standing exercises today to show patient alternative besides sitting and supine. He did fatigue and have some discomfort but eased with rest periods. He was advised to use heat at home for pain for short periods of time . He did not report any increase in pain post treatment session. Patient will continue to benefit from skilled PT services to modify and progress therapeutic interventions, address functional mobility deficits, address ROM deficits, address strength deficits, analyze and address soft tissue restrictions and assess and modify postural abnormalities to attain remaining goals. GOALS/Progress towards goals:  Patient Goal (s): To be independent without walker    []? Met []? Not met [x]? Partially met  1/31/22 short distance only in his house. Short Term Goals: To be accomplished in 2-4  treatments. 1. Patient will independent with his HEP to progress with POC. []? Met []? Not met [x]? Partially met  1/13/22  Long Term Goals: To be accomplished in 16  treatments. 1. Patient will gain 1/2 to 1 grade with LE strength to improve stability. []? Met [x]? Not met []? Partially met   2. Patient will reports numbness and tingling are subsiding by 50% since start of therapy. []? Met []? Not met [x]? Partially met 1/31/22 reports 25% decreased  3. Patient will reports no more sensation of B knee buckling during ambulation since start of therapy. []? Met []? Not met [x]? Partially met 1/31/22 reports a decrease in incident   4. Patient will be able to demonstrate proper lifting technique with decreased back pain and discomfort. []? Met [x]? Not met []? Partially met still has pain and discomfort  5. Patient will be able to ambulate short household distance without RW. [x]? Met []? Not met []? Partially met   6.  Patient will be able to perform a 6 min walking test with LRAD and decreased back pain. [x]? Met []? Not met []? Partially met     Treatment Plan may include any combination of the following: Therapeutic exercise, Neuromuscular re-education, Physical agent/modality, Gait/balance training, Manual therapy, Patient education, Functional mobility training, Home safety training and Stair training    Frequency / Duration: Patient to be seen 2 times per week for 16  treatments.     Certification Period: 12/29/21-3/29/22    PLAN  [x]  Upgrade activities as tolerated     [x]  Continue plan of care  [x]  Update interventions per flow sheet       []  Discharge due to:_  []  Other:_     Jennifer Araujo, PTA 2/8/2022

## 2022-02-10 ENCOUNTER — HOSPITAL ENCOUNTER (OUTPATIENT)
Dept: PHYSICAL THERAPY | Age: 72
Discharge: HOME | End: 2022-02-10
Payer: MEDICARE

## 2022-02-10 PROCEDURE — 97116 GAIT TRAINING THERAPY: CPT

## 2022-02-10 PROCEDURE — 97110 THERAPEUTIC EXERCISES: CPT

## 2022-02-10 NOTE — PROGRESS NOTES
PT DAILY TREATMENT NOTE - MCR     Patient Name: Jimmy Kaplan  Date:2/10/2022  : 1950  [x]  Patient  Verified  Payor: Augustin Conner / Plan: [place] MEDICARE PART A & B / Product Type: Medicare /    Treatment Area: Other low back pain [M54.59]     T7-T12 fusion, T9-T10 September  Next MD APPT:   In time:09:15 am  Out time:10:00  am  Total Treatment Time (min):45 min  Total Timed Codes (min):45 min  1:1 Treatment Time ( only) 45 min  Visit #13/16     SUBJECTIVE  Pain Level (0-10 scale) pre treatment:4/10  Pain Level (0-10 scale) post treatment:3/10  Any medication changes, allergies to medications, adverse drug reactions, diagnosis change, or new procedure performed?:   [] No    [] Yes (see summary sheet for update)  Subjective functional status/changes:   [] No changes reported  Patient stated his back was hurting yesterday so he did not do any exercises. \"I know I need to do better. \"      35 min Therapeutic Exercise:  [] See flow sheet :   Rationale: increase ROM, increase strength, improve coordination, improve balance and increase proprioception to improve the patients ability to ambulate safely with LRAD to perform ADL's. 10 min Gait Trainin___ feet with RW___ device on level surfaces with ___ level of assist. // bars x 2 min no HHA   Rationale: improve coordination, improve balance and increase proprioception  to improve the patients ability to ambulate safely with LRAD to perform ADL's      With   [x] TE   [] TA   [] Neuro   [] SC   [] other: Patient Education: [x] Review HEP    [] Progressed/Changed HEP based on:   [] positioning   [] body mechanics   [] transfers   [] Use of heat/ice    [] other:          Other Objective/Functional Measures: Patient was able to complete a 6 MWT= 675 ft with RW    ASSESSMENT/Changes in Function:   Patient reporting increase in soreness and pain. He is using a heating pad at home but not performing any exercises. He is able to stand better without HHA.  He was able to ambulate in // bars without HHA short distance. He still needs frequent breaks. He decreased his distance today with 6 MWT from previous time. Patient will continue to benefit from skilled PT services to modify and progress therapeutic interventions, address functional mobility deficits, address ROM deficits, address strength deficits, analyze and address soft tissue restrictions and assess and modify postural abnormalities to attain remaining goals. GOALS/Progress towards goals:  Patient Goal (s): To be independent without walker    []? Met []? Not met [x]? Partially met  1/31/22 short distance only in his house. Short Term Goals: To be accomplished in 2-4  treatments. 1. Patient will independent with his HEP to progress with POC. []? Met []? Not met [x]? Partially met  1/13/22  Long Term Goals: To be accomplished in 16  treatments. 1. Patient will gain 1/2 to 1 grade with LE strength to improve stability. []? Met [x]? Not met []? Partially met   2. Patient will reports numbness and tingling are subsiding by 50% since start of therapy. []? Met []? Not met [x]? Partially met 1/31/22 reports 25% decreased  3. Patient will reports no more sensation of B knee buckling during ambulation since start of therapy. []? Met []? Not met [x]? Partially met 1/31/22 reports a decrease in incident   4. Patient will be able to demonstrate proper lifting technique with decreased back pain and discomfort. []? Met [x]? Not met []? Partially met still has pain and discomfort  5. Patient will be able to ambulate short household distance without RW. [x]? Met []? Not met []? Partially met   6. Patient will be able to perform a 6 min walking test with LRAD and decreased back pain. [x]? Met []? Not met []?  Partially met     Treatment Plan may include any combination of the following: Therapeutic exercise, Neuromuscular re-education, Physical agent/modality, Gait/balance training, Manual therapy, Patient education, Functional mobility training, Home safety training and Stair training    Frequency / Duration: Patient to be seen 2 times per week for 16  treatments.     Certification Period: 12/29/21-3/29/22    PLAN  [x]  Upgrade activities as tolerated     [x]  Continue plan of care  [x]  Update interventions per flow sheet       []  Discharge due to:_  []  Other:_     Jennifer Araujo, PTA 2/10/2022

## 2022-02-15 ENCOUNTER — HOSPITAL ENCOUNTER (OUTPATIENT)
Dept: PHYSICAL THERAPY | Age: 72
Discharge: HOME | End: 2022-02-15
Payer: MEDICARE

## 2022-02-15 PROCEDURE — 97112 NEUROMUSCULAR REEDUCATION: CPT

## 2022-02-15 PROCEDURE — 97110 THERAPEUTIC EXERCISES: CPT

## 2022-02-15 NOTE — PROGRESS NOTES
PT DAILY TREATMENT NOTE - MCR     Patient Name: Aldair Oleary  Date:2/15/2022  : 1950  [x]  Patient  Verified  Payor: Mitchell Luu / Plan: [place] MEDICARE PART A & B / Product Type: Medicare /    Treatment Area: Other low back pain [M54.59]     T7-T12 fusion, T9-T10 September  Next MD APPT:   In time:09:20 am  Out time: 1000am  Total Treatment Time (min): 40  Total Timed Codes (min): 40  1:1 Treatment Time ( only) 40  Visit #14/16     SUBJECTIVE  Pain Level (0-10 scale) pre treatment:3/10  Pain Level (0-10 scale) post treatment:3/10  Any medication changes, allergies to medications, adverse drug reactions, diagnosis change, or new procedure performed?:   [] No    [] Yes (see summary sheet for update)  Subjective functional status/changes:   [] No changes reported  Patient states he did some sitting marches yesterday, but that was about it. Pt states he brought a new order for more PT from the doctor, 4-6 more weeks.       25 min Therapeutic Exercise:  [x] See flow sheet :   Rationale: increase ROM, increase strength, improve coordination, improve balance and increase proprioception to improve the patients ability to ambulate safely with LRAD to perform ADL's.   5 min Gait Training:  Ambulating around gym with RW, FW/BW gait in parallel bars with cueing   Rationale: improve coordination, improve balance and increase proprioception  to improve the patients ability to ambulate safely with LRAD to perform ADL's  10 min Neuromuscular Re-education:  [x]  See flow sheet : postural re-ed at wall   Rationale: increase ROM and increase strength  to improve the patients ability to perform ADLs and ambulate safely      With   [x] TE   [] TA   [] Neuro   [] SC   [] other: Patient Education: [x] Review HEP    [] Progressed/Changed HEP based on:   [] positioning   [] body mechanics   [] transfers   [] Use of heat/ice    [] other:          Other Objective/Functional Measures: tightness Bilateral HS and hip flexors contribute to postural deficits    ASSESSMENT/Changes in Function:   Pt continues to demo fair compliance to HEP and continues to require mod to max cueing for posture with gait. Focused on heel strike and improving step length as well as upright posture with ambulation in parallel bars. Pt then demonstrated improved quality gait pattern with RW. Reminded pt of importance of home exercises to supplement attendance at therapy sessions. Patient will continue to benefit from skilled PT services to modify and progress therapeutic interventions, address functional mobility deficits, address ROM deficits, address strength deficits, analyze and address soft tissue restrictions and assess and modify postural abnormalities to attain remaining goals. GOALS/Progress towards goals:  Patient Goal (s): To be independent without walker    []? Met []? Not met [x]? Partially met  1/31/22 short distance only in his house. Short Term Goals: To be accomplished in 2-4  treatments. 1. Patient will independent with his HEP to progress with POC. []? Met []? Not met [x]? Partially met  1/13/22  Long Term Goals: To be accomplished in 16  treatments. 1. Patient will gain 1/2 to 1 grade with LE strength to improve stability. []? Met [x]? Not met []? Partially met   2. Patient will reports numbness and tingling are subsiding by 50% since start of therapy. []? Met []? Not met [x]? Partially met 1/31/22 reports 25% decreased  3. Patient will reports no more sensation of B knee buckling during ambulation since start of therapy. []? Met []? Not met [x]? Partially met 1/31/22 reports a decrease in incident   4. Patient will be able to demonstrate proper lifting technique with decreased back pain and discomfort. []? Met [x]? Not met []? Partially met still has pain and discomfort  5. Patient will be able to ambulate short household distance without RW. [x]? Met []? Not met []? Partially met   6.  Patient will be able to perform a 6 min walking test with LRAD and decreased back pain. [x]? Met []? Not met []? Partially met     Treatment Plan may include any combination of the following: Therapeutic exercise, Neuromuscular re-education, Physical agent/modality, Gait/balance training, Manual therapy, Patient education, Functional mobility training, Home safety training and Stair training    Frequency / Duration: Patient to be seen 2 times per week for 16  treatments. Certification Period: 12/29/21-3/29/22    PLAN  [x]  Upgrade activities as tolerated     [x]  Continue plan of care  [x]  Update interventions per flow sheet       []  Discharge due to:_  [x]  Other:_ add visits?  Don't see new order in chart yet     Marva Angeles, PT, DPT 2/15/2022

## 2022-02-16 ENCOUNTER — APPOINTMENT (OUTPATIENT)
Dept: PHYSICAL THERAPY | Age: 72
End: 2022-02-16
Payer: MEDICARE

## 2022-02-17 ENCOUNTER — APPOINTMENT (OUTPATIENT)
Dept: PHYSICAL THERAPY | Age: 72
End: 2022-02-17
Payer: MEDICARE

## 2022-02-17 ENCOUNTER — HOSPITAL ENCOUNTER (OUTPATIENT)
Dept: PHYSICAL THERAPY | Age: 72
Discharge: HOME | End: 2022-02-17
Payer: MEDICARE

## 2022-02-17 PROCEDURE — 97116 GAIT TRAINING THERAPY: CPT

## 2022-02-17 PROCEDURE — 97112 NEUROMUSCULAR REEDUCATION: CPT

## 2022-02-17 NOTE — PROGRESS NOTES
PT DAILY TREATMENT NOTE - MCR     Patient Name: Glen Meredith  Date:2022  : 1950  [x]  Patient  Verified  Payor: Joesph Truong / Plan: [place] MEDICARE PART A & B / Product Type: Medicare /    Treatment Area: Other low back pain [M54.59]     T7-T12 fusion, T9-T10 September  Next MD APPT:   In time:10:53 am  Out time:11:53am  Total Treatment Time (min): 50  Total Timed Codes (min): 50  1:1 Treatment Time ( only) 50    Visit #15/16     SUBJECTIVE  Pain Level (0-10 scale) pre treatment:  3-4/10  Pain Level (0-10 scale) post treatment:  2-3/10    Any medication changes, allergies to medications, adverse drug reactions, diagnosis change, or new procedure performed?:   [x] No    [] Yes (see summary sheet for update)     Subjective functional status/changes:   [x] No changes reported  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 Other than that, he doesn't wear it. \"I'm doing pretty good\". min Therapeutic Exercise:  [] See flow sheet :   Rationale: increase ROM, increase strength, improve coordination, improve balance and increase proprioception to improve the patients ability to ambulate safely with LRAD to perform ADL's.    15 min Gait Training:  Abulated with SBQC today, ll bar gait training   Rationale: improve coordination, improve balance and increase proprioception  to improve the patients ability to ambulate safely with LRAD to perform ADL's  35 min Neuromuscular Re-education:  [x]  See flow sheet : postural re-ed at wall-frequent VC, bowling ball on head analogy, etc   Rationale: increase ROM and increase strength  to improve the patients ability to perform ADLs and ambulate safely      With   [x] TE   [] TA   [] Neuro   [] SC   [] other: Patient Education: [x] Review HEP  Gave him theraband for home  [] Progressed/Changed HEP based on:   [] positioning   [] body mechanics   [] transfers   [] Use of heat/ice    [] other: Other Objective/Functional Measures: Added horiz AB on wall, increased some reps, GT with SBQC    ASSESSMENT/Changes in Function:   Pt cooperated well today and was able to walk 130 feet with SBG and the SBQC. He seems to be more aware of body position and we spent much time using visual, tactile, and verbal cues to change postural stance. B hamstrings remain tight and reviewed how he can do many exercises right at his kitchen table. Gave him theraband to start working on postural stance while engaging UE's. Less pain following session. Patient will continue to benefit from skilled PT services to modify and progress therapeutic interventions, address functional mobility deficits, address ROM deficits, address strength deficits, analyze and address soft tissue restrictions and assess and modify postural abnormalities to attain remaining goals. GOALS/Progress towards goals:  Patient Goal (s): To be independent without walker    []? Met []? Not met [x]? Partially met  1/31/22 short distance only in his house. Short Term Goals: To be accomplished in 2-4  treatments. 1. Patient will independent with his HEP to progress with POC. []? Met []? Not met [x]? Partially met  1/13/22  Long Term Goals: To be accomplished in 16  treatments. 1. Patient will gain 1/2 to 1 grade with LE strength to improve stability. []? Met [x]? Not met []? Partially met   2. Patient will reports numbness and tingling are subsiding by 50% since start of therapy. []? Met []? Not met [x]? Partially met 1/31/22 reports 25% decreased  3. Patient will reports no more sensation of B knee buckling during ambulation since start of therapy. []? Met []? Not met [x]? Partially met 1/31/22 reports a decrease in incident   4. Patient will be able to demonstrate proper lifting technique with decreased back pain and discomfort. []? Met [x]? Not met []? Partially met still has pain and discomfort  5.  Patient will be able to ambulate short household distance without RW. [x]? Met []? Not met []? Partially met   6. Patient will be able to perform a 6 min walking test with LRAD and decreased back pain. [x]? Met []? Not met []? Partially met     Treatment Plan may include any combination of the following: Therapeutic exercise, Neuromuscular re-education, Physical agent/modality, Gait/balance training, Manual therapy, Patient education, Functional mobility training, Home safety training and Stair training    Frequency / Duration: Patient to be seen 2 times per week for 16  treatments.     Certification Period: 12/29/21-3/29/22    PLAN  [x]  Upgrade activities as tolerated     [x]  Continue plan of care  [x]  Update interventions per flow sheet       []  Discharge due to:_  [x]  Other:_ Reassess next visit and extend POC per MD order     Vibha Hahn, PT 2/17/2022

## 2022-02-22 ENCOUNTER — HOSPITAL ENCOUNTER (OUTPATIENT)
Dept: PHYSICAL THERAPY | Age: 72
Discharge: HOME | End: 2022-02-22
Payer: MEDICARE

## 2022-02-22 PROCEDURE — 97110 THERAPEUTIC EXERCISES: CPT

## 2022-02-22 NOTE — PROGRESS NOTES
PT DAILY TREATMENT NOTE - MCR     Patient Name: Austin Main  Date:2022  : 1950  [x]  Patient  Verified  Payor: Ritchie Ponce / Plan: [place] MED[place] PART A & B / Product Type: Medicare /    Treatment Area: Other low back pain [M54.59]     T7-T12 fusion, T9-T10 September  Next MD APPT:   In time:08:30 am  Out time:09:15 am  Total Treatment Time (min):45min  Total Timed Codes (min):45 min  1:1 Treatment Time ( only) 45 min    Visit #16/16     SUBJECTIVE  Pain Level (0-10 scale) pre treatment:  3-4/10  Pain Level (0-10 scale) post treatment:  2/10    Any medication changes, allergies to medications, adverse drug reactions, diagnosis change, or new procedure performed?:   [x] No    [] Yes (see summary sheet for update)     Subjective functional status/changes:   [x] No changes reported  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 Other than that, he doesn't wear it. \"I'm doing pretty good\". Patient stated he was not able to lie on his back before due to pain but now he can. He can move in bed better now with little difficulty. He reports he still has some difficulty getting in and out of a car due to the numbness in his LE still. He indicates a decrease in LE \"buckling sensation. \"      45 min Therapeutic Exercise:  [] See flow sheet :Reassessed   Rationale: increase ROM, increase strength, improve coordination, improve balance and increase proprioception to improve the patients ability to ambulate safely with LRAD to perform ADL's.    min Gait Training:  Abulated with SBQC today, ll bar gait training   Rationale: improve coordination, improve balance and increase proprioception  to improve the patients ability to ambulate safely with LRAD to perform ADL's   min Neuromuscular Re-education:  [x]  See flow sheet : postural re-ed at wall-frequent VC, bowling ball on head analogy, etc   Rationale: increase ROM and increase strength  to improve the patients ability to perform ADLs and ambulate safely      With   [x] TE   [] TA   [] Neuro   [] SC   [] other: Patient Education: [x] Review HEP  Gave him theraband for home  [] Progressed/Changed HEP based on:   [] positioning   [] body mechanics   [] transfers   [] Use of heat/ice    [x] other: Reassessed         Other Objective/Functional Measures:       Spine:   TRUNK ROM:     Flexion:                         [] N/A  []WNL  [x]Minimal  []Moderate  [] Major   Extension:                     [] N/A  []WNL  []Minimal  []Moderate  [x] Major   Right Lateral Flexion:    [] N/A  []WNL  [x]Minimal  []Moderate  [] Major   Left Lateral Flexion:  [] N/A  []WNL  [x]Minimal  []Moderate  [] Major   Rotation to the Right:  [] N/A  []WNL  []Minimal  [x]Moderate  [] Major   Rotation to the Left:   [] N/A  []WNL  []Minimal  [x]Moderate  [] Major   Right Side Glide:           [] N/A  []WNL  []Minimal  []Moderate  [] Major   Left Slide Glide:   [] N/A  []WNL  []Minimal  []Moderate  [] Major  Additional comments: no pain    Hip      AROM       PROM             MMT   R L R L R L   Flexion (120)     4+ 4+   Extension (15)     2 2   Abduction (40)     4 4   Adduction (30)     1 2+   IR (40)         ER (40)         Additional comments: Ext in sideline position    Knee          AROM          PROM                       MMT   R L R L R L   Extension (0)  -3 -10   5 5   Flexion (145)     5 5   Additional comments:     TU sec no AD  6 MWT: 658 ft no AD      ASSESSMENT/Changes in Function:   Patient progress well towards goals meeting STG's and meeting or partially meeting 5 out 6 LTG's. He is still experiencing numbness to bilateral LE. He is able to ambulate short distances without an AD. He was able to perform a 6 MWT today with 2 rest breaks and no AD. He is more aware of his posture and trying to correct. He has tight hamstrings and an extension lag on L LE. He has made improvements with MMT and ROM.  He is wearing back brace but only when attending [employer] where he plays the piano. Patient will continue to benefit from skilled PT services to modify and progress therapeutic interventions, address functional mobility deficits, address ROM deficits, address strength deficits, analyze and address soft tissue restrictions and assess and modify postural abnormalities to attain remaining goals. GOALS/Progress towards goals:  Patient Goal (s): To be independent without walker    []? Met []? Not met [x]? Partially met  1/31/22 short distance only in his house. 2/22 no change  Short Term Goals: To be accomplished in 2-4  treatments. 1. Patient will independent with his HEP to progress with POC. [x]? Met []? Not met []? Partially met    Long Term Goals: To be accomplished in 16  treatments. 1. Patient will gain 1/2 to 1 grade with LE strength to improve stability. [x]? Met []? Not met []? Partially met   2. Patient will reports numbness and tingling are subsiding by 50% since start of therapy. []? Met []? Not met [x]? Partially met 1/31/22 reports 25% decreased  3. Patient will reports no more sensation of B knee buckling during ambulation since start of therapy. []? Met []? Not met [x]? Partially met 1/31/22 reports a decrease in incident, 2/22 decreasing all more  4. Patient will be able to demonstrate proper lifting technique with decreased back pain and discomfort. []? Met [x]? Not met []? Partially met still has pain and discomfort  5. Patient will be able to ambulate short household distance without RW. [x]? Met []? Not met []? Partially met   6. Patient will be able to perform a 6 min walking test with LRAD and decreased back pain. [x]? Met []? Not met []?  Partially met 2/22 ambulated with no AD    Treatment Plan may include any combination of the following: Therapeutic exercise, Neuromuscular re-education, Physical agent/modality, Gait/balance training, Manual therapy, Patient education, Functional mobility training, Home safety training and Stair training    Frequency / Duration: Patient to be seen 2 times per week for 16  treatments.     Certification Period: 12/29/21-3/29/22    PLAN  [x]  Upgrade activities as tolerated     [x]  Continue plan of care  [x]  Update interventions per flow sheet       []  Discharge due to:_  []  Other:_     Jennifer Araujo, PTA 2/22/2022

## 2022-02-22 NOTE — PROGRESS NOTES
[address] [address] [facility] [address] [facility], [address] [place] [address]  Ph: [phone]    Fax: [phone]  Therapy Progress Report  Name: Hilda Jiang  : 1950   MD: Josué Cruz MD     Medical Diagnosis: Other low back pain [M54.59]  Start of Care: 21    Visits from Start of Care: 16     Missed Visits: 0  Certification Period: 21 to 3/29/22    Frequency/Duration: 2 times a week for 16 treatments   Summary of Care/Goals:      Other Objective/Functional Measures:         Spine:   TRUNK ROM:      Flexion:                                  [] N/A  []WNL  [x]Minimal  []Moderate  [] Major   Extension:                             [] N/A  []WNL  []Minimal  []Moderate  [x] Major   Right Lateral Flexion:           [] N/A  []WNL  [x]Minimal  []Moderate  [] Major   Left Lateral Flexion:    [] N/A  []WNL  [x]Minimal  []Moderate  [] Major   Rotation to the Right:           [] N/A  []WNL  []Minimal  [x]Moderate  [] Major   Rotation to the Left:              [] N/A  []WNL  []Minimal  [x]Moderate  [] Major   Right Side Glide:                   [] N/A  []WNL  []Minimal  []Moderate  [] Major   Left Slide Glide:                    [] N/A  []WNL  []Minimal  []Moderate  [] Major  Additional comments: no pain     Hip                                AROM                            PROM                              MMT    R L R L R L   Flexion (120)         4+ 4+   Extension (15)         2 2   Abduction (40)         4 4   Adduction (30)         1 2+   IR (40)               ER (40)               Additional comments: Ext in sideline position     Knee                                 AROM                          PROM                           MMT    R L R L R L   Extension (0)  -3 -10     5 5   Flexion (145)         5 5   Additional comments:      TU sec no AD  6 MWT: 658 ft no AD        ASSESSMENT/Changes in Function:   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 Other than that, he doesn't wear it. \"I'm doing pretty good\". Patient stated he was not able to lie on his back before due to pain but now he can. He can move in bed better now with little difficulty. He reports he still has some difficulty getting in and out of a car due to the numbness in his LE still. He indicates a decrease in LE \"buckling sensation. \"Patient progress well towards goals meeting STG's and meeting or partially meeting 5 out 6 LTG's. He is still experiencing numbness to bilateral LE. He is able to ambulate short distances without an AD. He was able to perform a 6 MWT today with 2 rest breaks and no AD. He is more aware of his posture and trying to correct. He has tight hamstrings and an extension lag on L LE. He has made improvements with MMT and ROM. Patient will continue to benefit from skilled PT services to modify and progress therapeutic interventions, address functional mobility deficits, address ROM deficits, address strength deficits, analyze and address soft tissue restrictions and assess and modify postural abnormalities to attain remaining goals. GOALS/Progress towards goals:  Patient Goal (s): To be independent without walker    [] Met [] Not met [x] Partially met  1/31/22 short distance only in his house. 2/22 no change  Short Term Goals: To be accomplished in 2-4  treatments. 1. Patient will independent with his HEP to progress with POC. [x] Met [] Not met [] Partially met    Long Term Goals: To be accomplished in 16  treatments. 1. Patient will gain 1/2 to 1 grade with LE strength to improve stability. [x] Met [] Not met [] Partially met   2. Patient will reports numbness and tingling are subsiding by 50% since start of therapy. [] Met [] Not met [x] Partially met 1/31/22 reports 25% decreased  3. Patient will reports no more sensation of B knee buckling during ambulation since start of therapy.  [] Met [] Not met [x] Partially met 1/31/22 reports a decrease in incident, 2/22 decreasing all more  4. Patient will be able to demonstrate proper lifting technique with decreased back pain and discomfort. [] Met [x] Not met [] Partially met still has pain and discomfort  5. Patient will be able to ambulate short household distance without RW. [x] Met [] Not met [] Partially met   6. Patient will be able to perform a 6 min walking test with LRAD and decreased back pain. [x] Met [] Not met [] Partially met 2/22 ambulated with no AD     A physical therapist assistant contributed to this documentation. [facility] Score:  31.09%*  Recommendations: Continue per MD  [x]  Plan of care has been reviewed with PTA. Minerva Armendariz, HUMZA 2/22/2022     ________________________________________________________________________     Please retain this original for your records.

## 2022-02-23 NOTE — PROGRESS NOTES
[address] [address] [facility] [address] [facility]  Ph: [phone]    Fax: [phone]  Plan of Care  Name: Brittney Encarnacion  : 1950   MD: Maggie Parekh MD     Medical/Treatment Diagnosis: Other low back pain [M54.59]     Problem List/Impairments: pain affecting function, decrease ROM, decrease strength, edema affecting function, impaired gait/ balance, decrease ADL/ functional abilitiies, decrease activity tolerance, decrease flexibility/ joint mobility and decrease transfer abilities    Start of Care: 21   Visits from Start of Care: 16  Missed Visits: 0  Certification Period: 22- 22    Frequency/Duration: 2 times a week for 12-16 treatments   Treatment Plan may include any combination of the following: Therapeutic exercise, Neuromuscular re-education, Physical agent/modality, Gait/balance training, Manual therapy, Patient education, Functional mobility training and Stair training  [x]  Plan of care has been reviewed with PTA. 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  Other than that, he doesn't wear it.  \"I'm doing pretty good\". Patient stated he was not able to lie on his back before due to pain but now he can. He can move in bed better now with little difficulty. He reports he still has some difficulty getting in and out of a car due to the numbness in his LE still. He indicates a decrease in LE \"buckling sensation. \"Patient progress well towards goals meeting STG's and meeting or partially meeting 5 out 6 LTG's. He is still experiencing numbness to bilateral LE. He is able to ambulate short distances without an AD. He was able to perform a 6 MWT today with 2 rest breaks and no AD. He is more aware of his posture and trying to correct.  He has tight hamstrings and an extension lag on L LE. He has made improvements with MMT and ROM. New order provided to continue with therapy and new POC will be requested. Patient will continue to benefit from skilled PT services to modify and progress therapeutic interventions, address functional mobility deficits, address ROM deficits, address strength deficits, analyze and address soft tissue restrictions and assess and modify postural abnormalities to attain remaining goals.         GOALS/Progress towards goals:  Patient Goal (s): To be independent without walker    []??? Met []? ?? Not met [x]??? Partially met  1/31/22 short distance only in his house. 2/22 no change  Short Term Goals: To be accomplished in 2-4  treatments. 1. Patient will independent with his HEP to progress with POC. [x]? ?? Met []? ?? Not met []? ?? Partially met    Long Term Goals: To be accomplished in 16  treatments. 1. Patient will gain 1/2 to 1 grade with LE strength to improve stability. [x]? ?? Met []? ?? Not met []? ?? Partially met   2. Patient will reports numbness and tingling are subsiding by 50% since start of therapy. []??? Met []? ?? Not met [x]??? Partially met 1/31/22 reports 25% decreased  3. Patient will reports no more sensation of B knee buckling during ambulation since start of therapy. []??? Met []? ?? Not met [x]??? Partially met 1/31/22 reports a decrease in incident, 2/22 decreasing all more  4. Patient will be able to demonstrate proper lifting technique with decreased back pain and discomfort. []??? Met [x]? ?? Not met []? ?? Partially met still has pain and discomfort  5. Patient will be able to ambulate short household distance without RW. [x]? ?? Met []? ?? Not met []? ?? Partially met   6. Patient will be able to perform a 6 min walking test with LRAD and decreased back pain. [x]? ?? Met []? ?? Not met []? ?? Partially met 2/22 ambulated with no AD     A physical therapist assistant contributed to this documentation.      Other Objective/Functional Measures:         Spine:   TRUNK ROM:      Flexion:                                  []? ? N/A  []? ?WNL  [x]? ? Minimal  []? ? Moderate  []? ? Major   Extension:                             []? ? N/A  []? ?WNL  []? ? Minimal  []? ? Moderate  [x]? ? Major   Right Lateral Flexion:           []? ? N/A  []? ?WNL  [x]? ? Minimal  []? ? Moderate  []? ? Major   Left Lateral Flexion:                []?? N/A  []? ?WNL  [x]? ? Minimal  []? ? Moderate  []? ? Major   Rotation to the Right:           []? ? N/A  []? ?WNL  []? ? Minimal  [x]? ? Moderate  []? ? Major   Rotation to the Left:              []? ? N/A  []? ?WNL  []? ? Minimal  [x]? ? Moderate  []? ? Major   Right Side Glide:                   []? ? N/A  []? ?WNL  []? ? Minimal  []? ? Moderate  []? ? Major   Left Slide Glide:                    []? ? N/A  []? ?WNL  []? ? Minimal  []? ? Moderate  []? ? Major  Additional comments: no pain     Hip                                AROM                            PROM                              MMT    R L R L R L   Flexion (120)         4+ 4+   Extension (15)         2 2   Abduction (40)         4 4   Adduction (30)         1 2+   IR (40)               ER (40)               Additional comments: Ext in sideline position     Knee                                 AROM                          PROM                           MMT    R L R L R L   Extension (0)  -3 -10     5 5   Flexion (145)         5 5   Additional comments:      TU sec no AD  6 HWC: 690 ft no AD         Ampac Score:  31.09%*    Recommendations: continue with therapy. Serena Masters, PT, DPT 2022     Retain this original for your records. If you are unable to process this request in 24 hours, please contact our office.   ________________________________________________________________________  NOTE TO PHYSICIAN:  Please complete the following and fax to:   [address] [facility]:  Fax: [phone]   ____ I have read the above report and request that my patient continue therapy. ____ I have read the above report and request that my patient continue therapy with the following changes/special instructions:    ____ I have read the above report and request that my patient be discharged from therapy.     Physician's Signature:_______________________________________________ Date:_____________Time:____________      Rob Vora MD

## 2022-02-24 ENCOUNTER — HOSPITAL ENCOUNTER (OUTPATIENT)
Dept: PHYSICAL THERAPY | Age: 72
Discharge: HOME | End: 2022-02-24
Payer: MEDICARE

## 2022-02-24 PROCEDURE — 97110 THERAPEUTIC EXERCISES: CPT

## 2022-02-24 PROCEDURE — 97112 NEUROMUSCULAR REEDUCATION: CPT

## 2022-02-24 PROCEDURE — 97116 GAIT TRAINING THERAPY: CPT

## 2022-02-24 NOTE — PROGRESS NOTES
PT DAILY TREATMENT NOTE - MCR     Patient Name: Yaniv Garcia  Date:2022  : 1950  [x]  Patient  Verified  Payor: Johnny Hussein / Plan: [place] MEDICARE PART A & B / Product Type: Medicare /    Treatment Area: Other low back pain [M54.59]     T7-T12 fusion, T9-T10 September  Next MD APPT:   In time:08:30 am  Out time:09:15 am  Total Treatment Time (min):45 min  Total Timed Codes (min):45 min  1:1 Treatment Time (MC only) 45 min    Visit #17/16 - 32 ( new POC )    SUBJECTIVE  Pain Level (0-10 scale) pre treatment:  3/10  Pain Level (0-10 scale) post treatment:  2/10    Any medication changes, allergies to medications, adverse drug reactions, diagnosis change, or new procedure performed?:   [x] No    [] Yes (see summary sheet for update)     Subjective functional status/changes:   [x] No changes reported  Pt reports he sometimes ambulates without his RW at home. He does have a cane at home. 15 min Therapeutic Exercise:  [] See flow sheet :Reassessed   Rationale: increase ROM, increase strength, improve coordination, improve balance and increase proprioception to improve the patients ability to ambulate safely with LRAD to perform ADL's.    15 min Gait Training:  Abulated with cane today 5 laps in gym x 135 ft   Rationale: improve coordination, improve balance and increase proprioception  to improve the patients ability to ambulate safely with LRAD to perform ADL's  15 min Neuromuscular Re-education:  [x]  See flow sheet : postural re-ed at wall-frequent VC, bowling ball on head analogy, etc   Rationale: increase ROM and increase strength  to improve the patients ability to perform ADLs and ambulate safely      With   [x] TE   [] TA   [] Neuro   [] SC   [] other: Patient Education: [x] Review HEP  Gave him theraband for home  [] Progressed/Changed HEP based on:   [] positioning   [] body mechanics   [] transfers   [] Use of heat/ice    [x] other: Reassessed         Other Objective/Functional Measures: Patient was able to ambulate with a spc 5x 135 ft SBA x 1. Good arm swing and step length/urvashi. Patient had cane on L side. Added step ups with cues not to use UE.    ASSESSMENT/Changes in Function:   Patient did very well with gait training today ambulating with spc. He had good urvashi/arm swing and step length than when ambulating with RW. He was also able to maintain good posture during ambulation with cane. He was also able to tolerate standing exercises today with only 2 rest breaks. Did discuss using cane at home to get him to move more. Patient has not been consistent with HEP. Patient will continue to benefit from skilled PT services to modify and progress therapeutic interventions, address functional mobility deficits, address ROM deficits, address strength deficits, analyze and address soft tissue restrictions and assess and modify postural abnormalities to attain remaining goals. GOALS/Progress towards goals:  Patient Goal (s): To be independent without walker    []? Met []? Not met [x]? Partially met  1/31/22 short distance only in his house. 2/22 no change  Short Term Goals: To be accomplished in 2-4  treatments. 1. Patient will independent with his HEP to progress with POC. [x]? Met []? Not met []? Partially met    Long Term Goals: To be accomplished in 16  treatments. 1. Patient will gain 1/2 to 1 grade with LE strength to improve stability. [x]? Met []? Not met []? Partially met   2. Patient will reports numbness and tingling are subsiding by 50% since start of therapy. []? Met []? Not met [x]? Partially met 1/31/22 reports 25% decreased  3. Patient will reports no more sensation of B knee buckling during ambulation since start of therapy. []? Met []? Not met [x]? Partially met 1/31/22 reports a decrease in incident, 2/22 decreasing all more  4. Patient will be able to demonstrate proper lifting technique with decreased back pain and discomfort. []? Met [x]? Not met []? Partially met still has pain and discomfort  5. Patient will be able to ambulate short household distance without RW. [x]? Met []? Not met []? Partially met   6. Patient will be able to perform a 6 min walking test with LRAD and decreased back pain. [x]? Met []? Not met []? Partially met 2/22 ambulated with no AD    Treatment Plan may include any combination of the following: Therapeutic exercise, Neuromuscular re-education, Physical agent/modality, Gait/balance training, Manual therapy, Patient education, Functional mobility training, Home safety training and Stair training    Frequency / Duration: Patient to be seen 2 times per week for 16  treatments.     Certification Period: 2/22 to 5/23    PLAN  [x]  Upgrade activities as tolerated     [x]  Continue plan of care  [x]  Update interventions per flow sheet       []  Discharge due to:_  []  Other:_     Jackelin Jung, PTA 2/24/2022

## 2022-03-01 ENCOUNTER — HOSPITAL ENCOUNTER (OUTPATIENT)
Dept: PHYSICAL THERAPY | Age: 72
Discharge: HOME | End: 2022-03-01
Payer: MEDICARE

## 2022-03-01 PROCEDURE — 97112 NEUROMUSCULAR REEDUCATION: CPT

## 2022-03-01 PROCEDURE — 97116 GAIT TRAINING THERAPY: CPT

## 2022-03-01 PROCEDURE — 97110 THERAPEUTIC EXERCISES: CPT

## 2022-03-01 NOTE — PROGRESS NOTES
PT DAILY TREATMENT NOTE - MCR     Patient Name: Austin Oakes  WZPI:  : 1950  [x]  Patient  Verified  Payor: Ritchie Ponce / Plan: [place] B / Product Type: Medicare /    Treatment Area: Other low back pain [M54.59]     T7-T12 fusion, T9-T10 September  Next MD APPT:   In time:09:15 am  Out time:10:00 am  Total Treatment Time (min):45 min  Total Timed Codes (min):45 min  1:1 Treatment Time ([facility] only) 45 min    Visit #18/16 - 32 ( new POC )    SUBJECTIVE  Pain Level (0-10 scale) pre treatment:  3/10  Pain Level (0-10 scale) post treatment:  2/10    Any medication changes, allergies to medications, adverse drug reactions, diagnosis change, or new procedure performed?:   [x] No    [] Yes (see summary sheet for update)     Subjective functional status/changes:   [x] No changes reported  Pt stated he has not been using his brace at all. He does admit he is not performing any exercises at home. \"I know I say I will but I don't. \"    15 min Therapeutic Exercise:  [] See flow sheet :Reassessed   Rationale: increase ROM, increase strength, improve coordination, improve balance and increase proprioception to improve the patients ability to ambulate safely with LRAD to perform ADL's.    15 min Gait Training:  Abulated with cane today 5 laps in gym x 135 ft   Rationale: improve coordination, improve balance and increase proprioception  to improve the patients ability to ambulate safely with LRAD to perform ADL's  15 min Neuromuscular Re-education:  [x]  See flow sheet : postural re-ed at wall-frequent VC, bowling ball on head analogy, etc   Rationale: increase ROM and increase strength  to improve the patients ability to perform ADLs and ambulate safely      With   [x] TE   [] TA   [] Neuro   [] SC   [] other: Patient Education: [x] Review HEP  Gave him theraband for home  [] Progressed/Changed HEP based on:   [] positioning   [] body mechanics   [] transfers   [] Use of heat/ice    [x] other: Reassessed         Other Objective/Functional Measures:   Patient was able to ambulate with a spc 5x 135 ft SBA x 1 for 6 min and 41 sec. Good arm swing and step length/urvashi. Patient had cane on L side. .    ASSESSMENT/Changes in Function:    Patient has not been consistent with HEP. He is ambulating better with spc . He is even able to tolerate standing exercises better with decrease rest breaks. Discussed with patient the importance of performing exercises at home to improve strength, posture and endurance. Patient will continue to benefit from skilled PT services to modify and progress therapeutic interventions, address functional mobility deficits, address ROM deficits, address strength deficits, analyze and address soft tissue restrictions and assess and modify postural abnormalities to attain remaining goals. GOALS/Progress towards goals:  Patient Goal (s): To be independent without walker    []? Met []? Not met [x]? Partially met  1/31/22 short distance only in his house. 2/22 no change  Short Term Goals: To be accomplished in 2-4  treatments. 1. Patient will independent with his HEP to progress with POC. [x]? Met []? Not met []? Partially met    Long Term Goals: To be accomplished in 16  treatments. 1. Patient will gain 1/2 to 1 grade with LE strength to improve stability. [x]? Met []? Not met []? Partially met   2. Patient will reports numbness and tingling are subsiding by 50% since start of therapy. []? Met []? Not met [x]? Partially met 1/31/22 reports 25% decreased  3. Patient will reports no more sensation of B knee buckling during ambulation since start of therapy. []? Met []? Not met [x]? Partially met 1/31/22 reports a decrease in incident, 2/22 decreasing all more  4. Patient will be able to demonstrate proper lifting technique with decreased back pain and discomfort. []? Met [x]? Not met []? Partially met still has pain and discomfort  5.  Patient will be able to ambulate short household distance without RW. [x]? Met []? Not met []? Partially met   6. Patient will be able to perform a 6 min walking test with LRAD and decreased back pain. [x]? Met []? Not met []? Partially met 2/22 ambulated with no AD    Treatment Plan may include any combination of the following: Therapeutic exercise, Neuromuscular re-education, Physical agent/modality, Gait/balance training, Manual therapy, Patient education, Functional mobility training, Home safety training and Stair training    Frequency / Duration: Patient to be seen 2 times per week for 16  treatments.     Certification Period: 2/22 to 5/23    PLAN  [x]  Upgrade activities as tolerated     [x]  Continue plan of care  [x]  Update interventions per flow sheet       []  Discharge due to:_  []  Other:_     Jennifer Araujo, PTA 3/1/2022

## 2022-03-03 ENCOUNTER — HOSPITAL ENCOUNTER (OUTPATIENT)
Dept: PHYSICAL THERAPY | Age: 72
Discharge: HOME | End: 2022-03-03
Payer: MEDICARE

## 2022-03-03 PROCEDURE — 97110 THERAPEUTIC EXERCISES: CPT

## 2022-03-03 PROCEDURE — 97112 NEUROMUSCULAR REEDUCATION: CPT

## 2022-03-03 NOTE — PROGRESS NOTES
PT DAILY TREATMENT NOTE - MCR     Patient Name: Stacie Dc  LFPW:  : 1950  [x]  Patient  Verified  Payor: Melanie Wheat / Plan: [place] MEDICARE PART A & B / Product Type: Medicare /    Treatment Area: Other low back pain [M54.59]     T7-T12 fusion, T9-T10 September  Next MD APPT:   In time:09:15 am  Out time:10:00 am  Total Treatment Time (min):45 min  Total Timed Codes (min):45 min  1:1 Treatment Time ([facility] only) 45 min    Visit #/16 - 32 ( new POC )    SUBJECTIVE  Pain Level (0-10 scale) pre treatment:  3/10  Pain Level (0-10 scale) post treatment:  2/10    Any medication changes, allergies to medications, adverse drug reactions, diagnosis change, or new procedure performed?:   [x] No    [] Yes (see summary sheet for update)     Subjective functional status/changes:   [x] No changes reported  Pt stated he has not been consistent with doing his exercises or ambulating at home.     30 min Therapeutic Exercise:  [] See flow sheet :Reassessed   Rationale: increase ROM, increase strength, improve coordination, improve balance and increase proprioception to improve the patients ability to ambulate safely with LRAD to perform ADL's.    min Gait Training:  Abulated with cane today 5 laps in gym x 135 ft   Rationale: improve coordination, improve balance and increase proprioception  to improve the patients ability to ambulate safely with LRAD to perform ADL's  15 min Neuromuscular Re-education:  [x]  See flow sheet : postural re-ed at wall-frequent VC, bowling ball on head analogy, etc   Rationale: increase ROM and increase strength  to improve the patients ability to perform ADLs and ambulate safely      With   [x] TE   [] TA   [] Neuro   [] SC   [] other: Patient Education: [x] Review HEP    [] Progressed/Changed HEP based on:   [] positioning   [] body mechanics   [] transfers   [] Use of heat/ice    [] other:          Other Objective/Functional Measures:   Patient was only able to ambulate with a spc 2x 135 ft SBA . Used mirror today for feedback on exercises    ASSESSMENT/Changes in Function:   Patient had 2 episodes of instability of L LE when performing exercises. He also had decreased endurance today during ambulation and needed to stop. He was able to tolerate standing exercises with weights. Patient has not been compliant with exercises for HEP. He also needed cues to  his feet instead of sliding them while ambulating. Patient will continue to benefit from skilled PT services to modify and progress therapeutic interventions, address functional mobility deficits, address ROM deficits, address strength deficits, analyze and address soft tissue restrictions and assess and modify postural abnormalities to attain remaining goals. GOALS/Progress towards goals:  Patient Goal (s): To be independent without walker    []? Met []? Not met [x]? Partially met  1/31/22 short distance only in his house. 2/22 no change  Short Term Goals: To be accomplished in 2-4  treatments. 1. Patient will independent with his HEP to progress with POC. [x]? Met []? Not met []? Partially met    Long Term Goals: To be accomplished in 16  treatments. 1. Patient will gain 1/2 to 1 grade with LE strength to improve stability. [x]? Met []? Not met []? Partially met   2. Patient will reports numbness and tingling are subsiding by 50% since start of therapy. []? Met []? Not met [x]? Partially met 1/31/22 reports 25% decreased  3. Patient will reports no more sensation of B knee buckling during ambulation since start of therapy. []? Met []? Not met [x]? Partially met 1/31/22 reports a decrease in incident, 2/22 decreasing all more  4. Patient will be able to demonstrate proper lifting technique with decreased back pain and discomfort. []? Met [x]? Not met []? Partially met still has pain and discomfort  5. Patient will be able to ambulate short household distance without RW. [x]? Met []? Not met []? Partially met   6.  Patient will be able to perform a 6 min walking test with LRAD and decreased back pain. [x]? Met []? Not met []? Partially met 2/22 ambulated with no AD    Treatment Plan may include any combination of the following: Therapeutic exercise, Neuromuscular re-education, Physical agent/modality, Gait/balance training, Manual therapy, Patient education, Functional mobility training, Home safety training and Stair training    Frequency / Duration: Patient to be seen 2 times per week for 16  treatments.     Certification Period: 2/22 to 5/23    PLAN  [x]  Upgrade activities as tolerated     [x]  Continue plan of care  [x]  Update interventions per flow sheet       []  Discharge due to:_  []  Other:_     Jennifer Araujo, PTA 3/3/2022

## 2022-03-08 ENCOUNTER — HOSPITAL ENCOUNTER (OUTPATIENT)
Dept: PHYSICAL THERAPY | Age: 72
Discharge: HOME | End: 2022-03-08
Payer: MEDICARE

## 2022-03-08 PROCEDURE — 97110 THERAPEUTIC EXERCISES: CPT

## 2022-03-08 NOTE — PROGRESS NOTES
PT DAILY TREATMENT NOTE - MCR     Patient Name: Jorge Luis Hernandez  Date:3/8/2022  : 1950  [x]  Patient  Verified  Payor: Abdoulaye Maldonado / Plan: [place] MEDICARE PART A & B / Product Type: Medicare /    Treatment Area: Other low back pain [M54.59]     T7-T12 fusion, T9-T10 September  Next MD APPT:   In time:08:30 am  Out time:09:15 am  Total Treatment Time (min):45 min  Total Timed Codes (min):45 min  1:1 Treatment Time ( [address] only)45 min    Visit #20/16 - 32 ( new POC )    SUBJECTIVE  Pain Level (0-10 scale) pre treatment:  3-4/10  Pain Level (0-10 scale) post treatment: 3-4/10    Any medication changes, allergies to medications, adverse drug reactions, diagnosis change, or new procedure performed?:   [x] No    [] Yes (see summary sheet for update)     Subjective functional status/changes:   [x] No changes reported  Pt stated he sleeps and eats good. He was given a new RW from the [place]. He still reports he is not doing any HEP. \"I walked a lot yesterday going from one appointment to another at the [place]. I even drove to the [place]. \"    45 min Therapeutic Exercise:  [] See flow sheet :Reassessed   Rationale: increase ROM, increase strength, improve coordination, improve balance and increase proprioception to improve the patients ability to ambulate safely with LRAD to perform ADL's.    min Gait Training:  Abulated with cane today 5 laps in gym x 135 ft   Rationale: improve coordination, improve balance and increase proprioception  to improve the patients ability to ambulate safely with LRAD to perform ADL's   min Neuromuscular Re-education:  [x]  See flow sheet : postural re-ed at wall-frequent VC, bowling ball on head analogy, etc   Rationale: increase ROM and increase strength  to improve the patients ability to perform ADLs and ambulate safely      With   [x] TE   [] TA   [] Neuro   [] SC   [] other: Patient Education: [x] Review HEP    [] Progressed/Changed HEP based on:   [] positioning   [] body mechanics   [] transfers [] Use of heat/ice    [] other:          Other Objective/Functional Measures:   Adjusted new RW from [place] and added tennis balls. Worked on MAT exercises due to noncompliance with HEP    ASSESSMENT/Changes in Function:   Discussed with patient the importance of performing HEP. Patient was very stiff today and had difficulty ambulating without sliding his feet. Adjusted RW today due to patient leaning over RW due to height. He is still non compliant with HEP and had difficulty with mat exercises. He did ambulate through out  the [place] without difficulty and drove. He also had difficulty with bed mobilities , which had improved. No changes with pain levels post treatment session. Patient will continue to benefit from skilled PT services to modify and progress therapeutic interventions, address functional mobility deficits, address ROM deficits, address strength deficits, analyze and address soft tissue restrictions and assess and modify postural abnormalities to attain remaining goals. GOALS/Progress towards goals:  Patient Goal (s): To be independent without walker    []? Met []? Not met [x]? Partially met  1/31/22 short distance only in his house. 2/22 no change  Short Term Goals: To be accomplished in 2-4  treatments. 1. Patient will independent with his HEP to progress with POC. [x]? Met []? Not met []? Partially met    Long Term Goals: To be accomplished in 16  treatments. 1. Patient will gain 1/2 to 1 grade with LE strength to improve stability. [x]? Met []? Not met []? Partially met   2. Patient will reports numbness and tingling are subsiding by 50% since start of therapy. []? Met []? Not met [x]? Partially met 1/31/22 reports 25% decreased  3. Patient will reports no more sensation of B knee buckling during ambulation since start of therapy. []? Met []? Not met [x]? Partially met 1/31/22 reports a decrease in incident, 2/22 decreasing all more  4.  Patient will be able to demonstrate proper lifting technique with decreased back pain and discomfort. []? Met [x]? Not met []? Partially met still has pain and discomfort  5. Patient will be able to ambulate short household distance without RW. [x]? Met []? Not met []? Partially met   6. Patient will be able to perform a 6 min walking test with LRAD and decreased back pain. [x]? Met []? Not met []? Partially met 2/22 ambulated with no AD    Treatment Plan may include any combination of the following: Therapeutic exercise, Neuromuscular re-education, Physical agent/modality, Gait/balance training, Manual therapy, Patient education, Functional mobility training, Home safety training and Stair training    Frequency / Duration: Patient to be seen 2 times per week for 16  treatments.     Certification Period: 2/22 to 5/23    PLAN  [x]  Upgrade activities as tolerated     [x]  Continue plan of care  [x]  Update interventions per flow sheet       []  Discharge due to:_  []  Other:_     Jennifer Araujo, PTA 3/8/2022

## 2022-03-10 ENCOUNTER — HOSPITAL ENCOUNTER (OUTPATIENT)
Dept: PHYSICAL THERAPY | Age: 72
Discharge: HOME | End: 2022-03-10
Payer: MEDICARE

## 2022-03-10 PROCEDURE — 97112 NEUROMUSCULAR REEDUCATION: CPT

## 2022-03-10 PROCEDURE — 97110 THERAPEUTIC EXERCISES: CPT

## 2022-03-10 NOTE — PROGRESS NOTES
PT DAILY TREATMENT NOTE - MCR     Patient Name: Hilda Jiang  Date:3/10/2022  : 1950  [x]  Patient  Verified  Payor: Nabila Tripp / Plan: [place] MEDICARE PART A & B / Product Type: Medicare /    Treatment Area: Other low back pain [M54.59]     T7-T12 fusion, T9-T10 September  Next MD APPT:   In time:08:30 am  Out time:09:20 am  Total Treatment Time (min):45 min  Total Timed Codes (min):45 min  1:1 Treatment Time ([address] only)45 min    Visit #21   ( new POC )    SUBJECTIVE  Pain Level (0-10 scale) pre treatment:  3-4/10  Pain Level (0-10 scale) post treatment: 3-4/10    Any medication changes, allergies to medications, adverse drug reactions, diagnosis change, or new procedure performed?:   [x] No    [] Yes (see summary sheet for update)     Subjective functional status/changes:   [x] No changes reported  Pt stated he tried to do a few of the exercises yesterday. \"I'm trying and I know I should do the exercises. \"    30 min Therapeutic Exercise:  [] See flow sheet :Reassessed   Rationale: increase ROM, increase strength, improve coordination, improve balance and increase proprioception to improve the patients ability to ambulate safely with LRAD to perform ADL's.    min Gait Training:  Abulated with cane today 5 laps in gym x 135 ft   Rationale: improve coordination, improve balance and increase proprioception  to improve the patients ability to ambulate safely with LRAD to perform ADL's  15 min Neuromuscular Re-education:  [x]  See flow sheet : postural re-ed at // bars   Rationale: increase ROM and increase strength  to improve the patients ability to perform ADLs and ambulate safely      With   [x] TE   [] TA   [] Neuro   [] SC   [] other: Patient Education: [x] Review HEP    [] Progressed/Changed HEP based on:   [] positioning   [] body mechanics   [] transfers   [] Use of heat/ice    [] other:          Other Objective/Functional Measures:   Cont with POC    ASSESSMENT/Changes in Function:   Patient was able to perform standing exercises for posture today with only 2 rest breaks. Patient has very tight hamstrings and ER rotates LE. He also has ext. Lag on LE L > R. He was able to ambulate short distances in the gym to equipment. He did have some discomfort with bed mobilities again and does not want to perform log roll. No change in pain levels post treatment session. Patient will continue to benefit from skilled PT services to modify and progress therapeutic interventions, address functional mobility deficits, address ROM deficits, address strength deficits, analyze and address soft tissue restrictions and assess and modify postural abnormalities to attain remaining goals. GOALS/Progress towards goals:  Patient Goal (s): To be independent without walker    []? Met []? Not met [x]? Partially met  1/31/22 short distance only in his house. 2/22 no change  Short Term Goals: To be accomplished in 2-4  treatments. 1. Patient will independent with his HEP to progress with POC. [x]? Met []? Not met []? Partially met    Long Term Goals: To be accomplished in 16  treatments. 1. Patient will gain 1/2 to 1 grade with LE strength to improve stability. [x]? Met []? Not met []? Partially met   2. Patient will reports numbness and tingling are subsiding by 50% since start of therapy. []? Met []? Not met [x]? Partially met 1/31/22 reports 25% decreased  3. Patient will reports no more sensation of B knee buckling during ambulation since start of therapy. []? Met []? Not met [x]? Partially met 1/31/22 reports a decrease in incident, 2/22 decreasing all more  4. Patient will be able to demonstrate proper lifting technique with decreased back pain and discomfort. []? Met [x]? Not met []? Partially met still has pain and discomfort  5. Patient will be able to ambulate short household distance without RW. [x]? Met []? Not met []? Partially met   6.  Patient will be able to perform a 6 min walking test with LRAD and decreased back pain. [x]? Met []? Not met []? Partially met 2/22 ambulated with no AD    Treatment Plan may include any combination of the following: Therapeutic exercise, Neuromuscular re-education, Physical agent/modality, Gait/balance training, Manual therapy, Patient education, Functional mobility training, Home safety training and Stair training    Frequency / Duration: Patient to be seen 2 times per week for 16  treatments.     Certification Period: 2/22 to 5/23    PLAN  [x]  Upgrade activities as tolerated     [x]  Continue plan of care  [x]  Update interventions per flow sheet       []  Discharge due to:_  []  Other:_     Jennifer Araujo, PTA 3/10/2022

## 2022-03-15 ENCOUNTER — HOSPITAL ENCOUNTER (OUTPATIENT)
Dept: PHYSICAL THERAPY | Age: 72
Discharge: HOME | End: 2022-03-15
Payer: MEDICARE

## 2022-03-15 PROCEDURE — 97110 THERAPEUTIC EXERCISES: CPT

## 2022-03-15 NOTE — PROGRESS NOTES
PT DAILY TREATMENT NOTE - MCR     Patient Name: Austin Main  Date:3/15/2022  : 1950  [x]  Patient  Verified  Payor: Ritchie Ponce / Plan: [place] MEDICARE PART A & B / Product Type: Medicare /    Treatment Area: Other low back pain [M54.59]     T7-T12 fusion, T9-T10 September  Next MD APPT:   In time:08:30 am  Out time:09:20 am  5 minTotal Treatment Time (min):50 min  Total Timed Codes (min) 50 min  1:1 Treatment Time ([facility] only) 50 min    Visit #22   ( new POC )    SUBJECTIVE  Pain Level (0-10 scale) pre treatment:  3-4/10  Pain Level (0-10 scale) post treatment:3/10    Any medication changes, allergies to medications, adverse drug reactions, diagnosis change, or new procedure performed?:   [x] No    [] Yes (see summary sheet for update)     Subjective functional status/changes:   [x] No changes reported  Patient stated he gets an injection today for his cancer treatment. Gets a CT scan 3/30/22. He states he is feeling better today    45 min Therapeutic Exercise:  [] See flow sheet :Reassessed   Rationale: increase ROM, increase strength, improve coordination, improve balance and increase proprioception to improve the patients ability to ambulate safely with LRAD to perform ADL's.   5 min Gait Training:  Abulated with cane today 5 laps in gym x 135 ft   Rationale: improve coordination, improve balance and increase proprioception  to improve the patients ability to ambulate safely with LRAD to perform ADL's   min Neuromuscular Re-education:  [x]  See flow sheet : postural re-ed at // bars   Rationale: increase ROM and increase strength  to improve the patients ability to perform ADLs and ambulate safely      With   [x] TE   [] TA   [] Neuro   [] SC   [] other: Patient Education: [x] Review HEP    [] Progressed/Changed HEP based on:   [] positioning   [] body mechanics   [] transfers   [] Use of heat/ice    [] other:          Other Objective/Functional Measures:   Cont with POC    ASSESSMENT/Changes in Function:   Patient standing upright better and ambulating at a faster pace today. He was able to ambulate with cane with increase in urvashi as well. He still has discomfort lying on his back . Patient getting injection in stomach today for cancer treatment. He will also be getting CT scan on 3/30/22. Patient also reporting compliance with HEP. Slight decrease in pain post treatment session. Patient will continue to benefit from skilled PT services to modify and progress therapeutic interventions, address functional mobility deficits, address ROM deficits, address strength deficits, analyze and address soft tissue restrictions and assess and modify postural abnormalities to attain remaining goals. GOALS/Progress towards goals:  Patient Goal (s): To be independent without walker    []? Met []? Not met [x]? Partially met  1/31/22 short distance only in his house. 2/22 no change  Short Term Goals: To be accomplished in 2-4  treatments. 1. Patient will independent with his HEP to progress with POC. [x]? Met []? Not met []? Partially met    Long Term Goals: To be accomplished in 16  treatments. 1. Patient will gain 1/2 to 1 grade with LE strength to improve stability. [x]? Met []? Not met []? Partially met   2. Patient will reports numbness and tingling are subsiding by 50% since start of therapy. []? Met []? Not met [x]? Partially met 1/31/22 reports 25% decreased  3. Patient will reports no more sensation of B knee buckling during ambulation since start of therapy. []? Met []? Not met [x]? Partially met 1/31/22 reports a decrease in incident, 2/22 decreasing all more  4. Patient will be able to demonstrate proper lifting technique with decreased back pain and discomfort. []? Met [x]? Not met []? Partially met still has pain and discomfort  5. Patient will be able to ambulate short household distance without RW. [x]? Met []? Not met []? Partially met   6.  Patient will be able to perform a 6 min walking test with LRAD and decreased back pain. [x]? Met []? Not met []? Partially met 2/22 ambulated with no AD    Treatment Plan may include any combination of the following: Therapeutic exercise, Neuromuscular re-education, Physical agent/modality, Gait/balance training, Manual therapy, Patient education, Functional mobility training, Home safety training and Stair training    Frequency / Duration: Patient to be seen 2 times per week for 16  treatments.     Certification Period: 2/22 to 5/23    PLAN  [x]  Upgrade activities as tolerated     [x]  Continue plan of care  [x]  Update interventions per flow sheet       []  Discharge due to:_  []  Other:_     Jennifer Araujo, PTA 3/15/2022

## 2022-03-17 ENCOUNTER — HOSPITAL ENCOUNTER (OUTPATIENT)
Dept: PHYSICAL THERAPY | Age: 72
Discharge: HOME | End: 2022-03-17
Payer: MEDICARE

## 2022-03-17 PROCEDURE — 97110 THERAPEUTIC EXERCISES: CPT

## 2022-03-17 PROCEDURE — 97116 GAIT TRAINING THERAPY: CPT

## 2022-03-17 NOTE — PROGRESS NOTES
PT DAILY TREATMENT NOTE - MCR     Patient Name: Anthony Santizo  Date:3/17/2022  : 1950  [x]  Patient  Verified  Payor: Nilesh Onofre / Plan: [place] / Product Type: Medicare /    Treatment Area: Other low back pain [M54.59]     T7-T12 fusion, T9-T10 September  Next MD APPT:   In time:08:30 am  Out time:09:15m  5 minTotal Treatment Time (min):45 min  Total Timed Codes (min) 45 min  1:1 Treatment Time (MC only) 45 min    Visit #23   ( new POC )    SUBJECTIVE  Pain Level (0-10 scale) pre treatment:  3/10  Pain Level (0-10 scale) post treatment:2/10    Any medication changes, allergies to medications, adverse drug reactions, diagnosis change, or new procedure performed?:   [x] No    [] Yes (see summary sheet for update)     Subjective functional status/changes:   [x] No changes reported  Patient stated he felt fine after getting injections . 35 min Therapeutic Exercise:  [] See flow sheet :Reassessed   Rationale: increase ROM, increase strength, improve coordination, improve balance and increase proprioception to improve the patients ability to ambulate safely with LRAD to perform ADL's. 10 min Gait Training:  Abulated with cane today 5 laps in gym x 135 ft   Rationale: improve coordination, improve balance and increase proprioception  to improve the patients ability to ambulate safely with LRAD to perform ADL's   min Neuromuscular Re-education:  [x]  See flow sheet : postural re-ed at // bars   Rationale: increase ROM and increase strength  to improve the patients ability to perform ADLs and ambulate safely      With   [x] TE   [] TA   [] Neuro   [] SC   [] other: Patient Education: [x] Review HEP    [] Progressed/Changed HEP based on:   [] positioning   [] body mechanics   [] transfers   [] Use of heat/ice    [] other:          Other Objective/Functional Measures:   Patient performed a 6MWT with [facility] 884ft.     ASSESSMENT/Changes in Function:   Patient able to perform 6 MWT  Today with increase in distance. He is not compliant with HEP. He is using UE less with step up and with spc but not RW. He is still ambulating with head down and slight forward lean. He is getting injection for cancer treatments. Slight decrease in pain post treatment session. Patient will continue to benefit from skilled PT services to modify and progress therapeutic interventions, address functional mobility deficits, address ROM deficits, address strength deficits, analyze and address soft tissue restrictions and assess and modify postural abnormalities to attain remaining goals. GOALS/Progress towards goals:  Patient Goal (s): To be independent without walker    []? Met []? Not met [x]? Partially met  1/31/22 short distance only in his house. 2/22 no change  Short Term Goals: To be accomplished in 2-4  treatments. 1. Patient will independent with his HEP to progress with POC. [x]? Met []? Not met []? Partially met    Long Term Goals: To be accomplished in 16  treatments. 1. Patient will gain 1/2 to 1 grade with LE strength to improve stability. [x]? Met []? Not met []? Partially met   2. Patient will reports numbness and tingling are subsiding by 50% since start of therapy. []? Met []? Not met [x]? Partially met 1/31/22 reports 25% decreased  3. Patient will reports no more sensation of B knee buckling during ambulation since start of therapy. []? Met []? Not met [x]? Partially met 1/31/22 reports a decrease in incident, 2/22 decreasing all more  4. Patient will be able to demonstrate proper lifting technique with decreased back pain and discomfort. []? Met [x]? Not met []? Partially met still has pain and discomfort  5. Patient will be able to ambulate short household distance without RW. [x]? Met []? Not met []? Partially met   6. Patient will be able to perform a 6 min walking test with LRAD and decreased back pain. [x]? Met []? Not met []?  Partially met 2/22 ambulated with no AD    Treatment Plan may include any combination of the following: Therapeutic exercise, Neuromuscular re-education, Physical agent/modality, Gait/balance training, Manual therapy, Patient education, Functional mobility training, Home safety training and Stair training    Frequency / Duration: Patient to be seen 2 times per week for 16  treatments.     Certification Period: 2/22 to 5/23    PLAN  [x]  Upgrade activities as tolerated     [x]  Continue plan of care  [x]  Update interventions per flow sheet       []  Discharge due to:_  []  Other:_     Jennifer Araujo, PTA 3/17/2022

## 2022-03-18 PROBLEM — R29.898 LOWER EXTREMITY WEAKNESS: Status: ACTIVE | Noted: 2021-09-25

## 2022-03-18 PROBLEM — Z98.1 S/P LUMBAR SPINAL FUSION: Status: ACTIVE | Noted: 2021-12-14

## 2022-03-18 PROBLEM — R26.2 AMBULATORY DYSFUNCTION: Status: ACTIVE | Noted: 2021-09-25

## 2022-03-18 PROBLEM — R59.0 RETROPERITONEAL LYMPHADENOPATHY: Status: ACTIVE | Noted: 2021-09-25

## 2022-03-19 PROBLEM — M16.11 OSTEOARTHRITIS OF RIGHT HIP: Status: ACTIVE | Noted: 2018-03-19

## 2022-03-19 PROBLEM — M48.061 SPINAL STENOSIS OF LUMBAR REGION: Status: ACTIVE | Noted: 2021-09-25

## 2022-03-19 PROBLEM — Z85.46 HISTORY OF PROSTATE CANCER: Status: ACTIVE | Noted: 2021-09-25

## 2022-03-19 PROBLEM — M89.9 BONE LESION: Status: ACTIVE | Noted: 2021-09-25

## 2022-03-19 PROBLEM — G96.198 MASS IN EPIDURAL SPACE: Status: ACTIVE | Noted: 2021-09-26

## 2022-03-19 PROBLEM — M48.9 MASS OF THORACIC VERTEBRA: Status: ACTIVE | Noted: 2021-09-26

## 2022-03-19 PROBLEM — M89.9 LESION OF BONE OF THORACIC SPINE: Status: ACTIVE | Noted: 2021-09-26

## 2022-03-19 PROBLEM — M16.9 DEGENERATIVE JOINT DISEASE (DJD) OF HIP: Status: ACTIVE | Noted: 2018-03-19

## 2022-03-20 PROBLEM — M54.41 CHRONIC BILATERAL LOW BACK PAIN WITH BILATERAL SCIATICA: Status: ACTIVE | Noted: 2021-09-25

## 2022-03-20 PROBLEM — M54.42 CHRONIC BILATERAL LOW BACK PAIN WITH BILATERAL SCIATICA: Status: ACTIVE | Noted: 2021-09-25

## 2022-03-20 PROBLEM — G89.29 CHRONIC BILATERAL LOW BACK PAIN WITH BILATERAL SCIATICA: Status: ACTIVE | Noted: 2021-09-25

## 2022-03-22 ENCOUNTER — HOSPITAL ENCOUNTER (OUTPATIENT)
Dept: PHYSICAL THERAPY | Age: 72
Discharge: HOME | End: 2022-03-22
Payer: MEDICARE

## 2022-03-22 PROCEDURE — 97116 GAIT TRAINING THERAPY: CPT

## 2022-03-22 PROCEDURE — 97110 THERAPEUTIC EXERCISES: CPT

## 2022-03-22 NOTE — PROGRESS NOTES
PT DAILY TREATMENT NOTE - MCR     Patient Name: Jeanenne Essex  Date:3/22/2022  : 1950  [x]  Patient  Verified  Payor: [employer]iraj [employer] / Plan: [place] MEDICARE PART A & B / Product Type: Medicare /    Treatment Area: Other low back pain [M54.59]     T7-T12 fusion, T9-T10 September  Next MD APPT:   In time:08:30 am  Out time:09:15 am  5 minTotal Treatment Time (min):40 min  Total Timed Codes (min) 40 min  1:1 Treatment Time (MC only) 40 min    Visit #24   ( new POC )    SUBJECTIVE  Pain Level (0-10 scale) pre treatment:  2/10  Pain Level (0-10 scale) post treatment:1-2/10    Any medication changes, allergies to medications, adverse drug reactions, diagnosis change, or new procedure performed?:   [x] No    [] Yes (see summary sheet for update)     Subjective functional status/changes:   [x] No changes reported  Patient stated he is a little confused about his cancer diagnosis. \"I'm going to get a CT-scan 3/31/22 and will find out more. \"He reports he received an injection in his stomach then they gave him an injection in his arm for his bones. He was not sure what it was for. 30 min Therapeutic Exercise:  [] See flow sheet :Reassessed   Rationale: increase ROM, increase strength, improve coordination, improve balance and increase proprioception to improve the patients ability to ambulate safely with LRAD to perform ADL's.    15 min Gait Training:  Abulated with cane today 5 laps in gym x 135 ft   Rationale: improve coordination, improve balance and increase proprioception  to improve the patients ability to ambulate safely with LRAD to perform ADL's   min Neuromuscular Re-education:  [x]  See flow sheet : postural re-ed at // bars   Rationale: increase ROM and increase strength  to improve the patients ability to perform ADLs and ambulate safely      With   [x] TE   [] TA   [] Neuro   [] SC   [] other: Patient Education: [x] Review HEP    [] Progressed/Changed HEP based on:   [] positioning   [] body mechanics [] transfers   [] Use of heat/ice    [] other:          Other Objective/Functional Measures:   Ambulated with cane 270 ft x 3 Patient picks up his feet better with cane than RW.    ASSESSMENT/Changes in Function:   Patient still using RW but ambulates better with spc. He picks up his feet instead of shuffling and has less forward posture. He also appears to have more endurance and does not fatigue as quickly. He is independent with exercises and requires min cues. He had less discomfort in supine position today. He received 2 injections last week for his cancer treatment and is expected to get an CT-scan at the end of the month. No increase in pian post treatment session. Patient will continue to benefit from skilled PT services to modify and progress therapeutic interventions, address functional mobility deficits, address ROM deficits, address strength deficits, analyze and address soft tissue restrictions and assess and modify postural abnormalities to attain remaining goals. GOALS/Progress towards goals:  Patient Goal (s): To be independent without walker    []? Met []? Not met [x]? Partially met  1/31/22 short distance only in his house. 2/22 no change  Short Term Goals: To be accomplished in 2-4  treatments. 1. Patient will independent with his HEP to progress with POC. [x]? Met []? Not met []? Partially met    Long Term Goals: To be accomplished in 16  treatments. 1. Patient will gain 1/2 to 1 grade with LE strength to improve stability. [x]? Met []? Not met []? Partially met   2. Patient will reports numbness and tingling are subsiding by 50% since start of therapy. []? Met []? Not met [x]? Partially met 1/31/22 reports 25% decreased  3. Patient will reports no more sensation of B knee buckling during ambulation since start of therapy. []? Met []? Not met [x]? Partially met 1/31/22 reports a decrease in incident, 2/22 decreasing all more  4.  Patient will be able to demonstrate proper lifting technique with decreased back pain and discomfort. []? Met [x]? Not met []? Partially met still has pain and discomfort  5. Patient will be able to ambulate short household distance without RW. [x]? Met []? Not met []? Partially met   6. Patient will be able to perform a 6 min walking test with LRAD and decreased back pain. [x]? Met []? Not met []? Partially met 2/22 ambulated with no AD    Treatment Plan may include any combination of the following: Therapeutic exercise, Neuromuscular re-education, Physical agent/modality, Gait/balance training, Manual therapy, Patient education, Functional mobility training, Home safety training and Stair training    Frequency / Duration: Patient to be seen 2 times per week for 16  treatments.     Certification Period: 2/22 to 5/23    PLAN  [x]  Upgrade activities as tolerated     [x]  Continue plan of care  [x]  Update interventions per flow sheet       []  Discharge due to:_  []  Other:_     Latanya Buchanan, PTA 3/22/2022

## 2022-03-24 ENCOUNTER — HOSPITAL ENCOUNTER (OUTPATIENT)
Dept: PHYSICAL THERAPY | Age: 72
Discharge: HOME | End: 2022-03-24
Payer: MEDICARE

## 2022-03-24 PROCEDURE — 97110 THERAPEUTIC EXERCISES: CPT

## 2022-03-24 NOTE — PROGRESS NOTES
PT DAILY TREATMENT NOTE - MCR     Patient Name: Wilma Dozier  Date:3/24/2022  : 1950  [x]  Patient  Verified  Payor: Lg Bhat / Plan: [place] MEDICARE PART A & B / Product Type: Medicare /    Treatment Area: Other low back pain [M54.59]     T7-T12 fusion, T9-T10 September  Next MD APPT:   In time:08:30 am  Out time:09:15 am  5 minTotal Treatment Time (min):45 min  Total Timed Codes (min) 45min  1:1 Treatment Time (MC only) 45 min    Visit #25/32   ( new POC )    SUBJECTIVE  Pain Level (0-10 scale) pre treatment:  2/10  Pain Level (0-10 scale) post treatment:1-2/10    Any medication changes, allergies to medications, adverse drug reactions, diagnosis change, or new procedure performed?:   [x] No    [] Yes (see summary sheet for update)     Subjective functional status/changes:   [x] No changes reported  Patient reporting his L LE still micha but not everyday, maybe every 1 or 2 days. He sung not use an AD at home and using RW community distances. He understands he needs to do more at home with his exercise program. He has been driving more without difficulty.      45 min Therapeutic Exercise:  [] See flow sheet :Reassessed   Rationale: increase ROM, increase strength, improve coordination, improve balance and increase proprioception to improve the patients ability to ambulate safely with LRAD to perform ADL's.    min Gait Training:  Abulated with cane today 5 laps in gym x 135 ft   Rationale: improve coordination, improve balance and increase proprioception  to improve the patients ability to ambulate safely with LRAD to perform ADL's   min Neuromuscular Re-education:  [x]  See flow sheet : postural re-ed at // bars   Rationale: increase ROM and increase strength  to improve the patients ability to perform ADLs and ambulate safely      With   [x] TE   [] TA   [] Neuro   [] SC   [] other: Patient Education: [x] Review HEP    [] Progressed/Changed HEP based on:   [] positioning   [] body mechanics   [] transfers   [] Use of heat/ice    [x] other: Reassessed         Other Objective/Functional Measures:   Spine:   TRUNK ROM:      Flexion:                                  []??? N/A  []? ?? WNL  [x]? ? ? Minimal  []? ? ? Moderate  []??? Major   Extension:                             []??? N/A  []? ?? WNL  []? ? ? Minimal  []? ? ? Moderate  [x]? ?? Major   Right Lateral Flexion:           []??? N/A  []? ?? WNL  [x]? ? ? Minimal  []? ? ? Moderate  []??? Major   Left Lateral Flexion:               []??? N/A  []? ?? WNL  [x]? ? ? Minimal  []? ? ? Moderate  []??? Major   Rotation to the Right:           []??? N/A  []? ?? WNL  []? ? ? Minimal  [x]? ? ? Moderate  []??? Major   Rotation to the Left:              []??? N/A  []? ?? WNL  []? ? ? Minimal  [x]? ? ? Moderate  []??? Major   Right Side Glide:                   []??? N/A  []? ?? WNL  []? ? ? Minimal  []? ? ? Moderate  []??? Major   Left Slide Glide:                    []??? N/A  []? ?? WNL  []? ? ? Minimal  []? ? ? Moderate  []??? Major  Additional comments: no pain     Hip                                AROM                            PROM                              MMT    R L R L R L   Flexion (120)         4+ 4+   Extension (15)         2 2   Abduction (40)         4 4   Adduction (30)         1 2+   IR (40)               ER (40)               Additional comments: Ext in sideline position     Knee                                 AROM                          PROM                           MMT    R L R L R L   Extension (0)  0 -5     5 5   Flexion (145)         5 5   Additional comments:      TUG: 10 no AD  6 MWT: 811 ft spc      ASSESSMENT/Changes in Function:   Patient still using RW but ambulates better with spc. He picks up his feet instead of shuffling and has less forward posture. He also appears to have more endurance and does not fatigue as quickly. He improved with his 6 MWT using a spc. He does not use an AD at home. He has made improvement with Knee  ROM. No changes with ROM to lumbar spine or MMT.  No changes with % of numbness down LE (25%). No increase in pian post treatment session. Patient will continue to benefit from skilled PT services to modify and progress therapeutic interventions, address functional mobility deficits, address ROM deficits, address strength deficits, analyze and address soft tissue restrictions and assess and modify postural abnormalities to attain remaining goals. GOALS/Progress towards goals:  Patient Goal (s): To be independent without walker    []? Met []? Not met [x]? Partially met  1/31/22 short distance only in his house. 2/22 no change  Short Term Goals: To be accomplished in 2-4  treatments. 1. Patient will independent with his HEP to progress with POC. [x]? Met []? Not met []? Partially met    Long Term Goals: To be accomplished in 16  treatments. 1. Patient will gain 1/2 to 1 grade with LE strength to improve stability. [x]? Met []? Not met []? Partially met   2. Patient will reports numbness and tingling are subsiding by 50% since start of therapy. []? Met []? Not met [x]? Partially met 1/31/22 reports 25% decreased 3/24 same 25%  3. Patient will reports no more sensation of B knee buckling during ambulation since start of therapy. []? Met []? Not met [x]? Partially met 3/24/22 every 1 to 2 days  4. Patient will be able to demonstrate proper lifting technique with decreased back pain and discomfort. []? Met [x]? Not met []? Partially met still has pain and discomfort  5. Patient will be able to ambulate short household distance without RW. [x]? Met []? Not met []? Partially met   6. Patient will be able to perform a 6 min walking test with LRAD and decreased back pain. [x]? Met []? Not met []?  Partially met     Treatment Plan may include any combination of the following: Therapeutic exercise, Neuromuscular re-education, Physical agent/modality, Gait/balance training, Manual therapy, Patient education, Functional mobility training, Home safety training and Stair training    Frequency / Duration: Patient to be seen 2 times per week for 16  treatments.     Certification Period: 2/22 to 5/23    PLAN  [x]  Upgrade activities as tolerated     [x]  Continue plan of care  [x]  Update interventions per flow sheet       []  Discharge due to:_  []  Other:_     Rosa Montalvo, PTA 3/24/2022

## 2022-03-24 NOTE — PROGRESS NOTES
[address]  Ph: [phone]    Fax: [phone]  Therapy Progress Report  Name: Simon Bess  : 1950   MD: Bernadette He MD     Medical Diagnosis: Other low back pain [M54.59]  Start of Care:22    Visits from Start of Care: 25     Missed Visits:0  Certification Period: 22 to 22    Frequency/Duration: 2 times a week for 16-32 treatments   Summary of Care/Goals: Other Objective/Functional Measures:   Spine:   TRUNK ROM:      Flexion:                                  []???? N/A  []????WNL  [x]? ???Minimal  []????Moderate  []???? Major   Extension:                             []???? N/A  []????WNL  []?? ??Minimal  []????Moderate  [x]? ??? Major   Right Lateral Flexion:           []???? N/A  []????WNL  [x]? ???Minimal  []????Moderate  []???? Major   Left Lateral Flexion:               []???? N/A  []????WNL  [x]? ???Minimal  []????Moderate  []???? Major   Rotation to the Right:           []???? N/A  []????WNL  []?? ??Minimal  [x]? ???Moderate  []???? Major   Rotation to the Left:              []???? N/A  []????WNL  []?? ??Minimal  [x]? ???Moderate  []???? Major   Right Side Glide:                   []???? N/A  []????WNL  []?? ??Minimal  []????Moderate  []???? Major   Left Slide Glide:                    []???? N/A  []????WNL  []?? ??Minimal  []????Moderate  []???? Major  Additional comments: no pain     Hip                                AROM                            PROM                              MMT    R L R L R L   Flexion (120)         4+ 4+   Extension (15)         2 2   Abduction (40)         4 4   Adduction (30)         1 2+   IR (40)               ER (40)               Additional comments: Ext in sideline position     Knee                                 AROM                          PROM                           MMT    R L R L R L   Extension (0)  0 -5     5 5   Flexion (145)         5 5 Additional comments:      TUG: 10 no AD  6 MWT: 811 ft spc        ASSESSMENT/Changes in Function:   Patient still using RW but ambulates better with spc. He picks up his feet instead of shuffling and has less forward posture. He also appears to have more endurance and does not fatigue as quickly. He improved with his 6 MWT using a spc. He does not use an AD at home. He has made improvement with Knee  ROM. No changes with ROM to lumbar spine or MMT. No changes with % of numbness down LE (25%). No increase in pian post treatment session. Patient will continue to benefit from skilled PT services to modify and progress therapeutic interventions, address functional mobility deficits, address ROM deficits, address strength deficits, analyze and address soft tissue restrictions and assess and modify postural abnormalities to attain remaining goals.         GOALS/Progress towards goals:  Patient Goal (s): To be independent without walker    []? ? Met []? ? Not met [x]? ? Partially met  1/31/22 short distance only in his house. 2/22 no change  Short Term Goals: To be accomplished in 2-4  treatments. 1. Patient will independent with his HEP to progress with POC. [x]? ? Met []? ? Not met []? ? Partially met    Long Term Goals: To be accomplished in 16  treatments. 1. Patient will gain 1/2 to 1 grade with LE strength to improve stability. [x]? ? Met []? ? Not met []? ? Partially met   2. Patient will reports numbness and tingling are subsiding by 50% since start of therapy. []?? Met []? ? Not met [x]? ? Partially met 1/31/22 reports 25% decreased 3/24 same 25%  3. Patient will reports no more sensation of B knee buckling during ambulation since start of therapy. []?? Met []? ? Not met [x]? ? Partially met 3/24/22 every 1 to 2 days  4. Patient will be able to demonstrate proper lifting technique with decreased back pain and discomfort. []?? Met [x]? ? Not met []? ? Partially met still has pain and discomfort  5.  Patient will be able to ambulate short household distance without RW. [x]? ? Met []? ? Not met []? ? Partially met   6. Patient will be able to perform a 6 min walking test with LRAD and decreased back pain. [x]? ? Met []? ? Not met []? ? Partially met         A physical therapist assistant contributed to this documentation.        Ampac Score:  21.30%  Recommendations: Cont with POC  [x]  Plan of care has been reviewed with PTA. Yumiko Dinh PTA 3/24/2022     ________________________________________________________________________     Please retain this original for your records.

## 2022-03-29 ENCOUNTER — HOSPITAL ENCOUNTER (OUTPATIENT)
Dept: PHYSICAL THERAPY | Age: 72
Discharge: HOME | End: 2022-03-29
Payer: MEDICARE

## 2022-03-29 PROCEDURE — 97116 GAIT TRAINING THERAPY: CPT

## 2022-03-29 PROCEDURE — 97110 THERAPEUTIC EXERCISES: CPT

## 2022-03-29 NOTE — PROGRESS NOTES
PT DAILY TREATMENT NOTE - MCR     Patient Name: Vitor Res  Date:3/29/2022  : 1950  [x]  Patient  Verified  Payor: Michael Mckinney / Plan: [place] B / Product Type: Medicare /    Treatment Area: Other low back pain [M54.59]     T7-T12 fusion, T9-T10 September  Next MD APPT:   In time:08:23 am  Out time:09:10 am  5 minTotal Treatment Time (min):45 min  Total Timed Codes (min) 45 min  1:1 Treatment Time (MC only) 45min    Visit #26/32   ( new POC )    SUBJECTIVE  Pain Level (0-10 scale) pre treatment:  3/10  Pain Level (0-10 scale) post treatment:2/10    Any medication changes, allergies to medications, adverse drug reactions, diagnosis change, or new procedure performed?:   [x] No    [] Yes (see summary sheet for update)     Subjective functional status/changes:   [x] No changes reported  Patient stated he is doing better doing his exercises at home. He is going for his CT-scan tomorrow . \"They keep saying I have cancer in the bones . I don't know. I believe that's why they are doing the scan. \"      35 min Therapeutic Exercise:  [] See flow sheet :Reassessed   Rationale: increase ROM, increase strength, improve coordination, improve balance and increase proprioception to improve the patients ability to ambulate safely with LRAD to perform ADL's.    10 min Gait Training:  Abulated with cane today 5 laps in gym x 135 ft   Rationale: improve coordination, improve balance and increase proprioception  to improve the patients ability to ambulate safely with LRAD to perform ADL's   min Neuromuscular Re-education:  [x]  See flow sheet : postural re-ed at // bars   Rationale: increase ROM and increase strength  to improve the patients ability to perform ADLs and ambulate safely      With   [x] TE   [] TA   [] Neuro   [] SC   [] other: Patient Education: [x] Review HEP    [] Progressed/Changed HEP based on:   [] positioning   [] body mechanics   [] transfers   [] Use of heat/ice    [x] other: Reassessed         Other Objective/Functional Measures:       ASSESSMENT/Changes in Function:   Patient tolerated treatment well today and was more aware of his posture during exercise. He was able to increase distance today without any SOB noted or cues to not drag foot. Patient only took 2 rest breaks. He required less cues with all exercises today. Dencrease in pain noted post treatment session. Patient will continue to benefit from skilled PT services to modify and progress therapeutic interventions, address functional mobility deficits, address ROM deficits, address strength deficits, analyze and address soft tissue restrictions and assess and modify postural abnormalities to attain remaining goals. GOALS/Progress towards goals:  Patient Goal (s): To be independent without walker    []? Met []? Not met [x]? Partially met  1/31/22 short distance only in his house. 2/22 no change  Short Term Goals: To be accomplished in 2-4  treatments. 1. Patient will independent with his HEP to progress with POC. [x]? Met []? Not met []? Partially met    Long Term Goals: To be accomplished in 16  treatments. 1. Patient will gain 1/2 to 1 grade with LE strength to improve stability. [x]? Met []? Not met []? Partially met   2. Patient will reports numbness and tingling are subsiding by 50% since start of therapy. []? Met []? Not met [x]? Partially met 1/31/22 reports 25% decreased 3/24 same 25%  3. Patient will reports no more sensation of B knee buckling during ambulation since start of therapy. []? Met []? Not met [x]? Partially met 3/24/22 every 1 to 2 days  4. Patient will be able to demonstrate proper lifting technique with decreased back pain and discomfort. []? Met [x]? Not met []? Partially met still has pain and discomfort  5. Patient will be able to ambulate short household distance without RW. [x]? Met []? Not met []? Partially met   6.  Patient will be able to perform a 6 min walking test with LRAD and decreased back pain. [x]? Met []? Not met []? Partially met     Treatment Plan may include any combination of the following: Therapeutic exercise, Neuromuscular re-education, Physical agent/modality, Gait/balance training, Manual therapy, Patient education, Functional mobility training, Home safety training and Stair training    Frequency / Duration: Patient to be seen 2 times per week for 16  treatments.     Certification Period: 2/22 to 5/23    PLAN  [x]  Upgrade activities as tolerated     [x]  Continue plan of care  [x]  Update interventions per flow sheet       []  Discharge due to:_  []  Other:_     Jennifer Araujo, PTA 3/29/2022

## 2022-03-31 ENCOUNTER — HOSPITAL ENCOUNTER (OUTPATIENT)
Dept: PHYSICAL THERAPY | Age: 72
Discharge: HOME | End: 2022-03-31
Payer: MEDICARE

## 2022-03-31 DIAGNOSIS — Z98.1 S/P LUMBAR SPINAL FUSION: ICD-10-CM

## 2022-03-31 DIAGNOSIS — M54.41 CHRONIC BILATERAL LOW BACK PAIN WITH BILATERAL SCIATICA: ICD-10-CM

## 2022-03-31 DIAGNOSIS — M54.42 CHRONIC BILATERAL LOW BACK PAIN WITH BILATERAL SCIATICA: ICD-10-CM

## 2022-03-31 DIAGNOSIS — G89.29 CHRONIC BILATERAL LOW BACK PAIN WITH BILATERAL SCIATICA: ICD-10-CM

## 2022-03-31 PROCEDURE — 97110 THERAPEUTIC EXERCISES: CPT

## 2022-03-31 PROCEDURE — 97116 GAIT TRAINING THERAPY: CPT

## 2022-03-31 NOTE — PROGRESS NOTES
PT DAILY TREATMENT NOTE - MCR     Patient Name: Tyson Colindres  Date:3/31/2022  : 1950  [x]  Patient  Verified  Payor: Norma Dupree / Plan: [place] MEDICARE PART A & B / Product Type: Medicare /    Treatment Area: Other low back pain [M54.59]     T7-T12 fusion, T9-T10 September  Next MD APPT:   In time:08:40 am  Out time:09:22 am  5 minTotal Treatment Time (min):42 min  Total Timed Codes (min) 42min  1:1 Treatment Time (MC only) 42min    Visit #26/32   ( new POC )    SUBJECTIVE  Pain Level (0-10 scale) pre treatment:  2/10  Pain Level (0-10 scale) post treatment:2/10    Any medication changes, allergies to medications, adverse drug reactions, diagnosis change, or new procedure performed?:   [x] No    [] Yes (see summary sheet for update)     Subjective functional status/changes:   [x] No changes reported  Patient stated he did not have to do the scan it got cancelled and rescheduled for . Lupe Strong said my insurance denied it and I'm going to call because my insurance usually pays for everything. \"    32 min Therapeutic Exercise:  [] See flow sheet :Reassessed   Rationale: increase ROM, increase strength, improve coordination, improve balance and increase proprioception to improve the patients ability to ambulate safely with LRAD to perform ADL's.    10 min Gait Training:  Abulated with cane today 5 laps in gym x 135 ft   Rationale: improve coordination, improve balance and increase proprioception  to improve the patients ability to ambulate safely with LRAD to perform ADL's   min Neuromuscular Re-education:  [x]  See flow sheet : postural re-ed at // bars   Rationale: increase ROM and increase strength  to improve the patients ability to perform ADLs and ambulate safely      With   [x] TE   [] TA   [] Neuro   [] SC   [] other: Patient Education: [x] Review HEP    [] Progressed/Changed HEP based on:   [] positioning   [] body mechanics   [] transfers   [] Use of heat/ice    [x] other: Reassessed         Other Objective/Functional Measures:       ASSESSMENT/Changes in Function:   Patient progressing well less cues needed for posture. He is ambulating better with the cane and was able to increase distance today of 1080 ft. Decrease in pain noted post treatment session. Patient will continue to benefit from skilled PT services to modify and progress therapeutic interventions, address functional mobility deficits, address ROM deficits, address strength deficits, analyze and address soft tissue restrictions and assess and modify postural abnormalities to attain remaining goals. GOALS/Progress towards goals:  Patient Goal (s): To be independent without walker    []? Met []? Not met [x]? Partially met  1/31/22 short distance only in his house. 2/22 no change  Short Term Goals: To be accomplished in 2-4  treatments. 1. Patient will independent with his HEP to progress with POC. [x]? Met []? Not met []? Partially met    Long Term Goals: To be accomplished in 16  treatments. 1. Patient will gain 1/2 to 1 grade with LE strength to improve stability. [x]? Met []? Not met []? Partially met   2. Patient will reports numbness and tingling are subsiding by 50% since start of therapy. []? Met []? Not met [x]? Partially met 1/31/22 reports 25% decreased 3/24 same 25%  3. Patient will reports no more sensation of B knee buckling during ambulation since start of therapy. []? Met []? Not met [x]? Partially met 3/24/22 every 1 to 2 days  4. Patient will be able to demonstrate proper lifting technique with decreased back pain and discomfort. []? Met [x]? Not met []? Partially met still has pain and discomfort  5. Patient will be able to ambulate short household distance without RW. [x]? Met []? Not met []? Partially met   6. Patient will be able to perform a 6 min walking test with LRAD and decreased back pain. [x]? Met []? Not met []?  Partially met     Treatment Plan may include any combination of the following: Therapeutic exercise, Neuromuscular re-education, Physical agent/modality, Gait/balance training, Manual therapy, Patient education, Functional mobility training, Home safety training and Stair training    Frequency / Duration: Patient to be seen 2 times per week for 16  treatments.     Certification Period: 2/22 to 5/23    PLAN  [x]  Upgrade activities as tolerated     [x]  Continue plan of care  [x]  Update interventions per flow sheet       []  Discharge due to:_  []  Other:_     Hans Mojica, PTA 3/31/2022

## 2022-04-05 ENCOUNTER — HOSPITAL ENCOUNTER (OUTPATIENT)
Dept: PHYSICAL THERAPY | Age: 72
Discharge: HOME | End: 2022-04-05
Payer: MEDICARE

## 2022-04-05 PROCEDURE — 97110 THERAPEUTIC EXERCISES: CPT

## 2022-04-05 PROCEDURE — 97116 GAIT TRAINING THERAPY: CPT

## 2022-04-05 NOTE — PROGRESS NOTES
PT DAILY TREATMENT NOTE - MCR     Patient Name: Anum Zurita  Date:2022  : 1950  [x]  Patient  Verified  Payor: Mehdi Thompson / Plan: [place] MEDICARE PART A & B / Product Type: Medicare /    Treatment Area: Other low back pain [M54.59]     T7-T12 fusion, T9-T10 September  Next MD APPT:   In time:08:30 am  Out time:09:13 am  5 minTotal Treatment Time (min):43 min  Total Timed Codes (min) 43min  1:1 Treatment Time (MC only) 43min    Visit #28/32   ( new POC )    SUBJECTIVE  Pain Level (0-10 scale) pre treatment:  2/10  Pain Level (0-10 scale) post treatment:2/10    Any medication changes, allergies to medications, adverse drug reactions, diagnosis change, or new procedure performed?:   [x] No    [] Yes (see summary sheet for update)     Subjective functional status/changes:   [x] No changes reported  Patient stated his pain is about the same. Patient stated he has been able to sleep on either side and back now. \"I prefer to sleep on my R side. 33 min Therapeutic Exercise:  [] See flow sheet :Reassessed   Rationale: increase ROM, increase strength, improve coordination, improve balance and increase proprioception to improve the patients ability to ambulate safely with LRAD to perform ADL's.    10 min Gait Training:  Abulated with cane today 5 laps in gym x 135 ft   Rationale: improve coordination, improve balance and increase proprioception  to improve the patients ability to ambulate safely with LRAD to perform ADL's   min Neuromuscular Re-education:  [x]  See flow sheet : postural re-ed at // bars   Rationale: increase ROM and increase strength  to improve the patients ability to perform ADLs and ambulate safely      With   [x] TE   [] TA   [] Neuro   [] SC   [] other: Patient Education: [x] Review HEP    [] Progressed/Changed HEP based on:   [] positioning   [] body mechanics   [] transfers   [] Use of heat/ice    [x] other: Reassessed         Other Objective/Functional Measures:   Ambulated 3.5 min with no AD for 431 ft. He also ambulated in gym without AD. ASSESSMENT/Changes in Function:   Patient ambulates better without RW and the use of a cane. He tends to lean forward with RW , like  pushing a cart. He is more aware of posture with cane or no AD. He was able to perform step ups with 1 HHA today and no difficulty. He reports he is now able to sleep on back and sides without difficult or pain. `  Decrease in pain noted post treatment session. Patient will continue to benefit from skilled PT services to modify and progress therapeutic interventions, address functional mobility deficits, address ROM deficits, address strength deficits, analyze and address soft tissue restrictions and assess and modify postural abnormalities to attain remaining goals. GOALS/Progress towards goals:  Patient Goal (s): To be independent without walker    []? Met []? Not met [x]? Partially met  1/31/22 short distance only in his house. 2/22 no change  Short Term Goals: To be accomplished in 2-4  treatments. 1. Patient will independent with his HEP to progress with POC. [x]? Met []? Not met []? Partially met    Long Term Goals: To be accomplished in 16  treatments. 1. Patient will gain 1/2 to 1 grade with LE strength to improve stability. [x]? Met []? Not met []? Partially met   2. Patient will reports numbness and tingling are subsiding by 50% since start of therapy. []? Met []? Not met [x]? Partially met 1/31/22 reports 25% decreased 3/24 same 25%  3. Patient will reports no more sensation of B knee buckling during ambulation since start of therapy. []? Met []? Not met [x]? Partially met 3/24/22 every 1 to 2 days  4. Patient will be able to demonstrate proper lifting technique with decreased back pain and discomfort. []? Met [x]? Not met []? Partially met still has pain and discomfort  5. Patient will be able to ambulate short household distance without RW. [x]? Met []? Not met []? Partially met   6.  Patient will be able to perform a 6 min walking test with LRAD and decreased back pain. [x]? Met []? Not met []? Partially met     Treatment Plan may include any combination of the following: Therapeutic exercise, Neuromuscular re-education, Physical agent/modality, Gait/balance training, Manual therapy, Patient education, Functional mobility training, Home safety training and Stair training    Frequency / Duration: Patient to be seen 2 times per week for 16  treatments.     Certification Period: 2/22 to 5/23    PLAN  [x]  Upgrade activities as tolerated     [x]  Continue plan of care  [x]  Update interventions per flow sheet       []  Discharge due to:_  []  Other:_     Jennifer Araujo, PTA 4/5/2022

## 2022-04-07 ENCOUNTER — HOSPITAL ENCOUNTER (OUTPATIENT)
Dept: PHYSICAL THERAPY | Age: 72
Discharge: HOME | End: 2022-04-07
Payer: MEDICARE

## 2022-04-07 PROCEDURE — 97116 GAIT TRAINING THERAPY: CPT

## 2022-04-07 PROCEDURE — 97110 THERAPEUTIC EXERCISES: CPT

## 2022-04-07 NOTE — PROGRESS NOTES
PT DAILY TREATMENT NOTE - MCR     Patient Name: Deedee Thornton  DULY:[id number]  : 1950  [x]  Patient  Verified  Payor: Dominique Ch / Plan: [place] MEDICARE PART A & B / Product Type: Medicare /    Treatment Area: Other low back pain [M54.59]     T7-T12 fusion, T9-T10 September  Next MD APPT:   In time:08:30 am  Out time:09:15 am  5 minTotal Treatment Time (min):45 min  Total Timed Codes (min) 45min  1:1 Treatment Time (MC only) 45min    Visit #29/32   ( new POC )    SUBJECTIVE  Pain Level (0-10 scale) pre treatment:  2/10  Pain Level (0-10 scale) post treatment:2/10    Any medication changes, allergies to medications, adverse drug reactions, diagnosis change, or new procedure performed?:   [x] No    [] Yes (see summary sheet for update)     Subjective functional status/changes:   [x] No changes reported  Patient stated his pain is about the same. \"I've been watching other people leaning over their walkers too. It's such a bad habit to break. \" Patient reports his L LE does not buckle for days then all of a sudden it does. 'I do not have to take some of this medication the last time I had cancer. \"    35 min Therapeutic Exercise:  [] See flow sheet :Reassessed   Rationale: increase ROM, increase strength, improve coordination, improve balance and increase proprioception to improve the patients ability to ambulate safely with LRAD to perform ADL's.    10 min Gait Training:  Abulated with cane today    Rationale: improve coordination, improve balance and increase proprioception  to improve the patients ability to ambulate safely with LRAD to perform ADL's   min Neuromuscular Re-education:  [x]  See flow sheet : postural re-ed at // bars   Rationale: increase ROM and increase strength  to improve the patients ability to perform ADLs and ambulate safely      With   [x] TE   [] TA   [] Neuro   [] SC   [] other: Patient Education: [x] Review HEP    [] Progressed/Changed HEP based on:   [] positioning   [] body mechanics   [] transfers   [] Use of heat/ice    [x] other: Reassessed         Other Objective/Functional Measures:   Fatigued noted today with ambulation using cane on R side. ASSESSMENT/Changes in Function:   Patient more aware of posture during ambulation today. He is also reporting decrease in instability to L LE. Patient concerned about all the medication he is taking. Advised him to discuss medication with his Doctors or pharmacist. Patient will continue to benefit from skilled PT services to modify and progress therapeutic interventions, address functional mobility deficits, address ROM deficits, address strength deficits, analyze and address soft tissue restrictions and assess and modify postural abnormalities to attain remaining goals. GOALS/Progress towards goals:  Patient Goal (s): To be independent without walker    []? Met []? Not met [x]? Partially met  1/31/22 short distance only in his house. 2/22 no change  Short Term Goals: To be accomplished in 2-4  treatments. 1. Patient will independent with his HEP to progress with POC. [x]? Met []? Not met []? Partially met    Long Term Goals: To be accomplished in 16  treatments. 1. Patient will gain 1/2 to 1 grade with LE strength to improve stability. [x]? Met []? Not met []? Partially met   2. Patient will reports numbness and tingling are subsiding by 50% since start of therapy. []? Met []? Not met [x]? Partially met 1/31/22 reports 25% decreased 3/24 same 25%  3. Patient will reports no more sensation of B knee buckling during ambulation since start of therapy. []? Met []? Not met [x]? Partially met 3/24/22 every 1 to 2 days  4. Patient will be able to demonstrate proper lifting technique with decreased back pain and discomfort. []? Met [x]? Not met []? Partially met still has pain and discomfort  5. Patient will be able to ambulate short household distance without RW. [x]? Met []? Not met []? Partially met   6.  Patient will be able to perform a 6 min walking test with LRAD and decreased back pain. [x]? Met []? Not met []? Partially met     Treatment Plan may include any combination of the following: Therapeutic exercise, Neuromuscular re-education, Physical agent/modality, Gait/balance training, Manual therapy, Patient education, Functional mobility training, Home safety training and Stair training    Frequency / Duration: Patient to be seen 2 times per week for 16  treatments.     Certification Period: 2/22 to 5/23    PLAN  [x]  Upgrade activities as tolerated     [x]  Continue plan of care  [x]  Update interventions per flow sheet       []  Discharge due to:_  []  Other:_     Jennifer Araujo, PTA 4/7/2022

## 2022-04-12 ENCOUNTER — HOSPITAL ENCOUNTER (OUTPATIENT)
Dept: PHYSICAL THERAPY | Age: 72
Discharge: HOME | End: 2022-04-12
Payer: MEDICARE

## 2022-04-12 PROCEDURE — 97110 THERAPEUTIC EXERCISES: CPT

## 2022-04-12 PROCEDURE — 97116 GAIT TRAINING THERAPY: CPT

## 2022-04-12 NOTE — PROGRESS NOTES
PT DAILY TREATMENT NOTE - MCR     Patient Name: Donavan Gip  Date:2022  : 1950  [x]  Patient  Verified  Payor: Yue Perez / Plan: [place] MEDICARE PART A & B / Product Type: Medicare /    Treatment Area: Other low back pain [M54.59]     T7-T12 fusion, T9-T10 September  Next MD APPT:   In time:08:30 am  Out time:09:15 am  5 minTotal Treatment Time (min):45 min  Total Timed Codes (min) 45min  1:1 Treatment Time (MC only) 45min    Visit #30/32   ( new POC )    SUBJECTIVE  Pain Level (0-10 scale) pre treatment:  2/10  Pain Level (0-10 scale) post treatment: 2/10    Any medication changes, allergies to medications, adverse drug reactions, diagnosis change, or new procedure performed?:   [x] No    [] Yes (see summary sheet for update)     Subjective functional status/changes:   [x] No changes reported  Patient stated his pain is about the same. 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 He has not had any falls. He is able to drive to [place] for MD appointments but also has other people drive him to [employer] and [employer]. Patient stated he sees cancer MD today. 35 min Therapeutic Exercise:  [] See flow sheet :Reassessed   Rationale: increase ROM, increase strength, improve coordination, improve balance and increase proprioception to improve the patients ability to ambulate safely with LRAD to perform ADL's.    10 min Gait Training:  Abulated with cane today    Rationale: improve coordination, improve balance and increase proprioception  to improve the patients ability to ambulate safely with LRAD to perform ADL's   min Neuromuscular Re-education:  [x]  See flow sheet : postural re-ed at // bars   Rationale: increase ROM and increase strength  to improve the patients ability to perform ADLs and ambulate safely      With   [x] TE   [] TA   [] Neuro   [] SC   [] other: Patient Education: [x] Review HEP    [] Progressed/Changed HEP based on:   [] positioning   [] body mechanics   [] transfers   [] Use of heat/ice    [x] other: Reassessed         Other Objective/Functional Measures:   Patient was able to ambulate for 10 min with cane for 1,215ft. ASSESSMENT/Changes in Function:   Patient has an appointment today with cancer MD .He was able to ambulate for 10 min and increased distance today. He ambulates better with cane  But using RW for community distances. He is not wearing back brace at all. No change in pain range post treatment session. Patient will continue to benefit from skilled PT services to modify and progress therapeutic interventions, address functional mobility deficits, address ROM deficits, address strength deficits, analyze and address soft tissue restrictions and assess and modify postural abnormalities to attain remaining goals. GOALS/Progress towards goals:  Patient Goal (s): To be independent without walker    []? Met []? Not met [x]? Partially met  1/31/22 short distance only in his house. 2/22 no change  Short Term Goals: To be accomplished in 2-4  treatments. 1. Patient will independent with his HEP to progress with POC. [x]? Met []? Not met []? Partially met    Long Term Goals: To be accomplished in 16  treatments. 1. Patient will gain 1/2 to 1 grade with LE strength to improve stability. [x]? Met []? Not met []? Partially met   2. Patient will reports numbness and tingling are subsiding by 50% since start of therapy. []? Met []? Not met [x]? Partially met 1/31/22 reports 25% decreased 3/24 same 25%  3. Patient will reports no more sensation of B knee buckling during ambulation since start of therapy. []? Met []? Not met [x]? Partially met 3/24/22 every 1 to 2 days  4. Patient will be able to demonstrate proper lifting technique with decreased back pain and discomfort. []? Met [x]? Not met []? Partially met still has pain and discomfort  5.  Patient will be able to ambulate short household distance without RW. [x]? Met []? Not met []? Partially met   6. Patient will be able to perform a 6 min walking test with LRAD and decreased back pain. [x]? Met []? Not met []? Partially met     Treatment Plan may include any combination of the following: Therapeutic exercise, Neuromuscular re-education, Physical agent/modality, Gait/balance training, Manual therapy, Patient education, Functional mobility training, Home safety training and Stair training    Frequency / Duration: Patient to be seen 2 times per week for 16  treatments.     Certification Period: 2/22 to 5/23    PLAN  [x]  Upgrade activities as tolerated     [x]  Continue plan of care  [x]  Update interventions per flow sheet       []  Discharge due to:_  []  Other:_     Kayla Jones, PTA 4/12/2022

## 2022-04-14 ENCOUNTER — HOSPITAL ENCOUNTER (OUTPATIENT)
Dept: PHYSICAL THERAPY | Age: 72
Discharge: HOME | End: 2022-04-14
Payer: MEDICARE

## 2022-04-14 PROCEDURE — 97116 GAIT TRAINING THERAPY: CPT

## 2022-04-14 PROCEDURE — 97110 THERAPEUTIC EXERCISES: CPT

## 2022-04-14 NOTE — PROGRESS NOTES
PT DAILY TREATMENT NOTE - [facility]     Patient Name: Danae Mike  Date:2022  : 1950  [x]  Patient  Verified  Payor: Yoni Silva / Plan: [place] / Product Type: Medicare /    Treatment Area: Other low back pain [M54.59]     T7-T12 fusion, T9-T10 September  Next MD APPT:   In time:08:30 am  Out time:09:15 am  5 minTotal Treatment Time (min):45 min  Total Timed Codes (min) 45min  1:1 Treatment Time (MC only) 45min    Visit #31/32   ( new POC )    SUBJECTIVE  Pain Level (0-10 scale) pre treatment:  2/10  Pain Level (0-10 scale) post treatment: 2/10    Any medication changes, allergies to medications, adverse drug reactions, diagnosis change, or new procedure performed?:   [x] No    [] Yes (see summary sheet for update)     Subjective functional status/changes:   [x] No changes reported  Patient stated his pain is about the same. Patient stated he saw the cancer doctor  and was informed that indeed the cancer has spread to his bones and that is what caused the tumor in his back. He is going for a scan on 22 to determine where the cancer has spread. He states he was supposed to get an injection but declinebecause he is supposed to be getting implants for his teeth and has to be off medication. 35 min Therapeutic Exercise:  [] See flow sheet :Reassessed   Rationale: increase ROM, increase strength, improve coordination, improve balance and increase proprioception to improve the patients ability to ambulate safely with LRAD to perform ADL's.    10 min Gait Training:  Abulated with cane today    Rationale: improve coordination, improve balance and increase proprioception  to improve the patients ability to ambulate safely with LRAD to perform ADL's   min Neuromuscular Re-education:  [x]  See flow sheet : postural re-ed at // bars   Rationale: increase ROM and increase strength  to improve the patients ability to perform ADLs and ambulate safely      With   [x] TE   [] TA   [] Neuro [] SC   [] other: Patient Education: [x] Review HEP    [] Progressed/Changed HEP based on:   [] positioning   [] body mechanics   [] transfers   [] Use of heat/ice    [x] other: Reassessed         Other Objective/Functional Measures:   Reviewed all HEP for patient to continue on his own. ASSESSMENT/Changes in Function:   Patient reporting he will be getting a scan to determine how much the cancer has spread. He did not get an injection due to getting implants in his teeth and needs to be off certain medication. He appears motivated to continue on his own with HEP. He is able to drive and play the piano without any pain or discomfort. Plan to discharge after next session. No change in pain range post treatment session. Patient will continue to benefit from skilled PT services to modify and progress therapeutic interventions, address functional mobility deficits, address ROM deficits, address strength deficits, analyze and address soft tissue restrictions and assess and modify postural abnormalities to attain remaining goals. GOALS/Progress towards goals:  Patient Goal (s): To be independent without walker    []? Met []? Not met [x]? Partially met  1/31/22 short distance only in his house. 2/22 no change  Short Term Goals: To be accomplished in 2-4  treatments. 1. Patient will independent with his HEP to progress with POC. [x]? Met []? Not met []? Partially met    Long Term Goals: To be accomplished in 16  treatments. 1. Patient will gain 1/2 to 1 grade with LE strength to improve stability. [x]? Met []? Not met []? Partially met   2. Patient will reports numbness and tingling are subsiding by 50% since start of therapy. []? Met []? Not met [x]? Partially met 1/31/22 reports 25% decreased 3/24 same 25%  3. Patient will reports no more sensation of B knee buckling during ambulation since start of therapy. []? Met []? Not met [x]? Partially met 3/24/22 every 1 to 2 days  4.  Patient will be able to demonstrate proper lifting technique with decreased back pain and discomfort. []? Met [x]? Not met []? Partially met still has pain and discomfort  5. Patient will be able to ambulate short household distance without RW. [x]? Met []? Not met []? Partially met   6. Patient will be able to perform a 6 min walking test with LRAD and decreased back pain. [x]? Met []? Not met []? Partially met     Treatment Plan may include any combination of the following: Therapeutic exercise, Neuromuscular re-education, Physical agent/modality, Gait/balance training, Manual therapy, Patient education, Functional mobility training, Home safety training and Stair training    Frequency / Duration: Patient to be seen 2 times per week for 16  treatments.     Certification Period: 2/22 to 5/23    PLAN  [x]  Upgrade activities as tolerated     [x]  Continue plan of care  [x]  Update interventions per flow sheet       []  Discharge due to:_  []  Other:_     Jennifer Araujo, PTA 4/14/2022

## 2022-04-19 ENCOUNTER — HOSPITAL ENCOUNTER (OUTPATIENT)
Dept: PHYSICAL THERAPY | Age: 72
Discharge: HOME | End: 2022-04-19
Payer: MEDICARE

## 2022-04-19 PROCEDURE — 97110 THERAPEUTIC EXERCISES: CPT

## 2022-04-19 NOTE — PROGRESS NOTES
PT DAILY TREATMENT NOTE - [facility]     Patient Name: Daniel Olson  Date:2022  : 1950  [x]  Patient  Verified  Payor: Wilfred Hudsonr / Plan: [place] MEDICARE PART A & B / Product Type: Medicare /    Treatment Area: Other low back pain [M54.59]     T7-T12 fusion, T9-T10 September  Next MD APPT:   In time:08:30 am  Out time:09:15 am  5 minTotal Treatment Time (min):45 min  Total Timed Codes (min) 45min  1:1 Treatment Time (MC only) 45min    Visit #32/32   ( new POC )    SUBJECTIVE  Pain Level (0-10 scale) pre treatment:  2/10  Pain Level (0-10 scale) post treatment: 2/10    Any medication changes, allergies to medications, adverse drug reactions, diagnosis change, or new procedure performed?:   [x] No    [] Yes (see summary sheet for update)     Subjective functional status/changes:   [x] No changes reported  Patient reporting 75 % improvement since receiving therapy. He states he is able to ambulate in his house with no AD or a cane. He uses RW for community distance. He is able to play the piano for 45 min to 1 hour at [employer] with no difficulty at all. He is no longer wearing back brace. He reports no changes with the numbness and tingling in LE only 25% decreased since he started. He has no swelling in his LE. He will be having a CT scan at the end of the month due to cancer. 35 min Therapeutic Exercise:  [] See flow sheet :Reassessed   Rationale: increase ROM, increase strength, improve coordination, improve balance and increase proprioception to improve the patients ability to ambulate safely with LRAD to perform ADL's.    10 min Gait Training:  Abulated with cane today    Rationale: improve coordination, improve balance and increase proprioception  to improve the patients ability to ambulate safely with LRAD to perform ADL's   min Neuromuscular Re-education:  [x]  See flow sheet : postural re-ed at // bars   Rationale: increase ROM and increase strength  to improve the patients ability to perform ADLs and ambulate safely      With   [x] TE   [] TA   [] Neuro   [] SC   [] other: Patient Education: [x] Review HEP    [] Progressed/Changed HEP based on:   [] positioning   [] body mechanics   [] transfers   [] Use of heat/ice    [x] other: Reassessed         Other Objective/Functional Measures:   Reviewed all HEP for patient to continue on his own. Spine:   TRUNK ROM:      Flexion:                                  []????? N/A  [x]? ?? ?? WNL  [x]? ?? ? ? Minimal  []??? ? ?Moderate  []????? Major   Extension:                             []????? N/A  []????? WNL  []??? ? ? Minimal  []??? ? ?Moderate  [x]? ???? Major   Right Lateral Flexion:           []????? N/A  []????? WNL  [x]? ?? ? ? Minimal  []??? ? ?Moderate  []????? Major   Left Lateral Flexion:               []????? N/A  []????? WNL  [x]? ?? ? ? Minimal  []??? ? ?Moderate  []????? Major   Rotation to the Right:           []????? N/A  []????? WNL  []??? ? ? Minimal  [x]? ?? ? ?Moderate  []????? Major   Rotation to the Left:              []????? N/A  []????? WNL  []??? ? ? Minimal  [x]? ?? ? ?Moderate  []????? Major   Right Side Glide:                   []????? N/A  []????? WNL  []??? ? ? Minimal  []??? ? ?Moderate  []????? Major   Left Slide Glide:                    []????? N/A  []????? WNL  []??? ? ? Minimal  []??? ? ?Moderate  []????? Major  Additional comments:\"just a little pain \"     Hip                                AROM                            PROM                              MMT    R L R L R L   Flexion (120)         5 5   Extension (15)         2 2   Abduction (40)         4+ 4+   Adduction (30)         3- 3-   IR (40)               ER (40)               Additional comments:      Knee                                 AROM                          PROM                           MMT    R L R L R L   Extension (0)  0 -5     5 5   Flexion (145)         5 5   Additional comments:      TUG: 10 no AD  6 QRB: 357 ft spc ( previously able to do 1080 ft on 3/31)       ASSESSMENT/Changes in Function:   Patient reporting he will be getting a scan to determine how much the cancer has spread. He appears motivated to continue on his own with HEP. He is able to drive and play the piano without any pain or discomfort. He has met or partially met all his goals. He is able to ambulate short distances in his house with no AD. He use RW for community distances. Did discuss a maintenace program with patient as well. Plan to discharge at present time with HEP. GOALS/Progress towards goals:  Patient Goal (s): To be independent without walker    []? Met []? Not met [x]? Partially met 4/19 patient ambulates in his house without any AD. He needs a cane or RW community distances. Short Term Goals: To be accomplished in 2-4  treatments. 1. Patient will independent with his HEP to progress with POC. [x]? Met []? Not met []? Partially met    Long Term Goals: To be accomplished in 16  treatments. 1. Patient will gain 1/2 to 1 grade with LE strength to improve stability. [x]? Met []? Not met []? Partially met   2. Patient will reports numbness and tingling are subsiding by 50% since start of therapy. []? Met []? Not met [x]? Partially met 1/31/22 reports 25% decreased 3/24 same 25%  3. Patient will reports no more sensation of B knee buckling during ambulation since start of therapy. []? Met []? Not met [x]? Partially met 4/19/22 every 3 to 4 days, not very often  4. Patient will be able to demonstrate proper lifting technique with decreased back pain and discomfort. []? Met []? Not met [x]? Partially met 419 just a little unbalanced no pain and discomfort  5. Patient will be able to ambulate short household distance without RW. [x]? Met []? Not met []? Partially met   6. Patient will be able to perform a 6 min walking test with LRAD and decreased back pain. [x]? Met []? Not met []?  Partially met     Treatment Plan may include any combination of the following: Therapeutic exercise, Neuromuscular re-education, Physical agent/modality, Gait/balance training, Manual therapy, Patient education, Functional mobility training, Home safety training and Stair training    Frequency / Duration: Patient to be seen 2 times per week for 16  treatments.     Certification Period: 2/22 to 5/23    PLAN  []  Upgrade activities as tolerated     []  Continue plan of care  []  Update interventions per flow sheet       [x]  Discharge due to:_meeting goals and plateau   []  Other:_     Jennifer Araujo, PTA 4/19/2022

## 2022-04-19 NOTE — PROGRESS NOTES
[address]  Ph: [phone]  Fax: [phone]    Discharge Summary 2-15    Patient name: Maribell Brian  : 1950  Provider#: [id number]  Referral source: Alida Hutchison MD      Medical/Treatment Diagnosis: Other low back pain [M54.59]     Prior Hospitalization: see medical history     Comorbidities: See Plan of Care  Prior Level of Function: See Plan of Care  Medications: Verified on Patient Summary List  Start of Care:21   Onset Date: (see initial plan of care)  Visits from Start of Care:32 Missed Visits: 0  Certification Period : to       Other Objective/Functional Measures:    Spine:   TRUNK ROM:      Flexion:                                  [] N/A  [x]WNL  [x]Minimal  []Moderate  [] Major   Extension:                             [] N/A  []WNL  []Minimal  []Moderate  [x] Major   Right Lateral Flexion:           [] N/A  []WNL  [x]Minimal  []Moderate  [] Major   Left Lateral Flexion:               [] N/A  []WNL  [x]Minimal  []Moderate  [] Major   Rotation to the Right:           [] N/A  []WNL  []Minimal  [x]Moderate  [] Major   Rotation to the Left:              [] N/A  []WNL  []Minimal  [x]Moderate  [] Major   Right Side Glide:                   [] N/A  []WNL  []Minimal  []Moderate  [] Major   Left Slide Glide:                    [] N/A  []WNL  []Minimal  []Moderate  [] Major  Additional comments:\"just a little pain \"     Hip                                AROM                            PROM                              MMT    R L R L R L   Flexion (120)         5 5   Extension (15)         2 2   Abduction (40)         4+ 4+   Adduction (30)         3- 3-   IR (40)               ER (40)               Additional comments:      Knee                                 AROM                          PROM                           MMT    R L R L R L   Extension (0)  0 -5     5 5   Flexion (145)         5 5   Additional comments:      TUG: 10 no AD  6 MWT: 860 ft spc ( previously able to do 1080 ft on 3/31)     Patient reporting 75 % improvement since receiving therapy. He states he is able to ambulate in his house with no AD or a cane. He uses RW for community distance. He is able to play the piano for 45 min to 1 hour at [employer] with no difficulty at all. He is no longer wearing back brace. He reports no changes with the numbness and tingling in LE only 25% decreased since he started. He has no swelling in his LE. He will be having a CT scan at the end of the month due to cancer. Patient reporting he will be getting a scan to determine how much the cancer has spread. He appears motivated to continue on his own with HEP. He is able to drive and play the piano without any pain or discomfort. He has met or partially met all his goals. He is able to ambulate short distances in his house with no AD. He use RW for community distances. Did discuss a maintenace program with patient as well. Plan to discharge at present time with HEP. GOALS/Progress towards goals:  Patient Goal (s): To be independent without walker    [] Met [] Not met [x] Partially met 4/19 patient ambulates in his house without any AD. He needs a cane or RW community distances. Short Term Goals: To be accomplished in 2-4  treatments. 1. Patient will independent with his HEP to progress with POC. [x] Met [] Not met [] Partially met    Long Term Goals: To be accomplished in 16  treatments. 1. Patient will gain 1/2 to 1 grade with LE strength to improve stability. [x] Met [] Not met [] Partially met   2. Patient will reports numbness and tingling are subsiding by 50% since start of therapy. [] Met [] Not met [x] Partially met 1/31/22 reports 25% decreased 3/24 same 25%  3. Patient will reports no more sensation of B knee buckling during ambulation since start of therapy.  [] Met [] Not met [x] Partially met 4/19/22 every 3 to 4 days, not very often  4. Patient will be able to demonstrate proper lifting technique with decreased back pain and discomfort. [] Met [] Not met [x] Partially met 419 just a little unbalanced no pain and discomfort  5. Patient will be able to ambulate short household distance without RW. [x] Met [] Not met [] Partially met   6. Patient will be able to perform a 6 min walking test with LRAD and decreased back pain. [x] Met [] Not met [] Partially met     A physical therapist assistant contributed to this documentation.        [facility] Score:  21%  RECOMMENDATIONS:  [x]Discontinue therapy:   [x]Patient has reached or is progressing toward set goals and is independent with HEP   []Patient is non-compliant or has abdicated   []Due to lack of appreciable progress towards set goals   []Patient was hospitalized or suffered illness that impacted ability to continue therapy   []Other:   Dylan Deshpande, PTA 4/19/2022

## 2022-04-21 ENCOUNTER — APPOINTMENT (OUTPATIENT)
Dept: PHYSICAL THERAPY | Age: 72
End: 2022-04-21
Payer: MEDICARE

## 2022-04-26 ENCOUNTER — APPOINTMENT (OUTPATIENT)
Dept: PHYSICAL THERAPY | Age: 72
End: 2022-04-26
Payer: MEDICARE

## 2022-04-27 ENCOUNTER — APPOINTMENT (OUTPATIENT)
Dept: PHYSICAL THERAPY | Age: 72
End: 2022-04-27
Payer: MEDICARE

## 2022-04-28 ENCOUNTER — APPOINTMENT (OUTPATIENT)
Dept: PHYSICAL THERAPY | Age: 72
End: 2022-04-28
Payer: MEDICARE

## 2022-05-17 ENCOUNTER — OFFICE VISIT (OUTPATIENT)
Dept: ORTHOPEDIC SURGERY | Age: 72
End: 2022-05-17
Payer: MEDICARE

## 2022-05-17 VITALS — WEIGHT: 178 LBS | BODY MASS INDEX: 26.36 KG/M2 | HEIGHT: 69 IN

## 2022-05-17 DIAGNOSIS — Z98.1 S/P LUMBAR SPINAL FUSION: Primary | ICD-10-CM

## 2022-05-17 PROCEDURE — 1101F PT FALLS ASSESS-DOCD LE1/YR: CPT | Performed by: ORTHOPAEDIC SURGERY

## 2022-05-17 PROCEDURE — G8419 CALC BMI OUT NRM PARAM NOF/U: HCPCS | Performed by: ORTHOPAEDIC SURGERY

## 2022-05-17 PROCEDURE — G8536 NO DOC ELDER MAL SCRN: HCPCS | Performed by: ORTHOPAEDIC SURGERY

## 2022-05-17 PROCEDURE — G8427 DOCREV CUR MEDS BY ELIG CLIN: HCPCS | Performed by: ORTHOPAEDIC SURGERY

## 2022-05-17 PROCEDURE — 3017F COLORECTAL CA SCREEN DOC REV: CPT | Performed by: ORTHOPAEDIC SURGERY

## 2022-05-17 PROCEDURE — 99213 OFFICE O/P EST LOW 20 MIN: CPT | Performed by: ORTHOPAEDIC SURGERY

## 2022-05-17 PROCEDURE — G8510 SCR DEP NEG, NO PLAN REQD: HCPCS | Performed by: ORTHOPAEDIC SURGERY

## 2022-05-17 RX ORDER — OXYCODONE AND ACETAMINOPHEN 5; 325 MG/1; MG/1
TABLET ORAL
COMMUNITY
Start: 2022-04-26

## 2022-05-17 NOTE — PROGRESS NOTES
Danae Mike (: 1950) is a 74 y.o. male, patient, here for evaluation of the following chief complaint(s):  LOW BACK PAIN       ASSESSMENT/PLAN:    Below is the assessment and plan developed based on review of pertinent history, physical exam, labs, studies, and medications. At this point we have reviewed his films. His fusion is stable. And neurologically is stable. He has some mild paresthesias related to the cord compression that he had. We will continue with physical therapy. He will see me back in 6 months. 1. S/P lumbar spinal fusion  -     XR SPINE LUMB MIN 4 V; Future      No follow-ups on file. SUBJECTIVE/OBJECTIVE:  Danae Mike (: 1950) is a 74 y.o. male. No flowsheet data found. Comes in today for follow-up. He is status post T8-T12 fusion and decompression for metastatic prostate cancer. He is six months out from his surgery. Overall doing fairly well. He takes minimal pain medications. He does take the oral Neurontin. He has been released from rehab at this time. He continues to make progress with physical therapy    Imaging:          XR Results (most recent):  Results from Appointment encounter on 22    XR SPINE LUMB MIN 4 V    Narrative  AP and lateral and flexion-extension lumbar and thoracic spine reviewed today. Stable thoracolumbar fusion. No acute changes. No hardware instability. MRI Results (most recent):        No Known Allergies    Current Outpatient Medications   Medication Sig    oxyCODONE-acetaminophen (PERCOCET) 5-325 mg per tablet     furosemide (LASIX) 20 mg tablet     gabapentin (NEURONTIN) 100 mg capsule     polyethylene glycol (Miralax) 17 gram/dose powder Take 17 g by mouth daily as needed for Constipation.  senna (Senna) 8.6 mg tablet Take 1 Tablet by mouth daily as needed for Constipation.  cholecalciferol (Vitamin D3) (1000 Units /25 mcg) tablet Take 1,000 Units by mouth daily.     leuprolide acetate (LUPRON DEPOT IM) by IntraMUSCular route See Admin Instructions. Every 6 months    thiamine (VITAMIN B-1) 100 mg tablet Take 100 mg by mouth daily.  gabapentin (NEURONTIN) 100 mg capsule 100 mg. (Patient not taking: Reported on 2022)    oxyCODONE IR (ROXICODONE) 5 mg immediate release tablet  (Patient not taking: Reported on 2022)    tamsulosin (FLOMAX) 0.4 mg capsule Take 0.4 mg by mouth daily. (Patient not taking: Reported on 2022)     No current facility-administered medications for this visit. Past Medical History:   Diagnosis Date    Arthritis     Mild anemia     Prostate cancer ([facility] [address] 2008    PALLIATIVE CARE PER DR LAMBERT FARR NOTES 2018: Yany Zapata, AND PAST EXTERNAL BEAM RADIATION THERAPY        Past Surgical History:   Procedure Laterality Date    HX BUNIONECTOMY Right 2014    HX CATARACT REMOVAL Bilateral 2017    HX COLONOSCOPY      HX GI  2009    ANAL FISTULA     HX HEMORRHOIDECTOMY  1974    HX HIP ARTHROSCOPY Right     HX TONSILLECTOMY  8       Family History   Problem Relation Age of Onset    Diabetes Mother     Hypertension Mother     Asthma Father     Hypertension Sister     Kidney Disease Brother         DIALYSIS    Diabetes Brother     Diabetes Brother     Drug Abuse Brother     Mental Retardation Sister     Obesity Sister     Anesth Problems Neg Hx         Social History     Tobacco Use    Smoking status: Former Smoker     Packs/day: 0.25     Years: 5.00     Pack years: 1.25     Quit date:      Years since quittin.3    Smokeless tobacco: Never Used   Substance Use Topics    Alcohol use: No    Drug use: No        Review of Systems   Constitutional: Positive for activity change. Musculoskeletal: Positive for back pain and joint swelling. Neurological: Positive for weakness. All other systems reviewed and are negative.          Vitals:  Ht 5' 9\" (1.753 m)   Wt 178 lb (80.7 kg)   BMI 26.29 kg/m²    Body mass index is 26.29 kg/m². Ortho Exam       Integumentary  Assessment of Surgical Incision - healing and consistent with normal anticipated wound healing. Neurologic  Sensory  Light Touch - Intact - Globally. Overall Assessment of Muscle Strength and Tone reveals  Lower Extremities - Right Iliopsoas - 5/5. Left Iliopsoas - 5/5. Right Tibialis Anterior - 5/5. Left Tibialis Anterior - 5/5. Right Gastroc-Soleus - 5/5. Left Gastroc-Soleus - 5/5. Right EHL - 5/5. Left EHL - 5/5. General Assessment of Reflexes  Right Ankle - Clonus is not present. Left Ankle - Clonus is not present. Reflexes (Dermatomes)  2/2 Normal - Left Achilles (L5-S2), Left Knee (L2-4), Right Achilles (L5-S2) and Right Knee (L2-4). Musculoskeletal  Global Assessment  Examination of related systems reveals - well-developed, well-nourished, in no acute distress, alert and oriented x 3 and normal coordination. Spine/Ribs/Pelvis  Lumbosacral Spine - Examination of the lumbosacral spine reveals - no known fractures or deformities. Inspection and Palpation - Tenderness - moderate. Assessment of pain reveals the following findings - The pain is characterized as - moderate. Location - pain refers to lower back bilaterally. An electronic signature was used to authenticate this note.   -- Royce Bernard MD

## 2022-05-17 NOTE — PATIENT INSTRUCTIONS
Spondylolysis and Spondylolisthesis: Exercises  Introduction  Here are some examples of exercises for you to try. The exercises may be suggested for a condition or for rehabilitation. Start each exercise slowly. Ease off the exercises if you start to have pain. You will be told when to start these exercises and which ones will work best for you. How to do the exercises  Single knee-to-chest    1. Lie on your back with your knees bent and your feet flat on the floor. You can put a small pillow under your head and neck if it is more comfortable. 2. Bring one knee to your chest, keeping the other foot flat on the floor. 3. Keep your lower back pressed to the floor. Hold for 15 to 30 seconds. 4. Relax, and lower the knee to the starting position. 5. Repeat with the other leg. Repeat 2 to 4 times with each leg. 6. To get more stretch, put your other leg flat on the floor while pulling your knee to your chest.  Double knee-to-chest    1. Lie on your back with your knees bent and your feet flat on the floor. You can put a small pillow under your head and neck if it is more comfortable. 2. Bring both knees to your chest.  3. Keep your lower back pressed to the floor. Hold for 15 to 30 seconds. 4. Relax, and lower your knees to the starting position. 5. Repeat 2 to 4 times. Alternate arm and leg (bird dog) exercise    Do this exercise slowly. Try to keep your body straight at all times. 1. Start on the floor, on your hands and knees. 2. Tighten your belly muscles by pulling your belly button in toward your spine. Be sure you continue to breathe normally and do not hold your breath. 3. Raise one arm off the floor, and hold it straight out in front of you. Be careful not to let your shoulder drop down, because that will twist your trunk. 4. Hold for about 6 seconds, then lower your arm and switch to your other arm. 5. Repeat 8 to 12 times on each arm.   6. When you can do this exercise with ease and no pain, repeat steps 1 through 5. But this time do it with one leg raised off the floor, holding your leg straight out behind you. Be careful not to let your hip drop down, because that will twist your trunk. 7. When holding your leg straight out becomes easier, try raising your opposite arm at the same time, and repeat steps 1 through 5. Bridging    1. Lie on your back with both knees bent. Your knees should be bent about 90 degrees. 2. Then push your feet into the floor, squeeze your buttocks, and lift your hips off the floor until your shoulders, hips, and knees are all in a straight line. 3. Hold for about 6 seconds as you continue to breathe normally, and then slowly lower your hips back down to the floor and rest for up to 10 seconds. 4. Repeat 8 to 12 times. Curl-ups    1. Lie on the floor on your back with your knees bent at a 90-degree angle. Your feet should be flat on the floor, about 12 inches from your buttocks. 2. Cross your arms over your chest. If this bothers your neck, try putting your hands behind your neck (not your head), with your elbows spread apart. 3. Slowly tighten your belly muscles and raise your shoulder blades off the floor. 4. Keep your head in line with your body, and do not press your chin to your chest.  5. Hold this position for 1 or 2 seconds, then slowly lower yourself back down to the floor. 6. Repeat 8 to 12 times. Plank    Do this exercise slowly. Try to keep your body straight at all times, and do not let one hip drop lower than the other. 1. Lie on your stomach, resting your upper body on your forearms. 2. Tighten your belly muscles by pulling your belly button in toward your spine. 3. Keeping your knees on the floor, press down with your forearms to lift your upper body off the floor. 4. Hold for about 6 seconds, then lower your body to the floor. Rest for up to 10 seconds. 5. Repeat 8 to 12 times.   6. Over time, work up to holding for 15 to 30 seconds each time.  7. If this exercise is easy to do with your knees on the floor, try doing this exercise with your knees and legs straight, supported by your toes on the floor. Follow-up care is a key part of your treatment and safety. Be sure to make and go to all appointments, and call your doctor if you are having problems. It's also a good idea to know your test results and keep a list of the medicines you take. Where can you learn more? Go to http://www.maldonado.com/  Enter M245 in the search box to learn more about \"Spondylolysis and Spondylolisthesis: Exercises. \"  Current as of: July 1, 2021               Content Version: 13.2  © 2006-2022 Healthwise[employer]. Care instructions adapted under license by [employer] (which disclaims liability or warranty for this information). If you have questions about a medical condition or this instruction, always ask your healthcare professional. [address] any warranty or liability for your use of this information.

## 2022-05-17 NOTE — LETTER
5/17/2022    Patient: Mona Roberts   YOB: 1950   Date of Visit: 5/17/2022     Jeffrey Quan MD  [address] [place] [id number]  Via Fax: [phone]    Dear Jeffrey Quan MD,      Thank you for referring Mr. Kylie Laurent to [facility] for evaluation. My notes for this consultation are attached. If you have questions, please do not hesitate to call me. I look forward to following your patient along with you.       Sincerely,    Frank Russell MD

## 2022-11-01 ENCOUNTER — OFFICE VISIT (OUTPATIENT)
Dept: ORTHOPEDIC SURGERY | Age: 72
End: 2022-11-01
Payer: MEDICARE

## 2022-11-01 VITALS — BODY MASS INDEX: 27.4 KG/M2 | HEIGHT: 69 IN | WEIGHT: 185 LBS

## 2022-11-01 DIAGNOSIS — Z98.1 S/P FUSION OF THORACIC SPINE: Primary | ICD-10-CM

## 2022-11-01 PROCEDURE — 1123F ACP DISCUSS/DSCN MKR DOCD: CPT | Performed by: STUDENT IN AN ORGANIZED HEALTH CARE EDUCATION/TRAINING PROGRAM

## 2022-11-01 PROCEDURE — 99214 OFFICE O/P EST MOD 30 MIN: CPT | Performed by: STUDENT IN AN ORGANIZED HEALTH CARE EDUCATION/TRAINING PROGRAM

## 2022-11-01 PROCEDURE — G8427 DOCREV CUR MEDS BY ELIG CLIN: HCPCS | Performed by: STUDENT IN AN ORGANIZED HEALTH CARE EDUCATION/TRAINING PROGRAM

## 2022-11-01 PROCEDURE — G8419 CALC BMI OUT NRM PARAM NOF/U: HCPCS | Performed by: STUDENT IN AN ORGANIZED HEALTH CARE EDUCATION/TRAINING PROGRAM

## 2022-11-01 PROCEDURE — 1101F PT FALLS ASSESS-DOCD LE1/YR: CPT | Performed by: STUDENT IN AN ORGANIZED HEALTH CARE EDUCATION/TRAINING PROGRAM

## 2022-11-01 PROCEDURE — G8432 DEP SCR NOT DOC, RNG: HCPCS | Performed by: STUDENT IN AN ORGANIZED HEALTH CARE EDUCATION/TRAINING PROGRAM

## 2022-11-01 PROCEDURE — G8536 NO DOC ELDER MAL SCRN: HCPCS | Performed by: STUDENT IN AN ORGANIZED HEALTH CARE EDUCATION/TRAINING PROGRAM

## 2022-11-01 PROCEDURE — 3017F COLORECTAL CA SCREEN DOC REV: CPT | Performed by: STUDENT IN AN ORGANIZED HEALTH CARE EDUCATION/TRAINING PROGRAM

## 2022-11-01 NOTE — PROGRESS NOTES
Pritesh Rebolledo (: 1950) is a 74 y.o. male, patient, here for evaluation of the following chief complaint(s):  Thoracic Back Pain       ASSESSMENT/PLAN:  Below is the assessment and plan developed based on review of pertinent history, physical exam, labs, studies, and medications. Patient presents today approximately 1 year status post recent thoracic fusion. Radiologic findings reviewed in detail with the patient. No weakness noted on physical exam.  He does have some persistent lower extremity numbness, bilateral lower extremity EMG is ordered today. Order placed for Elva brace today. Discussed importance of upright posture with the patient, reviewed gait while using walker. He will follow-up to discuss results of his EMG. 1. S/P fusion of thoracic spine  -     XR SPINE THORAC 3 V; Future  -     EMG ONE EXTREMITY LOWER LT; Future  -     EMG ONE EXTREMITY LOWER RT; Future      No follow-ups on file. SUBJECTIVE/OBJECTIVE:  Pritesh Rebolledo (: 1950) is a 74 y.o. male. No flowsheet data found. Patient presents today approximately 1 year status post recent thoracic fusion and decompression for metastatic prostate cancer. Overall he continues to progress well. His main concern at this point, as he feels like he does not stand straight up. Denies any lower extremity weakness. He does have some residual numbness and tingling lower extremities. Imaging:    XR Results (most recent):  Results from Appointment encounter on 22    XR [address] 3 V    Narrative  AP and lateral thoracic spine demonstrates a stable thoracic fusion. Mild increased thoracic kyphosis noted. No acute changes.          Not on File    Current Outpatient Medications   Medication Sig    oxyCODONE-acetaminophen (PERCOCET) 5-325 mg per tablet     furosemide (LASIX) 20 mg tablet     gabapentin (NEURONTIN) 100 mg capsule 100 mg.    gabapentin (NEURONTIN) 100 mg capsule     polyethylene glycol (MIRALAX) 17 gram/dose powder Take 17 g by mouth daily as needed for Constipation. senna (SENOKOT) 8.6 mg tablet Take 1 Tablet by mouth daily as needed for Constipation. cholecalciferol (VITAMIN D3) (1000 Units /25 mcg) tablet Take 1,000 Units by mouth daily. leuprolide acetate (LUPRON DEPOT IM) by IntraMUSCular route See Admin Instructions. Every 6 months    tamsulosin (FLOMAX) 0.4 mg capsule Take 0.4 mg by mouth daily. thiamine HCL (B-1) 100 mg tablet Take 100 mg by mouth daily. oxyCODONE IR (ROXICODONE) 5 mg immediate release tablet  (Patient not taking: Reported on 2022)     No current facility-administered medications for this visit. Past Medical History:   Diagnosis Date    Arthritis     Mild anemia     Prostate cancer ([facility] [address] 2008    PALLIATIVE CARE PER DR LAMBERT FARR NOTES 2018: Ronald Hadleyin, AND PAST EXTERNAL BEAM RADIATION THERAPY        Past Surgical History:   Procedure Laterality Date    HX BUNIONECTOMY Right 2014    HX CATARACT REMOVAL Bilateral 2017    HX COLONOSCOPY      HX GI  2009    ANAL FISTULA     HX HEMORRHOIDECTOMY  1974    HX HIP ARTHROSCOPY Right     HX TONSILLECTOMY  195       Family History   Problem Relation Age of Onset    Diabetes Mother     Hypertension Mother     Asthma Father     Hypertension Sister     Kidney Disease Brother         DIALYSIS    Diabetes Brother     Diabetes Brother     Drug Abuse Brother     Mental Retardation Sister     Obesity Sister     Anesth Problems Neg Hx         Social History     Tobacco Use    Smoking status: Former     Packs/day: 0.25     Years: 5.00     Pack years: 1.25     Types: Cigarettes     Quit date:      Years since quittin.8    Smokeless tobacco: Never   Substance Use Topics    Alcohol use: No    Drug use: No        Review of Systems   Constitutional:  Negative for chills and fever. Musculoskeletal:  Negative for back pain. Neurological:  Positive for numbness. Negative for weakness.    All other systems reviewed and are negative. Vitals:  Ht 5' 9\" (1.753 m)   Wt 185 lb (83.9 kg)   BMI 27.32 kg/m²    Body mass index is 27.32 kg/m². Physical Exam  Integumentary  Assessment of Surgical Incision - healing and consistent with normal anticipated wound healing. Neurologic  Sensory  Light Touch - Intact - Globally. Overall Assessment of Muscle Strength and Tone reveals  Lower Extremities - Right Iliopsoas - 5/5. Left Iliopsoas - 5/5. Right Tibialis Anterior - 5/5. Left Tibialis Anterior - 5/5. Right Gastroc-Soleus - 5/5. Left Gastroc-Soleus - 5/5. Right EHL - 5/5. Left EHL - 5/5. General Assessment of Reflexes  Right Ankle - Clonus is not present. Left Ankle - Clonus is not present. Reflexes (Dermatomes)  2/2 Normal - Left Achilles (L5-S2), Left Knee (L2-4), Right Achilles (L5-S2) and Right Knee (L2-4). Musculoskeletal  Global Assessment  Examination of related systems reveals - well-developed, well-nourished, in no acute distress, alert and oriented x 3 and ambulates using a walker with forward weightbearing line. Spine/Ribs/Pelvis  Lumbosacral Spine - Examination of the lumbosacral spine reveals - no known fractures or deformities. Inspection and Palpation - Tenderness - moderate. Assessment of pain reveals the following findings - The pain is characterized as - moderate. Location - pain refers to lower back bilaterally. Dr. Dennie Abide was available for immediate consult during this encounter. An electronic signature was used to authenticate this note.   -- JERRI Squires

## 2022-12-22 NOTE — PROGRESS NOTES
[address]  Ph: [phone]    Fax: [phone]  Therapy Progress Report  Name: Yaniv Garcia  : 1950   MD: Kim Mcknight MD     Medical Diagnosis: Other low back pain [M54.59]  Start of Care: 21   Visits from Start of Care: 10     Missed Visits: 0  Certification Period: 21 to 3/29/22    Frequency/Duration: 2 times a week for 16 treatments   Summary of Care/Goals:     Other Objective/Functional Measures:      Spine:   TRUNK ROM:      Flexion:                                  []? N/A  []? WNL  [x]? Minimal  []? Moderate  []? Major 23 cm  Extension:                             []? N/A  []? WNL  []? Minimal  []? Moderate  [x]? Major  Able to get into neutral  Right Lateral Flexion:           []? N/A  []? WNL  [x]? Minimal  []? Moderate  []? Major 50 cm  Left Lateral Flexion:   []? N/A  []? WNL  [x]? Minimal  []? Moderate  []? Major 47 cm  Rotation to the Right:           []? N/A  []? WNL  []? Minimal  [x]? Moderate  []? Major   Rotation to the Left:              []? N/A  []? WNL  []? Minimal  [x]? Moderate  []? Major   Right Side Glide:                   []? N/A  []? WNL  []? Minimal  []? Moderate  []? Major   Left Slide Glide:                    []? N/A  []? WNL  []? Minimal  []? Moderate  []?  Major  Additional comments: Pain only with flexion        Hip                                AROM                            PROM                              MMT    R L R L R L   Flexion (120)         4+ 4+   Extension (15)         1 1   Abduction (40)         4- 4-   Adduction (30)         1 2+   IR (40)               ER (40)               Additional comments: Patient able to get into prone without difficulty        Knee                                 AROM                          PROM                           MMT    R L R L R L   Extension (0)          5 5   Flexion (145)         4+ 4+   Additional comments:      TU sec  6 normal appearance , trachea midline MWT= 890 ft. RW    ASSESSMENT/Changes in Function:   Patient reporting 50% improvement since receiving therapy. His pain levels at best 3-4/10, worse 5/10. AD now. He is able to dress independently. He is only able to stand 15 min and sit for 1-2 hours before increase in pain. He reports the numbness/tingling has decreased 25% only. Patient is progressing well towards goals meeting all STG's and some LTG's. He has made some improvements with ROM but still has limitations. He has also improved with MMT but still showing LE weakness. He is able to ambulate without RW short distances in his household . Go up and down the stairs using reciprocal pattern and less pulling on UE. He still has numbness/tingling down B LE. Decrease in bilateral LE instability noted. Patient was also able to perform 6 MWT with no increase in pain to back. Patient will continue to benefit from skilled PT services to modify and progress therapeutic interventions, address functional mobility deficits, address ROM deficits, address strength deficits, analyze and address soft tissue restrictions and assess and modify postural abnormalities to attain remaining goals.         GOALS/Progress towards goals:  Patient Goal (s): To be independent without walker    []? ? Met []? ? Not met [x]? ? Partially met  1/31/22 short distance only in his house. Short Term Goals: To be accomplished in 2-4  treatments. 1. Patient will independent with his HEP to progress with POC. []?? Met []? ? Not met [x]? ? Partially met  1/13/22  Long Term Goals: To be accomplished in 16  treatments. 1. Patient will gain 1/2 to 1 grade with LE strength to improve stability. []?? Met [x]? ? Not met []? ? Partially met   2. Patient will reports numbness and tingling are subsiding by 50% since start of therapy. []?? Met []? ? Not met [x]? ? Partially met 1/31/22 reports 25% decreased  3.  Patient will reports no more sensation of B knee buckling during ambulation since start of therapy. []?? Met []? ? Not met [x]? ? Partially met 1/31/22 reports a decrease in incident   4. Patient will be able to demonstrate proper lifting technique with decreased back pain and discomfort. []?? Met [x]? ? Not met []? ? Partially met still has pain and discomfort  5. Patient will be able to ambulate short household distance without RW. [x]? ? Met []? ? Not met []? ? Partially met   6. Patient will be able to perform a 6 min walking test with LRAD and decreased back pain. [x]? ? Met []? ? Not met []? ? Partially met      Treatment Plan may include any combination of the following: Therapeutic exercise, Neuromuscular re-education, Physical agent/modality, Gait/balance training, Manual therapy, Patient education, Functional mobility training, Home safety training and Stair training    A physical therapist assistant contributed to this documentation. Ampac Score:  29.36 %  Recommendations: Continue with POC  [x]  Plan of care has been reviewed with PTA. Conor Mccain, PTA 1/31/2022     ________________________________________________________________________     Please retain this original for your records.

## 2023-05-02 ENCOUNTER — OFFICE VISIT (OUTPATIENT)
Dept: ORTHOPEDIC SURGERY | Age: 73
End: 2023-05-02

## 2023-05-02 VITALS — WEIGHT: 185 LBS | HEIGHT: 69 IN | BODY MASS INDEX: 27.4 KG/M2

## 2023-05-02 DIAGNOSIS — G62.9 NEUROPATHY: ICD-10-CM

## 2023-05-02 DIAGNOSIS — Z98.1 S/P FUSION OF THORACIC SPINE: Primary | ICD-10-CM

## 2023-05-02 DIAGNOSIS — M54.50 ACUTE BILATERAL LOW BACK PAIN WITHOUT SCIATICA: ICD-10-CM

## 2023-05-09 ENCOUNTER — TELEPHONE (OUTPATIENT)
Age: 73
End: 2023-05-09

## 2024-04-03 ENCOUNTER — APPOINTMENT (OUTPATIENT)
Facility: HOSPITAL | Age: 74
End: 2024-04-03
Payer: OTHER GOVERNMENT

## 2024-04-03 ENCOUNTER — HOSPITAL ENCOUNTER (EMERGENCY)
Facility: HOSPITAL | Age: 74
Discharge: HOME | End: 2024-04-03
Attending: STUDENT IN AN ORGANIZED HEALTH CARE EDUCATION/TRAINING PROGRAM
Payer: OTHER GOVERNMENT

## 2024-04-03 VITALS
RESPIRATION RATE: 16 BRPM | OXYGEN SATURATION: 97 % | HEART RATE: 94 BPM | SYSTOLIC BLOOD PRESSURE: 140 MMHG | WEIGHT: 148 LBS | DIASTOLIC BLOOD PRESSURE: 84 MMHG | HEIGHT: 69 IN | BODY MASS INDEX: 21.92 KG/M2 | TEMPERATURE: 98.6 F

## 2024-04-03 DIAGNOSIS — Z85.46 HISTORY OF PROSTATE CANCER: ICD-10-CM

## 2024-04-03 DIAGNOSIS — M54.16 SPINAL STENOSIS OF LUMBAR REGION WITH RADICULOPATHY: ICD-10-CM

## 2024-04-03 DIAGNOSIS — M48.061 SPINAL STENOSIS OF LUMBAR REGION WITH RADICULOPATHY: ICD-10-CM

## 2024-04-03 DIAGNOSIS — M54.12 SPINAL STENOSIS OF CERVICAL REGION WITH RADICULOPATHY: ICD-10-CM

## 2024-04-03 DIAGNOSIS — M48.02 SPINAL STENOSIS OF CERVICAL REGION WITH RADICULOPATHY: ICD-10-CM

## 2024-04-03 DIAGNOSIS — C79.51 METASTATIC CANCER TO SPINE (HCC): Primary | ICD-10-CM

## 2024-04-03 LAB
ALBUMIN SERPL-MCNC: 3.2 G/DL (ref 3.5–5)
ALBUMIN/GLOB SERPL: 0.7 (ref 1.1–2.2)
ALP SERPL-CCNC: 254 U/L (ref 45–117)
ALT SERPL-CCNC: 12 U/L (ref 12–78)
ANION GAP SERPL CALC-SCNC: 5 MMOL/L (ref 5–15)
APPEARANCE UR: CLEAR
AST SERPL-CCNC: 18 U/L (ref 15–37)
BACTERIA URNS QL MICRO: NEGATIVE /HPF
BASOPHILS # BLD: 0.1 K/UL (ref 0–0.1)
BASOPHILS NFR BLD: 1 % (ref 0–1)
BILIRUB SERPL-MCNC: 1.1 MG/DL (ref 0.2–1)
BILIRUB UR QL: NEGATIVE
BUN SERPL-MCNC: 10 MG/DL (ref 6–20)
BUN/CREAT SERPL: 16 (ref 12–20)
CALCIUM SERPL-MCNC: 8.6 MG/DL (ref 8.5–10.1)
CHLORIDE SERPL-SCNC: 108 MMOL/L (ref 97–108)
CO2 SERPL-SCNC: 26 MMOL/L (ref 21–32)
COLOR UR: ABNORMAL
CREAT SERPL-MCNC: 0.61 MG/DL (ref 0.7–1.3)
DIFFERENTIAL METHOD BLD: ABNORMAL
EKG ATRIAL RATE: 107 BPM
EKG DIAGNOSIS: NORMAL
EKG P AXIS: 64 DEGREES
EKG P-R INTERVAL: 154 MS
EKG Q-T INTERVAL: 336 MS
EKG QRS DURATION: 68 MS
EKG QTC CALCULATION (BAZETT): 448 MS
EKG R AXIS: 48 DEGREES
EKG T AXIS: 63 DEGREES
EKG VENTRICULAR RATE: 107 BPM
EOSINOPHIL # BLD: 0 K/UL (ref 0–0.4)
EOSINOPHIL NFR BLD: 0 % (ref 0–7)
EPITH CASTS URNS QL MICRO: ABNORMAL /LPF
ERYTHROCYTE [DISTWIDTH] IN BLOOD BY AUTOMATED COUNT: 12.3 % (ref 11.5–14.5)
GLOBULIN SER CALC-MCNC: 4.7 G/DL (ref 2–4)
GLUCOSE SERPL-MCNC: 105 MG/DL (ref 65–100)
GLUCOSE UR STRIP.AUTO-MCNC: NEGATIVE MG/DL
HCT VFR BLD AUTO: 30.6 % (ref 36.6–50.3)
HGB BLD-MCNC: 9.8 G/DL (ref 12.1–17)
HGB UR QL STRIP: NEGATIVE
IMM GRANULOCYTES # BLD AUTO: 0 K/UL (ref 0–0.04)
IMM GRANULOCYTES NFR BLD AUTO: 1 % (ref 0–0.5)
KETONES UR QL STRIP.AUTO: 15 MG/DL
LEUKOCYTE ESTERASE UR QL STRIP.AUTO: ABNORMAL
LYMPHOCYTES # BLD: 1.2 K/UL (ref 0.8–3.5)
LYMPHOCYTES NFR BLD: 20 % (ref 12–49)
MCH RBC QN AUTO: 30.6 PG (ref 26–34)
MCHC RBC AUTO-ENTMCNC: 32 G/DL (ref 30–36.5)
MCV RBC AUTO: 95.6 FL (ref 80–99)
MONOCYTES # BLD: 0.6 K/UL (ref 0–1)
MONOCYTES NFR BLD: 10 % (ref 5–13)
MUCOUS THREADS URNS QL MICRO: ABNORMAL /LPF
NEUTS SEG # BLD: 3.9 K/UL (ref 1.8–8)
NEUTS SEG NFR BLD: 68 % (ref 32–75)
NITRITE UR QL STRIP.AUTO: NEGATIVE
NRBC # BLD: 0 K/UL (ref 0–0.01)
NRBC BLD-RTO: 0 PER 100 WBC
PH UR STRIP: 7 (ref 5–8)
PLATELET # BLD AUTO: 265 K/UL (ref 150–400)
PMV BLD AUTO: 9.9 FL (ref 8.9–12.9)
POTASSIUM SERPL-SCNC: 3.6 MMOL/L (ref 3.5–5.1)
PROT SERPL-MCNC: 7.9 G/DL (ref 6.4–8.2)
PROT UR STRIP-MCNC: 30 MG/DL
RBC # BLD AUTO: 3.2 M/UL (ref 4.1–5.7)
RBC #/AREA URNS HPF: ABNORMAL /HPF (ref 0–5)
SODIUM SERPL-SCNC: 139 MMOL/L (ref 136–145)
SP GR UR REFRACTOMETRY: 1.02 (ref 1–1.03)
TROPONIN I SERPL HS-MCNC: 7 NG/L (ref 0–76)
URINE CULTURE IF INDICATED: ABNORMAL
UROBILINOGEN UR QL STRIP.AUTO: 1 EU/DL (ref 0.2–1)
WBC # BLD AUTO: 5.8 K/UL (ref 4.1–11.1)
WBC URNS QL MICRO: ABNORMAL /HPF (ref 0–4)

## 2024-04-03 PROCEDURE — 96374 THER/PROPH/DIAG INJ IV PUSH: CPT

## 2024-04-03 PROCEDURE — 36415 COLL VENOUS BLD VENIPUNCTURE: CPT

## 2024-04-03 PROCEDURE — 93010 ELECTROCARDIOGRAM REPORT: CPT | Performed by: SPECIALIST

## 2024-04-03 PROCEDURE — 93005 ELECTROCARDIOGRAM TRACING: CPT | Performed by: STUDENT IN AN ORGANIZED HEALTH CARE EDUCATION/TRAINING PROGRAM

## 2024-04-03 PROCEDURE — 99284 EMERGENCY DEPT VISIT MOD MDM: CPT

## 2024-04-03 PROCEDURE — 81001 URINALYSIS AUTO W/SCOPE: CPT

## 2024-04-03 PROCEDURE — 70551 MRI BRAIN STEM W/O DYE: CPT

## 2024-04-03 PROCEDURE — 72125 CT NECK SPINE W/O DYE: CPT

## 2024-04-03 PROCEDURE — 96376 TX/PRO/DX INJ SAME DRUG ADON: CPT

## 2024-04-03 PROCEDURE — 70450 CT HEAD/BRAIN W/O DYE: CPT

## 2024-04-03 PROCEDURE — 72131 CT LUMBAR SPINE W/O DYE: CPT

## 2024-04-03 PROCEDURE — 84484 ASSAY OF TROPONIN QUANT: CPT

## 2024-04-03 PROCEDURE — 80053 COMPREHEN METABOLIC PANEL: CPT

## 2024-04-03 PROCEDURE — 6370000000 HC RX 637 (ALT 250 FOR IP): Performed by: STUDENT IN AN ORGANIZED HEALTH CARE EDUCATION/TRAINING PROGRAM

## 2024-04-03 PROCEDURE — 6360000002 HC RX W HCPCS: Performed by: STUDENT IN AN ORGANIZED HEALTH CARE EDUCATION/TRAINING PROGRAM

## 2024-04-03 PROCEDURE — 85025 COMPLETE CBC W/AUTO DIFF WBC: CPT

## 2024-04-03 RX ORDER — KETOROLAC TROMETHAMINE 15 MG/ML
15 INJECTION, SOLUTION INTRAMUSCULAR; INTRAVENOUS
Status: COMPLETED | OUTPATIENT
Start: 2024-04-03 | End: 2024-04-03

## 2024-04-03 RX ORDER — NAPROXEN 500 MG/1
500 TABLET ORAL 2 TIMES DAILY
Qty: 30 TABLET | Refills: 0 | Status: SHIPPED | OUTPATIENT
Start: 2024-04-03

## 2024-04-03 RX ORDER — METHOCARBAMOL 750 MG/1
750 TABLET, FILM COATED ORAL
Status: COMPLETED | OUTPATIENT
Start: 2024-04-03 | End: 2024-04-03

## 2024-04-03 RX ORDER — LIDOCAINE 50 MG/G
1 PATCH TOPICAL DAILY
Qty: 10 PATCH | Refills: 0 | Status: SHIPPED | OUTPATIENT
Start: 2024-04-03 | End: 2024-04-13

## 2024-04-03 RX ADMIN — KETOROLAC TROMETHAMINE 15 MG: 15 INJECTION, SOLUTION INTRAMUSCULAR; INTRAVENOUS at 19:46

## 2024-04-03 RX ADMIN — METHOCARBAMOL 750 MG: 750 TABLET ORAL at 16:11

## 2024-04-03 RX ADMIN — KETOROLAC TROMETHAMINE 15 MG: 15 INJECTION, SOLUTION INTRAMUSCULAR; INTRAVENOUS at 14:25

## 2024-04-03 ASSESSMENT — PAIN DESCRIPTION - ORIENTATION
ORIENTATION: LEFT
ORIENTATION: LEFT

## 2024-04-03 ASSESSMENT — PAIN DESCRIPTION - LOCATION
LOCATION: SHOULDER
LOCATION: ARM;BACK

## 2024-04-03 ASSESSMENT — PAIN SCALES - GENERAL
PAINLEVEL_OUTOF10: 9
PAINLEVEL_OUTOF10: 6
PAINLEVEL_OUTOF10: 5
PAINLEVEL_OUTOF10: 9

## 2024-04-03 ASSESSMENT — LIFESTYLE VARIABLES
HOW MANY STANDARD DRINKS CONTAINING ALCOHOL DO YOU HAVE ON A TYPICAL DAY: PATIENT DOES NOT DRINK
HOW OFTEN DO YOU HAVE A DRINK CONTAINING ALCOHOL: NEVER

## 2024-04-03 ASSESSMENT — PAIN - FUNCTIONAL ASSESSMENT
PAIN_FUNCTIONAL_ASSESSMENT: 0-10
PAIN_FUNCTIONAL_ASSESSMENT: 0-10

## 2024-04-03 ASSESSMENT — PAIN DESCRIPTION - DESCRIPTORS: DESCRIPTORS: SORE

## 2024-04-03 NOTE — ED NOTES
This rn received pt permission to call pt sister Niall to confirm past medical history for MRI screening form. Per Niall, patient PMH is correct from what was received from patient. Per niall, patient had MRI last January 2023 and has not had surgery or procedure since then. Per niall, patient medical history is correct and is ok for MRI.

## 2024-04-03 NOTE — ED TRIAGE NOTES
Pt arrives to the ER for complaints left arm and left leg numbness along with left facial numbness. Pt states that he also had left sided pain and feels that the weakness may have been contributed to the pain.     Pt reports that this started on Friday, March 29.     Pt reports slight improvement with symptoms.     Pt reports sore throat and left shoulder pain.    Denies any visual changes, speech changes or facial droop.

## 2024-04-03 NOTE — ED NOTES
On exam, left sided numbness not appreciated outside of small area to left side of chin. Numbness to chin started \" last night\".  PT states left leg numbness, also on right leg,  is chronic x 3  years and is suspected neuropathy. Pt describes left arm as soreness rather than numbness at this time.

## 2024-04-03 NOTE — ED PROVIDER NOTES
[facility] EMERGENCY DEPT  EMERGENCY DEPARTMENT ENCOUNTER      Pt Name: Gerhard Overton  MRN: [id number]  Birthdate 1950  Date of evaluation: 4/3/2024  Provider: Juliana oD MD    CHIEF COMPLAINT       Chief Complaint   Patient presents with    Extremity Weakness     HISTORY OF PRESENT ILLNESS   (Location/Symptom, Timing/Onset, Context/Setting, Quality, Duration, Modifying Factors, Severity)  Note limiting factors.   HPI  74-year-old male with past medical history of prostate cancer, DJD, osteoarthritis, lumbar spinal stenosis status post lumbar spinal fusion/laminectomy, sciatica, mets to the spine, presents to the ER for evaluation of concern for possible stroke with symptom onset on Friday, March 29.  Reports began experiencing symptoms of left arm and left leg pain associated with weakness, numbness and tingling, as well as left lower jaw numbness and tingling. Denies any loss in ROM of his extremities, or gait disturbances despite endorsing weakness. States it hurts a lot when he moves his upper arms or legs. Also reports sore throat currently. Denies any headaches, vision changes, confusion, speech deficits, facial droop. He denies ambulatory dysfunction. He denies prior history of cva. He does endorse neck pain as well as lower back pain. Denies new trauma or injury to the spine prior to onset of current symptoms. Patient was observed to slowly, but independently ambulate into the ER with walker.     Review of External Medical Records:     Nursing Notes were reviewed.    REVIEW OF SYSTEMS    (2-9 systems for level 4, 10 or more for level 5)     Review of Systems   All other systems reviewed and are negative.      Except as noted above the remainder of the review of systems was reviewed and negative.       PAST MEDICAL HISTORY     Past Medical History:   Diagnosis Date    Arthritis     Mild anemia     Prostate cancer (HCC) 2008 2017    PALLIATIVE CARE PER DR NIKOLE LÓPEZ NOTES 2/12/2018: [facility]OR[facility] ME[facility], AND

## 2024-10-02 NOTE — ACP (ADVANCE CARE PLANNING)
Advance Care Planning     General Advance Care Planning (ACP) Conversation      Date of Conversation: 9/26/2021  Conducted with: Patient with Decision Making Capacity    Healthcare Decision Maker:     Primary Decision Maker: Duncan Pierce -  - [phone]  Click here to complete [address] including selection of the Healthcare Decision Maker Relationship (ie \"Primary\")          Content/Action Overview:   Has NO ACP documents/care preferences - requested patient complete ACP documents  Reviewed DNR/DNI and patient elects Full Code (Attempt Resuscitation)    The patient was clear that if his death was imminent, and medical treatments would not help him recover, he would not want treatments to prolong his life. The patient was clear that if he was in a condition that made him unaware of himself or his surroundings, he feels that 7 days would be a period of time acceptable for him for some improvement, if no improvement made, he would not want prolonging of life. Code Status discussed- patient remains FULL CODE at this time.      Jonny Heart, ANYIW
no...

## 2024-10-07 ENCOUNTER — HOSPITAL ENCOUNTER (EMERGENCY)
Facility: HOSPITAL | Age: 74
Discharge: HOME | End: 2024-10-07
Attending: EMERGENCY MEDICINE
Payer: OTHER GOVERNMENT

## 2024-10-07 VITALS
OXYGEN SATURATION: 100 % | RESPIRATION RATE: 18 BRPM | DIASTOLIC BLOOD PRESSURE: 74 MMHG | BODY MASS INDEX: 17.72 KG/M2 | SYSTOLIC BLOOD PRESSURE: 127 MMHG | TEMPERATURE: 97.5 F | HEART RATE: 84 BPM | WEIGHT: 120 LBS

## 2024-10-07 DIAGNOSIS — M79.604 RIGHT LEG PAIN: Primary | ICD-10-CM

## 2024-10-07 PROCEDURE — 96372 THER/PROPH/DIAG INJ SC/IM: CPT

## 2024-10-07 PROCEDURE — 6360000002 HC RX W HCPCS: Performed by: EMERGENCY MEDICINE

## 2024-10-07 PROCEDURE — 99284 EMERGENCY DEPT VISIT MOD MDM: CPT

## 2024-10-07 RX ORDER — KETOROLAC TROMETHAMINE 30 MG/ML
30 INJECTION, SOLUTION INTRAMUSCULAR; INTRAVENOUS
Status: COMPLETED | OUTPATIENT
Start: 2024-10-07 | End: 2024-10-07

## 2024-10-07 RX ORDER — MORPHINE SULFATE 2 MG/ML
2 INJECTION, SOLUTION INTRAMUSCULAR; INTRAVENOUS
Status: COMPLETED | OUTPATIENT
Start: 2024-10-07 | End: 2024-10-07

## 2024-10-07 RX ADMIN — MORPHINE SULFATE 2 MG: 2 INJECTION, SOLUTION INTRAMUSCULAR; INTRAVENOUS at 22:14

## 2024-10-07 RX ADMIN — KETOROLAC TROMETHAMINE 30 MG: 30 INJECTION, SOLUTION INTRAMUSCULAR; INTRAVENOUS at 20:53

## 2024-10-07 ASSESSMENT — PAIN DESCRIPTION - LOCATION
LOCATION: BACK;LEG
LOCATION: BACK

## 2024-10-07 ASSESSMENT — PAIN SCALES - GENERAL
PAINLEVEL_OUTOF10: 10
PAINLEVEL_OUTOF10: 10
PAINLEVEL_OUTOF10: 8
PAINLEVEL_OUTOF10: 10

## 2024-10-07 ASSESSMENT — PAIN - FUNCTIONAL ASSESSMENT: PAIN_FUNCTIONAL_ASSESSMENT: 0-10

## 2024-10-08 NOTE — ED PROVIDER NOTES
[facility] EMERGENCY DEPT  EMERGENCY DEPARTMENT HISTORY AND PHYSICAL EXAM      Date: 10/7/2024  Patient Name: Gerhard Overton  MRN: [id number]  Birthdate 1950  Date of evaluation: 10/7/2024  Provider: Jorge A Enriquez DO   Note Started: 9:36 PM EDT 10/7/24    HISTORY OF PRESENT ILLNESS     Chief Complaint   Patient presents with    Leg Pain     History Provided By: Patient    HPI: Gerhard Overton is a 74 y.o. male with past medical history of arthritis, anemia, and prostate cancer  who presents with left leg pain.  It is acute on chronic.  Pain is around his left knee and radiates downwards towards the shin.  States it is burning and constant.  He had Toradol shot in the past which helped.  He takes naproxen at home as needed for pain.    PAST MEDICAL HISTORY   Past Medical History:  Past Medical History:   Diagnosis Date    Arthritis     Mild anemia     Prostate cancer (HCC) 2008    PALLIATIVE CARE PER DR NIKOLE LÓPEZ NOTES 2018: XTANDI, LUPRON, AND PAST EXTERNAL BEAM RADIATION THERAPY       Past Surgical History:  Past Surgical History:   Procedure Laterality Date    BUNIONECTOMY Right 2014    CATARACT REMOVAL Bilateral 2017    COLONOSCOPY      GI  2009    ANAL FISTULA     HEMORRHOID SURGERY  1974    HIP ARTHROSCOPY Right     TONSILLECTOMY  195       Family History:  Family History   Problem Relation Age of Onset    Obesity Sister     Anesth Problems Neg Hx     Mental Retardation Sister     Drug Abuse Brother     Diabetes Brother     Diabetes Brother     Kidney Disease Brother         DIALYSIS    Hypertension Sister     Asthma Father     Hypertension Mother     Diabetes Mother        Social History:  Social History     Tobacco Use    Smoking status: Former     Current packs/day: 0.00     Types: Cigarettes     Quit date: 1993     Years since quittin.7    Smokeless tobacco: Never   Substance Use Topics    Alcohol use: No    Drug use: No       Allergies:  No Known Allergies    PCP: Lopez Bhat

## 2024-10-29 ENCOUNTER — HOSPITAL ENCOUNTER (EMERGENCY)
Facility: HOSPITAL | Age: 74
Discharge: ANOTHER HOSPITAL | End: 2024-10-30
Attending: EMERGENCY MEDICINE
Payer: OTHER GOVERNMENT

## 2024-10-29 ENCOUNTER — APPOINTMENT (OUTPATIENT)
Facility: HOSPITAL | Age: 74
End: 2024-10-29
Attending: EMERGENCY MEDICINE
Payer: OTHER GOVERNMENT

## 2024-10-29 ENCOUNTER — APPOINTMENT (OUTPATIENT)
Facility: HOSPITAL | Age: 74
End: 2024-10-29
Payer: OTHER GOVERNMENT

## 2024-10-29 DIAGNOSIS — G89.29 CHRONIC BILATERAL LOW BACK PAIN WITH SCIATICA, SCIATICA LATERALITY UNSPECIFIED: Primary | ICD-10-CM

## 2024-10-29 DIAGNOSIS — C79.51 PROSTATE CANCER METASTATIC TO BONE (HCC): ICD-10-CM

## 2024-10-29 DIAGNOSIS — C61 PROSTATE CANCER METASTATIC TO BONE (HCC): ICD-10-CM

## 2024-10-29 DIAGNOSIS — M54.40 CHRONIC BILATERAL LOW BACK PAIN WITH SCIATICA, SCIATICA LATERALITY UNSPECIFIED: Primary | ICD-10-CM

## 2024-10-29 LAB
ALBUMIN SERPL-MCNC: 2.5 G/DL (ref 3.5–5)
ALBUMIN/GLOB SERPL: 0.7 (ref 1.1–2.2)
ALP SERPL-CCNC: 490 U/L (ref 45–117)
ALT SERPL-CCNC: 19 U/L (ref 12–78)
ANION GAP SERPL CALC-SCNC: 6 MMOL/L (ref 2–12)
AST SERPL W P-5'-P-CCNC: 63 U/L (ref 15–37)
BASOPHILS # BLD: 0 K/UL (ref 0–0.1)
BASOPHILS NFR BLD: 1 % (ref 0–1)
BILIRUB SERPL-MCNC: 0.3 MG/DL (ref 0.2–1)
BUN SERPL-MCNC: 11 MG/DL (ref 6–20)
BUN/CREAT SERPL: 22 (ref 12–20)
CA-I BLD-MCNC: 7.9 MG/DL (ref 8.5–10.1)
CHLORIDE SERPL-SCNC: 111 MMOL/L (ref 97–108)
CO2 SERPL-SCNC: 23 MMOL/L (ref 21–32)
CREAT SERPL-MCNC: 0.49 MG/DL (ref 0.7–1.3)
CRP SERPL-MCNC: 3.19 MG/DL (ref 0–0.3)
DIFFERENTIAL METHOD BLD: ABNORMAL
EOSINOPHIL # BLD: 0 K/UL (ref 0–0.4)
EOSINOPHIL NFR BLD: 0 % (ref 0–7)
ERYTHROCYTE [DISTWIDTH] IN BLOOD BY AUTOMATED COUNT: 17.5 % (ref 11.5–14.5)
ERYTHROCYTE [SEDIMENTATION RATE] IN BLOOD: 120 MM/HR (ref 0–20)
GLOBULIN SER CALC-MCNC: 3.7 G/DL (ref 2–4)
GLUCOSE SERPL-MCNC: 95 MG/DL (ref 65–100)
HCT VFR BLD AUTO: 17.4 % (ref 36.6–50.3)
HCT VFR BLD AUTO: 20 % (ref 36.6–50.3)
HCT VFR BLD AUTO: 24.7 % (ref 36.6–50.3)
HGB BLD-MCNC: 5.1 G/DL (ref 12.1–17)
HGB BLD-MCNC: 6.1 G/DL (ref 12.1–17)
HGB BLD-MCNC: 8.1 G/DL (ref 12.1–17)
IMM GRANULOCYTES # BLD AUTO: 0 K/UL
IMM GRANULOCYTES NFR BLD AUTO: 0 %
LYMPHOCYTES # BLD: 0.7 K/UL (ref 0.8–3.5)
LYMPHOCYTES NFR BLD: 18 % (ref 12–49)
MCH RBC QN AUTO: 28 PG (ref 26–34)
MCHC RBC AUTO-ENTMCNC: 29.3 G/DL (ref 30–36.5)
MCV RBC AUTO: 95.6 FL (ref 80–99)
MONOCYTES # BLD: 0.4 K/UL (ref 0–1)
MONOCYTES NFR BLD: 9 % (ref 5–13)
MYELOCYTES NFR BLD MANUAL: 1 %
NEUTS BAND NFR BLD MANUAL: 1 % (ref 0–6)
NEUTS SEG # BLD: 2.9 K/UL (ref 1.8–8)
NEUTS SEG NFR BLD: 70 % (ref 32–75)
NRBC # BLD: 0 K/UL (ref 0–0.01)
NRBC BLD-RTO: 0 PER 100 WBC
PLATELET # BLD AUTO: 319 K/UL (ref 150–400)
PMV BLD AUTO: 9.5 FL (ref 8.9–12.9)
POTASSIUM SERPL-SCNC: 4.4 MMOL/L (ref 3.5–5.1)
PROT SERPL-MCNC: 6.2 G/DL (ref 6.4–8.2)
RBC # BLD AUTO: 1.82 M/UL (ref 4.1–5.7)
RBC MORPH BLD: ABNORMAL
RBC MORPH BLD: ABNORMAL
SODIUM SERPL-SCNC: 140 MMOL/L (ref 136–145)
WBC # BLD AUTO: 4.1 K/UL (ref 4.1–11.1)

## 2024-10-29 PROCEDURE — 2500000003 HC RX 250 WO HCPCS: Performed by: EMERGENCY MEDICINE

## 2024-10-29 PROCEDURE — 85014 HEMATOCRIT: CPT

## 2024-10-29 PROCEDURE — 6360000002 HC RX W HCPCS: Performed by: EMERGENCY MEDICINE

## 2024-10-29 PROCEDURE — 99285 EMERGENCY DEPT VISIT HI MDM: CPT

## 2024-10-29 PROCEDURE — 96375 TX/PRO/DX INJ NEW DRUG ADDON: CPT

## 2024-10-29 PROCEDURE — 6360000004 HC RX CONTRAST MEDICATION: Performed by: EMERGENCY MEDICINE

## 2024-10-29 PROCEDURE — 72158 MRI LUMBAR SPINE W/O & W/DYE: CPT

## 2024-10-29 PROCEDURE — 85018 HEMOGLOBIN: CPT

## 2024-10-29 PROCEDURE — 85025 COMPLETE CBC W/AUTO DIFF WBC: CPT

## 2024-10-29 PROCEDURE — 85652 RBC SED RATE AUTOMATED: CPT

## 2024-10-29 PROCEDURE — 36430 TRANSFUSION BLD/BLD COMPNT: CPT

## 2024-10-29 PROCEDURE — 36415 COLL VENOUS BLD VENIPUNCTURE: CPT

## 2024-10-29 PROCEDURE — 96374 THER/PROPH/DIAG INJ IV PUSH: CPT

## 2024-10-29 PROCEDURE — 80053 COMPREHEN METABOLIC PANEL: CPT

## 2024-10-29 PROCEDURE — A9577 INJ MULTIHANCE: HCPCS | Performed by: EMERGENCY MEDICINE

## 2024-10-29 PROCEDURE — 86850 RBC ANTIBODY SCREEN: CPT

## 2024-10-29 PROCEDURE — P9016 RBC LEUKOCYTES REDUCED: HCPCS

## 2024-10-29 PROCEDURE — 86900 BLOOD TYPING SEROLOGIC ABO: CPT

## 2024-10-29 PROCEDURE — 86901 BLOOD TYPING SEROLOGIC RH(D): CPT

## 2024-10-29 PROCEDURE — 86140 C-REACTIVE PROTEIN: CPT

## 2024-10-29 PROCEDURE — 72146 MRI CHEST SPINE W/O DYE: CPT

## 2024-10-29 PROCEDURE — 96372 THER/PROPH/DIAG INJ SC/IM: CPT

## 2024-10-29 PROCEDURE — 86923 COMPATIBILITY TEST ELECTRIC: CPT

## 2024-10-29 RX ORDER — SODIUM CHLORIDE 9 MG/ML
INJECTION, SOLUTION INTRAVENOUS PRN
Status: DISCONTINUED | OUTPATIENT
Start: 2024-10-29 | End: 2024-10-30 | Stop reason: HOSPADM

## 2024-10-29 RX ORDER — HYDROMORPHONE HYDROCHLORIDE 1 MG/ML
0.5 INJECTION, SOLUTION INTRAMUSCULAR; INTRAVENOUS; SUBCUTANEOUS ONCE
Status: COMPLETED | OUTPATIENT
Start: 2024-10-29 | End: 2024-10-29

## 2024-10-29 RX ORDER — MORPHINE SULFATE 2 MG/ML
2 INJECTION, SOLUTION INTRAMUSCULAR; INTRAVENOUS
Status: COMPLETED | OUTPATIENT
Start: 2024-10-29 | End: 2024-10-29

## 2024-10-29 RX ORDER — KETOROLAC TROMETHAMINE 30 MG/ML
30 INJECTION, SOLUTION INTRAMUSCULAR; INTRAVENOUS
Status: COMPLETED | OUTPATIENT
Start: 2024-10-29 | End: 2024-10-29

## 2024-10-29 RX ADMIN — HYDROMORPHONE HYDROCHLORIDE 0.5 MG: 1 INJECTION, SOLUTION INTRAMUSCULAR; INTRAVENOUS; SUBCUTANEOUS at 15:26

## 2024-10-29 RX ADMIN — MORPHINE SULFATE 2 MG: 2 INJECTION, SOLUTION INTRAMUSCULAR; INTRAVENOUS at 05:57

## 2024-10-29 RX ADMIN — MORPHINE SULFATE 2 MG: 2 INJECTION, SOLUTION INTRAMUSCULAR; INTRAVENOUS at 02:53

## 2024-10-29 RX ADMIN — KETOROLAC TROMETHAMINE 30 MG: 30 INJECTION, SOLUTION INTRAMUSCULAR; INTRAVENOUS at 02:53

## 2024-10-29 RX ADMIN — GADOBENATE DIMEGLUMINE 11 ML: 529 INJECTION, SOLUTION INTRAVENOUS at 16:18

## 2024-10-29 ASSESSMENT — PAIN SCALES - GENERAL
PAINLEVEL_OUTOF10: 10
PAINLEVEL_OUTOF10: 10
PAINLEVEL_OUTOF10: 8
PAINLEVEL_OUTOF10: 8

## 2024-10-29 ASSESSMENT — PAIN DESCRIPTION - LOCATION
LOCATION: BACK

## 2024-10-29 ASSESSMENT — PAIN - FUNCTIONAL ASSESSMENT
PAIN_FUNCTIONAL_ASSESSMENT: ACTIVITIES ARE NOT PREVENTED
PAIN_FUNCTIONAL_ASSESSMENT: 0-10

## 2024-10-29 ASSESSMENT — PAIN DESCRIPTION - DESCRIPTORS: DESCRIPTORS: DISCOMFORT

## 2024-10-29 NOTE — ED PROVIDER NOTES
[facility] EMERGENCY DEPT  EMERGENCY DEPARTMENT HISTORY AND PHYSICAL EXAM      Date: 10/29/2024  Patient Name: Gerhard Overton  MRN: [id number]  Birthdate 1950  Date of evaluation: 10/29/2024  Provider: Jorge A Enriquez DO   Note Started: 2:28 AM EDT 10/29/24    HISTORY OF PRESENT ILLNESS     Chief Complaint   Patient presents with    Back Pain     History Provided By: Patient    HPI: Gerhard Overton is a 74 y.o. male with past medical history of below  who presents with back pain that is chronic and midline bilateral low back.  He had surgery 3 years ago with laminectomy.    PAST MEDICAL HISTORY   Past Medical History:  Past Medical History:   Diagnosis Date    Arthritis     Mild anemia     Prostate cancer (HCC) 2008    PALLIATIVE CARE PER DR NIKOLE LÓPEZ NOTES 2018: XTANDI, LUPRON, AND PAST EXTERNAL BEAM RADIATION THERAPY       Past Surgical History:  Past Surgical History:   Procedure Laterality Date    BUNIONECTOMY Right 2014    CATARACT REMOVAL Bilateral 2017    COLONOSCOPY      GI  2009    ANAL FISTULA     HEMORRHOID SURGERY  1974    HIP ARTHROSCOPY Right     TONSILLECTOMY  195       Family History:  Family History   Problem Relation Age of Onset    Obesity Sister     Anesth Problems Neg Hx     Mental Retardation Sister     Drug Abuse Brother     Diabetes Brother     Diabetes Brother     Kidney Disease Brother         DIALYSIS    Hypertension Sister     Asthma Father     Hypertension Mother     Diabetes Mother        Social History:  Social History     Tobacco Use    Smoking status: Former     Current packs/day: 0.00     Types: Cigarettes     Quit date: 1993     Years since quittin.8    Smokeless tobacco: Never   Substance Use Topics    Alcohol use: No    Drug use: No       Allergies:  No Known Allergies    PCP: Lopez Bhat Jr., MD    Current Meds:   No current facility-administered medications for this encounter.     Current Outpatient Medications   Medication Sig Dispense Refill

## 2024-10-29 NOTE — CONSENT
Informed Consent for Blood Component Transfusion Note    I have discussed with the patient the rationale for blood component transfusion; its benefits in treating or preventing fatigue, organ damage, or death; and its risk which includes mild transfusion reactions, rare risk of blood borne infection, or more serious but rare reactions. I have discussed the alternatives to transfusion, including the risk and consequences of not receiving transfusion. The patient had an opportunity to ask questions and had agreed to proceed with transfusion of blood components.    Electronically signed by Jorge A Enriquez DO on 10/29/24 at 6:50 AM EDT

## 2024-10-29 NOTE — ED NOTES
Assumed care of this patient.  Patient to have a blood transfusion and MRI.  Blood consent signed, waiting on unit of blood from blood bank.

## 2024-10-30 ENCOUNTER — APPOINTMENT (OUTPATIENT)
Facility: HOSPITAL | Age: 74
End: 2024-10-30
Attending: FAMILY MEDICINE
Payer: MEDICARE

## 2024-10-30 ENCOUNTER — HOSPITAL ENCOUNTER (INPATIENT)
Facility: HOSPITAL | Age: 74
LOS: 7 days | Discharge: NURSING, REHAB OR LONG-TERM CARE | End: 2024-11-06
Attending: FAMILY MEDICINE | Admitting: INTERNAL MEDICINE
Payer: MEDICARE

## 2024-10-30 VITALS
HEART RATE: 97 BPM | HEIGHT: 69 IN | DIASTOLIC BLOOD PRESSURE: 74 MMHG | TEMPERATURE: 98.3 F | WEIGHT: 124 LBS | OXYGEN SATURATION: 95 % | SYSTOLIC BLOOD PRESSURE: 126 MMHG | RESPIRATION RATE: 19 BRPM | BODY MASS INDEX: 18.37 KG/M2

## 2024-10-30 DIAGNOSIS — M54.59 INTRACTABLE LOW BACK PAIN: Primary | ICD-10-CM

## 2024-10-30 LAB
ABO + RH BLD: NORMAL
ALBUMIN SERPL-MCNC: 2.4 G/DL (ref 3.5–5)
ALBUMIN/GLOB SERPL: 0.7 (ref 1.1–2.2)
ALP SERPL-CCNC: 516 U/L (ref 45–117)
ALT SERPL-CCNC: 16 U/L (ref 12–78)
ANION GAP SERPL CALC-SCNC: 6 MMOL/L (ref 2–12)
AST SERPL-CCNC: 84 U/L (ref 15–37)
BASOPHILS # BLD: 0 K/UL (ref 0–0.1)
BASOPHILS NFR BLD: 1 % (ref 0–1)
BILIRUB SERPL-MCNC: 0.5 MG/DL (ref 0.2–1)
BLD PROD TYP BPU: NORMAL
BLD PROD TYP BPU: NORMAL
BLOOD BANK BLOOD PRODUCT EXPIRATION DATE: NORMAL
BLOOD BANK BLOOD PRODUCT EXPIRATION DATE: NORMAL
BLOOD BANK DISPENSE STATUS: NORMAL
BLOOD BANK DISPENSE STATUS: NORMAL
BLOOD BANK ISBT PRODUCT BLOOD TYPE: 6200
BLOOD BANK ISBT PRODUCT BLOOD TYPE: 6200
BLOOD BANK PRODUCT CODE: NORMAL
BLOOD BANK PRODUCT CODE: NORMAL
BLOOD BANK UNIT TYPE AND RH: NORMAL
BLOOD BANK UNIT TYPE AND RH: NORMAL
BLOOD GROUP ANTIBODIES SERPL: NEGATIVE
BPU ID: NORMAL
BPU ID: NORMAL
BUN SERPL-MCNC: 8 MG/DL (ref 6–20)
BUN/CREAT SERPL: 16 (ref 12–20)
CALCIUM SERPL-MCNC: 8.1 MG/DL (ref 8.5–10.1)
CHLORIDE SERPL-SCNC: 112 MMOL/L (ref 97–108)
CO2 SERPL-SCNC: 23 MMOL/L (ref 21–32)
CREAT SERPL-MCNC: 0.51 MG/DL (ref 0.7–1.3)
CROSSMATCH RESULT: NORMAL
CROSSMATCH RESULT: NORMAL
DIFFERENTIAL METHOD BLD: ABNORMAL
EOSINOPHIL # BLD: 0 K/UL (ref 0–0.4)
EOSINOPHIL NFR BLD: 0 % (ref 0–7)
ERYTHROCYTE [DISTWIDTH] IN BLOOD BY AUTOMATED COUNT: 18.3 % (ref 11.5–14.5)
FERRITIN SERPL-MCNC: 1587 NG/ML (ref 26–388)
FOLATE SERPL-MCNC: 3.9 NG/ML (ref 5–21)
GLOBULIN SER CALC-MCNC: 3.6 G/DL (ref 2–4)
GLUCOSE SERPL-MCNC: 88 MG/DL (ref 65–100)
HCT VFR BLD AUTO: 27.1 % (ref 36.6–50.3)
HCT VFR BLD AUTO: 27.3 % (ref 36.6–50.3)
HGB BLD-MCNC: 8.4 G/DL (ref 12.1–17)
HGB BLD-MCNC: 8.6 G/DL (ref 12.1–17)
IMM GRANULOCYTES # BLD AUTO: 0 K/UL
IMM GRANULOCYTES NFR BLD AUTO: 0 %
IRON SATN MFR SERPL: 31 % (ref 20–50)
IRON SERPL-MCNC: 64 UG/DL (ref 35–150)
LYMPHOCYTES # BLD: 0.8 K/UL (ref 0.8–3.5)
LYMPHOCYTES NFR BLD: 18 % (ref 12–49)
MAGNESIUM SERPL-MCNC: 2.2 MG/DL (ref 1.6–2.4)
MCH RBC QN AUTO: 28.3 PG (ref 26–34)
MCHC RBC AUTO-ENTMCNC: 30.8 G/DL (ref 30–36.5)
MCV RBC AUTO: 91.9 FL (ref 80–99)
MONOCYTES # BLD: 0.3 K/UL (ref 0–1)
MONOCYTES NFR BLD: 6 % (ref 5–13)
NEUTS SEG # BLD: 3.2 K/UL (ref 1.8–8)
NEUTS SEG NFR BLD: 75 % (ref 32–75)
NRBC # BLD: 0 K/UL (ref 0–0.01)
NRBC BLD-RTO: 0 PER 100 WBC
PHOSPHATE SERPL-MCNC: 2.2 MG/DL (ref 2.6–4.7)
PLATELET # BLD AUTO: 306 K/UL (ref 150–400)
PMV BLD AUTO: 9.5 FL (ref 8.9–12.9)
POTASSIUM SERPL-SCNC: 4.4 MMOL/L (ref 3.5–5.1)
PROT SERPL-MCNC: 6 G/DL (ref 6.4–8.2)
RBC # BLD AUTO: 2.97 M/UL (ref 4.1–5.7)
RBC MORPH BLD: ABNORMAL
RBC MORPH BLD: ABNORMAL
SODIUM SERPL-SCNC: 141 MMOL/L (ref 136–145)
SPECIMEN EXP DATE BLD: NORMAL
TIBC SERPL-MCNC: 208 UG/DL (ref 250–450)
TRANSFUSION STATUS PATIENT QL: NORMAL
TRANSFUSION STATUS PATIENT QL: NORMAL
TROPONIN I SERPL HS-MCNC: 6 NG/L (ref 0–76)
TROPONIN I SERPL HS-MCNC: 7 NG/L (ref 0–76)
TSH SERPL DL<=0.05 MIU/L-ACNC: 2.73 UIU/ML (ref 0.36–3.74)
UNIT DIVISION: 0
UNIT DIVISION: 0
UNIT ISSUE DATE/TIME: NORMAL
UNIT ISSUE DATE/TIME: NORMAL
VIT B12 SERPL-MCNC: 645 PG/ML (ref 193–986)
WBC # BLD AUTO: 4.3 K/UL (ref 4.1–11.1)

## 2024-10-30 PROCEDURE — 2060000000 HC ICU INTERMEDIATE R&B

## 2024-10-30 PROCEDURE — 83735 ASSAY OF MAGNESIUM: CPT

## 2024-10-30 PROCEDURE — 84443 ASSAY THYROID STIM HORMONE: CPT

## 2024-10-30 PROCEDURE — 82607 VITAMIN B-12: CPT

## 2024-10-30 PROCEDURE — 6360000002 HC RX W HCPCS: Performed by: INTERNAL MEDICINE

## 2024-10-30 PROCEDURE — 6370000000 HC RX 637 (ALT 250 FOR IP): Performed by: INTERNAL MEDICINE

## 2024-10-30 PROCEDURE — 36415 COLL VENOUS BLD VENIPUNCTURE: CPT

## 2024-10-30 PROCEDURE — 82746 ASSAY OF FOLIC ACID SERUM: CPT

## 2024-10-30 PROCEDURE — 2580000003 HC RX 258: Performed by: INTERNAL MEDICINE

## 2024-10-30 PROCEDURE — 85014 HEMATOCRIT: CPT

## 2024-10-30 PROCEDURE — 82728 ASSAY OF FERRITIN: CPT

## 2024-10-30 PROCEDURE — 80053 COMPREHEN METABOLIC PANEL: CPT

## 2024-10-30 PROCEDURE — 71045 X-RAY EXAM CHEST 1 VIEW: CPT

## 2024-10-30 PROCEDURE — 84100 ASSAY OF PHOSPHORUS: CPT

## 2024-10-30 PROCEDURE — 83540 ASSAY OF IRON: CPT

## 2024-10-30 PROCEDURE — 85025 COMPLETE CBC W/AUTO DIFF WBC: CPT

## 2024-10-30 PROCEDURE — 85018 HEMOGLOBIN: CPT

## 2024-10-30 PROCEDURE — 84484 ASSAY OF TROPONIN QUANT: CPT

## 2024-10-30 PROCEDURE — 83550 IRON BINDING TEST: CPT

## 2024-10-30 RX ORDER — TAMSULOSIN HYDROCHLORIDE 0.4 MG/1
0.4 CAPSULE ORAL DAILY
Status: DISCONTINUED | OUTPATIENT
Start: 2024-10-30 | End: 2024-11-06 | Stop reason: HOSPADM

## 2024-10-30 RX ORDER — ACETAMINOPHEN 500 MG
1000 TABLET ORAL ONCE
Status: CANCELLED | OUTPATIENT
Start: 2024-10-30 | End: 2024-10-30

## 2024-10-30 RX ORDER — POTASSIUM CHLORIDE 750 MG/1
40 TABLET, EXTENDED RELEASE ORAL PRN
Status: DISCONTINUED | OUTPATIENT
Start: 2024-10-30 | End: 2024-10-31

## 2024-10-30 RX ORDER — SODIUM CHLORIDE 9 MG/ML
INJECTION, SOLUTION INTRAVENOUS PRN
Status: DISCONTINUED | OUTPATIENT
Start: 2024-10-30 | End: 2024-11-06 | Stop reason: HOSPADM

## 2024-10-30 RX ORDER — POTASSIUM CHLORIDE 7.45 MG/ML
10 INJECTION INTRAVENOUS PRN
Status: DISCONTINUED | OUTPATIENT
Start: 2024-10-30 | End: 2024-10-31

## 2024-10-30 RX ORDER — MIDODRINE HYDROCHLORIDE 5 MG/1
10 TABLET ORAL ONCE
Status: CANCELLED | OUTPATIENT
Start: 2024-10-30 | End: 2024-10-30

## 2024-10-30 RX ORDER — SODIUM CHLORIDE 9 MG/ML
INJECTION, SOLUTION INTRAVENOUS CONTINUOUS
Status: DISPENSED | OUTPATIENT
Start: 2024-10-30 | End: 2024-10-30

## 2024-10-30 RX ORDER — DEXAMETHASONE SODIUM PHOSPHATE 10 MG/ML
6 INJECTION, SOLUTION INTRAMUSCULAR; INTRAVENOUS ONCE
Status: CANCELLED | OUTPATIENT
Start: 2024-10-30 | End: 2024-10-30

## 2024-10-30 RX ORDER — ACETAMINOPHEN 650 MG/1
650 SUPPOSITORY RECTAL EVERY 6 HOURS PRN
Status: DISCONTINUED | OUTPATIENT
Start: 2024-10-30 | End: 2024-11-06 | Stop reason: HOSPADM

## 2024-10-30 RX ORDER — SODIUM CHLORIDE 0.9 % (FLUSH) 0.9 %
5-40 SYRINGE (ML) INJECTION PRN
Status: DISCONTINUED | OUTPATIENT
Start: 2024-10-30 | End: 2024-11-06 | Stop reason: HOSPADM

## 2024-10-30 RX ORDER — IBUPROFEN 600 MG/1
600 TABLET, FILM COATED ORAL
Status: CANCELLED | OUTPATIENT
Start: 2024-10-30 | End: 2024-10-30

## 2024-10-30 RX ORDER — THIAMINE MONONITRATE (VIT B1) 100 MG
100 TABLET ORAL DAILY
Status: DISCONTINUED | OUTPATIENT
Start: 2024-10-30 | End: 2024-10-30 | Stop reason: SDUPTHER

## 2024-10-30 RX ORDER — GABAPENTIN 100 MG/1
100 CAPSULE ORAL 3 TIMES DAILY
Status: DISCONTINUED | OUTPATIENT
Start: 2024-10-30 | End: 2024-11-01

## 2024-10-30 RX ORDER — MAGNESIUM SULFATE IN WATER 40 MG/ML
2000 INJECTION, SOLUTION INTRAVENOUS PRN
Status: DISCONTINUED | OUTPATIENT
Start: 2024-10-30 | End: 2024-10-31

## 2024-10-30 RX ORDER — VITAMIN B COMPLEX
1000 TABLET ORAL DAILY
Status: DISCONTINUED | OUTPATIENT
Start: 2024-10-30 | End: 2024-11-06 | Stop reason: HOSPADM

## 2024-10-30 RX ORDER — ONDANSETRON 4 MG/1
4 TABLET, ORALLY DISINTEGRATING ORAL EVERY 8 HOURS PRN
Status: DISCONTINUED | OUTPATIENT
Start: 2024-10-30 | End: 2024-11-06 | Stop reason: HOSPADM

## 2024-10-30 RX ORDER — ACETAMINOPHEN 325 MG/1
650 TABLET ORAL EVERY 6 HOURS PRN
Status: DISCONTINUED | OUTPATIENT
Start: 2024-10-30 | End: 2024-11-06 | Stop reason: HOSPADM

## 2024-10-30 RX ORDER — MORPHINE SULFATE 2 MG/ML
2 INJECTION, SOLUTION INTRAMUSCULAR; INTRAVENOUS EVERY 4 HOURS PRN
Status: DISCONTINUED | OUTPATIENT
Start: 2024-10-30 | End: 2024-11-06 | Stop reason: HOSPADM

## 2024-10-30 RX ORDER — DIPHENHYDRAMINE HCL 25 MG
25 CAPSULE ORAL
Status: CANCELLED | OUTPATIENT
Start: 2024-10-30 | End: 2024-10-30

## 2024-10-30 RX ORDER — ALBUTEROL SULFATE 0.83 MG/ML
20 SOLUTION RESPIRATORY (INHALATION) ONCE
Status: CANCELLED | OUTPATIENT
Start: 2024-10-30 | End: 2024-10-30

## 2024-10-30 RX ORDER — THIAMINE MONONITRATE (VIT B1) 100 MG
100 TABLET ORAL DAILY
Status: ON HOLD | COMMUNITY
End: 2024-11-06 | Stop reason: HOSPADM

## 2024-10-30 RX ORDER — LANOLIN ALCOHOL/MO/W.PET/CERES
100 CREAM (GRAM) TOPICAL DAILY
Status: DISCONTINUED | OUTPATIENT
Start: 2024-10-30 | End: 2024-10-30

## 2024-10-30 RX ORDER — DEXTROSE 25 % IN WATER 25 %
25 SYRINGE (ML) INTRAVENOUS ONCE
Status: CANCELLED | OUTPATIENT
Start: 2024-10-30 | End: 2024-10-30

## 2024-10-30 RX ORDER — ENOXAPARIN SODIUM 100 MG/ML
40 INJECTION SUBCUTANEOUS DAILY
Status: DISCONTINUED | OUTPATIENT
Start: 2024-10-30 | End: 2024-10-30

## 2024-10-30 RX ORDER — POLYETHYLENE GLYCOL 3350 17 G/17G
17 POWDER, FOR SOLUTION ORAL DAILY PRN
Status: DISCONTINUED | OUTPATIENT
Start: 2024-10-30 | End: 2024-11-06 | Stop reason: HOSPADM

## 2024-10-30 RX ORDER — ONDANSETRON 2 MG/ML
4 INJECTION INTRAMUSCULAR; INTRAVENOUS EVERY 6 HOURS PRN
Status: DISCONTINUED | OUTPATIENT
Start: 2024-10-30 | End: 2024-11-06 | Stop reason: HOSPADM

## 2024-10-30 RX ORDER — OCTREOTIDE ACETATE 100 UG/ML
100 INJECTION, SOLUTION INTRAVENOUS; SUBCUTANEOUS ONCE
Status: CANCELLED | OUTPATIENT
Start: 2024-10-30 | End: 2024-10-30

## 2024-10-30 RX ORDER — SODIUM CHLORIDE 0.9 % (FLUSH) 0.9 %
5-40 SYRINGE (ML) INJECTION EVERY 12 HOURS SCHEDULED
Status: DISCONTINUED | OUTPATIENT
Start: 2024-10-30 | End: 2024-11-06 | Stop reason: HOSPADM

## 2024-10-30 RX ORDER — METOCLOPRAMIDE 10 MG/1
10 TABLET ORAL ONCE
Status: CANCELLED | OUTPATIENT
Start: 2024-10-30 | End: 2024-10-30

## 2024-10-30 RX ORDER — ALBUMIN (HUMAN) 12.5 G/50ML
1 SOLUTION INTRAVENOUS ONCE
Status: CANCELLED | OUTPATIENT
Start: 2024-10-30 | End: 2024-10-30

## 2024-10-30 RX ORDER — OXYCODONE HYDROCHLORIDE 5 MG/1
5 TABLET ORAL EVERY 4 HOURS PRN
Status: DISCONTINUED | OUTPATIENT
Start: 2024-10-30 | End: 2024-11-06 | Stop reason: HOSPADM

## 2024-10-30 RX ORDER — OCTREOTIDE ACETATE 100 UG/ML
50 INJECTION, SOLUTION INTRAVENOUS; SUBCUTANEOUS ONCE
Status: CANCELLED | OUTPATIENT
Start: 2024-10-30 | End: 2024-10-30

## 2024-10-30 RX ADMIN — GABAPENTIN 100 MG: 100 CAPSULE ORAL at 15:08

## 2024-10-30 RX ADMIN — SODIUM CHLORIDE, PRESERVATIVE FREE 10 ML: 5 INJECTION INTRAVENOUS at 08:20

## 2024-10-30 RX ADMIN — GABAPENTIN 100 MG: 100 CAPSULE ORAL at 08:19

## 2024-10-30 RX ADMIN — MORPHINE SULFATE 2 MG: 2 INJECTION, SOLUTION INTRAMUSCULAR; INTRAVENOUS at 21:45

## 2024-10-30 RX ADMIN — GABAPENTIN 100 MG: 100 CAPSULE ORAL at 21:44

## 2024-10-30 RX ADMIN — MORPHINE SULFATE 2 MG: 2 INJECTION, SOLUTION INTRAMUSCULAR; INTRAVENOUS at 15:11

## 2024-10-30 RX ADMIN — Medication 1000 UNITS: at 08:19

## 2024-10-30 RX ADMIN — TAMSULOSIN HYDROCHLORIDE 0.4 MG: 0.4 CAPSULE ORAL at 08:19

## 2024-10-30 RX ADMIN — PANTOPRAZOLE SODIUM 40 MG: 40 INJECTION, POWDER, FOR SOLUTION INTRAVENOUS at 06:25

## 2024-10-30 RX ADMIN — SODIUM CHLORIDE, PRESERVATIVE FREE 10 ML: 5 INJECTION INTRAVENOUS at 21:45

## 2024-10-30 ASSESSMENT — PAIN DESCRIPTION - LOCATION
LOCATION: LEG
LOCATION: FOOT
LOCATION: LEG
LOCATION: LEG

## 2024-10-30 ASSESSMENT — PAIN DESCRIPTION - PAIN TYPE: TYPE: CHRONIC PAIN

## 2024-10-30 ASSESSMENT — PAIN DESCRIPTION - FREQUENCY: FREQUENCY: INTERMITTENT

## 2024-10-30 ASSESSMENT — PAIN SCALES - GENERAL
PAINLEVEL_OUTOF10: 4
PAINLEVEL_OUTOF10: 7
PAINLEVEL_OUTOF10: 9
PAINLEVEL_OUTOF10: 8
PAINLEVEL_OUTOF10: 5
PAINLEVEL_OUTOF10: 6

## 2024-10-30 ASSESSMENT — PAIN DESCRIPTION - ORIENTATION
ORIENTATION: LEFT
ORIENTATION: OTHER (COMMENT)
ORIENTATION: RIGHT;LEFT
ORIENTATION: MID

## 2024-10-30 ASSESSMENT — PAIN DESCRIPTION - DESCRIPTORS
DESCRIPTORS: ACHING
DESCRIPTORS: ACHING

## 2024-10-30 ASSESSMENT — PAIN DESCRIPTION - ONSET: ONSET: ON-GOING

## 2024-10-30 NOTE — H&P
History and Physical    Date of Service:  10/30/2024  Primary Care Provider: Lopez Bhat Jr., MD  Source of information: The patient    Chief Complaint: Back pain    History of Presenting Illness:   Gerhard Overton is a 74 y.o. male with past medical history significant for prostate cancer with metastasis to the bone presented at [facility] emergency room with back pain.  The pain is located at the lower back bilaterally, constant, sharp pain, no radiation, no known aggravating or relieving factors and 7/10 in severity.  Patient underwent laminectomy about 3 years ago and has been having the back pain since then.  The back pain got worse in the past couple of days and because of that the patient came to the emergency room.  Patient also has prostate cancer with metastasis to the bone.  Patient is reporting tingling sensation in both lower extremities but denies bowel and urinary incontinence.  MRI of the lumbar and thoracic spine showed extensive which shows metastasis.  Patient also presented in the emergency room with hemoglobin level of 5.1.  He denies  passing dark stool.  Patient has been taking naproxen at home for pain.  He was transferred to [facility] for higher level of care       REVIEW OF SYSTEMS:  REVIEW OF SYSTEMS:  HEAD, EYES, EARS, NOSE AND THROAT:  No headache.  No dizziness.  No blurring of vision.  No photophobia.  RESPIRATORY SYSTEM:  No shortness of breath.  No cough.  No hemoptysis.  CARDIOVASCULAR SYSTEM:  No chest pain.  No orthopnea.  No palpitations.  GASTROINTESTINAL SYSTEM:  No nausea or vomiting.  No diarrhea.  No constipation.  GENITOURINARY SYSTEM:  No dysuria, no urgency, and no frequency.     All other systems are reviewed and they are negative.        Past Medical History:   Diagnosis Date    Arthritis     Mild anemia     Prostate cancer (HCC) 2008 2017    PALLIATIVE CARE PER DR NIKOLE LÓPEZ NOTES 2/12/2018: DORON MEANS, AND PAST

## 2024-10-30 NOTE — CONSULTS
[facility] [address] [address]        GASTROENTEROLOGY CONSULTATION NOTE  Sylvie Overton PA-C  [phone] office  NP/PA in-hospital M-F until 4:30PM  After 5PM or on weekends, please call  for physician on call        NAME:  Gerhard Overton   :   1950   MRN:   [id number]       Referring Physician: Dr. Horne    Consult Date: 10/30/2024 8:54 AM    Chief Complaint: Suspected GI bleed     History of Present Illness:  Patient is a 74 y.o. who is seen in consultation at the request of Dr. Horne for suspected GI bleed. Past medical history significant for prostate cancer with metastasis to bone. Patient presented to the emergency room with worsening back pain. He was found to have a hemoglobin of 5.1 on presentation and received 2 units of PRBC with improved hgb to 8.4. Patient denies any hematemesis, hematochezia, or melena. No prior history of anemia. No heartburn, abdominal pain, nausea or vomiting. Reports significant weight loss secondary to prostate cancer. No longer on treatment. Patient has been taking Naproxen alternating with tylenol most days for back pain. No alcohol or tobacco use. No history of EGD.     Colonoscopy (2024) with Dr. Herr for colon cancer screening showed stool at the hepatic flexure, in the ascending colon and cecum. Scar in the rectum consistent with prior hemorrhoidectomy. Otherwise normal colonoscopy. No specimens collected. No further surveillance recommended.     I have reviewed the emergency room note, hospital admission note, notes by all other clinicians who have seen the patient during this hospitalization to date. I have reviewed the problem list and the reason for this hospitalization. I have reviewed the allergies and the medications the patient was taking at home prior to this hospitalization.    PMH:  Past Medical History:   Diagnosis Date    Arthritis     Mild anemia     Prostate cancer (HCC) 2008    PALLIATIVE

## 2024-10-30 NOTE — PROGRESS NOTES
A Spiritual Care Partner Volunteer visited Gerhard FREEMAN Tian at [facility] in [facility] TELEMET[facility] on 10/30/2024     Documented by: Chaplain Ignacia Porter

## 2024-10-30 NOTE — PROGRESS NOTES
[facility]                                                                                          Hospitalist Progress Note  Patricia Epstein PA-C  Answering service: [phone] OR [phone] from in house phone        Date of Service:  10/30/2024  NAME:  Gerhard Overton  :  1950  MRN:  [id number]       Admission Summary:     Gerhard Overton is a 74 y.o. male with past medical history significant for prostate cancer with metastasis to the bone presented at [facility] emergency room with back pain.  The pain is located at the lower back bilaterally, constant, sharp pain, no radiation, no known aggravating or relieving factors and 7/10 in severity.  Patient underwent laminectomy about 3 years ago and has been having the back pain since then.  The back pain got worse in the past couple of days and because of that the patient came to the emergency room.  Patient also has prostate cancer with metastasis to the bone.  Patient is reporting tingling sensation in both lower extremities but denies bowel and urinary incontinence.  MRI of the lumbar and thoracic spine showed extensive which shows metastasis.  Patient also presented in the emergency room with hemoglobin level of 5.1.  He denies  passing dark stool.  Patient has been taking naproxen at home for pain.  He was transferred to [facility] for higher level of care          Interval history / Subjective:     Patient seen and examined this am. Complains of being hungry otherwise no acute complaints. 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 Last BM: 2 days ago and was small/formed which is normal for him. Last colonoscopy in May 2024, according to pt no acute findings other than polyps.      Assessment & Plan:     Prostate cancer with metastasis to the bone POA:   Intractable low back pain POA:   - This most likely cause of the patient intractable low back pain.      Or    ondansetron (ZOFRAN) injection 4 mg  4 mg IntraVENous Q6H PRN    polyethylene glycol (GLYCOLAX) packet 17 g  17 g Oral Daily PRN    acetaminophen (TYLENOL) tablet 650 mg  650 mg Oral Q6H PRN    Or    acetaminophen (TYLENOL) suppository 650 mg  650 mg Rectal Q6H PRN    oxyCODONE (ROXICODONE) immediate release tablet 5 mg  5 mg Oral Q4H PRN    morphine (PF) injection 2 mg  2 mg IntraVENous Q4H PRN    pantoprazole (PROTONIX) 40 mg in sodium chloride (PF) 0.9 % 10 mL injection  40 mg IntraVENous Q12H    0.9 % sodium chloride infusion   IntraVENous Continuous     ______________________________________________________________________  EXPECTED LENGTH OF STAY: Unable to retrieve estimated LOS  ACTUAL LENGTH OF STAY:          0                 Patricia Epstein PA-C

## 2024-10-30 NOTE — PROGRESS NOTES
Comprehensive Nutrition Assessment    Type and Reason for Visit: Initial (low BMI)    Nutrition Recommendations/Plan:   Advance diet to Regular as medically appropriate  Will order Ensure Clear while on clear liquid diet  Order chocolate Ensure Enlive BID as snacks once diet advances  Replete Phos       Malnutrition Assessment:  Malnutrition Status:  Severe malnutrition (10/30/24 1419)    Context:  Chronic Illness     Findings of the 6 clinical characteristics of malnutrition:  Energy Intake:  75% or less estimated energy requirements for 1 month or longer  Weight Loss:  Greater than 10% over 6 months     Body Fat Loss:  Severe body fat loss Triceps, Orbital   Muscle Mass Loss:  Severe muscle mass loss Clavicles (pectoralis & deltoids)  Fluid Accumulation:  No fluid accumulation     Strength:  Not Performed       Nutrition Assessment:    75 yo male admitted for low back pain.  Pmhx: prostate CA with mets to bone.  Oncology consulted.      Seeing pt for low BMI of 18 with noticed weight loss.  Spoke with pt at bedside.  He reports decreased appetite ever since being diagnosed with CA.  States he forces himself to eat out of necessity but is not hungry.  Upset that he is currently on a clear liquid diet.  States he is starving and needs food in order to keep his weight up.  Agreed to try Ensure Clear (apple) while on clear liquid diet.  GI consulted and notes no immediate plans for procedures, awaiting recommendations from oncology to determine further work up.  When diet advances, pt would like chocolate Ensure shakes as snacks.  He drinks similar shakes at home.  Pt reports UBW of 175 lbs 6 months ago and current weight is 124 lbs.  This indicates 30% loss which is significant for time frame.  Weight hx in EMR confirms this.   Labs: phos 2.2      Nutritionally Significant Medications:  Protonix, Vit D      Estimated Daily Nutrient Needs:  Energy Requirements Based On: Kcal/kg  Weight Used for Energy      Goals:  Previous Goal Met: New Goal  Goals: Meet at least 75% of estimated needs, by next RD assessment       Nutrition Monitoring and Evaluation:   Behavioral-Environmental Outcomes: None Identified  Food/Nutrient Intake Outcomes: Diet Advancement/Tolerance, Food and Nutrient Intake, Supplement Intake  Physical Signs/Symptoms Outcomes: Biochemical Data, GI Status, Weight    Discharge Planning:    Continue Oral Nutrition Supplement     Yifan Ladd, RD  Available via DocDep

## 2024-10-30 NOTE — CONSULTS
Patient consult received.    Mr. Overton was previously seen by Dr. Eugene at [place] [facility]. Hospitalist team made aware, consult to be redirected to appropriate provider.     Hina HAYWARD-BC, NP      [facility] at [address], [address] [place] [address]  W: [phone]  F: [phone]

## 2024-10-31 LAB
HCT VFR BLD AUTO: 25.9 % (ref 36.6–50.3)
HGB BLD-MCNC: 8.1 G/DL (ref 12.1–17)

## 2024-10-31 PROCEDURE — 6370000000 HC RX 637 (ALT 250 FOR IP): Performed by: INTERNAL MEDICINE

## 2024-10-31 PROCEDURE — 85018 HEMOGLOBIN: CPT

## 2024-10-31 PROCEDURE — 2060000000 HC ICU INTERMEDIATE R&B

## 2024-10-31 PROCEDURE — 6360000002 HC RX W HCPCS: Performed by: INTERNAL MEDICINE

## 2024-10-31 PROCEDURE — 6370000000 HC RX 637 (ALT 250 FOR IP): Performed by: NURSE PRACTITIONER

## 2024-10-31 PROCEDURE — 2580000003 HC RX 258: Performed by: INTERNAL MEDICINE

## 2024-10-31 PROCEDURE — 85014 HEMATOCRIT: CPT

## 2024-10-31 RX ORDER — FOLIC ACID 1 MG/1
1 TABLET ORAL DAILY
Status: DISCONTINUED | OUTPATIENT
Start: 2024-10-31 | End: 2024-11-06 | Stop reason: HOSPADM

## 2024-10-31 RX ADMIN — SODIUM CHLORIDE, PRESERVATIVE FREE 10 ML: 5 INJECTION INTRAVENOUS at 09:34

## 2024-10-31 RX ADMIN — Medication 1 MG: at 15:12

## 2024-10-31 RX ADMIN — PANTOPRAZOLE SODIUM 40 MG: 40 INJECTION, POWDER, FOR SOLUTION INTRAVENOUS at 17:31

## 2024-10-31 RX ADMIN — GABAPENTIN 100 MG: 100 CAPSULE ORAL at 22:15

## 2024-10-31 RX ADMIN — SODIUM CHLORIDE, PRESERVATIVE FREE 10 ML: 5 INJECTION INTRAVENOUS at 03:10

## 2024-10-31 RX ADMIN — MORPHINE SULFATE 2 MG: 2 INJECTION, SOLUTION INTRAMUSCULAR; INTRAVENOUS at 15:35

## 2024-10-31 RX ADMIN — ACETAMINOPHEN 650 MG: 325 TABLET ORAL at 19:52

## 2024-10-31 RX ADMIN — MORPHINE SULFATE 2 MG: 2 INJECTION, SOLUTION INTRAMUSCULAR; INTRAVENOUS at 03:10

## 2024-10-31 RX ADMIN — Medication 1000 UNITS: at 09:33

## 2024-10-31 RX ADMIN — MORPHINE SULFATE 2 MG: 2 INJECTION, SOLUTION INTRAMUSCULAR; INTRAVENOUS at 19:48

## 2024-10-31 RX ADMIN — SODIUM CHLORIDE, PRESERVATIVE FREE 10 ML: 5 INJECTION INTRAVENOUS at 22:16

## 2024-10-31 RX ADMIN — GABAPENTIN 100 MG: 100 CAPSULE ORAL at 09:33

## 2024-10-31 RX ADMIN — PANTOPRAZOLE SODIUM 40 MG: 40 INJECTION, POWDER, FOR SOLUTION INTRAVENOUS at 06:43

## 2024-10-31 RX ADMIN — GABAPENTIN 100 MG: 100 CAPSULE ORAL at 15:12

## 2024-10-31 ASSESSMENT — PAIN DESCRIPTION - ORIENTATION: ORIENTATION: POSTERIOR

## 2024-10-31 ASSESSMENT — PAIN SCALES - GENERAL
PAINLEVEL_OUTOF10: 4
PAINLEVEL_OUTOF10: 8
PAINLEVEL_OUTOF10: 7
PAINLEVEL_OUTOF10: 9
PAINLEVEL_OUTOF10: 5

## 2024-10-31 ASSESSMENT — PAIN DESCRIPTION - LOCATION: LOCATION: BACK

## 2024-10-31 NOTE — PROGRESS NOTES
[facility]   [address] [address]       GI PROGRESS NOTE  Sylvie Overton PA-C  [phone] office  NP/PA in-hospital M-F until 4:30PM  After 5PM or on weekends, please call  for physician on call      NAME: Gerhard Overton   :  1950   MRN:  [id number]       Subjective:     Patient resting comfortably in bed. No overt signs of GI bleeding. Patient wants to eat a regular diet. No nausea vomiting, or abdominal pain.     Objective:     VITALS:   Last 24hrs VS reviewed since prior progress note. Most recent are:  Vitals:    10/31/24 0554   BP:    Pulse: (!) 101   Resp:    Temp:    SpO2:        PHYSICAL EXAM:  General: Cooperative, no acute distress    Neurologic:  Alert and oriented X 3.  HEENT: EOMI, no scleral icterus   Lungs:  CTA bilaterally. No wheezing  Heart:  S1 S2, regular rhythm  Abdomen: Soft, non-distended, no tenderness. +Bowel sounds  Extremities: No edema  Psych:   Good insight. Not anxious or agitated.    Lab Data Reviewed:     Recent Results (from the past 24 hour(s))   Hemoglobin and Hematocrit    Collection Time: 10/30/24 11:50 AM   Result Value Ref Range    Hemoglobin 8.6 (L) 12.1 - 17.0 g/dL    Hematocrit 27.1 (L) 36.6 - 50.3 %   Troponin    Collection Time: 10/30/24 11:50 AM   Result Value Ref Range    Troponin, High Sensitivity 7 0 - 76 ng/L   Hemoglobin and Hematocrit    Collection Time: 10/31/24  5:33 AM   Result Value Ref Range    Hemoglobin 8.1 (L) 12.1 - 17.0 g/dL    Hematocrit 25.9 (L) 36.6 - 50.3 %            Assessment:     Acute anemia: Presented due to back pain with intermittent intake of naproxen. No hematochezia, hematemesis, or melena. No prior history of anemia. Hgb 5.1 on presentation now up to 8.1. S/p 2 units of PRBC. Platelets 306. BUN/Cr 16. Ferritin 1587. Iron 64. TIBC 208. Folate 3.9. Iron sat 31. No abdominal imaging to review. Last colonoscopy 2024 as above. No history of EGD. Suspect infiltrative process leading to  anemia in setting of metastatic prostate cancer to bones and no obvious GI bleeding.   Prostate cancer with metastasis to bone: oncology consulted     Patient Active Problem List   Diagnosis    Palliative care encounter    Constipation, unspecified constipation type    Lower extremity weakness    Ambulatory dysfunction    S/P lumbar spinal fusion    Retroperitoneal lymphadenopathy    Degenerative joint disease (DJD) of hip    DNR (do not resuscitate) discussion    Osteoarthritis of right hip    Lesion of bone of thoracic spine    Bone lesion    Mass of thoracic vertebra    Spinal stenosis of lumbar region    Mass in epidural space    Acute back pain, unspecified back location, unspecified back pain laterality    Counseling regarding advance care planning and goals of care    History of prostate cancer    Intractable low back pain     Plan:     Diet as tolerated   BID PPI for now  Trend CBC and transfuse as necessary  No immediate plans for procedures at this time  Oncology consulted, will await recommendations  Patient was discussed with Dr. Rodriguez      Signed By: Sylvie Overton PA-C     10/31/2024  8:45 AM

## 2024-10-31 NOTE — CARE COORDINATION
Care Management Initial Assessment       RUR:  Readmission? No  1st IM letter given? Yes - 10/31/24  1st  letter given: No     10/31/24 1526   Service Assessment   Patient Orientation Alert and Oriented   Cognition Alert   History Provided By Patient   Primary Caregiver Self   Support Systems Friends/Neighbors   Patient's Healthcare Decision Maker is: Legal Next of Kin   PCP Verified by CM Yes   Last Visit to PCP Within last 6 months   Prior Functional Level Independent in ADLs/IADLs   Current Functional Level Independent in ADLs/IADLs   Can patient return to prior living arrangement Unknown at present   Ability to make needs known: Good   Social/Functional History   Lives With Alone   Type of Home House   Bathroom Toilet Standard   Bathroom Accessibility Accessible   Receives Help From Friend(s)   ADL Assistance Independent   Homemaking Assistance Independent   Ambulation Assistance Independent   Transfer Assistance Independent   Active  Yes   Mode of Transportation Car   Occupation Retired   Discharge Planning   Type of Residence House   Living Arrangements Family Members;Friends   Potential Assistance Purchasing Medications No   Patient expects to be discharged to: House   History of falls? 0   Services At/After Discharge   Transition of Care Consult (CM Consult) N/A   Services At/After Discharge None    Resource Information Provided? No   Mode of Transport at Discharge   (private transport)   Confirm Follow Up Transport Self   Condition of Participation: Discharge Planning   The Patient and/or Patient Representative was provided with a Choice of Provider? Patient   The Patient and/Or Patient Representative agree with the Discharge Plan? Yes   Freedom of Choice list was provided with basic dialogue that supports the patient's individualized plan of care/goals, treatment preferences, and shares the quality data associated with the providers?  Yes     Admitted from home with intractable back

## 2024-10-31 NOTE — PROGRESS NOTES
[facility]                                                                                          Hospitalist Progress Note  LEISA Nugent NP  Answering service: [phone] OR [phone] from in house phone        Date of Service:  10/31/2024  NAME:  Gerhard Overton  :  1950  MRN:  [id number]       Admission Summary:   Gerhard Overton is a 74 y.o. male with past medical history significant for prostate cancer with metastasis to the bone presented at [facility] emergency room with back pain.  The pain is located at the lower back bilaterally, constant, sharp pain, no radiation, no known aggravating or relieving factors and 7/10 in severity.  Patient underwent laminectomy about 3 years ago and has been having the back pain since then.  The back pain got worse in the past couple of days and because of that the patient came to the emergency room.  Patient also has prostate cancer with metastasis to the bone.  Patient is reporting tingling sensation in both lower extremities but denies bowel and urinary incontinence.    MRI of the lumbar and thoracic spine showed extensive osseous metastatic disease has progressed since earlier this year given difference in technique. There is extraosseous extension of neoplasm at L1, L3, and L4. Multilevel moderate stenoses.  Patient also presented in the emergency room with hemoglobin level of 5.1.  He denies  passing dark stool.  Patient has been taking naproxen at home for pain.  He was transferred to [facility] for higher level of care     Interval history / Subjective:   Pending eval by [facility] oncology, GI following, no procedures until oncology plan is known  Pt has confusion over who is oncologist is as he was told at the  at [facility] that Dr. Eugene is not his oncologist but this is where he goes to receive Lupron injections    Hgb stable now at 8.1  Folate low, folic started, discussed with

## 2024-11-01 ENCOUNTER — HOSPITAL ENCOUNTER (OUTPATIENT)
Facility: HOSPITAL | Age: 74
Discharge: HOME | End: 2024-11-04

## 2024-11-01 DIAGNOSIS — C79.51 SECONDARY MALIGNANT NEOPLASM OF BONE (HCC): ICD-10-CM

## 2024-11-01 DIAGNOSIS — C61 PROSTATE CANCER (HCC): Primary | ICD-10-CM

## 2024-11-01 PROBLEM — G89.3 CANCER ASSOCIATED PAIN: Status: ACTIVE | Noted: 2024-11-01

## 2024-11-01 LAB
ANION GAP SERPL CALC-SCNC: 7 MMOL/L (ref 2–12)
BUN SERPL-MCNC: 9 MG/DL (ref 6–20)
BUN/CREAT SERPL: 17 (ref 12–20)
CALCIUM SERPL-MCNC: 8.5 MG/DL (ref 8.5–10.1)
CHLORIDE SERPL-SCNC: 106 MMOL/L (ref 97–108)
CO2 SERPL-SCNC: 24 MMOL/L (ref 21–32)
CREAT SERPL-MCNC: 0.53 MG/DL (ref 0.7–1.3)
GLUCOSE SERPL-MCNC: 106 MG/DL (ref 65–100)
HCT VFR BLD AUTO: 27.2 % (ref 36.6–50.3)
HGB BLD-MCNC: 8.4 G/DL (ref 12.1–17)
POTASSIUM SERPL-SCNC: 3.8 MMOL/L (ref 3.5–5.1)
SODIUM SERPL-SCNC: 137 MMOL/L (ref 136–145)

## 2024-11-01 PROCEDURE — 2580000003 HC RX 258: Performed by: INTERNAL MEDICINE

## 2024-11-01 PROCEDURE — 6370000000 HC RX 637 (ALT 250 FOR IP): Performed by: NURSE PRACTITIONER

## 2024-11-01 PROCEDURE — 1100000000 HC RM PRIVATE

## 2024-11-01 PROCEDURE — 6370000000 HC RX 637 (ALT 250 FOR IP): Performed by: INTERNAL MEDICINE

## 2024-11-01 PROCEDURE — 6360000002 HC RX W HCPCS: Performed by: INTERNAL MEDICINE

## 2024-11-01 PROCEDURE — 85018 HEMOGLOBIN: CPT

## 2024-11-01 PROCEDURE — 99223 1ST HOSP IP/OBS HIGH 75: CPT | Performed by: FAMILY MEDICINE

## 2024-11-01 PROCEDURE — 85014 HEMATOCRIT: CPT

## 2024-11-01 PROCEDURE — 94760 N-INVAS EAR/PLS OXIMETRY 1: CPT

## 2024-11-01 PROCEDURE — 80048 BASIC METABOLIC PNL TOTAL CA: CPT

## 2024-11-01 RX ORDER — GABAPENTIN 100 MG/1
200 CAPSULE ORAL 3 TIMES DAILY
Status: DISCONTINUED | OUTPATIENT
Start: 2024-11-01 | End: 2024-11-06 | Stop reason: HOSPADM

## 2024-11-01 RX ADMIN — GABAPENTIN 100 MG: 100 CAPSULE ORAL at 09:06

## 2024-11-01 RX ADMIN — PANTOPRAZOLE SODIUM 40 MG: 40 INJECTION, POWDER, FOR SOLUTION INTRAVENOUS at 18:41

## 2024-11-01 RX ADMIN — ACETAMINOPHEN 650 MG: 325 TABLET ORAL at 21:18

## 2024-11-01 RX ADMIN — Medication 1 MG: at 09:07

## 2024-11-01 RX ADMIN — OXYCODONE 5 MG: 5 TABLET ORAL at 06:55

## 2024-11-01 RX ADMIN — Medication 1000 UNITS: at 09:08

## 2024-11-01 RX ADMIN — GABAPENTIN 200 MG: 100 CAPSULE ORAL at 13:36

## 2024-11-01 RX ADMIN — SODIUM CHLORIDE, PRESERVATIVE FREE 10 ML: 5 INJECTION INTRAVENOUS at 21:18

## 2024-11-01 RX ADMIN — GABAPENTIN 200 MG: 100 CAPSULE ORAL at 19:57

## 2024-11-01 RX ADMIN — OXYCODONE 5 MG: 5 TABLET ORAL at 19:57

## 2024-11-01 RX ADMIN — PANTOPRAZOLE SODIUM 40 MG: 40 INJECTION, POWDER, FOR SOLUTION INTRAVENOUS at 06:50

## 2024-11-01 ASSESSMENT — PAIN DESCRIPTION - DESCRIPTORS
DESCRIPTORS: ACHING
DESCRIPTORS: ACHING

## 2024-11-01 ASSESSMENT — PAIN SCALES - GENERAL
PAINLEVEL_OUTOF10: 3
PAINLEVEL_OUTOF10: 6
PAINLEVEL_OUTOF10: 5

## 2024-11-01 ASSESSMENT — PAIN DESCRIPTION - LOCATION
LOCATION: BACK
LOCATION: BACK

## 2024-11-01 ASSESSMENT — PAIN DESCRIPTION - ORIENTATION
ORIENTATION: MID
ORIENTATION: LOWER

## 2024-11-01 ASSESSMENT — PAIN SCALES - WONG BAKER: WONGBAKER_NUMERICALRESPONSE: NO HURT

## 2024-11-01 ASSESSMENT — PAIN - FUNCTIONAL ASSESSMENT: PAIN_FUNCTIONAL_ASSESSMENT: PREVENTS OR INTERFERES SOME ACTIVE ACTIVITIES AND ADLS

## 2024-11-01 NOTE — PLAN OF CARE
Problem: Discharge Planning  Goal: Discharge to home or other facility with appropriate resources  11/1/2024 1631 by Magdiel Mock, RN  Outcome: Completed  11/1/2024 1628 by Magdiel Mock, RN  Outcome: Progressing

## 2024-11-01 NOTE — PROGRESS NOTES
[facility]   [facility] [place]  [address] [place] [address]       GI PROGRESS NOTE  Sylvie Overton PA-C  [phone] office  NP/PA in-hospital M-F until 4:30PM  After 5PM or on weekends, please call  for physician on call      NAME: Gerhard Overton   :  1950   MRN:  [id number]       Subjective:     No acute events overnight.     Objective:     VITALS:   Last 24hrs VS reviewed since prior progress note. Most recent are:  Vitals:    24 0725   BP:    Pulse:    Resp: 18   Temp:    SpO2:        PHYSICAL EXAM:  General: Cooperative, no acute distress    Neurologic:  Alert and oriented X 3.  HEENT: EOMI, no scleral icterus   Lungs:  CTA bilaterally. No wheezing  Heart:  S1 S2, regular rhythm  Abdomen: Soft, non-distended, no tenderness. +Bowel sounds  Extremities: No edema  Psych:   Good insight. Not anxious or agitated.    Lab Data Reviewed:     Recent Results (from the past 24 hour(s))   Hemoglobin and Hematocrit    Collection Time: 24  5:17 AM   Result Value Ref Range    Hemoglobin 8.4 (L) 12.1 - 17.0 g/dL    Hematocrit 27.2 (L) 36.6 - 50.3 %   Basic Metabolic Panel    Collection Time: 24  5:17 AM   Result Value Ref Range    Sodium 137 136 - 145 mmol/L    Potassium 3.8 3.5 - 5.1 mmol/L    Chloride 106 97 - 108 mmol/L    CO2 24 21 - 32 mmol/L    Anion Gap 7 2 - 12 mmol/L    Glucose 106 (H) 65 - 100 mg/dL    BUN 9 6 - 20 MG/DL    Creatinine 0.53 (L) 0.70 - 1.30 MG/DL    BUN/Creatinine Ratio 17 12 - 20      Est, Glom Filt Rate >90 >60 ml/min/1.73m2    Calcium 8.5 8.5 - 10.1 MG/DL            Assessment:     Acute anemia: Presented due to back pain with intermittent intake of naproxen. No hematochezia, hematemesis, or melena. No prior history of anemia. Hgb 5.1 on presentation now up to 8.1. S/p 2 units of PRBC. Platelets 306. BUN/Cr 16. Ferritin 1587. Iron 64. TIBC 208. Folate 3.9. Iron sat 31. No abdominal imaging to review. Last colonoscopy 2024 as above. No history of EGD.

## 2024-11-01 NOTE — CONSULTS
tissues at L1, L3, and L4 were noted.  Chronic pathologic fractures noted at L3 and L4, unchanged.  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  No signs of spinal cord compression or pathologic intrathecal enhancement was noted.  Overall notable disease progression since his CT scans in April 2024.    Radiation oncology was consulted to evaluate if further palliative radiotherapy could be delivered.    Clinically, he denies any weakness in the lower extremity.  He denies any bowel or bladder incontinence or obstruction.  He notes ongoing lower extremity paresthesias which are chronic.  He confirms significant pain in the lower back.  He also confirms prior radiation.  He is unsure who his treating radiation oncologist was aside from Dr. Daniela boykin in 2010 for his definitive intent radiation.  He does recall being treated at [facility].    Review of Systems:  A complete review of systems was obtained and negative except as described above.    Allergies and Medications   No Known Allergies    Current Outpatient Medications   Medication Instructions    furosemide (LASIX) 20 MG tablet ceived the following from [facility]: Outside name: furosemide (LASIX) 20 mg tablet    gabapentin (NEURONTIN) 100 MG capsule ceived the following from [facility]: Outside name: gabapentin (NEURONTIN) 100 mg capsule    naproxen (NAPROSYN) 500 mg, Oral, 2 TIMES DAILY    oxyCODONE (ROXICODONE) 5 MG immediate release tablet ceived the following from [facility]: Outside name: oxyCODONE IR (ROXICODONE) 5 mg immediate release tablet    oxyCODONE-acetaminophen (PERCOCET) 5-325 MG per tablet ceived the following from [facility]: Outside name: oxyCODONE-acetaminophen (PERCOCET) 5-325 mg per tablet    polyethylene glycol (GLYCOLAX) 17 g, DAILY PRN    senna (SENOKOT) 8.6 MG tablet 1 tablet, DAILY PRN    tamsulosin (FLOMAX) 0.4 mg, DAILY    thiamine 100 mg,

## 2024-11-01 NOTE — PROGRESS NOTES
[facility] Adult  Hospitalist Group                                                                                          Hospitalist Progress Note  Amanda Torres MD  Answering service: [phone] OR [phone] from in house phone        Date of Service:  2024  NAME:  Gerhard Overton  :  1950  MRN:  [id number]       Admission Summary:   Gerhard Overton is a 74 y.o. male with past medical history significant for prostate cancer with metastasis to the bone presented at [facility] emergency room with back pain.  The pain is located at the lower back bilaterally, constant, sharp pain, no radiation, no known aggravating or relieving factors and 7/10 in severity.  Patient underwent laminectomy about 3 years ago and has been having the back pain since then.  The back pain got worse in the past couple of days and because of that the patient came to the emergency room.  Patient also has prostate cancer with metastasis to the bone.  Patient is reporting tingling sensation in both lower extremities but denies bowel and urinary incontinence.    MRI of the lumbar and thoracic spine showed extensive osseous metastatic disease has progressed since earlier this year given difference in technique. There is extraosseous extension of neoplasm at L1, L3, and L4. Multilevel moderate stenoses.  Patient also presented in the emergency room with hemoglobin level of 5.1.  He denies  passing dark stool.  Patient has been taking naproxen at home for pain.  He was transferred to [facility] for higher level of care     Interval history / Subjective:   Patient seen and examined this morning.  He complains of neuropathy in his feet (described as \"tingling\"), which he has been suffering from for a few years.  He is on low-dose Neurontin, willing to increase the dose.  He lives alone, but continues to drive.  Oncology consulted and evaluated patient this morning.    Radiation oncology and  infusion   IntraVENous PRN    ondansetron (ZOFRAN-ODT) disintegrating tablet 4 mg  4 mg Oral Q8H PRN    Or    ondansetron (ZOFRAN) injection 4 mg  4 mg IntraVENous Q6H PRN    polyethylene glycol (GLYCOLAX) packet 17 g  17 g Oral Daily PRN    acetaminophen (TYLENOL) tablet 650 mg  650 mg Oral Q6H PRN    Or    acetaminophen (TYLENOL) suppository 650 mg  650 mg Rectal Q6H PRN    oxyCODONE (ROXICODONE) immediate release tablet 5 mg  5 mg Oral Q4H PRN    morphine (PF) injection 2 mg  2 mg IntraVENous Q4H PRN    pantoprazole (PROTONIX) 40 mg in sodium chloride (PF) 0.9 % 10 mL injection  40 mg IntraVENous Q12H     ______________________________________________________________________  EXPECTED LENGTH OF STAY: Unable to retrieve estimated LOS  ACTUAL LENGTH OF STAY:          2                 Amanda Torres MD

## 2024-11-01 NOTE — CONSULTS
Palliative Medicine  Patient Name: Gerhard Overton  YOB: 1950  MRN: [id number]  Age: 74 y.o.  Gender: male    Date of Initial Consult: 11/1/24  Date of Service: 11/1/2024  Time: 1:31 PM  Provider: Alberto Ennis MD  Hospital Day: 3  Admit Date: 10/30/2024  Referring Provider: Dr. Torres       Reasons for Consultation:  Goals of Care    HISTORY OF PRESENT ILLNESS (HPI):   Gerhard Overton is a 74 y.o. male with a past medical history of metastatic prostate cancer-significant bony/osseous metastasis, neuropathy who was admitted on 10/30/2024 from home with a diagnosis of worsening back pain.     Patient is a 74-year-old male with known metastatic prostate cancer.  He is followed by [facility]-Dr. Eugene.  He has been through multiple treatment options to include Lupron, Xtandi but has had disease progression.  He presents to [facility] with increasing back pain and then transferred to [facility].  MRI of the lumbar spine shows metastatic disease with extraosseous extension-L1, L3, L4 but no pathological compression, fracture, cord compression.  We have been asked to see him for goals of care as well as symptom management.    Psychosocial: Patient lives alone in [place].  He is very involved with his Sabianist and sings in the choir.  He does love gospel and as [id number] he traveled to [place] in September by himself-drove.  He has been able to do his ADLs.  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  He is not , no children but has a sister that lives in [facility].      PALLIATIVE DIAGNOSES:    Palliative medicine consultation  Goals of care discussion  Advance care planning review  DNR discussion  Cancer associated pain-patient with metastatic prostate cancer    ASSESSMENT AND PLAN:   Chart reviewed thoroughly prior to visiting with the patient.

## 2024-11-01 NOTE — CONSULTS
[place] [id number]                              CONSULTATION      PATIENT NAME: BENJAMIN PATRICK               : 1950  MED REC NO: [id number]                       ROOM: 360  ACCOUNT NO: [id number]                       ADMIT DATE: 10/30/2024  PROVIDER: Grupo Jacobson MD    DATE OF SERVICE:  2024    ATTENDING PHYSICIAN:  Amanda Torres MD    HISTORY OF PRESENT ILLNESS:  The patient is a 74-year-old gentleman with a history of metastatic prostate cancer, who is cared for by Dr. Pedro Lusi Eugene at [facility], who is seen after admission to [facility] with increasing back pain.  This patient has been on and off Lupron since , and has also been off on and off Xtandi since that time.  He had disease progression when seen last in Dr. Eugene' office in September and was offered chemotherapy and refused.  He came to [facility] with increasing back pain.  He is having some tingling in his lower extremities, but no complaints of weakness.  No complaints of urinary or bowel incontinence.  Upon evaluation at this hospital, an MRI of the spine was performed and extensive osseous metastatic disease with extraosseous extension was noted at L1, L3 and L4, but no pathologic compression fracture or cord compression was identified.  The patient has been treated with narcotics here in the hospital and has had some improvement in symptoms.  He is also on gabapentin, which seems to help as well.    PAST MEDICAL HISTORY:  Significant for metastatic prostate cancer.  He was originally diagnosed in  and treated with external beam radiation therapy.  He was placed on Lupron for brief time frame plus Casodex and then stopped.  When disease recurred in the retroperitoneum in May of 2017, he started back on Lupron and Xtandi, but was stopped by the patient against medical advice in .  He had cord compression in  and was

## 2024-11-01 NOTE — PROGRESS NOTES
We received end of treatment records from Dr. Liam Brown.  These confirm that Mr. Overton received 3000 cGy/10 fractions to C6-T2 and T5-T12 completed on 2/15/2023.    Given he has received this, I recommended 800 cGy/1 fraction to L1-L4 for pain control.  This would make sure that the cumulative radiation dose does not exceed cord tolerance though.    We will discuss and tentatively plan to deliver his single fraction as an inpatient at the very early part of next week.

## 2024-11-02 LAB
BASOPHILS # BLD: 0 K/UL (ref 0–0.1)
BASOPHILS NFR BLD: 1 % (ref 0–1)
COMMENT:: NORMAL
DIFFERENTIAL METHOD BLD: ABNORMAL
EOSINOPHIL # BLD: 0 K/UL (ref 0–0.4)
EOSINOPHIL NFR BLD: 1 % (ref 0–7)
ERYTHROCYTE [DISTWIDTH] IN BLOOD BY AUTOMATED COUNT: 16.6 % (ref 11.5–14.5)
HCT VFR BLD AUTO: 25.1 % (ref 36.6–50.3)
HGB BLD-MCNC: 7.7 G/DL (ref 12.1–17)
IMM GRANULOCYTES # BLD AUTO: 0.1 K/UL (ref 0–0.04)
IMM GRANULOCYTES NFR BLD AUTO: 2 % (ref 0–0.5)
LYMPHOCYTES # BLD: 0.6 K/UL (ref 0.8–3.5)
LYMPHOCYTES NFR BLD: 17 % (ref 12–49)
MCH RBC QN AUTO: 28.2 PG (ref 26–34)
MCHC RBC AUTO-ENTMCNC: 30.7 G/DL (ref 30–36.5)
MCV RBC AUTO: 91.9 FL (ref 80–99)
MONOCYTES # BLD: 0.4 K/UL (ref 0–1)
MONOCYTES NFR BLD: 11 % (ref 5–13)
NEUTS SEG # BLD: 2.6 K/UL (ref 1.8–8)
NEUTS SEG NFR BLD: 68 % (ref 32–75)
NRBC # BLD: 0 K/UL (ref 0–0.01)
NRBC BLD-RTO: 0 PER 100 WBC
PLATELET # BLD AUTO: 254 K/UL (ref 150–400)
PMV BLD AUTO: 9.3 FL (ref 8.9–12.9)
RBC # BLD AUTO: 2.73 M/UL (ref 4.1–5.7)
RBC MORPH BLD: ABNORMAL
SPECIMEN HOLD: NORMAL
WBC # BLD AUTO: 3.7 K/UL (ref 4.1–11.1)

## 2024-11-02 PROCEDURE — 85025 COMPLETE CBC W/AUTO DIFF WBC: CPT

## 2024-11-02 PROCEDURE — 6360000002 HC RX W HCPCS: Performed by: INTERNAL MEDICINE

## 2024-11-02 PROCEDURE — 1100000000 HC RM PRIVATE

## 2024-11-02 PROCEDURE — 2580000003 HC RX 258: Performed by: INTERNAL MEDICINE

## 2024-11-02 PROCEDURE — 6370000000 HC RX 637 (ALT 250 FOR IP): Performed by: INTERNAL MEDICINE

## 2024-11-02 PROCEDURE — 36415 COLL VENOUS BLD VENIPUNCTURE: CPT

## 2024-11-02 PROCEDURE — 6370000000 HC RX 637 (ALT 250 FOR IP): Performed by: NURSE PRACTITIONER

## 2024-11-02 RX ORDER — BISACODYL 5 MG/1
10 TABLET, DELAYED RELEASE ORAL ONCE
Status: DISCONTINUED | OUTPATIENT
Start: 2024-11-02 | End: 2024-11-06 | Stop reason: HOSPADM

## 2024-11-02 RX ADMIN — Medication 1 MG: at 08:03

## 2024-11-02 RX ADMIN — OXYCODONE 5 MG: 5 TABLET ORAL at 08:03

## 2024-11-02 RX ADMIN — SODIUM CHLORIDE, PRESERVATIVE FREE 10 ML: 5 INJECTION INTRAVENOUS at 21:43

## 2024-11-02 RX ADMIN — OXYCODONE 5 MG: 5 TABLET ORAL at 00:10

## 2024-11-02 RX ADMIN — Medication 1000 UNITS: at 08:03

## 2024-11-02 RX ADMIN — GABAPENTIN 200 MG: 100 CAPSULE ORAL at 14:14

## 2024-11-02 RX ADMIN — OXYCODONE 5 MG: 5 TABLET ORAL at 14:14

## 2024-11-02 RX ADMIN — GABAPENTIN 200 MG: 100 CAPSULE ORAL at 08:03

## 2024-11-02 RX ADMIN — TAMSULOSIN HYDROCHLORIDE 0.4 MG: 0.4 CAPSULE ORAL at 08:03

## 2024-11-02 RX ADMIN — OXYCODONE 5 MG: 5 TABLET ORAL at 18:13

## 2024-11-02 RX ADMIN — OXYCODONE 5 MG: 5 TABLET ORAL at 21:41

## 2024-11-02 RX ADMIN — SODIUM CHLORIDE, PRESERVATIVE FREE 10 ML: 5 INJECTION INTRAVENOUS at 08:03

## 2024-11-02 RX ADMIN — PANTOPRAZOLE SODIUM 40 MG: 40 INJECTION, POWDER, FOR SOLUTION INTRAVENOUS at 05:47

## 2024-11-02 RX ADMIN — GABAPENTIN 200 MG: 100 CAPSULE ORAL at 21:41

## 2024-11-02 RX ADMIN — OXYCODONE 5 MG: 5 TABLET ORAL at 03:56

## 2024-11-02 ASSESSMENT — PAIN DESCRIPTION - DESCRIPTORS
DESCRIPTORS: SORE;ACHING
DESCRIPTORS: ACHING
DESCRIPTORS: ACHING;SHARP
DESCRIPTORS: ACHING

## 2024-11-02 ASSESSMENT — PAIN - FUNCTIONAL ASSESSMENT
PAIN_FUNCTIONAL_ASSESSMENT: PREVENTS OR INTERFERES SOME ACTIVE ACTIVITIES AND ADLS

## 2024-11-02 ASSESSMENT — PAIN SCALES - GENERAL
PAINLEVEL_OUTOF10: 6
PAINLEVEL_OUTOF10: 5
PAINLEVEL_OUTOF10: 8
PAINLEVEL_OUTOF10: 5

## 2024-11-02 ASSESSMENT — PAIN DESCRIPTION - LOCATION
LOCATION: BACK
LOCATION: BACK;LEG

## 2024-11-02 ASSESSMENT — PAIN DESCRIPTION - ORIENTATION
ORIENTATION: MID
ORIENTATION: MID
ORIENTATION: UPPER;POSTERIOR
ORIENTATION: RIGHT;LEFT

## 2024-11-02 NOTE — PROGRESS NOTES
Daily    tamsulosin (FLOMAX) capsule 0.4 mg  0.4 mg Oral Daily    Vitamin D (CHOLECALCIFEROL) tablet 1,000 Units  1,000 Units Oral Daily    sodium chloride flush 0.9 % injection 5-40 mL  5-40 mL IntraVENous 2 times per day    sodium chloride flush 0.9 % injection 5-40 mL  5-40 mL IntraVENous PRN    0.9 % sodium chloride infusion   IntraVENous PRN    ondansetron (ZOFRAN-ODT) disintegrating tablet 4 mg  4 mg Oral Q8H PRN    Or    ondansetron (ZOFRAN) injection 4 mg  4 mg IntraVENous Q6H PRN    polyethylene glycol (GLYCOLAX) packet 17 g  17 g Oral Daily PRN    acetaminophen (TYLENOL) tablet 650 mg  650 mg Oral Q6H PRN    Or    acetaminophen (TYLENOL) suppository 650 mg  650 mg Rectal Q6H PRN    oxyCODONE (ROXICODONE) immediate release tablet 5 mg  5 mg Oral Q4H PRN    morphine (PF) injection 2 mg  2 mg IntraVENous Q4H PRN    pantoprazole (PROTONIX) 40 mg in sodium chloride (PF) 0.9 % 10 mL injection  40 mg IntraVENous Q12H     ______________________________________________________________________  EXPECTED LENGTH OF STAY: Unable to retrieve estimated LOS  ACTUAL LENGTH OF STAY:          3                 Amanda Torres MD      nurses notes/vital signs

## 2024-11-03 LAB
BASOPHILS # BLD: 0 K/UL (ref 0–0.1)
BASOPHILS NFR BLD: 1 % (ref 0–1)
COMMENT:: NORMAL
DIFFERENTIAL METHOD BLD: ABNORMAL
EOSINOPHIL # BLD: 0 K/UL (ref 0–0.4)
EOSINOPHIL NFR BLD: 1 % (ref 0–7)
ERYTHROCYTE [DISTWIDTH] IN BLOOD BY AUTOMATED COUNT: 16.6 % (ref 11.5–14.5)
HCT VFR BLD AUTO: 26.9 % (ref 36.6–50.3)
HGB BLD-MCNC: 8.3 G/DL (ref 12.1–17)
IMM GRANULOCYTES # BLD AUTO: 0 K/UL
IMM GRANULOCYTES NFR BLD AUTO: 0 %
LYMPHOCYTES # BLD: 0.9 K/UL (ref 0.8–3.5)
LYMPHOCYTES NFR BLD: 23 % (ref 12–49)
MCH RBC QN AUTO: 28.2 PG (ref 26–34)
MCHC RBC AUTO-ENTMCNC: 30.9 G/DL (ref 30–36.5)
MCV RBC AUTO: 91.5 FL (ref 80–99)
MONOCYTES # BLD: 0.4 K/UL (ref 0–1)
MONOCYTES NFR BLD: 10 % (ref 5–13)
NEUTS BAND NFR BLD MANUAL: 2 % (ref 0–6)
NEUTS SEG # BLD: 2.6 K/UL (ref 1.8–8)
NEUTS SEG NFR BLD: 63 % (ref 32–75)
NRBC # BLD: 0 K/UL (ref 0–0.01)
NRBC BLD-RTO: 0 PER 100 WBC
PLATELET # BLD AUTO: 284 K/UL (ref 150–400)
PMV BLD AUTO: 9.3 FL (ref 8.9–12.9)
RBC # BLD AUTO: 2.94 M/UL (ref 4.1–5.7)
RBC MORPH BLD: ABNORMAL
SPECIMEN HOLD: NORMAL
WBC # BLD AUTO: 3.9 K/UL (ref 4.1–11.1)
WBC MORPH BLD: ABNORMAL

## 2024-11-03 PROCEDURE — 6370000000 HC RX 637 (ALT 250 FOR IP): Performed by: INTERNAL MEDICINE

## 2024-11-03 PROCEDURE — 36415 COLL VENOUS BLD VENIPUNCTURE: CPT

## 2024-11-03 PROCEDURE — 2580000003 HC RX 258: Performed by: INTERNAL MEDICINE

## 2024-11-03 PROCEDURE — 6370000000 HC RX 637 (ALT 250 FOR IP): Performed by: NURSE PRACTITIONER

## 2024-11-03 PROCEDURE — 85025 COMPLETE CBC W/AUTO DIFF WBC: CPT

## 2024-11-03 PROCEDURE — 1100000000 HC RM PRIVATE

## 2024-11-03 RX ORDER — LIDOCAINE 4 G/G
2 PATCH TOPICAL DAILY
Status: DISCONTINUED | OUTPATIENT
Start: 2024-11-03 | End: 2024-11-06 | Stop reason: HOSPADM

## 2024-11-03 RX ADMIN — OXYCODONE 5 MG: 5 TABLET ORAL at 10:51

## 2024-11-03 RX ADMIN — POLYETHYLENE GLYCOL 3350 17 G: 17 POWDER, FOR SOLUTION ORAL at 10:51

## 2024-11-03 RX ADMIN — OXYCODONE 5 MG: 5 TABLET ORAL at 22:38

## 2024-11-03 RX ADMIN — SODIUM CHLORIDE, PRESERVATIVE FREE 10 ML: 5 INJECTION INTRAVENOUS at 21:54

## 2024-11-03 RX ADMIN — TAMSULOSIN HYDROCHLORIDE 0.4 MG: 0.4 CAPSULE ORAL at 08:16

## 2024-11-03 RX ADMIN — Medication 1 MG: at 08:17

## 2024-11-03 RX ADMIN — GABAPENTIN 200 MG: 100 CAPSULE ORAL at 14:40

## 2024-11-03 RX ADMIN — OXYCODONE 5 MG: 5 TABLET ORAL at 14:40

## 2024-11-03 RX ADMIN — GABAPENTIN 200 MG: 100 CAPSULE ORAL at 21:53

## 2024-11-03 RX ADMIN — Medication 1000 UNITS: at 08:16

## 2024-11-03 RX ADMIN — OXYCODONE 5 MG: 5 TABLET ORAL at 05:23

## 2024-11-03 RX ADMIN — OXYCODONE 5 MG: 5 TABLET ORAL at 18:40

## 2024-11-03 RX ADMIN — ACETAMINOPHEN 650 MG: 325 TABLET ORAL at 05:23

## 2024-11-03 RX ADMIN — GABAPENTIN 200 MG: 100 CAPSULE ORAL at 08:16

## 2024-11-03 RX ADMIN — OXYCODONE 5 MG: 5 TABLET ORAL at 01:25

## 2024-11-03 ASSESSMENT — PAIN DESCRIPTION - DESCRIPTORS
DESCRIPTORS: ACHING;SORE
DESCRIPTORS: ACHING

## 2024-11-03 ASSESSMENT — PAIN DESCRIPTION - ORIENTATION
ORIENTATION: UPPER;POSTERIOR
ORIENTATION: UPPER;POSTERIOR
ORIENTATION: POSTERIOR;UPPER
ORIENTATION: RIGHT;LEFT

## 2024-11-03 ASSESSMENT — PAIN SCALES - GENERAL
PAINLEVEL_OUTOF10: 7
PAINLEVEL_OUTOF10: 6
PAINLEVEL_OUTOF10: 1
PAINLEVEL_OUTOF10: 5
PAINLEVEL_OUTOF10: 5

## 2024-11-03 ASSESSMENT — PAIN DESCRIPTION - LOCATION
LOCATION: BACK;SHOULDER
LOCATION: BACK;SHOULDER
LOCATION: BACK
LOCATION: BACK;SHOULDER

## 2024-11-03 ASSESSMENT — PAIN - FUNCTIONAL ASSESSMENT
PAIN_FUNCTIONAL_ASSESSMENT: PREVENTS OR INTERFERES SOME ACTIVE ACTIVITIES AND ADLS

## 2024-11-03 NOTE — PLAN OF CARE
Problem: Safety - Adult  Goal: Free from fall injury  11/2/2024 2335 by Evelia Collins, RN  Outcome: Progressing  11/2/2024 1019 by Magdiel Mock, RN  Outcome: Progressing     Problem: Skin/Tissue Integrity  Goal: Absence of new skin breakdown  Description: 1.  Monitor for areas of redness and/or skin breakdown  2.  Assess vascular access sites hourly  3.  Every 4-6 hours minimum:  Change oxygen saturation probe site  4.  Every 4-6 hours:  If on nasal continuous positive airway pressure, respiratory therapy assess nares and determine need for appliance change or resting period.  11/2/2024 2335 by Evelia Collins, RN  Outcome: Progressing  11/2/2024 1019 by Magdiel Mock, RN  Outcome: Progressing     Problem: ABCDS Injury Assessment  Goal: Absence of physical injury  Outcome: Progressing

## 2024-11-03 NOTE — PROGRESS NOTES
[facility]  Hospitalist Group                                                                                          Hospitalist Progress Note  Amanda Torres MD  Answering service: [phone] OR [phone] from in house phone        Date of Service:  11/3/2024  NAME:  Gerhard Overton  :  1950  MRN:  [id number]       Admission Summary:   Gerhard Overton is a 74 y.o. male with past medical history significant for prostate cancer with metastasis to the bone presented at [facility] emergency room with back pain.  The pain is located at the lower back bilaterally, constant, sharp pain, no radiation, no known aggravating or relieving factors and 7/10 in severity.  Patient underwent laminectomy about 3 years ago and has been having the back pain since then.  The back pain got worse in the past couple of days and because of that the patient came to the emergency room.  Patient also has prostate cancer with metastasis to the bone.  Patient is reporting tingling sensation in both lower extremities but denies bowel and urinary incontinence.    MRI of the lumbar and thoracic spine showed extensive osseous metastatic disease has progressed since earlier this year given difference in technique. There is extraosseous extension of neoplasm at L1, L3, and L4. Multilevel moderate stenoses.  Patient also presented in the emergency room with hemoglobin level of 5.1.  He denies  passing dark stool.  Patient has been taking naproxen at home for pain.  He was transferred to [facility] for higher level of care     Interval history / Subjective:   Patient seen and examined this morning.  Complains of \"arthritis\" pain in his shoulders, will put a Lidocaine patch on.   Back pain seems controlled.   PT/OT consulted.   Plan radiation therapy, single session to lumbar spine, early next week.       Assessment & Plan:     Prostate cancer with diffuse metastasis to the bone/spine :

## 2024-11-03 NOTE — PLAN OF CARE
Problem: Safety - Adult  Goal: Free from fall injury  11/3/2024 1048 by Magdiel Mock, RN  Outcome: Progressing  11/2/2024 2335 by Evelia Collins, RN  Outcome: Progressing

## 2024-11-04 ENCOUNTER — HOSPITAL ENCOUNTER (OUTPATIENT)
Facility: HOSPITAL | Age: 74
Discharge: HOME | End: 2024-11-07

## 2024-11-04 ENCOUNTER — APPOINTMENT (OUTPATIENT)
Facility: HOSPITAL | Age: 74
End: 2024-11-04
Attending: FAMILY MEDICINE
Payer: MEDICARE

## 2024-11-04 ENCOUNTER — HOSPITAL ENCOUNTER (OUTPATIENT)
Facility: HOSPITAL | Age: 74
Discharge: HOME | End: 2024-11-07
Attending: RADIOLOGY

## 2024-11-04 LAB
BASOPHILS # BLD: 0 K/UL (ref 0–0.1)
BASOPHILS NFR BLD: 1 % (ref 0–1)
DIFFERENTIAL METHOD BLD: ABNORMAL
EOSINOPHIL # BLD: 0.1 K/UL (ref 0–0.4)
EOSINOPHIL NFR BLD: 2 % (ref 0–7)
ERYTHROCYTE [DISTWIDTH] IN BLOOD BY AUTOMATED COUNT: 16.5 % (ref 11.5–14.5)
FOLATE SERPL-MCNC: 11.5 NG/ML (ref 5–21)
HCT VFR BLD AUTO: 27.9 % (ref 36.6–50.3)
HGB BLD-MCNC: 8.6 G/DL (ref 12.1–17)
IMM GRANULOCYTES # BLD AUTO: 0.1 K/UL (ref 0–0.04)
IMM GRANULOCYTES NFR BLD AUTO: 3 % (ref 0–0.5)
LYMPHOCYTES # BLD: 1.1 K/UL (ref 0.8–3.5)
LYMPHOCYTES NFR BLD: 28 % (ref 12–49)
MCH RBC QN AUTO: 28.4 PG (ref 26–34)
MCHC RBC AUTO-ENTMCNC: 30.8 G/DL (ref 30–36.5)
MCV RBC AUTO: 92.1 FL (ref 80–99)
MONOCYTES # BLD: 0.4 K/UL (ref 0–1)
MONOCYTES NFR BLD: 9 % (ref 5–13)
NEUTS SEG # BLD: 2.4 K/UL (ref 1.8–8)
NEUTS SEG NFR BLD: 57 % (ref 32–75)
NRBC # BLD: 0 K/UL (ref 0–0.01)
NRBC BLD-RTO: 0 PER 100 WBC
PLATELET # BLD AUTO: 311 K/UL (ref 150–400)
PMV BLD AUTO: 9.2 FL (ref 8.9–12.9)
RBC # BLD AUTO: 3.03 M/UL (ref 4.1–5.7)
RBC MORPH BLD: ABNORMAL
WBC # BLD AUTO: 4.1 K/UL (ref 4.1–11.1)
WBC MORPH BLD: ABNORMAL

## 2024-11-04 PROCEDURE — 97161 PT EVAL LOW COMPLEX 20 MIN: CPT

## 2024-11-04 PROCEDURE — 77307 TELETHX ISODOSE PLAN CPLX: CPT

## 2024-11-04 PROCEDURE — 2580000003 HC RX 258: Performed by: INTERNAL MEDICINE

## 2024-11-04 PROCEDURE — 97535 SELF CARE MNGMENT TRAINING: CPT

## 2024-11-04 PROCEDURE — 6370000000 HC RX 637 (ALT 250 FOR IP): Performed by: NURSE PRACTITIONER

## 2024-11-04 PROCEDURE — 85025 COMPLETE CBC W/AUTO DIFF WBC: CPT

## 2024-11-04 PROCEDURE — 6360000002 HC RX W HCPCS: Performed by: INTERNAL MEDICINE

## 2024-11-04 PROCEDURE — 6370000000 HC RX 637 (ALT 250 FOR IP): Performed by: INTERNAL MEDICINE

## 2024-11-04 PROCEDURE — 97165 OT EVAL LOW COMPLEX 30 MIN: CPT

## 2024-11-04 PROCEDURE — 1100000000 HC RM PRIVATE

## 2024-11-04 PROCEDURE — 77470 SPECIAL RADIATION TREATMENT: CPT

## 2024-11-04 PROCEDURE — 82746 ASSAY OF FOLIC ACID SERUM: CPT

## 2024-11-04 PROCEDURE — 77014 CT GUIDE PLACEMENT RADIATION THERAPY: CPT

## 2024-11-04 PROCEDURE — 36415 COLL VENOUS BLD VENIPUNCTURE: CPT

## 2024-11-04 PROCEDURE — 77334 RADIATION TREATMENT AID(S): CPT

## 2024-11-04 PROCEDURE — 77290 THER RAD SIMULAJ FIELD CPLX: CPT

## 2024-11-04 PROCEDURE — 97116 GAIT TRAINING THERAPY: CPT

## 2024-11-04 RX ORDER — CASTOR OIL AND BALSAM, PERU 788; 87 MG/G; MG/G
OINTMENT TOPICAL 2 TIMES DAILY
Status: DISCONTINUED | OUTPATIENT
Start: 2024-11-04 | End: 2024-11-06 | Stop reason: HOSPADM

## 2024-11-04 RX ADMIN — ACETAMINOPHEN 650 MG: 325 TABLET ORAL at 02:44

## 2024-11-04 RX ADMIN — OXYCODONE 5 MG: 5 TABLET ORAL at 16:05

## 2024-11-04 RX ADMIN — Medication 1000 UNITS: at 08:22

## 2024-11-04 RX ADMIN — OXYCODONE 5 MG: 5 TABLET ORAL at 20:35

## 2024-11-04 RX ADMIN — OXYCODONE 5 MG: 5 TABLET ORAL at 06:54

## 2024-11-04 RX ADMIN — Medication 1 MG: at 08:22

## 2024-11-04 RX ADMIN — GABAPENTIN 200 MG: 100 CAPSULE ORAL at 08:22

## 2024-11-04 RX ADMIN — CASTOR OIL AND BALSAM, PERU: 788; 87 OINTMENT TOPICAL at 13:35

## 2024-11-04 RX ADMIN — OXYCODONE 5 MG: 5 TABLET ORAL at 02:44

## 2024-11-04 RX ADMIN — ACETAMINOPHEN 650 MG: 325 TABLET ORAL at 16:05

## 2024-11-04 RX ADMIN — GABAPENTIN 200 MG: 100 CAPSULE ORAL at 13:35

## 2024-11-04 RX ADMIN — GABAPENTIN 200 MG: 100 CAPSULE ORAL at 20:35

## 2024-11-04 RX ADMIN — SODIUM CHLORIDE, PRESERVATIVE FREE 10 ML: 5 INJECTION INTRAVENOUS at 08:22

## 2024-11-04 RX ADMIN — TAMSULOSIN HYDROCHLORIDE 0.4 MG: 0.4 CAPSULE ORAL at 08:22

## 2024-11-04 RX ADMIN — MORPHINE SULFATE 2 MG: 2 INJECTION, SOLUTION INTRAMUSCULAR; INTRAVENOUS at 17:24

## 2024-11-04 RX ADMIN — CASTOR OIL AND BALSAM, PERU: 788; 87 OINTMENT TOPICAL at 23:09

## 2024-11-04 ASSESSMENT — PAIN SCALES - GENERAL
PAINLEVEL_OUTOF10: 5
PAINLEVEL_OUTOF10: 5
PAINLEVEL_OUTOF10: 7
PAINLEVEL_OUTOF10: 10
PAINLEVEL_OUTOF10: 8

## 2024-11-04 ASSESSMENT — PAIN DESCRIPTION - DESCRIPTORS
DESCRIPTORS: ACHING;SORE
DESCRIPTORS: ACHING
DESCRIPTORS: ACHING
DESCRIPTORS: ACHING;SORE

## 2024-11-04 ASSESSMENT — PAIN DESCRIPTION - LOCATION
LOCATION: BACK;LEG
LOCATION: BACK;SHOULDER
LOCATION: BACK;LEG
LOCATION: BACK;SHOULDER
LOCATION: BACK

## 2024-11-04 ASSESSMENT — PAIN DESCRIPTION - ORIENTATION
ORIENTATION: RIGHT;LEFT
ORIENTATION: RIGHT;LEFT
ORIENTATION: UPPER;POSTERIOR
ORIENTATION: UPPER;POSTERIOR

## 2024-11-04 ASSESSMENT — PAIN - FUNCTIONAL ASSESSMENT
PAIN_FUNCTIONAL_ASSESSMENT: PREVENTS OR INTERFERES SOME ACTIVE ACTIVITIES AND ADLS
PAIN_FUNCTIONAL_ASSESSMENT: PREVENTS OR INTERFERES SOME ACTIVE ACTIVITIES AND ADLS

## 2024-11-04 NOTE — PROGRESS NOTES
[facility]  [facility]ist [facility]                                                                                          Hospitalist Progress Note  Amanda Torres MD  Answering service: [phone] OR [phone] from in house phone        Date of Service:  2024  NAME:  Gerhard Overton  :  1950  MRN:  [id number]       Admission Summary:   Gerhard Overton is a 74 y.o. male with past medical history significant for prostate cancer with metastasis to the bone presented at [facility] emergency room with back pain.  The pain is located at the lower back bilaterally, constant, sharp pain, no radiation, no known aggravating or relieving factors and 7/10 in severity.  Patient underwent laminectomy about 3 years ago and has been having the back pain since then.  The back pain got worse in the past couple of days and because of that the patient came to the emergency room.  Patient also has prostate cancer with metastasis to the bone.  Patient is reporting tingling sensation in both lower extremities but denies bowel and urinary incontinence.    MRI of the lumbar and thoracic spine showed extensive osseous metastatic disease has progressed since earlier this year given difference in technique. There is extraosseous extension of neoplasm at L1, L3, and L4. Multilevel moderate stenoses.  Patient also presented in the emergency room with hemoglobin level of 5.1.  He denies  passing dark stool.  Patient has been taking naproxen at home for pain.  He was transferred to [facility] for higher level of care     Interval history / Subjective:   Patient seen and examined this morning at breakfast time.  He slept well last night, Lidocaine patches helped with shoulder pain.   PT/OT consulted, patient might benefit from IPR at discharge, he was independent prior to admission, he has been to [facility] in the past, and is willing to return there.   Plan radiation therapy, single

## 2024-11-04 NOTE — PLAN OF CARE
Problem: Safety - Adult  Goal: Free from fall injury  11/3/2024 2256 by Evelia Collins, RN  Outcome: Progressing  11/3/2024 1048 by Magdiel Mock RN  Outcome: Progressing     Problem: Skin/Tissue Integrity  Goal: Absence of new skin breakdown  Description: 1.  Monitor for areas of redness and/or skin breakdown  2.  Assess vascular access sites hourly  3.  Every 4-6 hours minimum:  Change oxygen saturation probe site  4.  Every 4-6 hours:  If on nasal continuous positive airway pressure, respiratory therapy assess nares and determine need for appliance change or resting period.  11/3/2024 2256 by Evelia Collins, RN  Outcome: Progressing  11/3/2024 1048 by Magdiel Mock, RN  Outcome: Progressing     Problem: ABCDS Injury Assessment  Goal: Absence of physical injury  11/3/2024 2256 by Evelia Collins, RN  Outcome: Progressing  11/3/2024 1048 by Magdiel Mock, RN  Outcome: Progressing

## 2024-11-04 NOTE — PROGRESS NOTES
Physical Therapy 11/4/2024    Orders received, chart reviewed and patient evaluated by physical therapy. Pending progression with skilled acute physical therapy, recommend:    Intermittent physical therapy up to 2-3x/week in previous living setting    Recommend with nursing OOB to chair 3x/day and walking daily with 1x assist and rolling walker. Thank you for completing as able in order to maintain patient strength, endurance and independence.     Full evaluation to follow.      Thank you,  Agnes Acharya, PT, DPT, NCS

## 2024-11-04 NOTE — PLAN OF CARE
Problem: Physical Therapy - Adult  Goal: By Discharge: Performs mobility at highest level of function for planned discharge setting.  See evaluation for individualized goals.  Description: FUNCTIONAL STATUS PRIOR TO ADMISSION: Patient was modified independent using a rolling walker for functional mobility.    HOME SUPPORT PRIOR TO ADMISSION: Patient lives alone with very limited local support.    Physical Therapy Goals  Initiated 11/4/2024  1.  Patient will move from supine to sit and sit to supine and roll side to side in bed with modified independence within 7 day(s).    2.  Patient will perform sit to stand with modified independence within 7 day(s).  3.  Patient will transfer from bed to chair and chair to bed with modified independence using the least restrictive device within 7 day(s).  4.  Patient will ambulate with modified independence for 250 feet with the least restrictive device within 7 day(s).   5.  Patient will ascend/descend 4 stairs with handrail(s) with modified independence within 7 day(s).   Outcome: Progressing   PHYSICAL THERAPY EVALUATION    Patient: Gerhard Overton (74 y.o. male)  Date: 11/4/2024  Primary Diagnosis: Intractable low back pain [M54.59]       Precautions: Restrictions/Precautions: Fall Risk                      ASSESSMENT :   DEFICITS/IMPAIRMENTS:   The patient is limited by decreased functional mobility, strength, body mechanics, activity tolerance, balance, posture, increased pain levels s/p admission for low back pain, found to have metastasis to the spine. Prior to admission patient was Mod I using a RW.    Based on the impairments listed above patient is near his functional baseline. Today, he completed mobility with standby assist. He ambulated in the hallway with RW and demonstrated forward flexed posture which improved with tactile cues and height adjustment of RW. Patient reported 5/10 pain at start of session which remained stable throughout. He was left comfortably

## 2024-11-04 NOTE — WOUND CARE
WOCN Note:     New consult for sacrum with redness noted POA.   Seen in 532/01     74 y.o. y/o male admitted on 10/30/2024   Admitted for  Intractable low back pain [M54.59]      Past Medical History:   Diagnosis Date    Arthritis     Mild anemia     Prostate cancer (HCC) 2008 2017    PALLIATIVE CARE PER DR NIKOLE LÓPEZ NOTES 2/12/2018: XTANDI, LUPRON, AND PAST EXTERNAL BEAM RADIATION THERAPY     Diet: reg           Assessment:   Appropriately conversational and reports no pain.  He also reports he has lost weight and \"doesn't have cushion anymore\" with buttocks irritated for a while now.   Able to turn independently onto right side.    Continent.  Surface: gianna gel mattress    Bilateral heels intact without erythema; moves feet freely.    Buttocks and sacrum intact with slowly blanching erythema under very dry scaly skin; sacral protective dressing already in use.    Recommend:    Sacrum: venelex twice daily and cover with foam dressing    and Isra Protocol:  Turn/reposition approximately every 2 hours  Offload heels with heels hanging off end of pillow at all times while in bed.  Sacral Preventive dressing: change as needed ~every 4 days. Discontinue if incontinence is frequently soiling dressing.       No concerns to relay to provider.    No wound care needs - will sign off.     Tasia Goodwin, EMIN, RN, CWOCN  Certified Wound, Ostomy, Continence Nurse  office [phone]  Available via [employer]

## 2024-11-04 NOTE — PLAN OF CARE
Problem: Occupational Therapy - Adult  Goal: By Discharge: Performs self-care activities at highest level of function for planned discharge setting.  See evaluation for individualized goals.  Description: FUNCTIONAL STATUS PRIOR TO ADMISSION:  Patient was ambulatory using RW or SPC, pending distance  Receives Help From: Friend(s), ADL Assistance: Independent,  ,  ,  ,  ,  , Homemaking Assistance: Independent, Ambulation Assistance: Independent, Transfer Assistance: Independent, Active : Yes     HOME SUPPORT: Patient lived alone, has neighbors and friends    Occupational Therapy Goals:  Initiated 11/4/2024  1.  Patient will perform bathing with Ryder within 7 day(s).  2.  Patient will perform lower body dressing with Ryder within 7 day(s).  3.  Patient will perform toilet transfers with Modified Ryder  within 7 day(s).  4.  Patient will perform all aspects of toileting with Modified Ryder within 7 day(s).  5.  Patient will participate in upper extremity therapeutic exercise/activities with Modified Ryder for 10 minutes within 7 day(s).    6.  Patient will utilize energy conservation techniques during functional activities with verbal cues within 7 day(s).   11/4/2024 1149 by Shavonne Jarrett, OT  Outcome: Progressing  OCCUPATIONAL THERAPY EVALUATION    Patient: Gerhard Overton (74 y.o. male)  Date: 11/4/2024  Primary Diagnosis: Intractable low back pain [M54.59]         Precautions: Fall Risk                  ASSESSMENT :  The patient is limited by decreased functional mobility, strength, activity tolerance, endurance.    Based on the impairments listed above pt appears to be approaching baseline with self-care and mobility.  No c/o of pain.  Pt lives home alone, never  and no children.  He still drives and has supportive neighbors.  Pt would benefit from HHOT, however, pt declined reporting that he is a hoarder and does not wanting anyone coming into his home.  He has  setting    Other factors to consider for discharge: lives alone    IF patient discharges home will need the following DME: none       SUBJECTIVE:   Patient stated, “I'm a hoarder.”  OBJECTIVE DATA SUMMARY:     Past Medical History:   Diagnosis Date    Arthritis     Mild anemia     Prostate cancer (HCC) 2008 2017    PALLIATIVE CARE PER DR NIKOLE LÓPEZ NOTES 2/12/2018: XTANDI, LUPRON, AND PAST EXTERNAL BEAM RADIATION THERAPY     Past Surgical History:   Procedure Laterality Date    BUNIONECTOMY Right 2014    CATARACT REMOVAL Bilateral 2017 2018    COLONOSCOPY      GI  2009    ANAL FISTULA     HEMORRHOID SURGERY  1974    HIP ARTHROSCOPY Right     TONSILLECTOMY  1958          Expanded or extensive additional review of patient history:   Social/Functional History  Lives With: Alone  Type of Home: House  Home Layout: One level  Home Access: Stairs to enter with rails  Entrance Stairs - Number of Steps: 4  Entrance Stairs - Rails: Both  Bathroom Shower/Tub: Walk-in shower  Bathroom Toilet: Standard  Bathroom Equipment: Grab bars in shower, Built-in shower seat  Bathroom Accessibility: Accessible  Home Equipment: Cane, Walker - Rolling  Has the patient had two or more falls in the past year or any fall with injury in the past year?: Yes  Receives Help From: Friend(s)  ADL Assistance: Independent  Homemaking Assistance: Independent  Homemaking Responsibilities: Yes  Ambulation Assistance: Independent  Transfer Assistance: Independent  Active : Yes  Mode of Transportation: Car  Occupation: Retired        EXAMINATION OF PERFORMANCE DEFICITS:    Cognitive/Behavioral Status:  Orientation  Overall Orientation Status: Within Normal Limits  Orientation Level: Oriented X4  Cognition  Overall Cognitive Status: WFL    Skin: intact    Edema: none noted    Hearing:   Hearing  Hearing: Within functional limits    Vision/Perceptual:          Vision  Vision: Within Functional Limits       Range of Motion:   AROM: Generally decreased,

## 2024-11-04 NOTE — PROGRESS NOTES
Spiritual Health History and Assessment/Progress Note  [facility]    Palliative Care,  , Adjustment to illness,      Name: Gerhard Overton MRN: [id number]    Age: 74 y.o.     Sex: male   Language: English   Restoration: Episcopal   Intractable low back pain     Date: 11/4/2024            Total Time Calculated: 25 min              Spiritual Assessment began in [facility] 5W1 ORTHO SPINE        Referral/Consult From: Palliative Care   Encounter Overview/Reason: Palliative Care  Service Provided For: Patient    Armida, Belief, Meaning:   Patient identifies as spiritual and is connected with a armida tradition or spiritual practice  Family/Friends No family/friends present      Importance and Influence:  Patient has no beliefs influential to healthcare decision-making identified during this visit  Family/Friends No family/friends present    Community:  Patient feels well-supported. Support system includes: [place] Community and Extended family  Family/Friends No family/friends present    Assessment and Plan of Care:     Patient Interventions include: Facilitated expression of thoughts and feelings and Affirmed coping skills/support systems  Family/Friends Interventions include: No family/friends present    Patient Plan of Care: Spiritual Care available upon further referral  Family/Friends Plan of Care: Spiritual Care available upon further referral    I visited Gerhard Overton for a palliative initial spiritual health assessment.     Psychosocial: Patient lives alone in [place].  He is very involved with his Taoism and sings in the choir. 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 He does love gospel and as [id number] he traveled to [place] in September by himself-drove.  He has been able to do his ADLs.  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  He is not , no

## 2024-11-05 ENCOUNTER — HOSPITAL ENCOUNTER (OUTPATIENT)
Facility: HOSPITAL | Age: 74
Discharge: HOME | End: 2024-11-08
Attending: RADIOLOGY

## 2024-11-05 LAB
BASOPHILS # BLD: 0 K/UL (ref 0–0.1)
BASOPHILS NFR BLD: 1 % (ref 0–1)
DIFFERENTIAL METHOD BLD: ABNORMAL
EOSINOPHIL # BLD: 0.1 K/UL (ref 0–0.4)
EOSINOPHIL NFR BLD: 2 % (ref 0–7)
ERYTHROCYTE [DISTWIDTH] IN BLOOD BY AUTOMATED COUNT: 16.5 % (ref 11.5–14.5)
HCT VFR BLD AUTO: 24.6 % (ref 36.6–50.3)
HGB BLD-MCNC: 7.5 G/DL (ref 12.1–17)
IMM GRANULOCYTES # BLD AUTO: 0 K/UL
IMM GRANULOCYTES NFR BLD AUTO: 0 %
LYMPHOCYTES # BLD: 0.9 K/UL (ref 0.8–3.5)
LYMPHOCYTES NFR BLD: 24 % (ref 12–49)
MCH RBC QN AUTO: 28.2 PG (ref 26–34)
MCHC RBC AUTO-ENTMCNC: 30.5 G/DL (ref 30–36.5)
MCV RBC AUTO: 92.5 FL (ref 80–99)
MONOCYTES # BLD: 0.2 K/UL (ref 0–1)
MONOCYTES NFR BLD: 6 % (ref 5–13)
MYELOCYTES NFR BLD MANUAL: 1 %
NEUTS BAND NFR BLD MANUAL: 3 % (ref 0–6)
NEUTS SEG # BLD: 2.5 K/UL (ref 1.8–8)
NEUTS SEG NFR BLD: 63 % (ref 32–75)
NRBC # BLD: 0 K/UL (ref 0–0.01)
NRBC BLD-RTO: 0 PER 100 WBC
PLATELET # BLD AUTO: 253 K/UL (ref 150–400)
PLATELET COMMENT: ABNORMAL
PMV BLD AUTO: 8.8 FL (ref 8.9–12.9)
RAD ONC ARIA COURSE FIRST TREATMENT DATE: NORMAL
RAD ONC ARIA COURSE ID: NORMAL
RAD ONC ARIA COURSE INTENT: NORMAL
RAD ONC ARIA COURSE LAST TREATMENT DATE: NORMAL
RAD ONC ARIA COURSE SESSION NUMBER: 1
RAD ONC ARIA COURSE START DATE: NORMAL
RAD ONC ARIA COURSE TREATMENT ELAPSED DAYS: 0
RAD ONC ARIA PLAN FRACTIONS TREATED TO DATE: 1
RAD ONC ARIA PLAN ID: NORMAL
RAD ONC ARIA PLAN PRESCRIBED DOSE PER FRACTION: 8 GY
RAD ONC ARIA PLAN PRIMARY REFERENCE POINT: NORMAL
RAD ONC ARIA PLAN TOTAL FRACTIONS PRESCRIBED: 1
RAD ONC ARIA PLAN TOTAL PRESCRIBED DOSE: 800 CGY
RAD ONC ARIA REFERENCE POINT DOSAGE GIVEN TO DATE: 8 GY
RAD ONC ARIA REFERENCE POINT DOSAGE GIVEN TO DATE: 8 GY
RAD ONC ARIA REFERENCE POINT ID: NORMAL
RAD ONC ARIA REFERENCE POINT ID: NORMAL
RAD ONC ARIA REFERENCE POINT SESSION DOSAGE GIVEN: 8 GY
RAD ONC ARIA REFERENCE POINT SESSION DOSAGE GIVEN: 8 GY
RBC # BLD AUTO: 2.66 M/UL (ref 4.1–5.7)
RBC MORPH BLD: ABNORMAL
WBC # BLD AUTO: 3.8 K/UL (ref 4.1–11.1)
WBC MORPH BLD: ABNORMAL

## 2024-11-05 PROCEDURE — 85025 COMPLETE CBC W/AUTO DIFF WBC: CPT

## 2024-11-05 PROCEDURE — 36415 COLL VENOUS BLD VENIPUNCTURE: CPT

## 2024-11-05 PROCEDURE — 97530 THERAPEUTIC ACTIVITIES: CPT

## 2024-11-05 PROCEDURE — 1100000000 HC RM PRIVATE

## 2024-11-05 PROCEDURE — 77280 THER RAD SIMULAJ FIELD SMPL: CPT

## 2024-11-05 PROCEDURE — 6370000000 HC RX 637 (ALT 250 FOR IP): Performed by: INTERNAL MEDICINE

## 2024-11-05 PROCEDURE — 77387 GUIDANCE FOR RADJ TX DLVR: CPT

## 2024-11-05 PROCEDURE — 77412 RADIATION TX DELIVERY LVL 3: CPT

## 2024-11-05 PROCEDURE — 6360000002 HC RX W HCPCS: Performed by: INTERNAL MEDICINE

## 2024-11-05 PROCEDURE — 6370000000 HC RX 637 (ALT 250 FOR IP): Performed by: NURSE PRACTITIONER

## 2024-11-05 PROCEDURE — 2580000003 HC RX 258: Performed by: INTERNAL MEDICINE

## 2024-11-05 RX ADMIN — MORPHINE SULFATE 2 MG: 2 INJECTION, SOLUTION INTRAMUSCULAR; INTRAVENOUS at 23:52

## 2024-11-05 RX ADMIN — SODIUM CHLORIDE, PRESERVATIVE FREE 10 ML: 5 INJECTION INTRAVENOUS at 08:26

## 2024-11-05 RX ADMIN — Medication 1 MG: at 08:26

## 2024-11-05 RX ADMIN — Medication 1000 UNITS: at 08:26

## 2024-11-05 RX ADMIN — SODIUM CHLORIDE, PRESERVATIVE FREE 10 ML: 5 INJECTION INTRAVENOUS at 20:36

## 2024-11-05 RX ADMIN — GABAPENTIN 200 MG: 100 CAPSULE ORAL at 08:25

## 2024-11-05 RX ADMIN — CASTOR OIL AND BALSAM, PERU: 788; 87 OINTMENT TOPICAL at 20:49

## 2024-11-05 RX ADMIN — OXYCODONE 5 MG: 5 TABLET ORAL at 00:45

## 2024-11-05 RX ADMIN — CASTOR OIL AND BALSAM, PERU: 788; 87 OINTMENT TOPICAL at 08:25

## 2024-11-05 RX ADMIN — OXYCODONE 5 MG: 5 TABLET ORAL at 13:15

## 2024-11-05 RX ADMIN — OXYCODONE 5 MG: 5 TABLET ORAL at 18:04

## 2024-11-05 RX ADMIN — GABAPENTIN 200 MG: 100 CAPSULE ORAL at 20:49

## 2024-11-05 RX ADMIN — OXYCODONE 5 MG: 5 TABLET ORAL at 06:54

## 2024-11-05 RX ADMIN — TAMSULOSIN HYDROCHLORIDE 0.4 MG: 0.4 CAPSULE ORAL at 08:25

## 2024-11-05 RX ADMIN — GABAPENTIN 200 MG: 100 CAPSULE ORAL at 13:16

## 2024-11-05 ASSESSMENT — PAIN SCALES - GENERAL
PAINLEVEL_OUTOF10: 5
PAINLEVEL_OUTOF10: 7
PAINLEVEL_OUTOF10: 5
PAINLEVEL_OUTOF10: 6
PAINLEVEL_OUTOF10: 6
PAINLEVEL_OUTOF10: 5

## 2024-11-05 ASSESSMENT — PAIN DESCRIPTION - DESCRIPTORS
DESCRIPTORS: ACHING

## 2024-11-05 ASSESSMENT — PAIN DESCRIPTION - LOCATION
LOCATION: BACK
LOCATION: BACK
LOCATION: BACK;LEG
LOCATION: BACK
LOCATION: BACK;LEG
LOCATION: BACK;LEG

## 2024-11-05 ASSESSMENT — PAIN DESCRIPTION - ORIENTATION
ORIENTATION: LEFT;RIGHT
ORIENTATION: RIGHT;LEFT
ORIENTATION: RIGHT;LEFT

## 2024-11-05 NOTE — PROGRESS NOTES
Physical Therapy 11/5/2024    Chart reviewed up to date. Pt off floor. Will continue to follow.      Thank you.  Marissa Weinstein, PT, DPT

## 2024-11-05 NOTE — PROGRESS NOTES
Pt concerned about gabapentin regimen. Educated that gabapentin is a scheduled ordered, for every 8 hours. Patient had thought he can always get it with oxy dose. Also confused as to why it's not brought in during the middle of the night since it's every 8 hours....    Will inform incoming nurse to talk to provider to possible change his gabapentin regimen.

## 2024-11-05 NOTE — PROGRESS NOTES
Hematology-Oncology Progress Note    Gerhard Overton  1950  [id number]  11/5/2024    Follow-up for:aemia / met prostate cancer     [x]        Chart notes since last visit reviewed   [x]        Medications reviewed for allergies and interactions       Case discussed with the following:         []                            []        Nursing Staff                                                                         []        Pathologist                                                                        []        FAMILY      Subjective:     Spoke with patient who complains of: pt is alert, awake, no c/o bleeding, no c/o pain    Objective:   Patient Vitals for the past 24 hrs:   BP Temp Temp src Pulse Resp SpO2   11/05/24 0754 105/68 97.5 °F (36.4 °C) Oral (!) 102 16 96 %   11/05/24 0315 112/63 98.4 °F (36.9 °C) Oral 91 16 95 %   11/04/24 2035 112/71 97.5 °F (36.4 °C) Oral 93 16 95 %   11/04/24 1538 124/79 98.8 °F (37.1 °C) Oral (!) 101 12 97 %       REVIEW OF SYSTEMS:    Constitutional: negative fever, negative chills, negative weight loss  Eyes:   negative visual changes  ENT:   negative sore throat, tongue or lip swelling  Respiratory:  negative cough, negative dyspnea  Cards:  negative for chest pain, palpitations, lower extremity edema  GI:   negative for nausea, vomiting, diarrhea, and abdominal pain  Neuro:  negative for headaches, dizziness, vertigo  []                        Full ROS o/w normal/non contributor    Constitutional:  Patient looks  []        Sick  []        Frail  [x]        Better                                                 []        Depressed    HEENT:  [x]   NC                         []   AT               []    ALOPECIA           Eyes: [x]   Normal               []    Icteric  Oropharynx: []    Normal                  []  Thrush               []   Dry  Mucositis: []    None                 Grade: []        I  []        II  []        III  []        IV  Neck:   [x]    Supple                trach in place         JVD:    []   ABSENT       []   PRESENT  Lymphadenopathy:   []   None Noted            []   PRESENT    Chest:  [x]   Clear               []    Rhonchi                      Dec'd @     []  Right Base           []   Left Base    CV:             []   Regular              []  Irregular               []   Tachy                []   Murmur  Abdominal:   [x]    Soft              [x]   NON-tender               []   Tender      BS:    []   ABSENT                   []   PRESENT  Liver:     [x]  NON-palp                  []   EDGE- palp  Spleen: []   NON-palp                   []  EDGE - palp  Mass:   []   ABSENT                          []  PRESENT  Extr:    []  Lymphedema             []   Cyanosis      []  Clubbing  Edema:     [x]   NONE       []   PRESENT  Skin:  Intact []           Purpura []        Rash: []   ABSENT       []  PRESENT  Neuro:  []        Normal  []        Confused      Available labs reviewed:  Labs:    Recent Results (from the past 24 hour(s))   CBC with Auto Differential    Collection Time: 11/05/24  4:33 AM   Result Value Ref Range    WBC 3.8 (L) 4.1 - 11.1 K/uL    RBC 2.66 (L) 4.10 - 5.70 M/uL    Hemoglobin 7.5 (L) 12.1 - 17.0 g/dL    Hematocrit 24.6 (L) 36.6 - 50.3 %    MCV 92.5 80.0 - 99.0 FL    MCH 28.2 26.0 - 34.0 PG    MCHC 30.5 30.0 - 36.5 g/dL    RDW 16.5 (H) 11.5 - 14.5 %    Platelets 253 150 - 400 K/uL    MPV 8.8 (L) 8.9 - 12.9 FL    Nucleated RBCs 0.0 0  WBC    nRBC 0.00 0.00 - 0.01 K/uL    Neutrophils % 63 32 - 75 %    Band Neutrophils 3 0 - 6 %    Lymphocytes % 24 12 - 49 %    Monocytes % 6 5 - 13 %    Eosinophils % 2 0 - 7 %    Basophils % 1 0 - 1 %    Myelocytes 1 (H) 0 %    Immature Granulocytes % 0 %    Neutrophils Absolute 2.5 1.8 - 8.0 K/UL    Lymphocytes Absolute 0.9 0.8 - 3.5 K/UL    Monocytes Absolute 0.2 0.0 - 1.0 K/UL    Eosinophils Absolute 0.1 0.0 - 0.4 K/UL    Basophils Absolute 0.0 0.0 - 0.1 K/UL    Immature Granulocytes

## 2024-11-05 NOTE — PROGRESS NOTES
[facility] Adult  Hospitalist Group                                                                                          Hospitalist Progress Note  Amanda Torres MD  Answering service: [phone] OR [phone] from in house phone        Date of Service:  2024  NAME:  Gerhard Overton  :  1950  MRN:  [id number]       Admission Summary:   Gerhard Overton is a 74 y.o. male with past medical history significant for prostate cancer with metastasis to the bone presented at [facility] emergency room with back pain.  The pain is located at the lower back bilaterally, constant, sharp pain, no radiation, no known aggravating or relieving factors and 7/10 in severity.  Patient underwent laminectomy about 3 years ago and has been having the back pain since then.  The back pain got worse in the past couple of days and because of that the patient came to the emergency room.  Patient also has prostate cancer with metastasis to the bone.  Patient is reporting tingling sensation in both lower extremities but denies bowel and urinary incontinence.    MRI of the lumbar and thoracic spine showed extensive osseous metastatic disease has progressed since earlier this year given difference in technique. There is extraosseous extension of neoplasm at L1, L3, and L4. Multilevel moderate stenoses.  Patient also presented in the emergency room with hemoglobin level of 5.1.  He denies  passing dark stool.  Patient has been taking naproxen at home for pain.  He was transferred to [facility] for higher level of care     Interval history / Subjective:   Patient seen and examined this morning, appears anxious.  I have discussed the plan with him every day, but he seems to think there is inadequate communication between staff members.  Patient is to receive a single dose of radiation today to lumbar spine.  Anticipate discharge tomorrow.      Assessment & Plan:     Prostate cancer with

## 2024-11-05 NOTE — CARE COORDINATION
Transition of Care Plan:    RUR: 12%   Prior Level of Functioning: Independent   Disposition: IPR   HATTIE: 24hrs.   If SNF or IPR: Date FOC offered: 11/5  Date FOC received: 11/5  Pending facility: [facility]   Per conversation with the pt; he reported that he recently discharged to [facility] and wanted to determine if he is eligible to return. This cm informed him that he would follow up with him and provide an update once available.   Date authorization started with reference number: N.A  Date authorization received and expires: N/A  Follow up appointments: PCP  DME needed: None   Transportation at discharge:   [facility] Medicare/ letter given: Received   Caregiver Contact: Jose Armando Almonte (Other)  [phone]  Discharge Caregiver contacted prior to discharge? N/A   Care Conference needed? N/A   Barriers to discharge: Medical, Plan radiation therapy, single session to lumbar spine-11/5

## 2024-11-05 NOTE — PLAN OF CARE
Problem: Occupational Therapy - Adult  Goal: By Discharge: Performs self-care activities at highest level of function for planned discharge setting.  See evaluation for individualized goals.  Description: FUNCTIONAL STATUS PRIOR TO ADMISSION:  Patient was ambulatory using RW or SPC, pending distance  Receives Help From: Friend(s), ADL Assistance: Independent, Homemaking Assistance: Independent, Ambulation Assistance: Independent, Transfer Assistance: Independent, Active : Yes     HOME SUPPORT: Patient lived alone, has neighbors and friends    Occupational Therapy Goals:  Initiated 11/4/2024  1.  Patient will perform bathing with [place] within 7 day(s).  2.  Patient will perform lower body dressing with [place] within 7 day(s).  3.  Patient will perform toilet transfers with Modified [place]  within 7 day(s).  4.  Patient will perform all aspects of toileting with Modified [place] within 7 day(s).  5.  Patient will participate in upper extremity therapeutic exercise/activities with Modified [place] for 10 minutes within 7 day(s).    6.  Patient will utilize energy conservation techniques during functional activities with verbal cues within 7 day(s).   Outcome: Progressing     OCCUPATIONAL THERAPY TREATMENT  Patient: Gerhard Overton (74 y.o. male)  Date: 11/5/2024  Primary Diagnosis: Intractable low back pain [M54.59]       Precautions: Fall Risk                Chart, occupational therapy assessment, plan of care, and goals were reviewed.    ASSESSMENT  Patient continues to benefit from skilled OT services and is progressing towards goals. Pt cleared for therapy by nursing and received supine in bed asleep. Woke to voice and agreeable to therapy. Pt overall SBA for functional mobility with RW. Able to don shoes with set up. Pt demo'ing slight confusion today reporting two therapists from [facility] worked with him this morning to get him to the chair and adjusted his walker (per notes,

## 2024-11-06 VITALS
HEIGHT: 69 IN | WEIGHT: 126 LBS | OXYGEN SATURATION: 96 % | TEMPERATURE: 98.6 F | RESPIRATION RATE: 17 BRPM | SYSTOLIC BLOOD PRESSURE: 121 MMHG | BODY MASS INDEX: 18.66 KG/M2 | DIASTOLIC BLOOD PRESSURE: 66 MMHG | HEART RATE: 103 BPM

## 2024-11-06 LAB
BASOPHILS # BLD: 0 K/UL (ref 0–0.1)
BASOPHILS # BLD: 0 K/UL (ref 0–0.1)
BASOPHILS NFR BLD: 0 % (ref 0–1)
BASOPHILS NFR BLD: 0 % (ref 0–1)
COMMENT:: NORMAL
DIFFERENTIAL METHOD BLD: ABNORMAL
DIFFERENTIAL METHOD BLD: ABNORMAL
EOSINOPHIL # BLD: 0 K/UL (ref 0–0.4)
EOSINOPHIL # BLD: 0 K/UL (ref 0–0.4)
EOSINOPHIL NFR BLD: 0 % (ref 0–7)
EOSINOPHIL NFR BLD: 0 % (ref 0–7)
ERYTHROCYTE [DISTWIDTH] IN BLOOD BY AUTOMATED COUNT: 16.5 % (ref 11.5–14.5)
ERYTHROCYTE [DISTWIDTH] IN BLOOD BY AUTOMATED COUNT: 16.7 % (ref 11.5–14.5)
HCT VFR BLD AUTO: 24.5 % (ref 36.6–50.3)
HCT VFR BLD AUTO: 24.9 % (ref 36.6–50.3)
HGB BLD-MCNC: 7.4 G/DL (ref 12.1–17)
HGB BLD-MCNC: 7.6 G/DL (ref 12.1–17)
IMM GRANULOCYTES # BLD AUTO: 0 K/UL
IMM GRANULOCYTES # BLD AUTO: 0 K/UL
IMM GRANULOCYTES NFR BLD AUTO: 0 %
IMM GRANULOCYTES NFR BLD AUTO: 0 %
LYMPHOCYTES # BLD: 0.5 K/UL (ref 0.8–3.5)
LYMPHOCYTES # BLD: 0.5 K/UL (ref 0.8–3.5)
LYMPHOCYTES NFR BLD: 12 % (ref 12–49)
LYMPHOCYTES NFR BLD: 12 % (ref 12–49)
MCH RBC QN AUTO: 27.9 PG (ref 26–34)
MCH RBC QN AUTO: 27.9 PG (ref 26–34)
MCHC RBC AUTO-ENTMCNC: 30.2 G/DL (ref 30–36.5)
MCHC RBC AUTO-ENTMCNC: 30.5 G/DL (ref 30–36.5)
MCV RBC AUTO: 91.5 FL (ref 80–99)
MCV RBC AUTO: 92.5 FL (ref 80–99)
METAMYELOCYTES NFR BLD MANUAL: 2 %
MONOCYTES # BLD: 0.2 K/UL (ref 0–1)
MONOCYTES # BLD: 0.2 K/UL (ref 0–1)
MONOCYTES NFR BLD: 5 % (ref 5–13)
MONOCYTES NFR BLD: 6 % (ref 5–13)
MYELOCYTES NFR BLD MANUAL: 2 %
NEUTS BAND NFR BLD MANUAL: 1 % (ref 0–6)
NEUTS SEG # BLD: 3.1 K/UL (ref 1.8–8)
NEUTS SEG # BLD: 3.7 K/UL (ref 1.8–8)
NEUTS SEG NFR BLD: 78 % (ref 32–75)
NEUTS SEG NFR BLD: 82 % (ref 32–75)
NRBC # BLD: 0 K/UL (ref 0–0.01)
NRBC # BLD: 0 K/UL (ref 0–0.01)
NRBC BLD-RTO: 0 PER 100 WBC
NRBC BLD-RTO: 0 PER 100 WBC
PLATELET # BLD AUTO: 245 K/UL (ref 150–400)
PLATELET # BLD AUTO: 249 K/UL (ref 150–400)
PMV BLD AUTO: 9.1 FL (ref 8.9–12.9)
PMV BLD AUTO: 9.2 FL (ref 8.9–12.9)
RBC # BLD AUTO: 2.65 M/UL (ref 4.1–5.7)
RBC # BLD AUTO: 2.72 M/UL (ref 4.1–5.7)
RBC MORPH BLD: ABNORMAL
SPECIMEN HOLD: NORMAL
WBC # BLD AUTO: 4 K/UL (ref 4.1–11.1)
WBC # BLD AUTO: 4.4 K/UL (ref 4.1–11.1)

## 2024-11-06 PROCEDURE — 85025 COMPLETE CBC W/AUTO DIFF WBC: CPT

## 2024-11-06 PROCEDURE — 6370000000 HC RX 637 (ALT 250 FOR IP): Performed by: INTERNAL MEDICINE

## 2024-11-06 PROCEDURE — 2580000003 HC RX 258: Performed by: INTERNAL MEDICINE

## 2024-11-06 PROCEDURE — 6370000000 HC RX 637 (ALT 250 FOR IP): Performed by: NURSE PRACTITIONER

## 2024-11-06 RX ORDER — LIDOCAINE 4 G/G
1 PATCH TOPICAL DAILY
Qty: 30 EACH | Refills: 0 | Status: SHIPPED | OUTPATIENT
Start: 2024-11-06 | End: 2024-12-06

## 2024-11-06 RX ORDER — GABAPENTIN 100 MG/1
200 CAPSULE ORAL 3 TIMES DAILY
Qty: 180 CAPSULE | Refills: 0 | Status: SHIPPED | OUTPATIENT
Start: 2024-11-06 | End: 2024-12-06

## 2024-11-06 RX ORDER — AMOXICILLIN 250 MG
1 CAPSULE ORAL DAILY
Qty: 30 TABLET | Refills: 0 | Status: SHIPPED | OUTPATIENT
Start: 2024-11-06 | End: 2024-12-06

## 2024-11-06 RX ORDER — FOLIC ACID 1 MG/1
1 TABLET ORAL DAILY
Qty: 30 TABLET | Refills: 3 | Status: SHIPPED | OUTPATIENT
Start: 2024-11-06

## 2024-11-06 RX ORDER — CASTOR OIL AND BALSAM, PERU 788; 87 MG/G; MG/G
OINTMENT TOPICAL 2 TIMES DAILY
Qty: 28.5 G | Refills: 0 | Status: SHIPPED | OUTPATIENT
Start: 2024-11-06

## 2024-11-06 RX ADMIN — TAMSULOSIN HYDROCHLORIDE 0.4 MG: 0.4 CAPSULE ORAL at 09:42

## 2024-11-06 RX ADMIN — Medication 1000 UNITS: at 09:42

## 2024-11-06 RX ADMIN — GABAPENTIN 200 MG: 100 CAPSULE ORAL at 09:42

## 2024-11-06 RX ADMIN — Medication 1 MG: at 09:42

## 2024-11-06 RX ADMIN — SODIUM CHLORIDE, PRESERVATIVE FREE 10 ML: 5 INJECTION INTRAVENOUS at 09:43

## 2024-11-06 RX ADMIN — CASTOR OIL AND BALSAM, PERU: 788; 87 OINTMENT TOPICAL at 09:43

## 2024-11-06 ASSESSMENT — PAIN SCALES - GENERAL: PAINLEVEL_OUTOF10: 3

## 2024-11-06 ASSESSMENT — PAIN SCALES - WONG BAKER: WONGBAKER_NUMERICALRESPONSE: NO HURT

## 2024-11-06 NOTE — PLAN OF CARE
Problem: Safety - Adult  Goal: Free from fall injury  11/6/2024 1019 by Agnes Hinds, RN  Outcome: HH/HSPC Resolved Met  11/6/2024 1018 by Agnes Hinds RN  Outcome: Progressing     Problem: Skin/Tissue Integrity  Goal: Absence of new skin breakdown  Description: 1.  Monitor for areas of redness and/or skin breakdown  2.  Assess vascular access sites hourly  3.  Every 4-6 hours minimum:  Change oxygen saturation probe site  4.  Every 4-6 hours:  If on nasal continuous positive airway pressure, respiratory therapy assess nares and determine need for appliance change or resting period.  11/6/2024 1019 by Agnes Hinds, RN  Outcome: HH/HSPC Resolved Met  11/6/2024 1018 by Agnes Hinds RN  Outcome: Progressing     Problem: ABCDS Injury Assessment  Goal: Absence of physical injury  11/6/2024 1019 by Agnes Hidns RN  Outcome: HH/HSPC Resolved Met  11/6/2024 1018 by Agnes Hinds RN  Outcome: Progressing     Problem: Pain  Goal: Verbalizes/displays adequate comfort level or baseline comfort level  11/6/2024 1019 by Agnes Hinds, RN  Outcome: HH/HSPC Resolved Met  11/6/2024 1018 by Agnes Hinds, RN  Outcome: Progressing

## 2024-11-06 NOTE — DISCHARGE SUMMARY
Discharge Summary       PATIENT ID: Gerhard Overton  MRN: [id number]   YOB: 1950    DATE OF ADMISSION: 10/30/2024  2:51 AM    DATE OF DISCHARGE: 11/6/2024   PRIMARY CARE PROVIDER: Lopez Bhat Jr., MD     DISCHARGING PROVIDER: Amanda Torres MD    To contact this individual call [phone] and ask the  to page.  If unavailable ask to be transferred the Adult Hospitalist Department.    CONSULTATIONS: IP CONSULT TO ONCOLOGY  IP CONSULT TO GI  IP CONSULT TO ONCOLOGY  IP CONSULT TO ONCOLOGY  IP CONSULT TO ONCOLOGY  IP CONSULT TO PALLIATIVE CARE  IP CONSULT TO RADIATION ONCOLOGY  IP WOUND CARE NURSE CONSULT TO EVAL    PROCEDURES/SURGERIES: * No surgery found *     ADMITTING DIAGNOSES & HOSPITAL COURSE:   HPI:  Gerhard Overton is a 74 y.o. male with past medical history significant for prostate cancer with metastasis to the bone presented at [facility] emergency room with back pain.  The pain is located at the lower back bilaterally, constant, sharp pain, no radiation, no known aggravating or relieving factors and 7/10 in severity.  Patient underwent laminectomy about 3 years ago and has been having the back pain since then.  The back pain got worse in the past couple of days and because of that the patient came to the emergency room.  Patient also has prostate cancer with metastasis to the bone.  Patient is reporting tingling sensation in both lower extremities but denies bowel and urinary incontinence.     MRI of the lumbar and thoracic spine showed extensive osseous metastatic disease has progressed since earlier this year given difference in technique. There is extraosseous extension of neoplasm at L1, L3, and L4. Multilevel moderate stenoses.  Patient also presented in the emergency room with hemoglobin level of 5.1.  He denies  passing dark stool.  Patient has been taking naproxen at home for pain.  He was transferred to [facility] for higher level of

## 2024-11-06 NOTE — PROGRESS NOTES
2048 RN educated pt he could not receive IVP pain Rx d/t his blood pressure not meeting criteria for safe admin. Pt acknowledged.    2200 RN went in pt room. Pt asleep. RR18    2325 RN put STAT CBC re RR hgb being 7.5 [id number] at 0430

## 2024-11-06 NOTE — PROGRESS NOTES
Physician Progress Note      PATIENT:               BENJAMIN PATRICK  CSN #:                  [id number]  :                       1950  ADMIT DATE:       10/30/2024 2:51 AM  DISCH DATE:        2024 2:07 PM  RESPONDING  PROVIDER #:        Amanda Torres MD          QUERY TEXT:    74yoM pt admitted with Low back pain and anemia and has malnutrition   documented on 10/30 by a Registered Dietitian. Please further specify type of   malnutrition with documentation in the medical record.    The medical record reflects the following:  Risk Factors: Metastatic Prostate CA to bone/spine, anemia, intractable Low   back pain,  Constipation, BMI <19  Clinical Indicators:  RD note 10/30  Malnutrition Assessment:  Malnutrition Status:  Severe malnutrition (10/30/24 1419)  Context:  Chronic Illness  Findings of the 6 clinical characteristics of malnutrition:  Energy Intake:  75% or less estimated energy requirements for 1 month or   longer  Weight Loss:  Greater than 10% over 6 months  Body Fat Loss:  Severe body fat loss Triceps, Orbital  Muscle Mass Loss:  Severe muscle mass loss Clavicles (pectoralis & deltoids)  Fluid Accumulation:  No fluid accumulation  Treatment: Adult oral nutrition supplement, treat anemia, bowel regimen, Rd   eval and assessment.    ASPEN Criteria:    https://aspenjournals.onlinelibrary.montes.com/doi/full/10.1177/214357217248480  5    thank you,  Rozina Roberts RN, CDI  Options provided:  -- Severe Malnutrition  -- Severe Protein calorie malnutrition  -- Other - I will add my own diagnosis  -- Disagree - Not applicable / Not valid  -- Disagree - Clinically unable to determine / Unknown  -- Refer to Clinical Documentation Reviewer    PROVIDER RESPONSE TEXT:    This patient has severe protein calorie malnutrition.    Query created by: Rozina Roberts on 2024 3:46 PM      Electronically signed by:  Amanda Torres MD 2024 4:05 PM

## 2024-11-06 NOTE — PROGRESS NOTES
Patient received radiation treatment to his T12-L5 spine today.  Nia Worrell  RT(T)  
50% of waking hours  Constitutional: Pleasant, sitting comfortably in no acute distress.  Respiratory: Non-labored breathing  Neurologic: Alert and oriented.  Psychiatric: Cooperative to commands and responds to questions appropriately.  See \"Interval history\" above for additional relevant exam findings.    Assessment & Plan   - Completed treatment as planned  - Treatment setup and plan reviewed. Port images/CBCT images reviewed. Appropriate laboratory work was reviewed.   - Treatment side effects and toxicities reviewed with the patient, and appropriate management was advised as detailed above.     Francisco Javier Whelan MD  [facility]  [facility]  [address] [place] [address]  P: [phone]  [facility] [address], [place] [address]  P: [phone]  [facility]  [address] [place] [address], [address], [place] [address]  P: [phone]

## 2024-11-06 NOTE — DISCHARGE INSTRUCTIONS
Discharge Instructions       PATIENT ID: Gerhard Overton  MRN: [id number]   YOB: 1950    DATE OF ADMISSION: 10/30/2024   DATE OF DISCHARGE: 11/6/2024    PRIMARY CARE PROVIDER: Lopez Bhat Jr.     ATTENDING PHYSICIAN: Amanda Torres MD   DISCHARGING PROVIDER: Amanda Torres MD    To contact this individual call [phone] and ask the  to page.   If unavailable ask to be transferred the Adult Hospitalist Department.    DISCHARGE DIAGNOSES   Prostate cancer with metastatic disease to bone/spine;  Gait dysfunction;  Lower back pain;     CONSULTATIONS:   Oncology  Radiation oncology    PROCEDURES/SURGERIES: * No surgery found *    PENDING TEST RESULTS:   At the time of discharge the following test results are still pending: None    FOLLOW UP APPOINTMENTS:   PCP in 1 to 2 weeks for general medical follow-up and medications refills; repeat labs: CBC, BMP;  Oncology in 2 to 3 weeks to discuss options for management of prostate cancer with metastatic disease to bone/spine;    ADDITIONAL CARE RECOMMENDATIONS:   Please take all your medications as prescribed;    DIET: regular diet    ACTIVITY: activity as tolerated  PT/OT to evaluate and treat  Fall precautions    WOUND CARE: N/A    EQUIPMENT needed: Per PT OT      DISCHARGE MEDICATIONS:   See Medication Reconciliation Form    It is important that you take the medication exactly as they are prescribed.   Keep your medication in the bottles provided by the pharmacist and keep a list of the medication names, dosages, and times to be taken in your wallet.   Do not take other medications without consulting your doctor.       NOTIFY YOUR PHYSICIAN FOR ANY OF THE FOLLOWING:   Fever over 101 degrees for 24 hours.   Chest pain, shortness of breath, fever, chills, nausea, vomiting, diarrhea, change in mentation, falling, weakness, bleeding. Severe pain or pain not relieved by medications.  Or, any other signs or symptoms that you may have  questions about.      DISPOSITION:    Home With:   OT  PT  HH  RN      x SNF/Inpatient Rehab/LTAC    Independent/assisted living    Hospice    Other:     CDMP Checked:   Yes IK     PROBLEM LIST Updated:  Yes IK       Signed:   Amanda Torres MD  11/6/2024  8:57 AM

## 2024-11-06 NOTE — CARE COORDINATION
Transition of Care Plan:    RUR: 12%   Prior Level of Functioning: Independent   Disposition: SNF  HATTIE: Today   If SNF or IPR: Date FOC offered: 11/5  Date FOC received: 11/5  SNF Acceptance:  [facility]   Room 501; Report [phone]    Date authorization started with reference number: N.A  Date authorization received and expires: N/A  Follow up appointments: PCP  DME needed: None   Transportation at discharge: H 1:30pm \"Pt does not meet medical necessity for Stretcher transport\" The pt is Unable to cover cost of W/C \"Hospital to Support with cost\"   IM/IMM Medicare/ letter given: Received   Caregiver Contact: Jose Armando Almonte (Other)  [phone]  Discharge Caregiver contacted prior to discharge? N/A   Care Conference needed? N/A     12:17pm    This cm met the pt at bedside to provide an update regarding AUTH. This cm informed him that [facility] were the only facility that could accommodate at this time due to bed availability. The pt reported that he wanted to contact the pt's sister to provide an update. The pt phoned his sister (Jose Armando Almonte (Other) [phone]) and provided her with an update regarding discharge. She reported that she was in agreement with the plan, if the pt consented.     9:18am    Per conversation with the pt; this cm informed the pt that at this [facility], was not able to accommodate due to the pt being deemed high level. This cm informed the pt that we could look into a lower level of care  I.e:Skilled Nursing Facility vs. HH. The pt reported that he was open to transferring to a SNF as he feels that he needs further rehab prior to returning home with Home Health services. This cm reviewed over the local facilities with the pt. The pt reported that he was open to this cm sending referrals to [facility], [facility], [facility], and [facility]. This cm informed him that he would follow up with him once he had an update available.                Transition of Care Plan to SNF/Rehab    Communication to Patient/Family:  Met with patient and family and they are agreeable to the transition plan. The Plan for Transition of Care is related to the following treatment goals: SNF    The Patient and/or patient representative was provided with a choice of provider and agrees  with the discharge plan.      Yes [x] No []    A Freedom of choice list was provided with basic dialogue that supports the patient's individualized plan of care/goals and shares the quality data associated with the providers.       Yes [x] No []    SNF/Rehab Transition:  Patient has been accepted to [facility] SNF[facility]Rehab and meets criteria for admission.   Date of Inpatient Status Order:   Patient will transported by [facility] and expected to leave at 1:30pm.    Communication to SNF/Rehab:  Bedside RN, Agnes, has been notified to update the transition plan to the facility and call report (phone number).  Discharge information has been updated on the AVS. And communicated to facility via [employer], or CC link.     Discharge instructions to be fax'd to facility at (Fax #).     Nursing Please include all hard scripts for controlled substances, med rec and dc summary, and AVS in packet.     Reviewed and confirmed with facility, can manage the patient care needs for the following:     Ivan with (X) only those applicable:  Medication:  [x]Medications are available at the facility  []IV Antibiotics    [x]Controlled Substance - hard copies available sent.  []Weekly Labs    Equipment:  []CPAP/BiPAP  []Wound Vacuum  []Cantu or Urinary Device  []PICC/Central Line  []Nebulizer  []Ventilator    Treatment:  []Isolation (for MRSA, VRE, etc.)  []Surgical Drain Management  []Tracheostomy Care  []Dressing Changes  []Dialysis with transportation  []PEG Care  []Oxygen  []Daily Weights for Heart Failure    Dietary:  []Any diet limitations  []Tube Feedings   []Total

## 2025-01-22 ENCOUNTER — HOSPITAL ENCOUNTER (EMERGENCY)
Facility: HOSPITAL | Age: 75
Discharge: HOME | End: 2025-01-23
Attending: EMERGENCY MEDICINE
Payer: OTHER GOVERNMENT

## 2025-01-22 DIAGNOSIS — K59.00 CONSTIPATION, UNSPECIFIED CONSTIPATION TYPE: Primary | ICD-10-CM

## 2025-01-22 DIAGNOSIS — K56.41 FECAL IMPACTION (HCC): ICD-10-CM

## 2025-01-22 PROCEDURE — 99284 EMERGENCY DEPT VISIT MOD MDM: CPT

## 2025-01-22 RX ORDER — POLYETHYLENE GLYCOL 3350 17 G/17G
17 POWDER, FOR SOLUTION ORAL DAILY
Qty: 578 G | Refills: 1 | Status: SHIPPED | OUTPATIENT
Start: 2025-01-22 | End: 2025-02-21

## 2025-01-22 ASSESSMENT — PAIN SCALES - GENERAL: PAINLEVEL_OUTOF10: 9

## 2025-01-22 ASSESSMENT — PAIN DESCRIPTION - LOCATION: LOCATION: RECTUM

## 2025-01-22 ASSESSMENT — PAIN DESCRIPTION - DESCRIPTORS: DESCRIPTORS: ACHING

## 2025-01-22 ASSESSMENT — PAIN DESCRIPTION - PAIN TYPE: TYPE: ACUTE PAIN

## 2025-01-23 VITALS
DIASTOLIC BLOOD PRESSURE: 54 MMHG | TEMPERATURE: 97.3 F | OXYGEN SATURATION: 98 % | SYSTOLIC BLOOD PRESSURE: 108 MMHG | RESPIRATION RATE: 20 BRPM | HEART RATE: 87 BPM

## 2025-01-23 PROCEDURE — 6360000002 HC RX W HCPCS: Performed by: STUDENT IN AN ORGANIZED HEALTH CARE EDUCATION/TRAINING PROGRAM

## 2025-01-23 PROCEDURE — 96374 THER/PROPH/DIAG INJ IV PUSH: CPT

## 2025-01-23 PROCEDURE — 2580000003 HC RX 258: Performed by: STUDENT IN AN ORGANIZED HEALTH CARE EDUCATION/TRAINING PROGRAM

## 2025-01-23 RX ORDER — KETOROLAC TROMETHAMINE 15 MG/ML
15 INJECTION, SOLUTION INTRAMUSCULAR; INTRAVENOUS ONCE
Status: COMPLETED | OUTPATIENT
Start: 2025-01-23 | End: 2025-01-23

## 2025-01-23 RX ORDER — SENNOSIDES 8.6 MG
1 TABLET ORAL DAILY
Qty: 30 TABLET | Refills: 0 | Status: SHIPPED | OUTPATIENT
Start: 2025-01-23

## 2025-01-23 RX ORDER — 0.9 % SODIUM CHLORIDE 0.9 %
500 INTRAVENOUS SOLUTION INTRAVENOUS ONCE
Status: COMPLETED | OUTPATIENT
Start: 2025-01-23 | End: 2025-01-23

## 2025-01-23 RX ORDER — BISACODYL 10 MG
10 SUPPOSITORY, RECTAL RECTAL DAILY
Qty: 30 SUPPOSITORY | Refills: 0 | Status: SHIPPED | OUTPATIENT
Start: 2025-01-23 | End: 2025-02-22

## 2025-01-23 RX ADMIN — KETOROLAC TROMETHAMINE 15 MG: 15 INJECTION, SOLUTION INTRAMUSCULAR; INTRAVENOUS at 01:34

## 2025-01-23 RX ADMIN — SODIUM CHLORIDE 500 ML: 9 INJECTION, SOLUTION INTRAVENOUS at 00:29

## 2025-01-23 ASSESSMENT — PAIN DESCRIPTION - LOCATION: LOCATION: LEG

## 2025-01-23 ASSESSMENT — PAIN SCALES - GENERAL: PAINLEVEL_OUTOF10: 8

## 2025-01-23 ASSESSMENT — PAIN DESCRIPTION - DESCRIPTORS: DESCRIPTORS: SHOOTING;SPASM

## 2025-01-23 NOTE — ED TRIAGE NOTES
Patient arrives via EMS c/o \"fecal impaction\". Patient reports he has been unable to pass bowels but has been \"soiling  underwear every 15min\"

## 2025-01-23 NOTE — DISCHARGE INSTRUCTIONS
You should probably be on a bowel regimen.  I recommend increasing fluid intake, taking daily MiraLAX and a fiber supplement to prevent constipation

## 2025-01-23 NOTE — ED PROVIDER NOTES
Applicable    Radiologic Studies:  Non-plain film images such as CT, Ultrasound and MRI are read by the radiologist. Plain radiographic images are visualized and preliminarily interpreted by the ED Physician with the following findings: Not Applicable.    Interpretation per the Radiologist below, if available at the time of this note:  No orders to display        ED COURSE and DIFFERENTIAL DIAGNOSIS/MDM   11:03 PM Differential and Considerations: Patient here with constipation, leaking stool.  Has fecal impaction on exam.  Performed soapsuds enema.  Discussed bowel regimen and prescribed MiraLAX and fiber supplements    Records Reviewed (source and summary of external notes): Prior medical records and Nursing notes    Vitals:    Vitals:    01/22/25 2130   BP: 115/71   Pulse: 71   Resp: 17   Temp: 98 °F (36.7 °C)   SpO2: 99%        ED COURSE       SEPSIS Reassessment: Patient does NOT meet Sepsis criteria after ED workup    Clinical Management Tools:  Not Applicable    Patient was given the following medications:  Medications - No data to display    CONSULTS: See ED Course/MDM for further details.  None     Social Determinants affecting Diagnosis/Treatment: None    Smoking Cessation: Not Applicable    PROCEDURES   Unless otherwise noted above, none.  Procedures      CRITICAL CARE TIME   Patient does not meet Critical Care Time, 0 minutes    ED IMPRESSION     1. Constipation, unspecified constipation type    2. Fecal impaction (HCC)          DISPOSITION/PLAN   DISPOSITION Discharge - Pending Orders Complete 01/22/2025 11:03:53 PM   DISPOSITION CONDITION Stable        Discharge Note: The patient is stable for discharge home. The signs, symptoms, diagnosis, and discharge instructions have been discussed, understanding conveyed, and agreed upon. The patient is to follow up as recommended or return to ER should their symptoms worsen.      PATIENT REFERRED TO:  Lopez Bhat Jr., MD  [address] [place] [address]

## 2025-01-23 NOTE — ED NOTES
Patient had large liquid bowel movement. Total linen change performed. Patient cleansed with warm wipes. New gown placed.

## 2025-01-25 ENCOUNTER — HOSPITAL ENCOUNTER (EMERGENCY)
Facility: HOSPITAL | Age: 75
Discharge: ANOTHER HOSPITAL | End: 2025-01-26
Attending: EMERGENCY MEDICINE
Payer: OTHER GOVERNMENT

## 2025-01-25 ENCOUNTER — APPOINTMENT (OUTPATIENT)
Facility: HOSPITAL | Age: 75
End: 2025-01-25
Payer: OTHER GOVERNMENT

## 2025-01-25 DIAGNOSIS — D64.9 ANEMIA, UNSPECIFIED TYPE: ICD-10-CM

## 2025-01-25 DIAGNOSIS — Z74.09 IMPAIRED MOBILITY AND ADLS: ICD-10-CM

## 2025-01-25 DIAGNOSIS — R19.7 DIARRHEA, UNSPECIFIED TYPE: Primary | ICD-10-CM

## 2025-01-25 DIAGNOSIS — C61 PROSTATE CANCER METASTATIC TO BONE (HCC): ICD-10-CM

## 2025-01-25 DIAGNOSIS — Z78.9 IMPAIRED MOBILITY AND ADLS: ICD-10-CM

## 2025-01-25 DIAGNOSIS — C79.51 PROSTATE CANCER METASTATIC TO BONE (HCC): ICD-10-CM

## 2025-01-25 PROCEDURE — 96372 THER/PROPH/DIAG INJ SC/IM: CPT

## 2025-01-25 PROCEDURE — 99285 EMERGENCY DEPT VISIT HI MDM: CPT

## 2025-01-25 PROCEDURE — 74018 RADEX ABDOMEN 1 VIEW: CPT

## 2025-01-25 PROCEDURE — 6360000002 HC RX W HCPCS: Performed by: EMERGENCY MEDICINE

## 2025-01-25 PROCEDURE — 36430 TRANSFUSION BLD/BLD COMPNT: CPT

## 2025-01-25 RX ORDER — FENTANYL CITRATE 50 UG/ML
50 INJECTION, SOLUTION INTRAMUSCULAR; INTRAVENOUS
Status: COMPLETED | OUTPATIENT
Start: 2025-01-25 | End: 2025-01-25

## 2025-01-25 RX ADMIN — FENTANYL CITRATE 50 MCG: 50 INJECTION, SOLUTION INTRAMUSCULAR; INTRAVENOUS at 20:32

## 2025-01-25 ASSESSMENT — PAIN DESCRIPTION - LOCATION: LOCATION: SACRUM

## 2025-01-25 ASSESSMENT — PAIN DESCRIPTION - PAIN TYPE: TYPE: ACUTE PAIN

## 2025-01-25 ASSESSMENT — PAIN - FUNCTIONAL ASSESSMENT
PAIN_FUNCTIONAL_ASSESSMENT: 0-10
PAIN_FUNCTIONAL_ASSESSMENT: ACTIVITIES ARE NOT PREVENTED

## 2025-01-25 ASSESSMENT — PAIN DESCRIPTION - FREQUENCY: FREQUENCY: INTERMITTENT

## 2025-01-25 ASSESSMENT — PAIN SCALES - GENERAL: PAINLEVEL_OUTOF10: 10

## 2025-01-25 NOTE — ED PROVIDER NOTES
[facility] EMERGENCY DEPT  EMERGENCY DEPARTMENT HISTORY AND PHYSICAL EXAM      Date: 2025  Patient Name: Gerhard Overton  MRN: [id number]  Birthdate 1950  Date of evaluation: 2025  Provider: Ortega Cortez MD   Note Started: 5:08 PM EST 25    HISTORY OF PRESENT ILLNESS     Chief Complaint   Patient presents with    Hemorrhoids    Incontinence       History Provided By: Patient    HPI: Gerhard Overton is a 74 y.o. male leakage of liquid stool and constipation for weeks.  Also feels like is not emptying his bladder.  His history of metastatic prostate cancer and started hospice care at home this past Wednesday.    PAST MEDICAL HISTORY   Past Medical History:  Past Medical History:   Diagnosis Date    Arthritis     Mild anemia     Prostate cancer (HCC) 2008    PALLIATIVE CARE PER DR NIKOLE LÓPEZ NOTES 2018: XTANDI, LUPRON, AND PAST EXTERNAL BEAM RADIATION THERAPY       Past Surgical History:  Past Surgical History:   Procedure Laterality Date    BUNIONECTOMY Right 2014    CATARACT REMOVAL Bilateral 2017    COLONOSCOPY      GI  2009    ANAL FISTULA     HEMORRHOID SURGERY  1974    HIP ARTHROSCOPY Right     TONSILLECTOMY         Family History:  Family History   Problem Relation Age of Onset    Obesity Sister     Anesth Problems Neg Hx     Mental Retardation Sister     Drug Abuse Brother     Diabetes Brother     Diabetes Brother     Kidney Disease Brother         DIALYSIS    Hypertension Sister     Asthma Father     Hypertension Mother     Diabetes Mother        Social History:  Social History     Tobacco Use    Smoking status: Former     Current packs/day: 0.00     Types: Cigarettes     Quit date: 1993     Years since quittin.0    Smokeless tobacco: Never   Substance Use Topics    Alcohol use: No    Drug use: No       Allergies:  No Known Allergies    PCP: Lopez Bhat Jr., MD    Current Meds:   No current facility-administered medications for this encounter.     Current

## 2025-01-25 NOTE — ED TRIAGE NOTES
Patient states he has been incontinent of stool and having pain to hemorrhoids. Metastatic cancer from lower back to bone cancer. Patient is not having cancer tx at this time.

## 2025-01-26 VITALS
HEART RATE: 98 BPM | OXYGEN SATURATION: 94 % | DIASTOLIC BLOOD PRESSURE: 52 MMHG | WEIGHT: 122 LBS | RESPIRATION RATE: 18 BRPM | HEIGHT: 69 IN | BODY MASS INDEX: 18.07 KG/M2 | TEMPERATURE: 98.7 F | SYSTOLIC BLOOD PRESSURE: 117 MMHG

## 2025-01-26 LAB
ALBUMIN SERPL-MCNC: 2.4 G/DL (ref 3.5–5)
ALBUMIN/GLOB SERPL: 0.5 (ref 1.1–2.2)
ALP SERPL-CCNC: 498 U/L (ref 45–117)
ALT SERPL-CCNC: 11 U/L (ref 12–78)
ANION GAP SERPL CALC-SCNC: 3 MMOL/L (ref 2–12)
AST SERPL W P-5'-P-CCNC: 44 U/L (ref 15–37)
BASOPHILS # BLD: 0 K/UL (ref 0–0.1)
BASOPHILS NFR BLD: 0 % (ref 0–1)
BILIRUB DIRECT SERPL-MCNC: 0.2 MG/DL (ref 0–0.2)
BILIRUB SERPL-MCNC: 0.5 MG/DL (ref 0.2–1)
BUN SERPL-MCNC: 15 MG/DL (ref 6–20)
BUN/CREAT SERPL: 29 (ref 12–20)
CA-I BLD-MCNC: 8.9 MG/DL (ref 8.5–10.1)
CHLORIDE SERPL-SCNC: 113 MMOL/L (ref 97–108)
CO2 SERPL-SCNC: 23 MMOL/L (ref 21–32)
CREAT SERPL-MCNC: 0.52 MG/DL (ref 0.7–1.3)
DIFFERENTIAL METHOD BLD: ABNORMAL
EOSINOPHIL # BLD: 0 K/UL (ref 0–0.4)
EOSINOPHIL NFR BLD: 0 % (ref 0–7)
ERYTHROCYTE [DISTWIDTH] IN BLOOD BY AUTOMATED COUNT: 23.8 % (ref 11.5–14.5)
GLOBULIN SER CALC-MCNC: 4.7 G/DL (ref 2–4)
GLUCOSE SERPL-MCNC: 92 MG/DL (ref 65–100)
HCT VFR BLD AUTO: 17.5 % (ref 36.6–50.3)
HGB BLD-MCNC: 5.1 G/DL (ref 12.1–17)
IMM GRANULOCYTES # BLD AUTO: 0 K/UL
IMM GRANULOCYTES NFR BLD AUTO: 0 %
LYMPHOCYTES # BLD: 0.87 K/UL (ref 0.8–3.5)
LYMPHOCYTES NFR BLD: 19 % (ref 12–49)
MCH RBC QN AUTO: 28 PG (ref 26–34)
MCHC RBC AUTO-ENTMCNC: 29.1 G/DL (ref 30–36.5)
MCV RBC AUTO: 96.2 FL (ref 80–99)
METAMYELOCYTES NFR BLD MANUAL: 1 %
MONOCYTES # BLD: 0.14 K/UL (ref 0–1)
MONOCYTES NFR BLD: 3 % (ref 5–13)
NEUTS BAND NFR BLD MANUAL: 2 % (ref 0–6)
NEUTS SEG # BLD: 3.54 K/UL (ref 1.8–8)
NEUTS SEG NFR BLD: 75 % (ref 32–75)
NRBC # BLD: 0.09 K/UL (ref 0–0.01)
NRBC BLD-RTO: 1.9 PER 100 WBC
PLATELET # BLD AUTO: 191 K/UL (ref 150–400)
PMV BLD AUTO: 10 FL (ref 8.9–12.9)
POTASSIUM SERPL-SCNC: 3.8 MMOL/L (ref 3.5–5.1)
PROT SERPL-MCNC: 7.1 G/DL (ref 6.4–8.2)
RBC # BLD AUTO: 1.82 M/UL (ref 4.1–5.7)
RBC MORPH BLD: ABNORMAL
RBC MORPH BLD: ABNORMAL
SODIUM SERPL-SCNC: 139 MMOL/L (ref 136–145)
WBC # BLD AUTO: 4.6 K/UL (ref 4.1–11.1)

## 2025-01-26 PROCEDURE — 80076 HEPATIC FUNCTION PANEL: CPT

## 2025-01-26 PROCEDURE — 96372 THER/PROPH/DIAG INJ SC/IM: CPT

## 2025-01-26 PROCEDURE — 6360000002 HC RX W HCPCS: Performed by: EMERGENCY MEDICINE

## 2025-01-26 PROCEDURE — 86900 BLOOD TYPING SEROLOGIC ABO: CPT

## 2025-01-26 PROCEDURE — P9016 RBC LEUKOCYTES REDUCED: HCPCS

## 2025-01-26 PROCEDURE — 36415 COLL VENOUS BLD VENIPUNCTURE: CPT

## 2025-01-26 PROCEDURE — 36430 TRANSFUSION BLD/BLD COMPNT: CPT

## 2025-01-26 PROCEDURE — 80048 BASIC METABOLIC PNL TOTAL CA: CPT

## 2025-01-26 PROCEDURE — 86923 COMPATIBILITY TEST ELECTRIC: CPT

## 2025-01-26 PROCEDURE — 2580000003 HC RX 258: Performed by: EMERGENCY MEDICINE

## 2025-01-26 PROCEDURE — 86850 RBC ANTIBODY SCREEN: CPT

## 2025-01-26 PROCEDURE — 86901 BLOOD TYPING SEROLOGIC RH(D): CPT

## 2025-01-26 PROCEDURE — 85025 COMPLETE CBC W/AUTO DIFF WBC: CPT

## 2025-01-26 PROCEDURE — 96360 HYDRATION IV INFUSION INIT: CPT

## 2025-01-26 RX ORDER — PANTOPRAZOLE SODIUM 40 MG/1
40 TABLET, DELAYED RELEASE ORAL
Status: DISCONTINUED | OUTPATIENT
Start: 2025-01-27 | End: 2025-01-26 | Stop reason: HOSPADM

## 2025-01-26 RX ORDER — SODIUM CHLORIDE 9 MG/ML
INJECTION, SOLUTION INTRAVENOUS PRN
Status: DISCONTINUED | OUTPATIENT
Start: 2025-01-26 | End: 2025-01-26 | Stop reason: HOSPADM

## 2025-01-26 RX ORDER — 0.9 % SODIUM CHLORIDE 0.9 %
500 INTRAVENOUS SOLUTION INTRAVENOUS
Status: COMPLETED | OUTPATIENT
Start: 2025-01-26 | End: 2025-01-26

## 2025-01-26 RX ORDER — FENTANYL CITRATE 50 UG/ML
50 INJECTION, SOLUTION INTRAMUSCULAR; INTRAVENOUS
Status: COMPLETED | OUTPATIENT
Start: 2025-01-26 | End: 2025-01-26

## 2025-01-26 RX ADMIN — FENTANYL CITRATE 50 MCG: 50 INJECTION, SOLUTION INTRAMUSCULAR; INTRAVENOUS at 13:05

## 2025-01-26 RX ADMIN — SODIUM CHLORIDE 500 ML: 9 INJECTION, SOLUTION INTRAVENOUS at 16:04

## 2025-01-26 ASSESSMENT — PAIN SCALES - GENERAL: PAINLEVEL_OUTOF10: 9

## 2025-01-26 NOTE — ED NOTES
Pt placed on a hospital bed at this time. Pt cleaned up, new brief. Pt placed back on bp and pulse ox monitoring. Call bell and cell phone within reach.

## 2025-01-26 NOTE — ED NOTES
Pt cleaned up at this time. New linens and gown applied. Pt placed on new male-wick. Connected to BP and pulse ox monitoring.

## 2025-01-26 NOTE — CARE COORDINATION
The pt is reported as an active Hospice pt.   Per Dr Cortez the Hospice was assisting with transport home from ED  Number provided .  [facility].    Spoke with Nia Maciel RN on call.-[facility]  Per Nia, The pt has been working with their , Wil [email or web address]@ [phone].  Reported that the pts sister will revoke POA on Monday 01/27.  The pt revoked Hospice today and requested 24h care.      CM will follow up with Hospice SW for additional information and [facility], on Monday.    Reported that the pt is not safe to discharge from the ED at this time.

## 2025-01-26 NOTE — ED NOTES
Pt cleaned and linens changed. Placed manwick, clean brief. Pt repositioned, provided beverage and call bell placed within reach.

## 2025-01-26 NOTE — ED NOTES
Pt c/o right arm pain. Pt repositioned in bed to relieve pressure on right arm. Pt states that pain has decreased now.

## 2025-01-26 NOTE — ED NOTES
Pt upset that he was told by doctor he would be getting chicken noodle soup. Explained to pt what options we have at this time. Pt agreed to lunch box and beverage. Call bell placed within reach once again.

## 2025-01-27 LAB
ABO + RH BLD: NORMAL
BLD PROD TYP BPU: NORMAL
BLOOD BANK BLOOD PRODUCT EXPIRATION DATE: NORMAL
BLOOD BANK DISPENSE STATUS: NORMAL
BLOOD BANK ISBT PRODUCT BLOOD TYPE: 6200
BLOOD BANK PRODUCT CODE: NORMAL
BLOOD BANK UNIT TYPE AND RH: NORMAL
BLOOD GROUP ANTIBODIES SERPL: NEGATIVE
BPU ID: NORMAL
CROSSMATCH RESULT: NORMAL
SPECIMEN EXP DATE BLD: NORMAL
TRANSFUSION STATUS PATIENT QL: NORMAL
UNIT DIVISION: 0
UNIT ISSUE DATE/TIME: NORMAL

## 2025-02-26 ENCOUNTER — HOSPITAL ENCOUNTER (INPATIENT)
Facility: HOSPITAL | Age: 75
LOS: 5 days | Discharge: NURSING, REHAB OR LONG-TERM CARE | DRG: 542 | End: 2025-03-04
Attending: STUDENT IN AN ORGANIZED HEALTH CARE EDUCATION/TRAINING PROGRAM | Admitting: FAMILY MEDICINE
Payer: MEDICARE

## 2025-02-26 DIAGNOSIS — R53.1 GENERAL WEAKNESS: Primary | ICD-10-CM

## 2025-02-26 DIAGNOSIS — C61 PROSTATE CANCER METASTATIC TO BONE (HCC): ICD-10-CM

## 2025-02-26 DIAGNOSIS — C79.51 PROSTATE CANCER METASTATIC TO BONE (HCC): ICD-10-CM

## 2025-02-26 DIAGNOSIS — D64.9 SYMPTOMATIC ANEMIA: ICD-10-CM

## 2025-02-26 LAB
ALBUMIN SERPL-MCNC: 2.6 G/DL (ref 3.5–5)
ALBUMIN/GLOB SERPL: 0.7 (ref 1.1–2.2)
ALP SERPL-CCNC: 690 U/L (ref 45–117)
ALT SERPL-CCNC: 9 U/L (ref 12–78)
ANION GAP SERPL CALC-SCNC: 7 MMOL/L (ref 2–12)
AST SERPL-CCNC: 23 U/L (ref 15–37)
BILIRUB SERPL-MCNC: 0.4 MG/DL (ref 0.2–1)
BUN SERPL-MCNC: 14 MG/DL (ref 6–20)
BUN/CREAT SERPL: 22 (ref 12–20)
CALCIUM SERPL-MCNC: 9.1 MG/DL (ref 8.5–10.1)
CHLORIDE SERPL-SCNC: 102 MMOL/L (ref 97–108)
CO2 SERPL-SCNC: 29 MMOL/L (ref 21–32)
COMMENT:: NORMAL
CREAT SERPL-MCNC: 0.65 MG/DL (ref 0.7–1.3)
GLOBULIN SER CALC-MCNC: 3.8 G/DL (ref 2–4)
GLUCOSE SERPL-MCNC: 126 MG/DL (ref 65–100)
INR PPP: 1.1 (ref 0.9–1.1)
POTASSIUM SERPL-SCNC: 2.7 MMOL/L (ref 3.5–5.1)
PROT SERPL-MCNC: 6.4 G/DL (ref 6.4–8.2)
PROTHROMBIN TIME: 11.6 SEC (ref 9.2–11.2)
SODIUM SERPL-SCNC: 138 MMOL/L (ref 136–145)
SPECIMEN HOLD: NORMAL

## 2025-02-26 PROCEDURE — 80053 COMPREHEN METABOLIC PANEL: CPT

## 2025-02-26 PROCEDURE — 36415 COLL VENOUS BLD VENIPUNCTURE: CPT

## 2025-02-26 PROCEDURE — 86850 RBC ANTIBODY SCREEN: CPT

## 2025-02-26 PROCEDURE — 86923 COMPATIBILITY TEST ELECTRIC: CPT

## 2025-02-26 PROCEDURE — 85025 COMPLETE CBC W/AUTO DIFF WBC: CPT

## 2025-02-26 PROCEDURE — 83550 IRON BINDING TEST: CPT

## 2025-02-26 PROCEDURE — 83540 ASSAY OF IRON: CPT

## 2025-02-26 PROCEDURE — 99285 EMERGENCY DEPT VISIT HI MDM: CPT

## 2025-02-26 PROCEDURE — 83735 ASSAY OF MAGNESIUM: CPT

## 2025-02-26 PROCEDURE — 84100 ASSAY OF PHOSPHORUS: CPT

## 2025-02-26 PROCEDURE — 85610 PROTHROMBIN TIME: CPT

## 2025-02-26 PROCEDURE — 84443 ASSAY THYROID STIM HORMONE: CPT

## 2025-02-26 PROCEDURE — 86900 BLOOD TYPING SEROLOGIC ABO: CPT

## 2025-02-26 PROCEDURE — 82728 ASSAY OF FERRITIN: CPT

## 2025-02-26 PROCEDURE — 86901 BLOOD TYPING SEROLOGIC RH(D): CPT

## 2025-02-26 ASSESSMENT — PAIN DESCRIPTION - LOCATION: LOCATION: BACK

## 2025-02-26 ASSESSMENT — PAIN SCALES - GENERAL
PAINLEVEL_OUTOF10: 4
PAINLEVEL_OUTOF10: 0
PAINLEVEL_OUTOF10: 0

## 2025-02-26 ASSESSMENT — PAIN - FUNCTIONAL ASSESSMENT
PAIN_FUNCTIONAL_ASSESSMENT: 0-10
PAIN_FUNCTIONAL_ASSESSMENT: NONE - DENIES PAIN
PAIN_FUNCTIONAL_ASSESSMENT: NONE - DENIES PAIN

## 2025-02-26 ASSESSMENT — PAIN DESCRIPTION - ORIENTATION: ORIENTATION: LEFT

## 2025-02-26 ASSESSMENT — PAIN DESCRIPTION - PAIN TYPE: TYPE: ACUTE PAIN

## 2025-02-26 ASSESSMENT — PAIN DESCRIPTION - DESCRIPTORS: DESCRIPTORS: ACHING

## 2025-02-27 ENCOUNTER — APPOINTMENT (OUTPATIENT)
Facility: HOSPITAL | Age: 75
DRG: 542 | End: 2025-02-27
Payer: MEDICARE

## 2025-02-27 PROBLEM — D64.9 SYMPTOMATIC ANEMIA: Status: ACTIVE | Noted: 2025-02-27

## 2025-02-27 LAB
ALBUMIN SERPL-MCNC: 2.4 G/DL (ref 3.5–5)
ALBUMIN/GLOB SERPL: 0.6 (ref 1.1–2.2)
ALP SERPL-CCNC: 680 U/L (ref 45–117)
ALT SERPL-CCNC: 10 U/L (ref 12–78)
ANION GAP SERPL CALC-SCNC: 6 MMOL/L (ref 2–12)
AST SERPL-CCNC: 31 U/L (ref 15–37)
BASOPHILS # BLD: 0 K/UL (ref 0–0.1)
BASOPHILS # BLD: 0.03 K/UL (ref 0–0.1)
BASOPHILS # BLD: 0.04 K/UL (ref 0–0.1)
BASOPHILS NFR BLD: 0 % (ref 0–1)
BASOPHILS NFR BLD: 1 % (ref 0–1)
BASOPHILS NFR BLD: 1 % (ref 0–1)
BILIRUB SERPL-MCNC: 0.5 MG/DL (ref 0.2–1)
BUN SERPL-MCNC: 12 MG/DL (ref 6–20)
BUN/CREAT SERPL: 25 (ref 12–20)
CALCIUM SERPL-MCNC: 9.3 MG/DL (ref 8.5–10.1)
CHLORIDE SERPL-SCNC: 105 MMOL/L (ref 97–108)
CO2 SERPL-SCNC: 27 MMOL/L (ref 21–32)
CREAT SERPL-MCNC: 0.48 MG/DL (ref 0.7–1.3)
DIFFERENTIAL METHOD BLD: ABNORMAL
EOSINOPHIL # BLD: 0 K/UL (ref 0–0.4)
EOSINOPHIL NFR BLD: 0 % (ref 0–7)
ERYTHROCYTE [DISTWIDTH] IN BLOOD BY AUTOMATED COUNT: 17.3 % (ref 11.5–14.5)
ERYTHROCYTE [DISTWIDTH] IN BLOOD BY AUTOMATED COUNT: 17.8 % (ref 11.5–14.5)
ERYTHROCYTE [DISTWIDTH] IN BLOOD BY AUTOMATED COUNT: 20.3 % (ref 11.5–14.5)
FERRITIN SERPL-MCNC: 1274 NG/ML (ref 26–388)
GLOBULIN SER CALC-MCNC: 3.7 G/DL (ref 2–4)
GLUCOSE SERPL-MCNC: 80 MG/DL (ref 65–100)
HCT VFR BLD AUTO: 19.3 % (ref 36.6–50.3)
HCT VFR BLD AUTO: 26.8 % (ref 36.6–50.3)
HCT VFR BLD AUTO: 29.9 % (ref 36.6–50.3)
HGB BLD-MCNC: 5.8 G/DL (ref 12.1–17)
HGB BLD-MCNC: 8.4 G/DL (ref 12.1–17)
HGB BLD-MCNC: 9.6 G/DL (ref 12.1–17)
HISTORY CHECK: NORMAL
IMM GRANULOCYTES # BLD AUTO: 0 K/UL
IMM GRANULOCYTES NFR BLD AUTO: 0 %
IRON SATN MFR SERPL: 41 % (ref 20–50)
IRON SERPL-MCNC: 99 UG/DL (ref 35–150)
LYMPHOCYTES # BLD: 0.78 K/UL (ref 0.8–3.5)
LYMPHOCYTES # BLD: 0.8 K/UL (ref 0.8–3.5)
LYMPHOCYTES # BLD: 1.12 K/UL (ref 0.8–3.5)
LYMPHOCYTES NFR BLD: 20 % (ref 12–49)
LYMPHOCYTES NFR BLD: 23 % (ref 12–49)
LYMPHOCYTES NFR BLD: 31 % (ref 12–49)
MAGNESIUM SERPL-MCNC: 1.8 MG/DL (ref 1.6–2.4)
MCH RBC QN AUTO: 30.4 PG (ref 26–34)
MCH RBC QN AUTO: 31.1 PG (ref 26–34)
MCH RBC QN AUTO: 31.5 PG (ref 26–34)
MCHC RBC AUTO-ENTMCNC: 30.1 G/DL (ref 30–36.5)
MCHC RBC AUTO-ENTMCNC: 31.3 G/DL (ref 30–36.5)
MCHC RBC AUTO-ENTMCNC: 32.1 G/DL (ref 30–36.5)
MCV RBC AUTO: 101 FL (ref 80–99)
MCV RBC AUTO: 98 FL (ref 80–99)
MCV RBC AUTO: 99.3 FL (ref 80–99)
METAMYELOCYTES NFR BLD MANUAL: 1 %
METAMYELOCYTES NFR BLD MANUAL: 2 %
MONOCYTES # BLD: 0.22 K/UL (ref 0–1)
MONOCYTES # BLD: 0.24 K/UL (ref 0–1)
MONOCYTES # BLD: 0.24 K/UL (ref 0–1)
MONOCYTES NFR BLD: 6 % (ref 5–13)
MONOCYTES NFR BLD: 6 % (ref 5–13)
MONOCYTES NFR BLD: 7 % (ref 5–13)
MYELOCYTES NFR BLD MANUAL: 1 %
MYELOCYTES NFR BLD MANUAL: 2 %
NEUTS BAND NFR BLD MANUAL: 1 % (ref 0–6)
NEUTS BAND NFR BLD MANUAL: 3 % (ref 0–6)
NEUTS SEG # BLD: 2.12 K/UL (ref 1.8–8)
NEUTS SEG # BLD: 2.31 K/UL (ref 1.8–8)
NEUTS SEG # BLD: 2.88 K/UL (ref 1.8–8)
NEUTS SEG NFR BLD: 59 % (ref 32–75)
NEUTS SEG NFR BLD: 67 % (ref 32–75)
NEUTS SEG NFR BLD: 69 % (ref 32–75)
NRBC # BLD: 0.05 K/UL (ref 0–0.01)
NRBC # BLD: 0.06 K/UL (ref 0–0.01)
NRBC # BLD: 0.08 K/UL (ref 0–0.01)
NRBC BLD-RTO: 1.5 PER 100 WBC
NRBC BLD-RTO: 1.5 PER 100 WBC
NRBC BLD-RTO: 2.2 PER 100 WBC
PHOSPHATE SERPL-MCNC: 2.9 MG/DL (ref 2.6–4.7)
PLATELET # BLD AUTO: 104 K/UL (ref 150–400)
PLATELET # BLD AUTO: 115 K/UL (ref 150–400)
PLATELET # BLD AUTO: 93 K/UL (ref 150–400)
PMV BLD AUTO: 9.5 FL (ref 8.9–12.9)
PMV BLD AUTO: 9.9 FL (ref 8.9–12.9)
POTASSIUM SERPL-SCNC: 3.7 MMOL/L (ref 3.5–5.1)
PROT SERPL-MCNC: 6.1 G/DL (ref 6.4–8.2)
RBC # BLD AUTO: 1.91 M/UL (ref 4.1–5.7)
RBC # BLD AUTO: 2.7 M/UL (ref 4.1–5.7)
RBC # BLD AUTO: 3.05 M/UL (ref 4.1–5.7)
RBC MORPH BLD: ABNORMAL
SODIUM SERPL-SCNC: 138 MMOL/L (ref 136–145)
TIBC SERPL-MCNC: 239 UG/DL (ref 250–450)
TSH SERPL DL<=0.05 MIU/L-ACNC: 13.6 UIU/ML (ref 0.36–3.74)
WBC # BLD AUTO: 3.4 K/UL (ref 4.1–11.1)
WBC # BLD AUTO: 3.6 K/UL (ref 4.1–11.1)
WBC # BLD AUTO: 4 K/UL (ref 4.1–11.1)
WBC MORPH BLD: ABNORMAL
WBC MORPH BLD: ABNORMAL

## 2025-02-27 PROCEDURE — 97530 THERAPEUTIC ACTIVITIES: CPT

## 2025-02-27 PROCEDURE — 6370000000 HC RX 637 (ALT 250 FOR IP): Performed by: STUDENT IN AN ORGANIZED HEALTH CARE EDUCATION/TRAINING PROGRAM

## 2025-02-27 PROCEDURE — 36430 TRANSFUSION BLD/BLD COMPNT: CPT

## 2025-02-27 PROCEDURE — 6360000002 HC RX W HCPCS: Performed by: STUDENT IN AN ORGANIZED HEALTH CARE EDUCATION/TRAINING PROGRAM

## 2025-02-27 PROCEDURE — 6370000000 HC RX 637 (ALT 250 FOR IP): Performed by: NURSE PRACTITIONER

## 2025-02-27 PROCEDURE — 97161 PT EVAL LOW COMPLEX 20 MIN: CPT

## 2025-02-27 PROCEDURE — 2060000000 HC ICU INTERMEDIATE R&B

## 2025-02-27 PROCEDURE — 97165 OT EVAL LOW COMPLEX 30 MIN: CPT

## 2025-02-27 PROCEDURE — 97116 GAIT TRAINING THERAPY: CPT

## 2025-02-27 PROCEDURE — 2500000003 HC RX 250 WO HCPCS: Performed by: STUDENT IN AN ORGANIZED HEALTH CARE EDUCATION/TRAINING PROGRAM

## 2025-02-27 PROCEDURE — 80053 COMPREHEN METABOLIC PANEL: CPT

## 2025-02-27 PROCEDURE — 99223 1ST HOSP IP/OBS HIGH 75: CPT | Performed by: NURSE PRACTITIONER

## 2025-02-27 PROCEDURE — P9016 RBC LEUKOCYTES REDUCED: HCPCS

## 2025-02-27 PROCEDURE — 85025 COMPLETE CBC W/AUTO DIFF WBC: CPT

## 2025-02-27 PROCEDURE — 36415 COLL VENOUS BLD VENIPUNCTURE: CPT

## 2025-02-27 RX ORDER — POTASSIUM CHLORIDE 750 MG/1
40 TABLET, EXTENDED RELEASE ORAL ONCE
Status: COMPLETED | OUTPATIENT
Start: 2025-02-27 | End: 2025-02-27

## 2025-02-27 RX ORDER — ONDANSETRON 2 MG/ML
4 INJECTION INTRAMUSCULAR; INTRAVENOUS EVERY 6 HOURS PRN
Status: DISCONTINUED | OUTPATIENT
Start: 2025-02-27 | End: 2025-03-04 | Stop reason: HOSPADM

## 2025-02-27 RX ORDER — ACETAMINOPHEN 500 MG
1000 TABLET ORAL EVERY 8 HOURS
Status: DISCONTINUED | OUTPATIENT
Start: 2025-02-27 | End: 2025-03-04 | Stop reason: HOSPADM

## 2025-02-27 RX ORDER — SODIUM CHLORIDE 9 MG/ML
INJECTION, SOLUTION INTRAVENOUS PRN
Status: DISCONTINUED | OUTPATIENT
Start: 2025-02-27 | End: 2025-03-04 | Stop reason: HOSPADM

## 2025-02-27 RX ORDER — PREDNISONE 5 MG/1
1 TABLET ORAL DAILY
COMMUNITY
Start: 2025-02-07

## 2025-02-27 RX ORDER — MORPHINE SULFATE 4 MG/ML
4 INJECTION, SOLUTION INTRAMUSCULAR; INTRAVENOUS
Status: COMPLETED | OUTPATIENT
Start: 2025-02-27 | End: 2025-02-27

## 2025-02-27 RX ORDER — POTASSIUM CHLORIDE 7.45 MG/ML
10 INJECTION INTRAVENOUS ONCE
Status: COMPLETED | OUTPATIENT
Start: 2025-02-27 | End: 2025-02-27

## 2025-02-27 RX ORDER — GABAPENTIN 100 MG/1
200 CAPSULE ORAL 3 TIMES DAILY
Status: DISCONTINUED | OUTPATIENT
Start: 2025-02-27 | End: 2025-03-04 | Stop reason: HOSPADM

## 2025-02-27 RX ORDER — POLYETHYLENE GLYCOL 3350 17 G/17G
17 POWDER, FOR SOLUTION ORAL DAILY PRN
Status: DISCONTINUED | OUTPATIENT
Start: 2025-02-27 | End: 2025-03-04 | Stop reason: HOSPADM

## 2025-02-27 RX ORDER — ACETAMINOPHEN 325 MG/1
650 TABLET ORAL EVERY 6 HOURS PRN
Status: DISCONTINUED | OUTPATIENT
Start: 2025-02-27 | End: 2025-02-27

## 2025-02-27 RX ORDER — TAMSULOSIN HYDROCHLORIDE 0.4 MG/1
0.4 CAPSULE ORAL
COMMUNITY
Start: 2024-04-16

## 2025-02-27 RX ORDER — FOLIC ACID 1 MG/1
1 TABLET ORAL DAILY
Status: DISCONTINUED | OUTPATIENT
Start: 2025-02-27 | End: 2025-03-04 | Stop reason: HOSPADM

## 2025-02-27 RX ORDER — ACETAMINOPHEN 500 MG
1000 TABLET ORAL EVERY 8 HOURS PRN
Status: DISCONTINUED | OUTPATIENT
Start: 2025-02-27 | End: 2025-02-27

## 2025-02-27 RX ORDER — MAGNESIUM SULFATE IN WATER 40 MG/ML
2000 INJECTION, SOLUTION INTRAVENOUS PRN
Status: DISCONTINUED | OUTPATIENT
Start: 2025-02-27 | End: 2025-03-04 | Stop reason: HOSPADM

## 2025-02-27 RX ORDER — POTASSIUM CHLORIDE 750 MG/1
40 TABLET, EXTENDED RELEASE ORAL PRN
Status: DISCONTINUED | OUTPATIENT
Start: 2025-02-27 | End: 2025-03-04 | Stop reason: HOSPADM

## 2025-02-27 RX ORDER — ABIRATERONE ACETATE 250 MG/1
250 TABLET ORAL
COMMUNITY
Start: 2025-02-04

## 2025-02-27 RX ORDER — POTASSIUM CHLORIDE 7.45 MG/ML
10 INJECTION INTRAVENOUS PRN
Status: DISCONTINUED | OUTPATIENT
Start: 2025-02-27 | End: 2025-03-04 | Stop reason: HOSPADM

## 2025-02-27 RX ORDER — OXYCODONE HYDROCHLORIDE 5 MG/1
10 TABLET ORAL EVERY 4 HOURS PRN
Status: DISCONTINUED | OUTPATIENT
Start: 2025-02-27 | End: 2025-03-04 | Stop reason: HOSPADM

## 2025-02-27 RX ORDER — OXYCODONE HYDROCHLORIDE 5 MG/1
5 TABLET ORAL EVERY 4 HOURS PRN
Status: DISCONTINUED | OUTPATIENT
Start: 2025-02-27 | End: 2025-02-27

## 2025-02-27 RX ORDER — ONDANSETRON 4 MG/1
4 TABLET, ORALLY DISINTEGRATING ORAL EVERY 8 HOURS PRN
Status: DISCONTINUED | OUTPATIENT
Start: 2025-02-27 | End: 2025-03-04 | Stop reason: HOSPADM

## 2025-02-27 RX ORDER — ACETAMINOPHEN 650 MG/1
650 SUPPOSITORY RECTAL EVERY 6 HOURS PRN
Status: DISCONTINUED | OUTPATIENT
Start: 2025-02-27 | End: 2025-02-27

## 2025-02-27 RX ORDER — SODIUM CHLORIDE 0.9 % (FLUSH) 0.9 %
5-40 SYRINGE (ML) INJECTION PRN
Status: DISCONTINUED | OUTPATIENT
Start: 2025-02-27 | End: 2025-03-04 | Stop reason: HOSPADM

## 2025-02-27 RX ORDER — SODIUM CHLORIDE 0.9 % (FLUSH) 0.9 %
5-40 SYRINGE (ML) INJECTION EVERY 12 HOURS SCHEDULED
Status: DISCONTINUED | OUTPATIENT
Start: 2025-02-27 | End: 2025-03-04 | Stop reason: HOSPADM

## 2025-02-27 RX ORDER — NALOXONE HYDROCHLORIDE 0.4 MG/ML
0.4 INJECTION, SOLUTION INTRAMUSCULAR; INTRAVENOUS; SUBCUTANEOUS PRN
Status: DISCONTINUED | OUTPATIENT
Start: 2025-02-27 | End: 2025-03-04 | Stop reason: HOSPADM

## 2025-02-27 RX ORDER — ACETAMINOPHEN 650 MG/1
650 SUPPOSITORY RECTAL EVERY 6 HOURS PRN
Status: DISCONTINUED | OUTPATIENT
Start: 2025-02-27 | End: 2025-03-04 | Stop reason: HOSPADM

## 2025-02-27 RX ORDER — SENNOSIDES A AND B 8.6 MG/1
1 TABLET, FILM COATED ORAL DAILY
Status: DISCONTINUED | OUTPATIENT
Start: 2025-02-27 | End: 2025-03-04 | Stop reason: HOSPADM

## 2025-02-27 RX ORDER — MIRTAZAPINE 7.5 MG/1
7.5 TABLET, FILM COATED ORAL NIGHTLY
COMMUNITY
Start: 2025-01-14

## 2025-02-27 RX ADMIN — PSYLLIUM HUSK 1 PACKET: 3.4 POWDER ORAL at 08:56

## 2025-02-27 RX ADMIN — Medication 1 MG: at 08:56

## 2025-02-27 RX ADMIN — POTASSIUM CHLORIDE 10 MEQ: 7.46 INJECTION, SOLUTION INTRAVENOUS at 04:25

## 2025-02-27 RX ADMIN — ACETAMINOPHEN 1000 MG: 500 TABLET ORAL at 23:47

## 2025-02-27 RX ADMIN — MORPHINE SULFATE 4 MG: 4 INJECTION INTRAVENOUS at 02:03

## 2025-02-27 RX ADMIN — HYDROMORPHONE HYDROCHLORIDE 0.5 MG: 1 INJECTION, SOLUTION INTRAMUSCULAR; INTRAVENOUS; SUBCUTANEOUS at 15:17

## 2025-02-27 RX ADMIN — SENNOSIDES 8.6 MG: 8.6 TABLET, FILM COATED ORAL at 08:56

## 2025-02-27 RX ADMIN — HYDROMORPHONE HYDROCHLORIDE 0.5 MG: 1 INJECTION, SOLUTION INTRAMUSCULAR; INTRAVENOUS; SUBCUTANEOUS at 06:24

## 2025-02-27 RX ADMIN — POTASSIUM CHLORIDE 10 MEQ: 7.46 INJECTION, SOLUTION INTRAVENOUS at 00:30

## 2025-02-27 RX ADMIN — ACETAMINOPHEN 650 MG: 325 TABLET ORAL at 03:42

## 2025-02-27 RX ADMIN — Medication 10 ML: at 08:55

## 2025-02-27 RX ADMIN — Medication 10 ML: at 20:44

## 2025-02-27 RX ADMIN — GABAPENTIN 200 MG: 100 CAPSULE ORAL at 08:56

## 2025-02-27 RX ADMIN — POTASSIUM CHLORIDE 40 MEQ: 750 TABLET, FILM COATED, EXTENDED RELEASE ORAL at 00:25

## 2025-02-27 RX ADMIN — HYDROMORPHONE HYDROCHLORIDE 0.5 MG: 1 INJECTION, SOLUTION INTRAMUSCULAR; INTRAVENOUS; SUBCUTANEOUS at 20:44

## 2025-02-27 ASSESSMENT — PAIN DESCRIPTION - LOCATION
LOCATION: LEG;BACK
LOCATION: BACK
LOCATION: BACK;LEG
LOCATION: BACK
LOCATION: BACK;LEG
LOCATION: BACK
LOCATION: BACK;SACRUM
LOCATION: BACK

## 2025-02-27 ASSESSMENT — PAIN - FUNCTIONAL ASSESSMENT
PAIN_FUNCTIONAL_ASSESSMENT: 0-10
PAIN_FUNCTIONAL_ASSESSMENT: 0-10
PAIN_FUNCTIONAL_ASSESSMENT: NONE - DENIES PAIN
PAIN_FUNCTIONAL_ASSESSMENT: ACTIVITIES ARE NOT PREVENTED

## 2025-02-27 ASSESSMENT — PAIN SCALES - GENERAL
PAINLEVEL_OUTOF10: 7
PAINLEVEL_OUTOF10: 0
PAINLEVEL_OUTOF10: 4
PAINLEVEL_OUTOF10: 3
PAINLEVEL_OUTOF10: 5
PAINLEVEL_OUTOF10: 10
PAINLEVEL_OUTOF10: 10
PAINLEVEL_OUTOF10: 8
PAINLEVEL_OUTOF10: 5
PAINLEVEL_OUTOF10: 4
PAINLEVEL_OUTOF10: 10

## 2025-02-27 ASSESSMENT — PAIN DESCRIPTION - ONSET: ONSET: ON-GOING

## 2025-02-27 ASSESSMENT — PAIN DESCRIPTION - ORIENTATION
ORIENTATION: LOWER
ORIENTATION: LOWER
ORIENTATION: MID

## 2025-02-27 ASSESSMENT — PAIN DESCRIPTION - FREQUENCY: FREQUENCY: CONTINUOUS

## 2025-02-27 ASSESSMENT — PAIN DESCRIPTION - DESCRIPTORS: DESCRIPTORS: ACHING;DISCOMFORT

## 2025-02-27 ASSESSMENT — LIFESTYLE VARIABLES
HOW OFTEN DO YOU HAVE A DRINK CONTAINING ALCOHOL: NEVER
HOW MANY STANDARD DRINKS CONTAINING ALCOHOL DO YOU HAVE ON A TYPICAL DAY: PATIENT DOES NOT DRINK

## 2025-02-27 ASSESSMENT — PAIN DESCRIPTION - PAIN TYPE: TYPE: CHRONIC PAIN

## 2025-02-27 NOTE — ED NOTES
Blood transfusion consent secured. Patient was explained by this RN about importance of blood transfusion and encouraged to report transfusion reactions such as shortness of breath, chest pain, gavi pain, itchiness, dizziness or any untoward signs and symptoms. Patient fully aware and understood about the indications, contraindications and reactions.

## 2025-02-27 NOTE — ED PROVIDER NOTES
HISTORY OF PRESENT ILLNESS    A 74-year-old male with a past medical history significant for metastatic prostate cancer, currently residing in a rehab facility, presents with weakness, fatigue, back pain, and left leg pain. The patient has required recent blood transfusions and was recently admitted to [facility]. He provided his own history, reporting fatigue and difficulty ambulating due to pain. He denies experiencing chest pain, shortness of breath, dizziness, lightheadedness, nausea, or vomiting. Recent lab results indicate a hemoglobin level of 5.4.    PAST MEDICAL HISTORY    - Metastatic prostate cancer.    PHYSICAL EXAM    Vitals: Interpreted as normal for this patient.    General: Chronically ill-appearing, pale, cachectic male adult.    Eyes: Appear normal with no scleral icterus.    HENT: Atraumatic. Moist mucous membranes, no pharyngeal erythema, edema or lesions.    Neck: Atraumatic, supple.    Cardiac: Regular rate, regular rhythm, no significant murmurs appreciated.    Respiratory: No respiratory distress, clear lungs bilaterally with no abnormal breath sounds.    Abdomen: Soft. Nontender. Nondistended. No rebound. No guarding. No tenderness over McBurney's point, no tenderness over the liver or spleen, no Bryant's sign, no pulsatile abdominal mass.    : No CVAT.    MS: Midline thoracic and lumbar spine tenderness without evidence of cord compression. Tenderness in left hip and left upper leg. Spontaneous movement in lower extremities with pulse, motor, and sensation intact.    Skin: No exanthems, no cyanosis, no diaphoresis.    Back exam: Midline thoracic and lumbar spine tenderness without evidence of cord compression.    Neurologic: No altered mental status, speech is fluent. Gait is within normal limits. No cerebellar deficits. No motor deficits. No sensory deficits.    Psychological: Cooperative and participatory with examination.      SUMMARY:  The patient, a 74-year-old male  symptomatic anemia and hypokalemia. The severity of illness, as indicated by the patient's significantly low hemoglobin level of 5.8 and hypokalemia with a potassium level of 2.7, necessitates inpatient care. The intensity of service required includes blood transfusion and close monitoring of electrolyte levels, which cannot be safely managed in an outpatient setting. Additionally, a palliative consult will be placed to address the patient's ongoing needs related to metastatic prostate cancer.    DIAGNOSIS:  Primary Diagnosis:  - Symptomatic anemia      Perfect Serve Consult for Admission  12:55 AM    ED Room Number: ER09/09  Patient Name and age:  Gerhard Overton 74 y.o.  male  Working Diagnosis:   1. General weakness    2. Prostate cancer metastatic to bone (HCC)    3. Symptomatic anemia        COVID-19 Suspicion: No  Sepsis present:  No  Reassessment needed: No  Code Status:  Do Not Resuscitate  Readmission: No  Isolation Requirements: no  Recommended Level of Care: telemetry  Department: [facility] Adult ED - [phone]  Consulting Provider:     Other:      Total critical care time spent exclusive of procedures:  48 minutes.         Armaan Loredo MD  02/27/25 0057       Armaan Loredo MD  02/27/25 0303

## 2025-02-27 NOTE — ED NOTES
Pt A&O x4. Attached to cardiac monitor x3. VSS. Pt reports consuming his breakfast tray this morning.

## 2025-02-27 NOTE — ED NOTES
NO signs of blood transfusion reactions. Informed patient about to start 2nd bag of unit PRBC. Independent double checked with Jm BRITT. Will stay and monitor for first 15 minutes at the bedside.

## 2025-02-27 NOTE — CONSULTS
Standard High Calorie/High Protein Oral Supplement       PSYCHOSOCIAL/SPIRITUAL SCREENING:   Palliative IDT has assessed this patient for cultural preferences / practices and a referral made as appropriate to needs (Cultural Services, Patient Advocacy, Ethics, etc.)    Spiritual Affiliation: Sabianism    Any spiritual / Gnosticist concerns:  [] Yes /  [x] No  [] Unable to obtain this information  If \"Yes\" to discuss with pastoral care during IDT     Does caregiver feel burdened by caring for their loved one:   [] Yes /  [] No /  [] No Caregiver Present/Available [] No Caregiver [x] Pt Lives at Facility  If \"Yes\" to discuss with social work during IDT    Anticipatory grief assessment:   [] Normal  / [] Maladaptive   [x] Unable to obtain this information  If \"Maladaptive\" to discuss with social work during IDT    ESAS Anxiety: Anxiety Score: Not anxious    ESAS Depression: Depression Score: Not depressed        LAB AND IMAGING FINDINGS:   Objective data reviewed:  labs, images, records, medication use, vitals, and chart     FINAL COMMENTS   Thank you for allowing Palliative Medicine to participate in the care of Gerhard Overton.    Only check if applicable and billing time based rather than MDM  [] The total encounter time on this service date was ____ minutes which was spent performing a face-to-face encounter and personally completing the provider-level activities documented in the note. This includes time spent prior to the visit and after the visit in direct care of the patient. This time does not include time spent in any separately reportable services.    Electronically signed by   Shannan Rivera, MSN, FNP-BC, [facility]  Palliative Care Team  on 2/27/2025 at 2:10 PM

## 2025-02-27 NOTE — H&P
History & Physical    Primary Care Provider: Lopez Bhat Jr., MD  Source of Information: Patient and chart review    History of Presenting Illness:   Gerhard Overton is a 74 y.o. male with hx of prostate cancer with bone mets, anemia of chronic dz, arthritis, chronic constipation who presented to ed from [facility] for concerns of weakness.  Patient currently resides at a long-term care facility after recent discharge from [facility] for anemia and cancer associated low back pain.  Reports chronic low back pain from his prostate metastasis which is unchanged from baseline.  Has also had malaise and fatigue which she states has been ongoing for several months.  Had routine labs at this facility and was noted to be anemic and transported to the hospital.    Chart review shows admission to hospital from 10/30/24 to 11/06/24 for  cancer associated pain and anemia requiring 1u prbc.    The patient denies any fever, chills, chest or abdominal pain, nausea, vomiting, cough, congestion, recent illness, palpitations, or dysuria.  Remarkable vitals on ER Presentation: vss  Labs Remarkable for: hgb 5.8, plt 115, k-2.7, alk phos 690  ER Images: xr lumbar and thoracic spine  ER Rx: k-40 po and 10     Review of Systems:  Pertinent items are noted in the History of Present Illness.     Past Medical History:   Diagnosis Date    Arthritis     Mild anemia     Prostate cancer (HCC) 2008 2017    PALLIATIVE CARE PER DR NIKOLE LÓPEZ NOTES 2/12/2018: XTANDI, LUPRON, AND PAST EXTERNAL BEAM RADIATION THERAPY      Past Surgical History:   Procedure Laterality Date    BUNIONECTOMY Right 2014    CATARACT REMOVAL Bilateral 2017 2018    COLONOSCOPY      GI  2009    ANAL FISTULA     HEMORRHOID SURGERY  1974    HIP ARTHROSCOPY Right     TONSILLECTOMY  1958     Prior to Admission medications    Medication Sig Start Date End Date Taking? Authorizing Provider   senna (SENOKOT) 8.6 MG TABS tablet Take 1 tablet by mouth daily 1/23/25    by analyte.   Magnesium    Collection Time: 02/26/25 11:07 PM   Result Value Ref Range    Magnesium 1.8 1.6 - 2.4 mg/dL   Phosphorus    Collection Time: 02/26/25 11:07 PM   Result Value Ref Range    Phosphorus 2.9 2.6 - 4.7 MG/DL   PREPARE RBC (CROSSMATCH), 2 Units    Collection Time: 02/27/25 12:15 AM   Result Value Ref Range    History Check Historical check performed          Imaging:     Assessment:     Gerhard Overton is a 74 y.o. male with hx of prostate cancer with bone mets, anemia of chronic dz, arthritis, chronic constipation who is admitted for anemia.       Plan:       Symptomatic Anemia  -likely acute on chronic Anemia of chronic dz  -check fe profile, ferritin and fobt  -tx to goal hgb of 7    Prostate cancer with diffuse metastasis to the bone/spine :   Non-Intractable low back pain   -cont pta srinath  -palliative care consult in am    Chronic Constipation  -cont senna and miralax    Pedal Neuropathy  -cont pta gabapentin        FEN/GI -  ns@0ml/hr  Activity - as tolerated  DVT prophylaxis - scds  GI prophylaxis -  none indicated  Disposition - home    CODE STATUS:   full code       Signed By: Janessa Cardoso MD     February 27, 2025

## 2025-02-27 NOTE — CONSENT
Informed Consent for Blood Component Transfusion Note    I have discussed with the patient the rationale for blood component transfusion; its benefits in treating or preventing fatigue, organ damage, or death; and its risk which includes mild transfusion reactions, rare risk of blood borne infection, or more serious but rare reactions. I have discussed the alternatives to transfusion, including the risk and consequences of not receiving transfusion. The patient had an opportunity to ask questions and had agreed to proceed with transfusion of blood components.    Electronically signed by Armaan Loredo MD on 2/27/25 at 12:01 AM EST

## 2025-02-27 NOTE — ED NOTES
Verbal shift change report given to LORENZA Abel (oncoming nurse) by LORENZA Ann (offgoing nurse). Report included the following information Nurse Handoff Report, ED Encounter Summary, ED SBAR, Adult Overview, Intake/Output, MAR, Recent Results, and Neuro Assessment.

## 2025-02-27 NOTE — ED NOTES
Introduced self to patient. Informed regarding ed process such as lab test and waiting time of results. Falls precautions activated by changing clothes to gown, applying anti slippery socks, lowering the beds, fall risks band and call bell within reach

## 2025-02-27 NOTE — PROGRESS NOTES
polyethylene glycol (GLYCOLAX) packet 17 g  17 g Oral Daily PRN    acetaminophen (TYLENOL) suppository 650 mg  650 mg Rectal Q6H PRN    oxyCODONE (ROXICODONE) immediate release tablet 10 mg  10 mg Oral Q4H PRN    HYDROmorphone (DILAUDID) injection 0.5 mg  0.5 mg IntraVENous Q3H PRN    naloxone (NARCAN) injection 0.4 mg  0.4 mg IntraVENous PRN    acetaminophen (TYLENOL) tablet 1,000 mg  1,000 mg Oral q8h     Current Outpatient Medications   Medication Sig    abiraterone acetate (ZYTIGA) 250 MG tablet Take 1 tablet by mouth    mirtazapine (REMERON) 7.5 MG tablet Take 1 tablet by mouth nightly at bedtime    predniSONE (DELTASONE) 5 MG tablet Take 1 tablet by mouth daily    tamsulosin (FLOMAX) 0.4 MG capsule Take 1 capsule by mouth    senna (SENOKOT) 8.6 MG TABS tablet Take 1 tablet by mouth daily    psyllium (KONSYL) 60.3 % PACK powder Take 1 packet by mouth daily    gabapentin (NEURONTIN) 100 MG capsule Take 2 capsules by mouth 3 times daily for 30 days. Max Daily Amount: 600 mg    folic acid (FOLVITE) 1 MG tablet Take 1 tablet by mouth daily    balsum peru-castor oil (VENELEX) OINT ointment Apply topically 2 times daily Apply to sacrum and cover with foam    vitamin D (CHOLECALCIFEROL) 25 MCG (1000 UT) TABS tablet Take 1 tablet by mouth daily    oxyCODONE (ROXICODONE) 5 MG immediate release tablet ceived the following from [facility]: Outside name: oxyCODONE IR (ROXICODONE) 5 mg immediate release tablet    oxyCODONE-acetaminophen (PERCOCET) 5-325 MG per tablet ceived the following from [facility]: Outside name: oxyCODONE-acetaminophen (PERCOCET) 5-325 mg per tablet    thiamine 100 MG tablet Take 1 tablet by mouth daily     ______________________________________________________________________  EXPECTED LENGTH OF STAY: Unable to retrieve estimated LOS  ACTUAL LENGTH OF STAY:          0                 Patricia Epstein PA-C

## 2025-02-27 NOTE — ED TRIAGE NOTES
Pt arrives via EMS from [facility], states he began having weakness today, had labs drawn and had hgb 5.7. has had hx of transfusions in the past. Denies blood in stool.

## 2025-02-27 NOTE — ED NOTES
This RN notified by universal monitoring at the nurses's station that pt's o2 was noted to be 84% with good pleath. This RN entered the pt's room to find the pt had removed his nasal cannula O2 from his nose. This RN stated to the pt that the pt needed to keep his nasal cannula O2 in his nose, as to keep his oxygen level at an adequate level. The pt stated, \"even when I eat\" as his family member is bringing him something to eat. This RN stated to the pt yes, he needs to keep his nasal cannula O2 in his nose, even when eating. Nasal cannula O2 placed back into pt's nose by this RN. Pt remains attached to cardiac monitor x3. VSS.

## 2025-02-27 NOTE — PLAN OF CARE
Problem: Occupational Therapy - Adult  Goal: By Discharge: Performs self-care activities at highest level of function for planned discharge setting.  See evaluation for individualized goals.  Description: FUNCTIONAL STATUS PRIOR TO ADMISSION:  Pt lives alone at baseline but has been in SNF since October 2024. Pt reports he has been walking with RW in rehab and has been toileting himself Mod I using RW for mobility. 1 recent fall when he did not use RW when transferring to bathroom. Pt plans to discharge home after rehab and begin hospice.  Active : Yes (stopped Oct 2024 d/t hospitalizations)     HOME SUPPORT: Patient lived alone but has been in rehab since October 2024.    Occupational Therapy Goals:  Initiated 2/27/2025  1.  Patient will perform grooming with Stand by Assist within 7 day(s).  2.  Patient will perform lower body dressing with Supervision within 7 day(s).  3.  Patient will perform toilet transfers with Modified [place]  within 7 day(s).  4.  Patient will perform all aspects of toileting with Modified [place] within 7 day(s).  5.  Patient will participate in upper extremity therapeutic exercise/activities with Modified [place] for 10 minutes within 7 day(s).    6.  Patient will utilize energy conservation techniques during functional activities with verbal cues within 7 day(s).    Outcome: Progressing   OCCUPATIONAL THERAPY EVALUATION    Patient: Gerhard Overton (74 y.o. male)  Date: 2/27/2025  Primary Diagnosis: Symptomatic anemia [D64.9]       Precautions: Fall Risk                  ASSESSMENT :  The patient is limited by decreased functional mobility, independence in ADLs, high-level IADLs, ROM, strength, activity tolerance, endurance, balance, posture, increased pain levels. Pt admitted from [facility] for symptomatic anemia. Pt is s/p 2 blood transfusions.    Based on the impairments listed above Pt is requiring up to Sean with BADLs which is below his baseline of Mod I. Pt  categories (i.e. Activities of daily living).    Cutoff score 39.4 (19) correlates to a good likelihood of discharging home versus a facility  Luz Maria Hutchison, Ying Melo, Eloy Swanson, Ginna Swanson, Caleb Sheppard, Caleb Hutchison, -PAC “6-Clicks” Functional Assessment Scores Predict Acute Care Hospital Discharge Destination, Physical Therapy, Volume 94, Issue 9, 2014, Pages 8181-3995, [email or web address]    Pain Ratin/10 back  Pain Intervention(s):   repositioning    Activity Tolerance:   Good    After treatment:   Patient left in no apparent distress in bed and placed in chair position. and Call bell within reach    COMMUNICATION/EDUCATION:   The patient's plan of care was discussed with: physical therapist    Patient Education  Education Given To: Patient  Education Provided: Role of Therapy  Education Method: Verbal  Barriers to Learning: None  Education Outcome: Verbalized understanding;Demonstrated understanding;Continued education needed    Thank you for this referral.  Lala Bahc OT  Minutes: 23    Occupational Therapy Evaluation Charge Determination   History Examination Decision-Making   LOW Complexity : Brief history review  LOW Complexity: 1-3 Performance deficits relating to physical, cognitive, or psychosocial skills that result in activity limitations and/or participation restrictions LOW Complexity: No comorbidities that affect functional and  no verbal  or physical assist needed to complete eval tasks   Based on the above components, the patient evaluation is determined to be of the following complexity level: Low

## 2025-02-27 NOTE — ED NOTES
Blood was independently checked with this RN and Jm RN at the bedside. Patient was hooked to monitor and will observed closely for first 15 minutes

## 2025-02-27 NOTE — ED NOTES
Pt requesting pain medication for lower back pain. This RN asked the pt if the pt would like to receive Dilaudid, to which the pt stated yes. Prior to this RN administering the pt's Dilaudid this RN stated to the pt that the pt last received Dilaudid at 0624 this morning. The pt began laughing and stated, \"what is going on?\" This RN asked the pt to clarify what the pt meant. The pt stated \"you said I was given one thing and then you're going to give me another\" while laughing. This RN turned the computer screen to show the pt the MAR administration record and clarify with the pt that the pt received Morphine at 0207 this morning and Dilaudid at 0624 this morning, to try and clarify the pt's question. Pt verbalizes understanding.

## 2025-02-27 NOTE — ED NOTES
Pt states he did not consume his lunch from the tray that was provided. He states he has someone brining him food.

## 2025-02-27 NOTE — ED NOTES
Informed Janessa PENA about request of morphine and wanted to see him now. Patient removed the other cannula and said \"I cannot take this anymore. Call the doctor. I want pain medicine.\"

## 2025-02-27 NOTE — ED NOTES
Verbal shift change report given to Kisha Ann Rn RN (oncoming nurse) by Sheng RN (offgoing nurse). Report included the following information Nurse Handoff Report, Index, ED Encounter Summary, ED SBAR, Intake/Output, and MAR.

## 2025-02-27 NOTE — PROGRESS NOTES
Orders received, chart reviewed and patient evaluated by physical therapy. Pending progression with skilled acute physical therapy, recommend:    Moderate intensity short-term skilled physical therapy up to 5x/week    Recommend with nursing OOB to chair 3x/day and walking daily with one assist and rolling walker and gait belt . Thank you for completing as able in order to maintain patient strength, endurance and independence.     Full evaluation to follow.

## 2025-02-28 ENCOUNTER — APPOINTMENT (OUTPATIENT)
Facility: HOSPITAL | Age: 75
DRG: 542 | End: 2025-02-28
Payer: MEDICARE

## 2025-02-28 LAB
ABO + RH BLD: NORMAL
ALBUMIN SERPL-MCNC: 2.2 G/DL (ref 3.5–5)
ALBUMIN/GLOB SERPL: 0.6 (ref 1.1–2.2)
ALP SERPL-CCNC: 875 U/L (ref 45–117)
ALT SERPL-CCNC: 10 U/L (ref 12–78)
AMYLASE FLD-CCNC: 27 U/L
ANION GAP SERPL CALC-SCNC: 4 MMOL/L (ref 2–12)
APPEARANCE FLD: CLEAR
AST SERPL-CCNC: 82 U/L (ref 15–37)
BASOPHILS # BLD: 0 K/UL (ref 0–0.1)
BASOPHILS NFR BLD: 0 % (ref 0–1)
BILIRUB SERPL-MCNC: 0.5 MG/DL (ref 0.2–1)
BLD PROD TYP BPU: NORMAL
BLD PROD TYP BPU: NORMAL
BLOOD BANK BLOOD PRODUCT EXPIRATION DATE: NORMAL
BLOOD BANK BLOOD PRODUCT EXPIRATION DATE: NORMAL
BLOOD BANK DISPENSE STATUS: NORMAL
BLOOD BANK DISPENSE STATUS: NORMAL
BLOOD BANK ISBT PRODUCT BLOOD TYPE: 6200
BLOOD BANK ISBT PRODUCT BLOOD TYPE: 6200
BLOOD BANK PRODUCT CODE: NORMAL
BLOOD BANK PRODUCT CODE: NORMAL
BLOOD BANK UNIT TYPE AND RH: NORMAL
BLOOD BANK UNIT TYPE AND RH: NORMAL
BLOOD GROUP ANTIBODIES SERPL: NORMAL
BODY FLD TYPE: NORMAL
BPU ID: NORMAL
BPU ID: NORMAL
BUN SERPL-MCNC: 13 MG/DL (ref 6–20)
BUN/CREAT SERPL: 25 (ref 12–20)
CALCIUM SERPL-MCNC: 9.3 MG/DL (ref 8.5–10.1)
CHLORIDE SERPL-SCNC: 107 MMOL/L (ref 97–108)
CO2 SERPL-SCNC: 27 MMOL/L (ref 21–32)
COLOR FLD: YELLOW
CREAT SERPL-MCNC: 0.51 MG/DL (ref 0.7–1.3)
CROSSMATCH RESULT: NORMAL
CROSSMATCH RESULT: NORMAL
DIFFERENTIAL METHOD BLD: ABNORMAL
EOSINOPHIL # BLD: 0.05 K/UL (ref 0–0.4)
EOSINOPHIL NFR BLD: 1 % (ref 0–7)
ERYTHROCYTE [DISTWIDTH] IN BLOOD BY AUTOMATED COUNT: 17.6 % (ref 11.5–14.5)
GLOBULIN SER CALC-MCNC: 3.6 G/DL (ref 2–4)
GLUCOSE FLD-MCNC: 105 MG/DL
GLUCOSE SERPL-MCNC: 82 MG/DL (ref 65–100)
HCT VFR BLD AUTO: 27.8 % (ref 36.6–50.3)
HGB BLD-MCNC: 8.8 G/DL (ref 12.1–17)
IMM GRANULOCYTES # BLD AUTO: 0 K/UL
IMM GRANULOCYTES NFR BLD AUTO: 0 %
LDH FLD L TO P-CCNC: 192 U/L
LYMPHOCYTES # BLD: 1.15 K/UL (ref 0.8–3.5)
LYMPHOCYTES NFR BLD: 25 % (ref 12–49)
LYMPHOCYTES NFR FLD: 26 %
MCH RBC QN AUTO: 31.2 PG (ref 26–34)
MCHC RBC AUTO-ENTMCNC: 31.7 G/DL (ref 30–36.5)
MCV RBC AUTO: 98.6 FL (ref 80–99)
MESOTHL CELL NFR FLD: 4 %
MONOCYTES # BLD: 0.32 K/UL (ref 0–1)
MONOCYTES NFR BLD: 7 % (ref 5–13)
MONOS+MACROS NFR FLD: 69 %
MYELOCYTES NFR BLD MANUAL: 1 %
NEUTROPHILS NFR FLD: 1 %
NEUTS SEG # BLD: 3.04 K/UL (ref 1.8–8)
NEUTS SEG NFR BLD: 66 % (ref 32–75)
NRBC # BLD: 0.06 K/UL (ref 0–0.01)
NRBC BLD-RTO: 1.3 PER 100 WBC
NUC CELL # FLD: 380 /CU MM
PH FLD: 7
PLATELET # BLD AUTO: 100 K/UL (ref 150–400)
PMV BLD AUTO: 10 FL (ref 8.9–12.9)
POTASSIUM SERPL-SCNC: 3.6 MMOL/L (ref 3.5–5.1)
PROT FLD-MCNC: 2.4 G/DL
PROT SERPL-MCNC: 5.8 G/DL (ref 6.4–8.2)
RBC # BLD AUTO: 2.82 M/UL (ref 4.1–5.7)
RBC # FLD: >100 /CU MM
RBC MORPH BLD: ABNORMAL
RBC MORPH BLD: ABNORMAL
SODIUM SERPL-SCNC: 138 MMOL/L (ref 136–145)
SPECIMEN EXP DATE BLD: NORMAL
SPECIMEN SOURCE FLD: ABNORMAL
SPECIMEN SOURCE FLD: NORMAL
T4 FREE SERPL-MCNC: 0.7 NG/DL (ref 0.8–1.5)
UNIT DIVISION: 0
UNIT DIVISION: 0
UNIT ISSUE DATE/TIME: NORMAL
UNIT ISSUE DATE/TIME: NORMAL
WBC # BLD AUTO: 4.6 K/UL (ref 4.1–11.1)

## 2025-02-28 PROCEDURE — 83615 LACTATE (LD) (LDH) ENZYME: CPT

## 2025-02-28 PROCEDURE — 82150 ASSAY OF AMYLASE: CPT

## 2025-02-28 PROCEDURE — 71045 X-RAY EXAM CHEST 1 VIEW: CPT

## 2025-02-28 PROCEDURE — 0W993ZZ DRAINAGE OF RIGHT PLEURAL CAVITY, PERCUTANEOUS APPROACH: ICD-10-PCS | Performed by: RADIOLOGY

## 2025-02-28 PROCEDURE — 6370000000 HC RX 637 (ALT 250 FOR IP): Performed by: STUDENT IN AN ORGANIZED HEALTH CARE EDUCATION/TRAINING PROGRAM

## 2025-02-28 PROCEDURE — 80053 COMPREHEN METABOLIC PANEL: CPT

## 2025-02-28 PROCEDURE — 85025 COMPLETE CBC W/AUTO DIFF WBC: CPT

## 2025-02-28 PROCEDURE — 6370000000 HC RX 637 (ALT 250 FOR IP): Performed by: NURSE PRACTITIONER

## 2025-02-28 PROCEDURE — 10030 IMG GID FLU COLL DRG SFT TIS: CPT

## 2025-02-28 PROCEDURE — 87070 CULTURE OTHR SPECIMN AEROBIC: CPT

## 2025-02-28 PROCEDURE — 87015 SPECIMEN INFECT AGNT CONCNTJ: CPT

## 2025-02-28 PROCEDURE — 36415 COLL VENOUS BLD VENIPUNCTURE: CPT

## 2025-02-28 PROCEDURE — 2060000000 HC ICU INTERMEDIATE R&B

## 2025-02-28 PROCEDURE — 88112 CYTOPATH CELL ENHANCE TECH: CPT

## 2025-02-28 PROCEDURE — 87205 SMEAR GRAM STAIN: CPT

## 2025-02-28 PROCEDURE — 84157 ASSAY OF PROTEIN OTHER: CPT

## 2025-02-28 PROCEDURE — 71275 CT ANGIOGRAPHY CHEST: CPT

## 2025-02-28 PROCEDURE — 88305 TISSUE EXAM BY PATHOLOGIST: CPT

## 2025-02-28 PROCEDURE — 97530 THERAPEUTIC ACTIVITIES: CPT

## 2025-02-28 PROCEDURE — 84439 ASSAY OF FREE THYROXINE: CPT

## 2025-02-28 PROCEDURE — C1729 CATH, DRAINAGE: HCPCS

## 2025-02-28 PROCEDURE — 83986 ASSAY PH BODY FLUID NOS: CPT

## 2025-02-28 PROCEDURE — 97116 GAIT TRAINING THERAPY: CPT

## 2025-02-28 PROCEDURE — 2700000000 HC OXYGEN THERAPY PER DAY

## 2025-02-28 PROCEDURE — 6360000004 HC RX CONTRAST MEDICATION: Performed by: RADIOLOGY

## 2025-02-28 PROCEDURE — 82945 GLUCOSE OTHER FLUID: CPT

## 2025-02-28 PROCEDURE — 94760 N-INVAS EAR/PLS OXIMETRY 1: CPT

## 2025-02-28 PROCEDURE — 89050 BODY FLUID CELL COUNT: CPT

## 2025-02-28 PROCEDURE — 6360000002 HC RX W HCPCS: Performed by: STUDENT IN AN ORGANIZED HEALTH CARE EDUCATION/TRAINING PROGRAM

## 2025-02-28 RX ORDER — SODIUM CHLORIDE 9 MG/ML
INJECTION, SOLUTION INTRAVENOUS PRN
Status: DISCONTINUED | OUTPATIENT
Start: 2025-02-28 | End: 2025-03-04 | Stop reason: HOSPADM

## 2025-02-28 RX ORDER — SODIUM CHLORIDE 0.9 % (FLUSH) 0.9 %
5-40 SYRINGE (ML) INJECTION PRN
Status: DISCONTINUED | OUTPATIENT
Start: 2025-02-28 | End: 2025-03-04 | Stop reason: HOSPADM

## 2025-02-28 RX ORDER — IOPAMIDOL 755 MG/ML
100 INJECTION, SOLUTION INTRAVASCULAR
Status: COMPLETED | OUTPATIENT
Start: 2025-02-28 | End: 2025-02-28

## 2025-02-28 RX ORDER — SODIUM CHLORIDE 0.9 % (FLUSH) 0.9 %
5-40 SYRINGE (ML) INJECTION EVERY 12 HOURS SCHEDULED
Status: DISCONTINUED | OUTPATIENT
Start: 2025-02-28 | End: 2025-03-04 | Stop reason: HOSPADM

## 2025-02-28 RX ADMIN — GABAPENTIN 200 MG: 100 CAPSULE ORAL at 13:03

## 2025-02-28 RX ADMIN — PSYLLIUM HUSK 1 PACKET: 3.4 POWDER ORAL at 08:14

## 2025-02-28 RX ADMIN — OXYCODONE HYDROCHLORIDE 10 MG: 5 TABLET ORAL at 21:43

## 2025-02-28 RX ADMIN — ACETAMINOPHEN 1000 MG: 500 TABLET ORAL at 06:31

## 2025-02-28 RX ADMIN — HYDROMORPHONE HYDROCHLORIDE 0.5 MG: 1 INJECTION, SOLUTION INTRAMUSCULAR; INTRAVENOUS; SUBCUTANEOUS at 06:34

## 2025-02-28 RX ADMIN — GABAPENTIN 200 MG: 100 CAPSULE ORAL at 08:14

## 2025-02-28 RX ADMIN — ACETAMINOPHEN 1000 MG: 500 TABLET ORAL at 21:43

## 2025-02-28 RX ADMIN — ACETAMINOPHEN 1000 MG: 500 TABLET ORAL at 13:03

## 2025-02-28 RX ADMIN — GABAPENTIN 200 MG: 100 CAPSULE ORAL at 21:49

## 2025-02-28 RX ADMIN — IOPAMIDOL 70 ML: 755 INJECTION, SOLUTION INTRAVENOUS at 11:23

## 2025-02-28 RX ADMIN — SENNOSIDES 8.6 MG: 8.6 TABLET, FILM COATED ORAL at 08:14

## 2025-02-28 RX ADMIN — Medication 1 MG: at 08:14

## 2025-02-28 ASSESSMENT — PAIN DESCRIPTION - ORIENTATION
ORIENTATION: LOWER;RIGHT;LEFT
ORIENTATION: RIGHT;LEFT;LOWER
ORIENTATION: LOWER

## 2025-02-28 ASSESSMENT — PAIN - FUNCTIONAL ASSESSMENT
PAIN_FUNCTIONAL_ASSESSMENT: PREVENTS OR INTERFERES WITH MANY ACTIVE NOT PASSIVE ACTIVITIES
PAIN_FUNCTIONAL_ASSESSMENT: NONE - DENIES PAIN
PAIN_FUNCTIONAL_ASSESSMENT: PREVENTS OR INTERFERES WITH MANY ACTIVE NOT PASSIVE ACTIVITIES

## 2025-02-28 ASSESSMENT — PAIN SCALES - GENERAL
PAINLEVEL_OUTOF10: 4
PAINLEVEL_OUTOF10: 7
PAINLEVEL_OUTOF10: 5
PAINLEVEL_OUTOF10: 6
PAINLEVEL_OUTOF10: 8
PAINLEVEL_OUTOF10: 7

## 2025-02-28 ASSESSMENT — PAIN DESCRIPTION - DESCRIPTORS
DESCRIPTORS: ACHING
DESCRIPTORS: ACHING;DISCOMFORT
DESCRIPTORS: ACHING;DISCOMFORT

## 2025-02-28 ASSESSMENT — PAIN DESCRIPTION - LOCATION
LOCATION: BACK;LEG
LOCATION: BACK

## 2025-02-28 ASSESSMENT — PAIN DESCRIPTION - FREQUENCY: FREQUENCY: CONTINUOUS

## 2025-02-28 ASSESSMENT — PAIN DESCRIPTION - PAIN TYPE: TYPE: CHRONIC PAIN

## 2025-02-28 ASSESSMENT — PAIN DESCRIPTION - ONSET: ONSET: ON-GOING

## 2025-02-28 NOTE — ED NOTES
Bedside and Verbal shift change report given to Bella RN (oncoming nurse) by LORENZA Douglass (offgoing nurse). Report included the following information ED SBAR, MAR, Recent Results, and Med Rec Status.

## 2025-02-28 NOTE — PROGRESS NOTES
[facility] Adult  Hospitalist Group                                                                                          Hospitalist Progress Note  Patricia Epstein PA-C  Answering service: [phone] OR [phone] from in house phone        Date of Service:  2025  NAME:  Gerhard Overton  :  1950  MRN:  [id number]       Admission Summary:     Gerhadr Overton is a 74 y.o. male with hx of prostate cancer with bone mets, anemia of chronic dz, arthritis, chronic constipation who presented to ed from [facility] for concerns of weakness.  Patient currently resides at a long-term care facility after recent discharge from [facility] for anemia and cancer associated low back pain.  Reports chronic low back pain from his prostate metastasis which is unchanged from baseline.  Has also had malaise and fatigue which she states has been ongoing for several months.  Had routine labs at this facility and was noted to be anemic and transported to the hospital.     Chart review shows admission to hospital from 10/30/24 to 24 for  cancer associated pain and anemia requiring 1u prbc.     The patient denies any fever, chills, chest or abdominal pain, nausea, vomiting, cough, congestion, recent illness, palpitations, or dysuria.  Remarkable vitals on ER Presentation: vss  Labs Remarkable for: hgb 5.8, plt 115, k-2.7, alk phos 690  ER Images: xr lumbar and thoracic spine  ER Rx: k-40 po and 10       Interval history / Subjective:     Seen and examined this a.m.  Last bowel movement 2 days ago, reports bowel movement was soft and without blood or black stools.  Denies abdominal pain, chest pain, shortness of breath, nausea/vomiting, lightheadedness.  Requiring 3L O2 today (increased from 2L yesterday) and  on my bedside assessment. CTA subsequently ordered which showed evidence of BL large pleural effusions, likely exudative 2/2 metastatic disease      Assessment & Plan:      Symptomatic

## 2025-02-28 NOTE — PROGRESS NOTES
Spiritual Health History and Assessment/Progress Note  [facility]    Initial Encounter,  ,  ,      Name: Gerhard Overton MRN: [id number]    Age: 74 y.o.     Sex: male   Language: English   Nondenominational: Uatsdin   Symptomatic anemia     Date: 2/28/2025            Total Time Calculated: 10 min              Spiritual Assessment began in [facility] EMERGENCY DEPARTMENT        Referral/Consult From: Rounding   Encounter Overview/Reason: Initial Encounter  Service Provided For: Patient    Armida, Belief, Meaning:   Patient has beliefs or practices that help with coping during difficult times  Family/Friends No family/friends present      Importance and Influence:  Patient has no beliefs influential to healthcare decision-making identified during this visit  Family/Friends No family/friends present    Community:  Patient feels well-supported.  Family/Friends No family/friends present    Assessment and Plan of Care:     Patient Interventions include: Affirmed coping skills/support systems  Family/Friends Interventions include: No family/friends present    Patient Plan of Care: Spiritual Care available upon further referral  Family/Friends Plan of Care: No family/friends present    Routine rounding visit with patient Gerhard. He expressed that he felt supported by family and friends and was in good spirits.   No further needs identified at this time. Spiritual Care is available upon request.    Electronically signed by Chaplain Adele Resident on 2/28/2025 at 8:10 AM

## 2025-02-28 NOTE — ED NOTES
ED TO INPATIENT SBAR HANDOFF    Patient Name: Gerhard Overton   :  1950  74 y.o.   MRN:  [id number]  ED Room #:  ER09/09     Situation  Code Status: DNR   Allergies: Patient has no known allergies.  Weight: Patient Vitals for the past 96 hrs (Last 3 readings):   Weight   25 2249 57.2 kg (126 lb)       Arrived from: home    Chief Complaint:   Chief Complaint   Patient presents with    Fatigue       Hospital Problem/Diagnosis:  Principal Problem:    Symptomatic anemia  Resolved Problems:    * No resolved hospital problems. *      Mobility: limited bed mobility   ED Fall Risk: Presents to emergency department  because of falls (Syncope, seizure, or loss of consciousness): No, Age > 70: Yes, Altered Mental Status, Intoxication with alcohol or substance confusion (Disorientation, impaired judgment, poor safety awaremess, or inability to follow instructions): No, Impaired Mobility: Ambulates or transfers with assistive devices or assistance; Unable to ambulate or transer.: Yes, Nursing Judgement: Yes   Fell in ED or prior to admission: no   Restraints: no     Sitter: no   Family/Caregiver Present: no    Neet to know social/safety information: n/a    Background  History:   Past Medical History:   Diagnosis Date    Arthritis     Mild anemia     Prostate cancer (HCC) 2008    PALLIATIVE CARE PER DR NIKOLE LÓPEZ NOTES 2018: DORON MEANS, AND PAST EXTERNAL BEAM RADIATION THERAPY       Assessment    Abnormal Assessment Findings: low hgb  Imaging:   CTA CHEST W WO CONTRAST    (Results Pending)     Abnormal labs:   Abnormal Labs Reviewed   CBC WITH AUTO DIFFERENTIAL - Abnormal; Notable for the following components:       Result Value    WBC 3.6 (*)     RBC 1.91 (*)     Hemoglobin 5.8 (*)     Hematocrit 19.3 (*)     .0 (*)     RDW 20.3 (*)     Platelets 115 (*)     Nucleated RBCs 2.2 (*)     nRBC 0.08 (*)     Metamyelocytes 2 (*)     Myelocytes 1 (*)     All other components within normal limits

## 2025-02-28 NOTE — CARE COORDINATION
Noted [facility] [employer] - contact made / [facility] transfer center [phone]/ Representative Snin informed patient is VA connected and also has Medicare A/B number obtained [id number].     CM provided to Patient Registrar. Ms. Overton is a resident at [facility]. Patient transferred to .  Department to follow.

## 2025-02-28 NOTE — ED NOTES
Pt up to the bedside commode with this RN assisting. Pt given new brief and pt re-adjusted in ED stretcher.

## 2025-02-28 NOTE — PLAN OF CARE
Foster FONSECA, Libia S, Dale W, Ellie P. AM-PAC Short Forms Manual 4.0. Revised 2/2020.                                                                                                                                                                                                                              Pain Rating:  Back, 9/10 in the bed (received w/ slump position in bed), improved w/ time OOB and walking    Pain Intervention(s):   repositioning and pain is at a level acceptable to the patient post session    Activity Tolerance:   Fair  and requires rest breaks        After treatment:   Patient left in no apparent distress in bed, Call bell within reach, and Side rails x3    COMMUNICATION/EDUCATION:   The patient's plan of care was discussed with: occupational therapist and registered nurse    Patient Education  Education Given To: Patient  Education Provided: Role of Therapy;Plan of Care;Fall Prevention Strategies;Mobility Training  Education Method: Verbal  Barriers to Learning: None  Education Outcome: Verbalized understanding;Demonstrated understanding;Continued education needed    Thank you for this referral.  Genie Jarrett, PT  Minutes: 49      Physical Therapy Evaluation Charge Determination   History Examination Presentation Decision-Making   MEDIUM  Complexity : 1-2 comorbidities / personal factors will impact the outcome/ POC  LOW Complexity : 1-2 Standardized tests and measures addressing body structure, function, activity limitation and / or participation in recreation  LOW Complexity : Stable, uncomplicated  AM-PAC  LOW    Based on the above components, the patient evaluation is determined to be of the following complexity level: Low

## 2025-02-28 NOTE — ED NOTES
0900 pt given warm breakfast tray.    1000 pt becoming increasingly agitated and would like to leave. AGUSTIN Epstein made aware via perfectserve. Awaiting response.

## 2025-02-28 NOTE — CONSULTS
Facility-Administered Medications   Medication Dose Route Frequency    sodium chloride flush 0.9 % injection 5-40 mL  5-40 mL IntraVENous 2 times per day    sodium chloride flush 0.9 % injection 5-40 mL  5-40 mL IntraVENous PRN    0.9 % sodium chloride infusion   IntraVENous PRN    0.9 % sodium chloride infusion   IntraVENous PRN    folic acid (FOLVITE) tablet 1 mg  1 mg Oral Daily    gabapentin (NEURONTIN) capsule 200 mg  200 mg Oral TID    psyllium husk-aspartame (METAMUCIL FIBER) packet 1 packet  1 packet Oral Daily    senna (SENOKOT) tablet 8.6 mg  1 tablet Oral Daily    sodium chloride flush 0.9 % injection 5-40 mL  5-40 mL IntraVENous 2 times per day    sodium chloride flush 0.9 % injection 5-40 mL  5-40 mL IntraVENous PRN    0.9 % sodium chloride infusion   IntraVENous PRN    potassium chloride (KLOR-CON) extended release tablet 40 mEq  40 mEq Oral PRN    Or    potassium bicarb-citric acid (EFFER-K) effervescent tablet 40 mEq  40 mEq Oral PRN    Or    potassium chloride 10 mEq/100 mL IVPB (Peripheral Line)  10 mEq IntraVENous PRN    magnesium sulfate 2000 mg in 50 mL IVPB premix  2,000 mg IntraVENous PRN    ondansetron (ZOFRAN-ODT) disintegrating tablet 4 mg  4 mg Oral Q8H PRN    Or    ondansetron (ZOFRAN) injection 4 mg  4 mg IntraVENous Q6H PRN    polyethylene glycol (GLYCOLAX) packet 17 g  17 g Oral Daily PRN    acetaminophen (TYLENOL) suppository 650 mg  650 mg Rectal Q6H PRN    oxyCODONE (ROXICODONE) immediate release tablet 10 mg  10 mg Oral Q4H PRN    HYDROmorphone (DILAUDID) injection 0.5 mg  0.5 mg IntraVENous Q3H PRN    naloxone (NARCAN) injection 0.4 mg  0.4 mg IntraVENous PRN    acetaminophen (TYLENOL) tablet 1,000 mg  1,000 mg Oral q8h       Labs:  ABG Invalid input(s): \"ABG\"     CBC Recent Labs     02/27/25  0835 02/27/25 2024 02/28/25 0225   WBC 3.4* 4.0* 4.6   HGB 8.4* 9.6* 8.8*   HCT 26.8* 29.9* 27.8*   PLT 93* 104* 100*   MCV 99.3* 98.0 98.6   MCH 31.1 31.5 31.2        Metabolic  Panel  Recent Labs     02/26/25 2307 02/27/25  0835 02/28/25  0225    138 138   K 2.7* 3.7 3.6    105 107   CO2 29 27 27   BUN 14 12 13   MG 1.8  --   --    PHOS 2.9  --   --    ALT 9* 10* 10*   INR 1.1  --   --         Pertinent Labs                eJsús Abel PA-C  2/28/2025

## 2025-02-28 NOTE — ED NOTES
Bedside and Verbal shift change report given to LORENZA Douglass (oncoming nurse) by LORENZA Abreu (offgoing nurse). Report included the following information ED SBAR, MAR, Recent Results, and Med Rec Status.

## 2025-02-28 NOTE — CARE COORDINATION
Care Management Initial Assessment       RUR: 19%  Readmission? No  1st IM letter given? No  1st  letter given: No       02/28/25 1819   Service Assessment   Patient Orientation Alert and Oriented   Cognition Alert   History Provided By Patient   Primary Caregiver   (has been a resiedent at [facility] since 2/7/25)   Support Systems Family Members   Patient's Healthcare Decision Maker is: Legal Next of Kin   PCP Verified by CM Yes   Last Visit to PCP Within last 3 months   Prior Functional Level Mobility   Can patient return to prior living arrangement Unknown at present   Ability to make needs known: Good   Would you like for me to discuss the discharge plan with any other family members/significant others, and if so, who? Yes  (sister Jose Armando Almonte [phone])   Financial Resources Medicare; (VA)   Condition of Participation: Discharge Planning   The Plan for Transition of Care is related to the following treatment goals: SNF when medically stable   The Patient and/or Patient Representative was provided with a Choice of Provider? Patient   The Patient and/Or Patient Representative agree with the Discharge Plan? Yes   Freedom of Choice list was provided with basic dialogue that supports the patient's individualized plan of care/goals, treatment preferences, and shares the quality data associated with the providers?  Yes     EMR reviewed. History of prostate cancer w/ bone mets, anemia, arthritis, and chronic constipation. Met w/patient and introduced to role . Discussed if patient would like to remain at [facility] or transfer to [facility] .    elects to continue care at [facility]. Form signed and for scanning in EMR.     Patient placed at [facility] SNF from [facility] 2/7-2/27. Mr. Overton seen by Palliative and would like to continue days at [facility] then go home w/ Hospice care.  Patient is retired [employer]. This writer expressed appreciation for his service. Mr. Overton states he never

## 2025-02-28 NOTE — PLAN OF CARE
Problem: Physical Therapy - Adult  Goal: By Discharge: Performs mobility at highest level of function for planned discharge setting.  See evaluation for individualized goals.  Description: FUNCTIONAL STATUS PRIOR TO ADMISSION: Prior Nov 2025 patient was modified independent w/ RW, has been in/out of hosp and rehab, walking w/ a rolling walker in rehab.     HOME SUPPORT PRIOR TO ADMISSION: Prior to Nov 2025 patient lived alone.      Physical Therapy Goals  Initiated 2/27/2025  1.  Patient will move from supine to sit and sit to supine in bed with contact guard assist within 7 day(s).    2.  Patient will perform sit to stand with contact guard assist within 7 day(s).  3.  Patient will transfer from bed to chair and chair to bed with contact guard assist using the least restrictive device within 7 day(s).  4.  Patient will ambulate with contact guard assist for 150 feet with the least restrictive device within 7 day(s).   5.  Patient will ascend/descend 4 stairs with  handrail(s) with contact guard assist within 7 day(s).    Outcome: Progressing     PHYSICAL THERAPY TREATMENT    Patient: Gerhard Overton (74 y.o. male)  Date: 2/28/2025  Diagnosis: General weakness [R53.1]  Symptomatic anemia [D64.9]  Prostate cancer metastatic to bone (HCC) [C61, C79.51] Symptomatic anemia      Precautions: Restrictions/Precautions  Restrictions/Precautions: Fall Risk            ASSESSMENT:  Patient continues to benefit from skilled PT services and is progressing towards goals. Received in bed on 2L, motivated to get OOB and walk.  Pt requested to mathew pajama pants.  Assisted with pt's request and donning socks and slippers.  RA trial w/ O2 sat down to 85% mobilizing in the bed. Also note, O2 sat decreased to 87% ambulating w/ 2L O2, recovers to 90's w/ rest and cues.  HR up to 100's at rest, up to 122 ambulating.   Pt denies feeling SOB w/ activity.    Pt demonstrates overall improvement in strength and functional mobility today, able

## 2025-03-01 LAB
ALBUMIN SERPL-MCNC: 2.3 G/DL (ref 3.5–5)
ALBUMIN/GLOB SERPL: 0.7 (ref 1.1–2.2)
ALP SERPL-CCNC: 837 U/L (ref 45–117)
ALT SERPL-CCNC: 9 U/L (ref 12–78)
ANION GAP SERPL CALC-SCNC: 7 MMOL/L (ref 2–12)
AST SERPL-CCNC: 62 U/L (ref 15–37)
BASOPHILS # BLD: 0 K/UL (ref 0–0.1)
BASOPHILS NFR BLD: 0 % (ref 0–1)
BILIRUB SERPL-MCNC: 0.6 MG/DL (ref 0.2–1)
BUN SERPL-MCNC: 14 MG/DL (ref 6–20)
BUN/CREAT SERPL: 28 (ref 12–20)
CALCIUM SERPL-MCNC: 9 MG/DL (ref 8.5–10.1)
CHLORIDE SERPL-SCNC: 105 MMOL/L (ref 97–108)
CO2 SERPL-SCNC: 27 MMOL/L (ref 21–32)
CREAT SERPL-MCNC: 0.5 MG/DL (ref 0.7–1.3)
DIFFERENTIAL METHOD BLD: ABNORMAL
EOSINOPHIL # BLD: 0 K/UL (ref 0–0.4)
EOSINOPHIL NFR BLD: 0 % (ref 0–7)
ERYTHROCYTE [DISTWIDTH] IN BLOOD BY AUTOMATED COUNT: 17.4 % (ref 11.5–14.5)
GLOBULIN SER CALC-MCNC: 3.1 G/DL (ref 2–4)
GLUCOSE SERPL-MCNC: 83 MG/DL (ref 65–100)
HCT VFR BLD AUTO: 28.6 % (ref 36.6–50.3)
HGB BLD-MCNC: 9.1 G/DL (ref 12.1–17)
IMM GRANULOCYTES # BLD AUTO: 0 K/UL
IMM GRANULOCYTES NFR BLD AUTO: 0 %
LDH SERPL L TO P-CCNC: 694 U/L (ref 85–241)
LYMPHOCYTES # BLD: 0.74 K/UL (ref 0.8–3.5)
LYMPHOCYTES NFR BLD: 19 % (ref 12–49)
MCH RBC QN AUTO: 31.3 PG (ref 26–34)
MCHC RBC AUTO-ENTMCNC: 31.8 G/DL (ref 30–36.5)
MCV RBC AUTO: 98.3 FL (ref 80–99)
MONOCYTES # BLD: 0.2 K/UL (ref 0–1)
MONOCYTES NFR BLD: 5 % (ref 5–13)
MYELOCYTES NFR BLD MANUAL: 1 %
NEUTS BAND NFR BLD MANUAL: 1 % (ref 0–6)
NEUTS SEG # BLD: 2.93 K/UL (ref 1.8–8)
NEUTS SEG NFR BLD: 74 % (ref 32–75)
NRBC # BLD: 0.04 K/UL (ref 0–0.01)
NRBC BLD-RTO: 1 PER 100 WBC
PLATELET # BLD AUTO: 96 K/UL (ref 150–400)
PMV BLD AUTO: 9.9 FL (ref 8.9–12.9)
POTASSIUM SERPL-SCNC: 3.6 MMOL/L (ref 3.5–5.1)
PROT SERPL-MCNC: 5.4 G/DL (ref 6.4–8.2)
RBC # BLD AUTO: 2.91 M/UL (ref 4.1–5.7)
RBC MORPH BLD: ABNORMAL
RBC MORPH BLD: ABNORMAL
SODIUM SERPL-SCNC: 139 MMOL/L (ref 136–145)
WBC # BLD AUTO: 3.9 K/UL (ref 4.1–11.1)

## 2025-03-01 PROCEDURE — 94760 N-INVAS EAR/PLS OXIMETRY 1: CPT

## 2025-03-01 PROCEDURE — 2500000003 HC RX 250 WO HCPCS: Performed by: STUDENT IN AN ORGANIZED HEALTH CARE EDUCATION/TRAINING PROGRAM

## 2025-03-01 PROCEDURE — 2500000003 HC RX 250 WO HCPCS

## 2025-03-01 PROCEDURE — 6370000000 HC RX 637 (ALT 250 FOR IP): Performed by: STUDENT IN AN ORGANIZED HEALTH CARE EDUCATION/TRAINING PROGRAM

## 2025-03-01 PROCEDURE — 6370000000 HC RX 637 (ALT 250 FOR IP): Performed by: NURSE PRACTITIONER

## 2025-03-01 PROCEDURE — 36415 COLL VENOUS BLD VENIPUNCTURE: CPT

## 2025-03-01 PROCEDURE — 80053 COMPREHEN METABOLIC PANEL: CPT

## 2025-03-01 PROCEDURE — 83615 LACTATE (LD) (LDH) ENZYME: CPT

## 2025-03-01 PROCEDURE — 2060000000 HC ICU INTERMEDIATE R&B

## 2025-03-01 PROCEDURE — 6360000002 HC RX W HCPCS: Performed by: STUDENT IN AN ORGANIZED HEALTH CARE EDUCATION/TRAINING PROGRAM

## 2025-03-01 PROCEDURE — 85025 COMPLETE CBC W/AUTO DIFF WBC: CPT

## 2025-03-01 RX ORDER — LIDOCAINE 40 MG/G
CREAM TOPICAL PRN
Status: DISCONTINUED | OUTPATIENT
Start: 2025-03-01 | End: 2025-03-04 | Stop reason: HOSPADM

## 2025-03-01 RX ORDER — LEVOTHYROXINE SODIUM 25 UG/1
25 TABLET ORAL DAILY
Status: DISCONTINUED | OUTPATIENT
Start: 2025-03-01 | End: 2025-03-04 | Stop reason: HOSPADM

## 2025-03-01 RX ADMIN — Medication 10 ML: at 07:53

## 2025-03-01 RX ADMIN — ACETAMINOPHEN 1000 MG: 500 TABLET ORAL at 21:31

## 2025-03-01 RX ADMIN — SODIUM CHLORIDE, PRESERVATIVE FREE 10 ML: 5 INJECTION INTRAVENOUS at 07:53

## 2025-03-01 RX ADMIN — GABAPENTIN 200 MG: 100 CAPSULE ORAL at 13:54

## 2025-03-01 RX ADMIN — HYDROMORPHONE HYDROCHLORIDE 0.5 MG: 1 INJECTION, SOLUTION INTRAMUSCULAR; INTRAVENOUS; SUBCUTANEOUS at 21:34

## 2025-03-01 RX ADMIN — ACETAMINOPHEN 1000 MG: 500 TABLET ORAL at 06:49

## 2025-03-01 RX ADMIN — GABAPENTIN 200 MG: 100 CAPSULE ORAL at 07:53

## 2025-03-01 RX ADMIN — ACETAMINOPHEN 1000 MG: 500 TABLET ORAL at 13:54

## 2025-03-01 RX ADMIN — SODIUM CHLORIDE, PRESERVATIVE FREE 10 ML: 5 INJECTION INTRAVENOUS at 21:36

## 2025-03-01 RX ADMIN — LEVOTHYROXINE SODIUM 25 MCG: 0.03 TABLET ORAL at 13:54

## 2025-03-01 RX ADMIN — Medication 1 MG: at 07:53

## 2025-03-01 RX ADMIN — SODIUM CHLORIDE, PRESERVATIVE FREE 10 ML: 5 INJECTION INTRAVENOUS at 21:35

## 2025-03-01 RX ADMIN — PSYLLIUM HUSK 1 PACKET: 3.4 POWDER ORAL at 07:53

## 2025-03-01 RX ADMIN — GABAPENTIN 200 MG: 100 CAPSULE ORAL at 21:33

## 2025-03-01 RX ADMIN — SENNOSIDES 8.6 MG: 8.6 TABLET, FILM COATED ORAL at 07:53

## 2025-03-01 RX ADMIN — Medication 10 ML: at 21:36

## 2025-03-01 ASSESSMENT — PAIN DESCRIPTION - DESCRIPTORS
DESCRIPTORS: ACHING
DESCRIPTORS: ACHING

## 2025-03-01 ASSESSMENT — PAIN DESCRIPTION - LOCATION
LOCATION: BACK
LOCATION: BACK;LEG
LOCATION: BACK;LEG

## 2025-03-01 ASSESSMENT — PAIN SCALES - GENERAL
PAINLEVEL_OUTOF10: 9
PAINLEVEL_OUTOF10: 5
PAINLEVEL_OUTOF10: 2
PAINLEVEL_OUTOF10: 9

## 2025-03-01 ASSESSMENT — PAIN DESCRIPTION - ORIENTATION: ORIENTATION: LEFT

## 2025-03-01 NOTE — PLAN OF CARE
Problem: Physical Therapy - Adult  Goal: By Discharge: Performs mobility at highest level of function for planned discharge setting.  See evaluation for individualized goals.  Description: FUNCTIONAL STATUS PRIOR TO ADMISSION: Prior Nov 2025 patient was modified independent w/ RW, has been in/out of hosp and rehab, walking w/ a rolling walker in rehab.     HOME SUPPORT PRIOR TO ADMISSION: Prior to Nov 2025 patient lived alone.      Physical Therapy Goals  Initiated 2/27/2025  1.  Patient will move from supine to sit and sit to supine in bed with contact guard assist within 7 day(s).    2.  Patient will perform sit to stand with contact guard assist within 7 day(s).  3.  Patient will transfer from bed to chair and chair to bed with contact guard assist using the least restrictive device within 7 day(s).  4.  Patient will ambulate with contact guard assist for 150 feet with the least restrictive device within 7 day(s).   5.  Patient will ascend/descend 4 stairs with  handrail(s) with contact guard assist within 7 day(s).    2/28/2025 1626 by Genie Jarrett, PT  Outcome: Progressing     Problem: Safety - Adult  Goal: Free from fall injury  Outcome: Progressing     Problem: Discharge Planning  Goal: Discharge to home or other facility with appropriate resources  Outcome: Progressing     Problem: Pain  Goal: Verbalizes/displays adequate comfort level or baseline comfort level  Outcome: Progressing     Problem: ABCDS Injury Assessment  Goal: Absence of physical injury  Outcome: Progressing

## 2025-03-01 NOTE — PROGRESS NOTES
I participated in Palliative Rounds today where the patient's care was discussed.     The Interdisciplinary team is aware of  availability and were encouraged to request Spiritual Health support as needed.      The  on-call can be reached at [phone]).     Rev. Avelino Crisostomo MDiv  Chaplain Resident

## 2025-03-01 NOTE — PROGRESS NOTES
[facility]                                                                                          Hospitalist Progress Note  Julito Boss MD  Office Phone: [phone]        Date of Service:  3/1/2025  NAME:  Gerhard Overton  :  1950  MRN:  [id number]       Admission Summary:     Gerhard Overton is a 74 y.o. male with hx of prostate cancer with bone mets, anemia of chronic dz, arthritis, chronic constipation who [facility] ed from [facility] for concerns of weakness.  Patient currently resides at a long-term care facility after recent discharge from [facility] for anemia and cancer associated low back pain.  Reports chronic low back pain from his prostate metastasis which is unchanged from baseline.  Has also had malaise and fatigue which she states has been ongoing for several months.  Had routine labs at this facility and was noted to be anemic and transported to the hospital.     Chart review shows admission to hospital from 10/30/24 to 24 for  cancer associated pain and anemia requiring 1u prbc.     The patient denies any fever, chills, chest or abdominal pain, nausea, vomiting, cough, congestion, recent illness, palpitations, or dysuria.  Remarkable vitals on ER Presentation: vss  Labs Remarkable for: hgb 5.8, plt 115, k-2.7, alk phos 690  ER Images: xr lumbar and thoracic spine  ER Rx: k-40 po and 10       Interval history / Subjective:   Patient complaining of lower back pain       Assessment & Plan:          Bilateral pleural effusion R>L  Acute hypoxic respiratory failure due to above  -CTA chest with severe widespread bony metastatic disease.  Large pleural effusion right greater than left.  Patient received thoracocentesis 2025 for 600 mL.    -Check serum LDH for lights criteria  -Repeat ultrasound thoracocentesis for left side  - Wean supplemental oxygen as tolerated    Metastatic prostate disease  Lower back pain due to  Denies     Financial abuse: Denies     Sexual abuse: Denies   Utilities: Not At Risk (10/30/2024)    [facility] Utilities     Threatened with loss of utilities: No       Review of Systems:   Pertinent items are noted in HPI.       Vital Signs:    Last 24hrs VS reviewed since prior progress note. Most recent are:  Vitals:    03/01/25 1000   BP:    Pulse: (!) 108   Resp:    Temp:    SpO2:          Intake/Output Summary (Last 24 hours) at 3/1/2025 1023  Last data filed at 3/1/2025 0648  Gross per 24 hour   Intake 200 ml   Output 900 ml   Net -700 ml        Physical Examination:     I had a face to face encounter with this patient and independently examined them on 3/1/2025 as outlined below:          General : alert x 3, awake, no acute distress,   HEENT: PEERL, EOMI, moist mucus membrane, TM clear  Neck: supple, no JVD, no meningeal signs  Chest: Coarse breathing bilaterally  CVS: S1 S2 heard, Capillary refill less than 2 seconds  Abd: soft/ non tender, non distended, BS physiological,   Ext: no clubbing, no cyanosis, no edema, brisk 2+ DP pulses  Neuro/Psych: pleasant mood and affect, CN 2-12 grossly intact, sensory grossly within normal limit, Strength 5/5 in all extremities, DTR 1+ x 4  Skin: warm     Data Review:    Review and/or order of clinical lab test  Review and/or order of tests in the radiology section of CPT  Review and/or order of tests in the medicine section of CPT  Discussion of test results with performing physician      I have personally and independently reviewed all pertinent labs, diagnostic studies, imaging, and have provided independent interpretation of the same.     Labs:     Recent Labs     02/28/25 0225 03/01/25  0744   WBC 4.6 3.9*   HGB 8.8* 9.1*   HCT 27.8* 28.6*   * 96*     Recent Labs     02/26/25  2307 02/27/25  0835 02/28/25 0225 03/01/25  0744    138 138 139   K 2.7* 3.7 3.6 3.6    105 107 105   CO2 29 27 27 27   BUN 14 12 13 14   MG 1.8  --   --   --    PHOS 2.9  --

## 2025-03-02 ENCOUNTER — APPOINTMENT (OUTPATIENT)
Facility: HOSPITAL | Age: 75
DRG: 542 | End: 2025-03-02
Payer: MEDICARE

## 2025-03-02 LAB
BASOPHILS # BLD: 0.02 K/UL (ref 0–0.1)
BASOPHILS NFR BLD: 0.4 % (ref 0–1)
DIFFERENTIAL METHOD BLD: ABNORMAL
EOSINOPHIL # BLD: 0.02 K/UL (ref 0–0.4)
EOSINOPHIL NFR BLD: 0.4 % (ref 0–7)
ERYTHROCYTE [DISTWIDTH] IN BLOOD BY AUTOMATED COUNT: 17.5 % (ref 11.5–14.5)
FOLATE SERPL-MCNC: 26.8 NG/ML (ref 5–21)
HCT VFR BLD AUTO: 28.4 % (ref 36.6–50.3)
HGB BLD-MCNC: 8.8 G/DL (ref 12.1–17)
IMM GRANULOCYTES # BLD AUTO: 0.08 K/UL (ref 0–0.04)
IMM GRANULOCYTES NFR BLD AUTO: 1.8 % (ref 0–0.5)
LYMPHOCYTES # BLD: 1.13 K/UL (ref 0.8–3.5)
LYMPHOCYTES NFR BLD: 25 % (ref 12–49)
MCH RBC QN AUTO: 31.2 PG (ref 26–34)
MCHC RBC AUTO-ENTMCNC: 31 G/DL (ref 30–36.5)
MCV RBC AUTO: 100.7 FL (ref 80–99)
MONOCYTES # BLD: 0.48 K/UL (ref 0–1)
MONOCYTES NFR BLD: 10.7 % (ref 5–13)
NEUTS SEG # BLD: 2.77 K/UL (ref 1.8–8)
NEUTS SEG NFR BLD: 61.7 % (ref 32–75)
NRBC # BLD: 0.05 K/UL (ref 0–0.01)
NRBC BLD-RTO: 1.1 PER 100 WBC
PLATELET # BLD AUTO: 90 K/UL (ref 150–400)
PMV BLD AUTO: 10.2 FL (ref 8.9–12.9)
RBC # BLD AUTO: 2.82 M/UL (ref 4.1–5.7)
RBC MORPH BLD: ABNORMAL
RBC MORPH BLD: ABNORMAL
VIT B12 SERPL-MCNC: 291 PG/ML (ref 193–986)
WBC # BLD AUTO: 4.5 K/UL (ref 4.1–11.1)

## 2025-03-02 PROCEDURE — 6370000000 HC RX 637 (ALT 250 FOR IP): Performed by: STUDENT IN AN ORGANIZED HEALTH CARE EDUCATION/TRAINING PROGRAM

## 2025-03-02 PROCEDURE — 32555 ASPIRATE PLEURA W/ IMAGING: CPT

## 2025-03-02 PROCEDURE — 82607 VITAMIN B-12: CPT

## 2025-03-02 PROCEDURE — 6360000002 HC RX W HCPCS: Performed by: STUDENT IN AN ORGANIZED HEALTH CARE EDUCATION/TRAINING PROGRAM

## 2025-03-02 PROCEDURE — 2060000000 HC ICU INTERMEDIATE R&B

## 2025-03-02 PROCEDURE — 6370000000 HC RX 637 (ALT 250 FOR IP): Performed by: NURSE PRACTITIONER

## 2025-03-02 PROCEDURE — 2500000003 HC RX 250 WO HCPCS

## 2025-03-02 PROCEDURE — 71045 X-RAY EXAM CHEST 1 VIEW: CPT

## 2025-03-02 PROCEDURE — 10030 IMG GID FLU COLL DRG SFT TIS: CPT

## 2025-03-02 PROCEDURE — 82746 ASSAY OF FOLIC ACID SERUM: CPT

## 2025-03-02 PROCEDURE — 2500000003 HC RX 250 WO HCPCS: Performed by: STUDENT IN AN ORGANIZED HEALTH CARE EDUCATION/TRAINING PROGRAM

## 2025-03-02 PROCEDURE — 85025 COMPLETE CBC W/AUTO DIFF WBC: CPT

## 2025-03-02 RX ADMIN — LEVOTHYROXINE SODIUM 25 MCG: 0.03 TABLET ORAL at 06:39

## 2025-03-02 RX ADMIN — ACETAMINOPHEN 1000 MG: 500 TABLET ORAL at 06:39

## 2025-03-02 RX ADMIN — SODIUM CHLORIDE, PRESERVATIVE FREE 10 ML: 5 INJECTION INTRAVENOUS at 08:07

## 2025-03-02 RX ADMIN — ACETAMINOPHEN 1000 MG: 500 TABLET ORAL at 13:29

## 2025-03-02 RX ADMIN — GABAPENTIN 200 MG: 100 CAPSULE ORAL at 08:05

## 2025-03-02 RX ADMIN — ONDANSETRON 4 MG: 2 INJECTION INTRAMUSCULAR; INTRAVENOUS at 19:45

## 2025-03-02 RX ADMIN — SENNOSIDES 8.6 MG: 8.6 TABLET, FILM COATED ORAL at 08:05

## 2025-03-02 RX ADMIN — SODIUM CHLORIDE, PRESERVATIVE FREE 10 ML: 5 INJECTION INTRAVENOUS at 20:56

## 2025-03-02 RX ADMIN — GABAPENTIN 200 MG: 100 CAPSULE ORAL at 13:29

## 2025-03-02 RX ADMIN — Medication 10 ML: at 08:05

## 2025-03-02 RX ADMIN — ACETAMINOPHEN 1000 MG: 500 TABLET ORAL at 22:49

## 2025-03-02 RX ADMIN — Medication 10 ML: at 20:57

## 2025-03-02 RX ADMIN — GABAPENTIN 200 MG: 100 CAPSULE ORAL at 20:54

## 2025-03-02 RX ADMIN — PSYLLIUM HUSK 1 PACKET: 3.4 POWDER ORAL at 08:05

## 2025-03-02 RX ADMIN — Medication 1 MG: at 08:05

## 2025-03-02 ASSESSMENT — PAIN DESCRIPTION - ORIENTATION: ORIENTATION: LEFT

## 2025-03-02 ASSESSMENT — PAIN SCALES - GENERAL
PAINLEVEL_OUTOF10: 3
PAINLEVEL_OUTOF10: 4

## 2025-03-02 ASSESSMENT — PAIN DESCRIPTION - DESCRIPTORS: DESCRIPTORS: ACHING

## 2025-03-02 ASSESSMENT — PAIN DESCRIPTION - LOCATION
LOCATION: BACK;LEG
LOCATION: BACK;LEG

## 2025-03-02 NOTE — PROGRESS NOTES
[facility]                                                                                          Hospitalist Progress Note  Julito Boss MD  Office Phone: [phone]        Date of Service:  3/2/2025  NAME:  Gerhard Overton  :  1950  MRN:  [id number]       Admission Summary:     Gerhard Overton is a 74 y.o. male with hx of prostate cancer with bone mets, anemia of chronic dz, arthritis, chronic constipation who [facility] ed from [facility] for concerns of weakness.  Patient currently resides at a long-term care facility after recent discharge from [facility] for anemia and cancer associated low back pain.  Reports chronic low back pain from his prostate metastasis which is unchanged from baseline.  Has also had malaise and fatigue which she states has been ongoing for several months.  Had routine labs at this facility and was noted to be anemic and transported to the hospital.     Chart review shows admission to hospital from 10/30/24 to 24 for  cancer associated pain and anemia requiring 1u prbc.     The patient denies any fever, chills, chest or abdominal pain, nausea, vomiting, cough, congestion, recent illness, palpitations, or dysuria.  Remarkable vitals on ER Presentation: vss  Labs Remarkable for: hgb 5.8, plt 115, k-2.7, alk phos 690  ER Images: xr lumbar and thoracic spine  ER Rx: k-40 po and 10       Interval history / Subjective:   Patient is receiving thoracocentesis on left side today       Assessment & Plan:          Bilateral exudative pleural effusion R>L  Acute hypoxic respiratory failure due to above  -CTA chest with severe widespread bony metastatic disease.  Large pleural effusion right greater than left.  Patient received thoracocentesis 2025 for 600 mL.  Patient underwent left-sided ultrasound-guided thoracocentesis on 3/2/2025.  Pleural fluid analysis positive for exudative    - Wean supplemental oxygen as  chloride flush 0.9 % injection 5-40 mL  5-40 mL IntraVENous PRN    0.9 % sodium chloride infusion   IntraVENous PRN    potassium chloride (KLOR-CON) extended release tablet 40 mEq  40 mEq Oral PRN    Or    potassium bicarb-citric acid (EFFER-K) effervescent tablet 40 mEq  40 mEq Oral PRN    Or    potassium chloride 10 mEq/100 mL IVPB (Peripheral Line)  10 mEq IntraVENous PRN    magnesium sulfate 2000 mg in 50 mL IVPB premix  2,000 mg IntraVENous PRN    ondansetron (ZOFRAN-ODT) disintegrating tablet 4 mg  4 mg Oral Q8H PRN    Or    ondansetron (ZOFRAN) injection 4 mg  4 mg IntraVENous Q6H PRN    polyethylene glycol (GLYCOLAX) packet 17 g  17 g Oral Daily PRN    acetaminophen (TYLENOL) suppository 650 mg  650 mg Rectal Q6H PRN    oxyCODONE (ROXICODONE) immediate release tablet 10 mg  10 mg Oral Q4H PRN    HYDROmorphone (DILAUDID) injection 0.5 mg  0.5 mg IntraVENous Q3H PRN    naloxone (NARCAN) injection 0.4 mg  0.4 mg IntraVENous PRN    acetaminophen (TYLENOL) tablet 1,000 mg  1,000 mg Oral q8h     ______________________________________________________________________  EXPECTED LENGTH OF STAY: 3  ACTUAL LENGTH OF STAY:          3                 Julito Boss MD

## 2025-03-02 NOTE — PROCEDURES
Interventional Radiology  Procedure Note        3/2/2025 11:29 AM    Patient: Gerhard Overton     Informed consent obtained    Diagnosis: pleural effusion    Procedure(s): left US guided thoracentesis    Estimated blood loss: less than 5cc    Anesthesia: local    Specimens removed:  ongoing drainage at time of note    Complications: None    Implants: None    Primary Physician: Nikolay Ambriz MD    Recommendations: N/A    Full dictated report to follow    Nikolay Ambriz MD  Interventional Radiology  [facility]  11:29 AM, 3/2/2025

## 2025-03-02 NOTE — CARE COORDINATION
Transition of Care Plan:    RUR: 205-High  Prior Level of Functioning: assist with all adls  Disposition: SNF return  HATTIE:   If SNF or IPR: Date FOC offered: 2/28/25  Date FOC received: 2/28/25  Accepting facility: [facility]  Date authorization started with reference number:   Date authorization received and expires:   Follow up appointments:   DME needed:   Transportation at discharge: wheelchair van  IM/IMM Medicare/ letter given:   Is patient a [place] and connected with [facility]?   If yes, was [place] transfer form completed and [facility] notified?   Caregiver Contact: sister Jose Armando Almonte  Discharge Caregiver contacted prior to discharge?   Care Conference needed? NO  Barriers to discharge:     Admitted from [facility] and they have accepted back. They will need to get a [facility] with [facility]. Patient would like to return to the facility to finish rehab. Then possibility discharge home with Hospice.

## 2025-03-03 ENCOUNTER — APPOINTMENT (OUTPATIENT)
Facility: HOSPITAL | Age: 75
DRG: 542 | End: 2025-03-03
Payer: MEDICARE

## 2025-03-03 PROCEDURE — 2060000000 HC ICU INTERMEDIATE R&B

## 2025-03-03 PROCEDURE — 6370000000 HC RX 637 (ALT 250 FOR IP): Performed by: NURSE PRACTITIONER

## 2025-03-03 PROCEDURE — 71250 CT THORAX DX C-: CPT

## 2025-03-03 PROCEDURE — 6360000002 HC RX W HCPCS: Performed by: STUDENT IN AN ORGANIZED HEALTH CARE EDUCATION/TRAINING PROGRAM

## 2025-03-03 PROCEDURE — 2500000003 HC RX 250 WO HCPCS

## 2025-03-03 PROCEDURE — 30233N1 TRANSFUSION OF NONAUTOLOGOUS RED BLOOD CELLS INTO PERIPHERAL VEIN, PERCUTANEOUS APPROACH: ICD-10-PCS | Performed by: STUDENT IN AN ORGANIZED HEALTH CARE EDUCATION/TRAINING PROGRAM

## 2025-03-03 PROCEDURE — 6370000000 HC RX 637 (ALT 250 FOR IP): Performed by: STUDENT IN AN ORGANIZED HEALTH CARE EDUCATION/TRAINING PROGRAM

## 2025-03-03 PROCEDURE — 0W9B3ZZ DRAINAGE OF LEFT PLEURAL CAVITY, PERCUTANEOUS APPROACH: ICD-10-PCS | Performed by: STUDENT IN AN ORGANIZED HEALTH CARE EDUCATION/TRAINING PROGRAM

## 2025-03-03 PROCEDURE — 2500000003 HC RX 250 WO HCPCS: Performed by: STUDENT IN AN ORGANIZED HEALTH CARE EDUCATION/TRAINING PROGRAM

## 2025-03-03 RX ADMIN — HYDROMORPHONE HYDROCHLORIDE 0.5 MG: 1 INJECTION, SOLUTION INTRAMUSCULAR; INTRAVENOUS; SUBCUTANEOUS at 21:30

## 2025-03-03 RX ADMIN — Medication 1 MG: at 08:58

## 2025-03-03 RX ADMIN — GABAPENTIN 200 MG: 100 CAPSULE ORAL at 21:30

## 2025-03-03 RX ADMIN — SENNOSIDES 8.6 MG: 8.6 TABLET, FILM COATED ORAL at 08:58

## 2025-03-03 RX ADMIN — GABAPENTIN 200 MG: 100 CAPSULE ORAL at 14:06

## 2025-03-03 RX ADMIN — Medication 10 ML: at 08:58

## 2025-03-03 RX ADMIN — ACETAMINOPHEN 1000 MG: 500 TABLET ORAL at 06:46

## 2025-03-03 RX ADMIN — LEVOTHYROXINE SODIUM 25 MCG: 0.03 TABLET ORAL at 06:46

## 2025-03-03 RX ADMIN — ACETAMINOPHEN 1000 MG: 500 TABLET ORAL at 21:30

## 2025-03-03 RX ADMIN — ACETAMINOPHEN 1000 MG: 500 TABLET ORAL at 14:06

## 2025-03-03 RX ADMIN — GABAPENTIN 200 MG: 100 CAPSULE ORAL at 08:58

## 2025-03-03 RX ADMIN — SODIUM CHLORIDE, PRESERVATIVE FREE 10 ML: 5 INJECTION INTRAVENOUS at 08:58

## 2025-03-03 RX ADMIN — PSYLLIUM HUSK 1 PACKET: 3.4 POWDER ORAL at 08:58

## 2025-03-03 ASSESSMENT — PAIN DESCRIPTION - LOCATION
LOCATION: BACK;LEG
LOCATION: LEG

## 2025-03-03 ASSESSMENT — PAIN SCALES - GENERAL
PAINLEVEL_OUTOF10: 10
PAINLEVEL_OUTOF10: 9

## 2025-03-03 ASSESSMENT — PAIN DESCRIPTION - DESCRIPTORS: DESCRIPTORS: ACHING

## 2025-03-03 ASSESSMENT — PAIN DESCRIPTION - ORIENTATION: ORIENTATION: LEFT;LOWER;RIGHT

## 2025-03-03 NOTE — PROGRESS NOTES
Pulmonary, Critical Care, and Sleep Medicine~Progress Note    Name: Gerhard Overton MRN: [id number]   : 1950 Hospital: [facility]   Date: 3/3/2025 3:42 PM Admission: 2025     Impression Plan   Acute hypoxic respiratory failure  Bilateral right greater than left pleural effusions, etiology uncertain at this time.  etiologies could be hypoalbuminemia versus metastatic versus volume overload   Known metastatic prostate cancer.  [facility]  Normochromic normocytic anemia  No pulmonary disease BNP was canceled   S/p thoracentesis bilaterally   Follow Pleural fluid labs  O2 titration above 90%  Discussed with patient. Now that both sides have been tapped would a be a good idea to get CT chest      Daily Progression:    3/3  Breathing ok. No issues     Consult Note requested by hospitalist service   Patient presented for admission on 2025 secondary to ongoing malaise and fatigue it has been lasting several months.  He denies any shortness of breath.  Was found to be hypoxic on admission and required O2.  Has known history of anemia that is being monitored by hematologist because he does have a history of prostate cancer.  [facility]  Has not started treatment yet.  Unknown when he was diagnosed.  On admission his hemoglobin was 5.1.  Has begun to normalize after several units of packed red blood cells.  During initial evaluation imaging was obtained of the chest which revealed bilateral pleural effusions right greater than left with bibasilar atelectasis.  It was also noted to have widely metastatic lesions and the spine particularly.  Of note he had an abdominal x-ray on 2025 that did not show any pleural effusions.  He has never had a thoracentesis before.  Denies any history of significant smoking.  Denies any previous cardiac history.  I have reviewed the labs and previous day’s notes.    Review of systems not obtained due to patient factors.  Past Medical  8.8*   HCT 28.6* 28.4*   PLT 96* 90*   MCV 98.3 100.7*   MCH 31.3 31.2        Metabolic  Panel Recent Labs     03/01/25  0744      K 3.6      CO2 27   BUN 14   ALT 9*        Pertinent Labs                Jesús Abel PA-C  3/3/2025

## 2025-03-03 NOTE — PLAN OF CARE
Problem: Safety - Adult  Goal: Free from fall injury  3/3/2025 1045 by Deedee Chapman RN  Outcome: Progressing  3/2/2025 2251 by Bere Pope RN  Outcome: Progressing     Problem: Discharge Planning  Goal: Discharge to home or other facility with appropriate resources  3/3/2025 1045 by Deedee Chapman RN  Outcome: Progressing  3/2/2025 2251 by Bere Pope RN  Outcome: Progressing     Problem: Pain  Goal: Verbalizes/displays adequate comfort level or baseline comfort level  3/3/2025 1045 by Deedee Chapman RN  Outcome: Progressing  3/2/2025 2251 by Bere Pope RN  Outcome: Progressing     Problem: ABCDS Injury Assessment  Goal: Absence of physical injury  Outcome: Progressing

## 2025-03-03 NOTE — PROGRESS NOTES
[facility]                                                                                          Hospitalist Progress Note  Julito Boss MD  Office Phone: [phone]        Date of Service:  3/3/2025  NAME:  Gerhard Overton  :  1950  MRN:  [id number]       Admission Summary:     Gerhard Overton is a 74 y.o. male with hx of prostate cancer with bone mets, anemia of chronic dz, arthritis, chronic constipation who [facility] from [facility].  Patient currently resides at a long-term care facility after recent discharge from [facility] for anemia and cancer associated low back pain.  Reports chronic low back pain from his prostate metastasis which is unchanged from baseline.  Has also had malaise and fatigue which she states has been ongoing for several months.  Had routine labs at this facility and was noted to be anemic and transported to the hospital.     Chart review shows admission to hospital from 10/30/24 to 24 for  cancer associated pain and anemia requiring 1u prbc.     The patient denies any fever, chills, chest or abdominal pain, nausea, vomiting, cough, congestion, recent illness, palpitations, or dysuria.  Remarkable vitals on ER Presentation: vss  Labs Remarkable for: hgb 5.8, plt 115, k-2.7, alk phos 690  ER Images: xr lumbar and thoracic spine  ER Rx: k-40 po and 10       Interval history / Subjective:     Feels better after thoracocentesis.  Patient is off oxygen requesting discharge in the morning       Assessment & Plan:          Bilateral exudative pleural effusion R>L  Acute hypoxic respiratory failure due to above improving  -CTA chest with severe widespread bony metastatic disease.  Large pleural effusion right greater than left.  Patient received thoracocentesis 2025 for 600 mL.  Patient underwent left-sided ultrasound-guided thoracocentesis on 3/2/2025.  Pleural fluid analysis positive for  sodium chloride flush 0.9 % injection 5-40 mL  5-40 mL IntraVENous PRN    0.9 % sodium chloride infusion   IntraVENous PRN    potassium chloride (KLOR-CON) extended release tablet 40 mEq  40 mEq Oral PRN    Or    potassium bicarb-citric acid (EFFER-K) effervescent tablet 40 mEq  40 mEq Oral PRN    Or    potassium chloride 10 mEq/100 mL IVPB (Peripheral Line)  10 mEq IntraVENous PRN    magnesium sulfate 2000 mg in 50 mL IVPB premix  2,000 mg IntraVENous PRN    ondansetron (ZOFRAN-ODT) disintegrating tablet 4 mg  4 mg Oral Q8H PRN    Or    ondansetron (ZOFRAN) injection 4 mg  4 mg IntraVENous Q6H PRN    polyethylene glycol (GLYCOLAX) packet 17 g  17 g Oral Daily PRN    acetaminophen (TYLENOL) suppository 650 mg  650 mg Rectal Q6H PRN    oxyCODONE (ROXICODONE) immediate release tablet 10 mg  10 mg Oral Q4H PRN    HYDROmorphone (DILAUDID) injection 0.5 mg  0.5 mg IntraVENous Q3H PRN    naloxone (NARCAN) injection 0.4 mg  0.4 mg IntraVENous PRN    acetaminophen (TYLENOL) tablet 1,000 mg  1,000 mg Oral q8h     ______________________________________________________________________  EXPECTED LENGTH OF STAY: 3  ACTUAL LENGTH OF STAY:          4                 Julito Boss MD

## 2025-03-03 NOTE — CARE COORDINATION
JUVE- Pt will return to [facility] tomorrow at 3pm.  DIAN spoke with Jennifer at the [facility] ([phone]) regarding this pt needing a new contract to return to [facility]. She reviewed clinicals sent to her and she said that this pt is still under the same contract at [facility]. She is going to reach out to [facility].     DIAN received a message from Agnes with [facility]. She confirmed that they can accept pt back under his [facility] contract on Tuesday. DIAN met with this pt and messaged Dr. Boss with this information. They are in agreement with pt returning to [facility] tomorrow. DIAN set up ambulance transport with [facility] for 3pm tomorrow.LOBO Queen,ACM-SW

## 2025-03-04 VITALS
BODY MASS INDEX: 16.85 KG/M2 | HEART RATE: 113 BPM | OXYGEN SATURATION: 94 % | TEMPERATURE: 98.4 F | DIASTOLIC BLOOD PRESSURE: 70 MMHG | SYSTOLIC BLOOD PRESSURE: 116 MMHG | HEIGHT: 68 IN | RESPIRATION RATE: 22 BRPM | WEIGHT: 111.2 LBS

## 2025-03-04 LAB
BACTERIA SPEC CULT: NORMAL
GRAM STN SPEC: NORMAL
HCT VFR BLD AUTO: 25.4 % (ref 36.6–50.3)
HGB BLD-MCNC: 8 G/DL (ref 12.1–17)
SERVICE CMNT-IMP: NORMAL

## 2025-03-04 PROCEDURE — 6360000002 HC RX W HCPCS: Performed by: STUDENT IN AN ORGANIZED HEALTH CARE EDUCATION/TRAINING PROGRAM

## 2025-03-04 PROCEDURE — 6370000000 HC RX 637 (ALT 250 FOR IP): Performed by: NURSE PRACTITIONER

## 2025-03-04 PROCEDURE — 2500000003 HC RX 250 WO HCPCS: Performed by: STUDENT IN AN ORGANIZED HEALTH CARE EDUCATION/TRAINING PROGRAM

## 2025-03-04 PROCEDURE — 85014 HEMATOCRIT: CPT

## 2025-03-04 PROCEDURE — 2700000000 HC OXYGEN THERAPY PER DAY

## 2025-03-04 PROCEDURE — 2500000003 HC RX 250 WO HCPCS

## 2025-03-04 PROCEDURE — 85018 HEMOGLOBIN: CPT

## 2025-03-04 PROCEDURE — 94760 N-INVAS EAR/PLS OXIMETRY 1: CPT

## 2025-03-04 PROCEDURE — 6370000000 HC RX 637 (ALT 250 FOR IP): Performed by: STUDENT IN AN ORGANIZED HEALTH CARE EDUCATION/TRAINING PROGRAM

## 2025-03-04 RX ORDER — LEVOTHYROXINE SODIUM 25 UG/1
25 TABLET ORAL DAILY
Qty: 30 TABLET | Refills: 3 | Status: SHIPPED
Start: 2025-03-05

## 2025-03-04 RX ADMIN — ACETAMINOPHEN 1000 MG: 500 TABLET ORAL at 14:54

## 2025-03-04 RX ADMIN — ACETAMINOPHEN 1000 MG: 500 TABLET ORAL at 06:38

## 2025-03-04 RX ADMIN — SENNOSIDES 8.6 MG: 8.6 TABLET, FILM COATED ORAL at 08:57

## 2025-03-04 RX ADMIN — Medication 10 ML: at 00:07

## 2025-03-04 RX ADMIN — GABAPENTIN 200 MG: 100 CAPSULE ORAL at 14:54

## 2025-03-04 RX ADMIN — GABAPENTIN 200 MG: 100 CAPSULE ORAL at 08:56

## 2025-03-04 RX ADMIN — Medication 1 MG: at 08:56

## 2025-03-04 RX ADMIN — Medication 10 ML: at 08:57

## 2025-03-04 RX ADMIN — HYDROMORPHONE HYDROCHLORIDE 0.5 MG: 1 INJECTION, SOLUTION INTRAMUSCULAR; INTRAVENOUS; SUBCUTANEOUS at 04:32

## 2025-03-04 RX ADMIN — Medication 10 ML: at 00:08

## 2025-03-04 RX ADMIN — SODIUM CHLORIDE, PRESERVATIVE FREE 10 ML: 5 INJECTION INTRAVENOUS at 00:09

## 2025-03-04 RX ADMIN — LIDOCAINE 4%: 4 CREAM TOPICAL at 09:01

## 2025-03-04 RX ADMIN — HYDROMORPHONE HYDROCHLORIDE 0.5 MG: 1 INJECTION, SOLUTION INTRAMUSCULAR; INTRAVENOUS; SUBCUTANEOUS at 00:37

## 2025-03-04 RX ADMIN — SODIUM CHLORIDE, PRESERVATIVE FREE 10 ML: 5 INJECTION INTRAVENOUS at 08:57

## 2025-03-04 RX ADMIN — PSYLLIUM HUSK 1 PACKET: 3.4 POWDER ORAL at 08:57

## 2025-03-04 RX ADMIN — LEVOTHYROXINE SODIUM 25 MCG: 0.03 TABLET ORAL at 06:38

## 2025-03-04 ASSESSMENT — PAIN SCALES - GENERAL
PAINLEVEL_OUTOF10: 4
PAINLEVEL_OUTOF10: 3
PAINLEVEL_OUTOF10: 7
PAINLEVEL_OUTOF10: 4

## 2025-03-04 ASSESSMENT — PAIN DESCRIPTION - LOCATION
LOCATION: BACK;LEG
LOCATION: BACK;LEG
LOCATION: LEG;BACK

## 2025-03-04 NOTE — PROGRESS NOTES
Pulmonary, Critical Care, and Sleep Medicine~Progress Note    Name: Gerhard Overton MRN: [id number]   : 1950 Hospital: [facility]   Date: 3/4/2025 11:45 AM Admission: 2025     Impression Plan   Acute hypoxic respiratory failure  Bilateral right greater than left pleural effusions, etiology uncertain at this time.  etiologies could be hypoalbuminemia > metastatic. No suspicious lung lesions but possibly from osseous mets?    Known metastatic prostate cancer.  [facility] onc[facility]  Normochromic normocytic anemia  No pulmonary disease O2 titration above 90%  Will need follow up chest x-ray in 1-2 wks after discharge  [facility] the [facility]  Noted possible d/c. Discussed with hospitalist      Daily Progression:    3/4  Ct chest  IMPRESSION:     1. Decreased bilateral pleural effusions with residual.     2. No underlying lesions are identified.     3. Diffuse osseous metastatic disease    No dyspnea      3/3  Breathing ok. No issues     Consult Note requested by hospitalist service   Patient presented for admission on 2025 secondary to ongoing malaise and fatigue it has been lasting several months.  He denies any shortness of breath.  Was found to be hypoxic on admission and required O2.  Has known history of anemia that is being monitored by hematologist because he does have a history of prostate cancer.  [facility]  Has not started treatment yet.  Unknown when he was diagnosed.  On admission his hemoglobin was 5.1.  Has begun to normalize after several units of packed red blood cells.  During initial evaluation imaging was obtained of the chest which revealed bilateral pleural effusions right greater than left with bibasilar atelectasis.  It was also noted to have widely metastatic lesions and the spine particularly.  Of note he had an abdominal x-ray on 2025 that did not show any pleural effusions.  He has never had a thoracentesis before.  Denies any history  IntraVENous Q3H PRN    naloxone (NARCAN) injection 0.4 mg  0.4 mg IntraVENous PRN    acetaminophen (TYLENOL) tablet 1,000 mg  1,000 mg Oral q8h       Labs:  ABG Invalid input(s): \"ABG\"     CBC Recent Labs     03/02/25  0537 03/04/25  0700   WBC 4.5  --    HGB 8.8* 8.0*   HCT 28.4* 25.4*   PLT 90*  --    .7*  --    MCH 31.2  --         Metabolic  Panel No results for input(s): \"NA\", \"K\", \"CL\", \"CO2\", \"GLU\", \"BUN\", \"MG\", \"PHOS\", \"ALT\", \"INR\" in the last 72 hours.    Invalid input(s): \"CREA\", \"CA\", \"ALB\", \"TBIL\", \"SGOT\"       Pertinent Labs                Jesús Abel PA-C  3/4/2025

## 2025-03-04 NOTE — PLAN OF CARE
Problem: Safety - Adult  Goal: Free from fall injury  3/4/2025 1504 by Josie Gary, RN  Outcome: HH/HSPC Resolved Met  3/4/2025 0504 by Duke Torres LPN  Outcome: Progressing  Flowsheets (Taken 3/4/2025 0504)  Free From Fall Injury:   Instruct family/caregiver on patient safety   Based on caregiver fall risk screen, instruct family/caregiver to ask for assistance with transferring infant if caregiver noted to have fall risk factors     Problem: Discharge Planning  Goal: Discharge to home or other facility with appropriate resources  Outcome: HH/HSPC Resolved Met     Problem: Pain  Goal: Verbalizes/displays adequate comfort level or baseline comfort level  Outcome: HH/HSPC Resolved Met     Problem: ABCDS Injury Assessment  Goal: Absence of physical injury  Outcome: HH/HSPC Resolved Met

## 2025-03-04 NOTE — DISCHARGE SUMMARY
Discharge Summary   Please note that this dictation was completed with [employer], the computer voice recognition software.  Quite often unanticipated grammatical, syntax, homophones, and other interpretive errors are inadvertently transcribed by the computer software.  Please disregard these errors.  Please excuse any errors that have escaped final proofreading.    PATIENT ID: Gerhadr Overton  MRN: [id number]   YOB: 1950    DATE OF ADMISSION: 2/26/2025 10:52 PM    DATE OF DISCHARGE: 3/4/2025  PRIMARY CARE PROVIDER: Lopez Bhat Jr., MD         ATTENDING PHYSICIAN: Julito Boss MD  DISCHARGING PROVIDER: Julito Boss MD       CONSULTATIONS: IP CONSULT TO PALLIATIVE CARE  IP CONSULT TO HOSPITALIST  IP CONSULT TO PALLIATIVE CARE  IP CONSULT TO CASE MANAGEMENT  IP CONSULT TO PULMONOLOGY    PROCEDURES/SURGERIES: * No surgery found *    ADMITTING HPI from excerpted H&P     Gerhard Overton is a 74 y.o. male with hx of prostate cancer with bone mets, anemia of chronic dz, arthritis, chronic constipation who [facility] ed from [facility] concerns of [facility].  Patient currently resides at a long-term care facility after recent discharge from [facility] for anemia and cancer associated low back pain.  Reports chronic low back pain from his prostate metastasis which is unchanged from baseline.  Has also had malaise and fatigue which she states has been ongoing for several months.  Had routine labs at this facility and was noted to be anemic and transported to the hospital.     Chart review shows admission to hospital from 10/30/24 to 11/06/24 for  cancer associated pain and anemia requiring 1u prbc.     The patient denies any fever, chills, chest or abdominal pain, nausea, vomiting, cough, congestion, recent illness, palpitations, or dysuria.  Remarkable vitals on ER Presentation: vss  Labs Remarkable for: hgb 5.8, plt 115, k-2.7, alk phos 690  ER Images: xr lumbar and thoracic spine  ER Rx: k-40

## 2025-03-04 NOTE — PALLIATIVE CARE DISCHARGE
Goals of Care/Treatment Preferences    The Palliative Medicine team was consulted as part of your/your loved one's care in the hospital. Our team is a supportive service; we strive to relieve suffering and improve quality of life.    We reviewed advance care planning information, which includes the following:    Primary Decision Maker: JoseA rmando Almonte - Other, Legal Guardian - [phone]  Patient's Healthcare Decision Maker is:: Legal Next of Kin  Confirm Advance Directive: Yes, on file      We reviewed / discussed your code status as:   Code Status: DNR     “Full Code” means perform CPR in the event of cardiac arrest.      “DNR” means do NOT perform CPR in the event of cardiac arrest.      “Partial Code” means you have specific preferences, please discuss with your healthcare team.      “No Order” means this issue was not addressed / resolved during your stay          Because of the importance of this information, we are providing you with a printed copy to share with other healthcare providers after this hospitalization is complete.

## 2025-03-06 NOTE — CARE COORDINATION
[facility] call from [facility] with [facility] requesting discharge summary to be faxed to [phone].  faxed discharge summary per request and protocol.    Ok Castañeda, MPH  Care Manager l [facility]

## 2025-03-12 NOTE — PROGRESS NOTES
failure?  ?He denies any shortness of breath.  Was found to be hypoxic on admission and   required O2.?  ?General: No resp distress noted, appears stated age?  ?Lungs: diminished BS at bases?    SpO2%: 89-98%  RR: 14-33    Treatment: Bilateral Thoracentesis, Chest imaging, Pulmonary Consult, Pulse   Oximetry, Supplemental Oxygen    Thank you,  Lala Black, BSN, RN, CCRN, CRCR  Options provided:  -- Acute Respiratory Failure as evidenced by, Please document evidence.  -- Acute Respiratory Failure ruled out after study  -- Other - I will add my own diagnosis  -- Disagree - Not applicable / Not valid  -- Disagree - Clinically unable to determine / Unknown  -- Refer to Clinical Documentation Reviewer    PROVIDER RESPONSE TEXT:    This patient is in acute respiratory failure as evidenced by Was found to be   hypoxic on admission and required O2    Query created by: Lala Black on 3/12/2025 1:00 PM      Electronically signed by:  Julito Boss MD 3/12/2025 1:06 PM

## (undated) DEVICE — DRAPE XR C ARM 41X74IN LF --

## (undated) DEVICE — TRAY CATH 16F URIN MTR LTX -- CONVERT TO ITEM 363111

## (undated) DEVICE — GLOVE ORANGE PI 7 1/2   MSG9075

## (undated) DEVICE — 2108 SERIES SAGITTAL BLADE (18.6 X 0.8 X 73.8MM)

## (undated) DEVICE — SUTURE MCRYL SZ 3-0 L27IN ABSRB UD L19MM PS-2 3/8 CIR PRIM Y427H

## (undated) DEVICE — (D)PREP SKN CHLRAPRP APPL 26ML -- CONVERT TO ITEM 371833

## (undated) DEVICE — LAMINECTOMY-SMH: Brand: MEDLINE INDUSTRIES, INC.

## (undated) DEVICE — PROBE SPNL L PEDCL STR NRV STIM DISP

## (undated) DEVICE — BONE WAX WHITE: Brand: BONE WAX WHITE

## (undated) DEVICE — STERILE POLYISOPRENE POWDER-FREE SURGICAL GLOVES WITH EMOLLIENT COATING: Brand: PROTEXIS

## (undated) DEVICE — DERMABOND SKIN ADH 0.7ML -- DERMABOND ADVANCED 12/BX

## (undated) DEVICE — CONTAINER,SPECIMEN,4OZ,OR STRL: Brand: MEDLINE

## (undated) DEVICE — SUTURE VCRL SZ 2-0 L36IN ABSRB UD L40MM CT 1/2 CIR J957H

## (undated) DEVICE — COVER,MAYO STAND,STERILE: Brand: MEDLINE

## (undated) DEVICE — SOLUTION IRRIG 3000ML 0.9% SOD CHL FLX CONT 0797208] ICU MEDICAL INC]

## (undated) DEVICE — ZIP 16 SURGICAL SKIN CLOSURE DEVICE: Brand: ZIP 16 SURGICAL SKIN CLOSURE DEVICE

## (undated) DEVICE — REM POLYHESIVE ADULT PATIENT RETURN ELECTRODE: Brand: VALLEYLAB

## (undated) DEVICE — PAD,ABDOMINAL,5"X9",ST,LF,25/BX: Brand: MEDLINE INDUSTRIES, INC.

## (undated) DEVICE — 450 ML BOTTLE OF 0.05% CHLORHEXIDINE GLUCONATE IN 99.95% STERILE WATER FOR IRRIGATION, USP AND APPLICATOR.: Brand: IRRISEPT ANTIMICROBIAL WOUND LAVAGE

## (undated) DEVICE — DEVON™ KNEE AND BODY STRAP 60" X 3" (1.5 M X 7.6 CM): Brand: DEVON

## (undated) DEVICE — SYR LR LCK 1ML GRAD NSAF 30ML --

## (undated) DEVICE — GLOVE SURG SZ 8 L12IN FNGR THK94MIL STD WHT LTX FREE

## (undated) DEVICE — KENDALL SCD EXPRESS SLEEVES, KNEE LENGTH, MEDIUM: Brand: KENDALL SCD

## (undated) DEVICE — COVER,TABLE,HEAVY DUTY,60"X90",STRL: Brand: MEDLINE

## (undated) DEVICE — SUTURE VCRL SZ 2 L54IN ABSRB UD L65MM TP-1 1/2 CIR J880T

## (undated) DEVICE — TOTAL TRAY, 16FR 10ML SIL FOLEY, URN: Brand: MEDLINE

## (undated) DEVICE — PADDING CAST SPEC 6INX4YD COT --

## (undated) DEVICE — DRSG AQUACEL SURG 3.5 X 10IN -- CONVERT TO ITEM 370183

## (undated) DEVICE — SUTURE VCRL 2-0 L27IN ABSRB UD CP-2 L26MM 1/2 CIR REV CUT J869H

## (undated) DEVICE — SOLUTION IRRIG 3000ML 0.9% SOD CHL USP UROMATIC PLAS CONT

## (undated) DEVICE — FLOSEAL HEMOSTATIC MATRIX, 10ML: Brand: FLOSEAL HEMOSTATIC MATRIX

## (undated) DEVICE — PREP KIT PEEL PTCH POVIDONE IOD

## (undated) DEVICE — 6619 2 PTNT ISO SYS INCISE AREA&LT;(&GT;&&LT;)&GT;P: Brand: STERI-DRAPE™ IOBAN™ 2

## (undated) DEVICE — 4-PORT MANIFOLD: Brand: NEPTUNE 2

## (undated) DEVICE — SOLUTION IV 250ML 0.9% SOD CHL CLR INJ FLX BG CONT PRT CLSR

## (undated) DEVICE — SPONGE GZ W4XL4IN COT 12 PLY TYP VII WVN C FLD DSGN

## (undated) DEVICE — KIT EVAC 0.13IN RECT TB DIA10FR 400CC PVC 3 SPR Y CONN DRN

## (undated) DEVICE — INFECTION CONTROL KIT SYS

## (undated) DEVICE — TOOL 14MH30 LEGEND 14CM 3MM: Brand: MIDAS REX ™

## (undated) DEVICE — STERILE-Z SURGICAL PATIENT COVERS CLEAR POLYETHYLENE STERILE UNIVERSAL FIT 10 PER CASE: Brand: STERILE-Z

## (undated) DEVICE — BIPOLAR IRRIGATOR INTEGRATED TUBING AND BIPOLAR CORD SET, DISPOSABLE

## (undated) DEVICE — BLADE ELECTRODE: Brand: EDGE

## (undated) DEVICE — SUTURE VCRL SZ 3-0 L27IN ABSRB UD CP-2 L26MM 1/2 CIR REV J868H

## (undated) DEVICE — SOLUTION IRRIG 1000ML H2O STRL BLT

## (undated) DEVICE — HANDPIECE SET WITH BONE CLEANING TIP AND SUCTION TUBE: Brand: INTERPULSE

## (undated) DEVICE — POSITIONER HD REST FOAM CMFRT TCH

## (undated) DEVICE — NEEDLE SPNL 22GA L3.5IN BLK HUB S STL REG WALL FIT STYL W/

## (undated) DEVICE — SPHERE STEALTH 12PK/TY --

## (undated) DEVICE — Device

## (undated) DEVICE — SUTURE STRATAFIX SYMMETRIC PDS + SZ 1 L18IN ABSRB VLT L48MM SXPP1A400

## (undated) DEVICE — SCRUB DRY SURG EZ SCRUB BRUSH PREOPERATIVE GRN